# Patient Record
Sex: MALE | Race: WHITE | NOT HISPANIC OR LATINO | Employment: OTHER
[De-identification: names, ages, dates, MRNs, and addresses within clinical notes are randomized per-mention and may not be internally consistent; named-entity substitution may affect disease eponyms.]

---

## 2017-01-17 ENCOUNTER — HOSPITAL (OUTPATIENT)
Dept: OTHER | Age: 67
End: 2017-01-17
Attending: HOSPITALIST

## 2017-01-17 ENCOUNTER — CHARTING TRANS (OUTPATIENT)
Dept: OTHER | Age: 67
End: 2017-01-17

## 2017-01-17 LAB
ANALYZER ANC (IANC): NORMAL
ANION GAP SERPL CALC-SCNC: 12 MMOL/L (ref 10–20)
APTT PPP: 29 SECONDS (ref 22–30)
APTT PPP: 57 SECONDS (ref 22–30)
APTT PPP: ABNORMAL S
APTT PPP: NORMAL S
BASOPHILS # BLD: 0 THOUSAND/MCL (ref 0–0.3)
BASOPHILS NFR BLD: 0 %
BNP SERPL-MCNC: 198 PG/ML
BUN SERPL-MCNC: 17 MG/DL (ref 10–20)
BUN/CREAT SERPL: 20 (ref 7–25)
CALCIUM SERPL-MCNC: 8.9 MG/DL (ref 8.4–10.2)
CHLORIDE: 102 MMOL/L (ref 98–107)
CO2 SERPL-SCNC: 28 MMOL/L (ref 21–32)
CREAT SERPL-MCNC: 0.84 MG/DL (ref 0.67–1.17)
D DIMER PPP FEU-MCNC: 1.27 MG/L FEU
DIFFERENTIAL METHOD BLD: NORMAL
EOSINOPHIL # BLD: 0.4 THOUSAND/MCL (ref 0.1–0.5)
EOSINOPHIL NFR BLD: 4 %
ERYTHROCYTE [DISTWIDTH] IN BLOOD: 14 % (ref 11–15)
GLUCOSE SERPL-MCNC: 151 MG/DL (ref 65–99)
HEMATOCRIT: 48.3 % (ref 39–51)
HGB BLD-MCNC: 16.9 GM/DL (ref 13–17)
LYMPHOCYTES # BLD: 1.1 THOUSAND/MCL (ref 1–4)
LYMPHOCYTES NFR BLD: 12 %
MAGNESIUM SERPL-MCNC: 2.2 MG/DL (ref 1.7–2.4)
MCH RBC QN AUTO: 30.3 PG (ref 26–34)
MCHC RBC AUTO-ENTMCNC: 35 GM/DL (ref 32–36.5)
MCV RBC AUTO: 86.7 FL (ref 78–100)
MONOCYTES # BLD: 0.8 THOUSAND/MCL (ref 0.3–0.9)
MONOCYTES NFR BLD: 8 %
NEUTROPHILS # BLD: 7.2 THOUSAND/MCL (ref 1.8–7.7)
NEUTROPHILS NFR BLD: 76 %
NEUTS SEG NFR BLD: NORMAL %
PERCENT NRBC: NORMAL
PLATELET # BLD: 208 THOUSAND/MCL (ref 140–450)
POTASSIUM SERPL-SCNC: 4.1 MMOL/L (ref 3.4–5.1)
RBC # BLD: 5.57 MILLION/MCL (ref 4.5–5.9)
SODIUM SERPL-SCNC: 138 MMOL/L (ref 135–145)
TROPONIN I SERPL HS-MCNC: <0.02 NG/ML
WBC # BLD: 9.5 THOUSAND/MCL (ref 4.2–11)

## 2017-01-18 ENCOUNTER — CHARTING TRANS (OUTPATIENT)
Dept: OTHER | Age: 67
End: 2017-01-18

## 2017-01-18 LAB
ANALYZER ANC (IANC): ABNORMAL
APTT PPP: 38 SECONDS (ref 22–30)
APTT PPP: 63 SECONDS (ref 22–30)
APTT PPP: ABNORMAL S
APTT PPP: ABNORMAL S
BASOPHILS # BLD: 0.1 THOUSAND/MCL (ref 0–0.3)
BASOPHILS NFR BLD: 1 %
DIFFERENTIAL METHOD BLD: ABNORMAL
EOSINOPHIL # BLD: 0.6 THOUSAND/MCL (ref 0.1–0.5)
EOSINOPHIL NFR BLD: 8 %
ERYTHROCYTE [DISTWIDTH] IN BLOOD: 14.2 % (ref 11–15)
HEMATOCRIT: 42.5 % (ref 39–51)
HGB BLD-MCNC: 14.5 GM/DL (ref 13–17)
LYMPHOCYTES # BLD: 1.6 THOUSAND/MCL (ref 1–4)
LYMPHOCYTES NFR BLD: 24 %
MCH RBC QN AUTO: 30 PG (ref 26–34)
MCHC RBC AUTO-ENTMCNC: 34.1 GM/DL (ref 32–36.5)
MCV RBC AUTO: 87.8 FL (ref 78–100)
MONOCYTES # BLD: 0.6 THOUSAND/MCL (ref 0.3–0.9)
MONOCYTES NFR BLD: 9 %
NEUTROPHILS # BLD: 4 THOUSAND/MCL (ref 1.8–7.7)
NEUTROPHILS NFR BLD: 58 %
NEUTS SEG NFR BLD: ABNORMAL %
PERCENT NRBC: ABNORMAL
PLATELET # BLD: 178 THOUSAND/MCL (ref 140–450)
RBC # BLD: 4.84 MILLION/MCL (ref 4.5–5.9)
WBC # BLD: 6.9 THOUSAND/MCL (ref 4.2–11)

## 2017-01-19 ENCOUNTER — DIAGNOSTIC TRANS (OUTPATIENT)
Dept: OTHER | Age: 67
End: 2017-01-19

## 2017-01-19 ENCOUNTER — CHARTING TRANS (OUTPATIENT)
Dept: OTHER | Age: 67
End: 2017-01-19

## 2017-02-13 PROBLEM — I48.4 ATYPICAL ATRIAL FLUTTER (HCC): Status: ACTIVE | Noted: 2017-02-13

## 2017-04-21 PROBLEM — M48.061 LUMBAR STENOSIS: Status: ACTIVE | Noted: 2017-04-21

## 2017-05-17 PROBLEM — I25.810 CORONARY ARTERY DISEASE INVOLVING CORONARY BYPASS GRAFT OF NATIVE HEART WITHOUT ANGINA PECTORIS: Status: ACTIVE | Noted: 2017-05-17

## 2017-11-06 PROCEDURE — 82607 VITAMIN B-12: CPT | Performed by: INTERNAL MEDICINE

## 2017-11-06 PROCEDURE — 82746 ASSAY OF FOLIC ACID SERUM: CPT | Performed by: INTERNAL MEDICINE

## 2017-11-15 PROCEDURE — 86618 LYME DISEASE ANTIBODY: CPT | Performed by: OTHER

## 2017-11-15 PROCEDURE — 83921 ORGANIC ACID SINGLE QUANT: CPT | Performed by: OTHER

## 2017-11-15 PROCEDURE — 86334 IMMUNOFIX E-PHORESIS SERUM: CPT | Performed by: OTHER

## 2017-11-15 PROCEDURE — 84165 PROTEIN E-PHORESIS SERUM: CPT | Performed by: OTHER

## 2017-11-15 PROCEDURE — 84425 ASSAY OF VITAMIN B-1: CPT | Performed by: OTHER

## 2017-11-15 PROCEDURE — 83883 ASSAY NEPHELOMETRY NOT SPEC: CPT | Performed by: OTHER

## 2018-06-11 PROCEDURE — 88305 TISSUE EXAM BY PATHOLOGIST: CPT | Performed by: INTERNAL MEDICINE

## 2019-01-16 PROCEDURE — 87070 CULTURE OTHR SPECIMN AEROBIC: CPT | Performed by: OTOLARYNGOLOGY

## 2019-01-16 PROCEDURE — 88311 DECALCIFY TISSUE: CPT | Performed by: OTOLARYNGOLOGY

## 2019-01-16 PROCEDURE — 87102 FUNGUS ISOLATION CULTURE: CPT | Performed by: OTOLARYNGOLOGY

## 2019-01-16 PROCEDURE — 87206 SMEAR FLUORESCENT/ACID STAI: CPT | Performed by: OTOLARYNGOLOGY

## 2019-01-16 PROCEDURE — 87205 SMEAR GRAM STAIN: CPT | Performed by: OTOLARYNGOLOGY

## 2019-01-16 PROCEDURE — 87077 CULTURE AEROBIC IDENTIFY: CPT | Performed by: OTOLARYNGOLOGY

## 2019-01-16 PROCEDURE — 88304 TISSUE EXAM BY PATHOLOGIST: CPT | Performed by: OTOLARYNGOLOGY

## 2019-01-16 PROCEDURE — 87075 CULTR BACTERIA EXCEPT BLOOD: CPT | Performed by: OTOLARYNGOLOGY

## 2019-01-16 PROCEDURE — 87076 CULTURE ANAEROBE IDENT EACH: CPT | Performed by: OTOLARYNGOLOGY

## 2019-01-21 ENCOUNTER — HOSPITAL (OUTPATIENT)
Dept: OTHER | Age: 69
End: 2019-01-21

## 2019-01-21 ENCOUNTER — HOSPITAL (OUTPATIENT)
Dept: OTHER | Age: 69
End: 2019-01-21
Attending: HOSPITALIST

## 2019-01-21 LAB
ANALYZER ANC (IANC): ABNORMAL
ANION GAP SERPL CALC-SCNC: 13 MMOL/L (ref 10–20)
APTT PPP: 34 SECONDS (ref 22–32)
APTT PPP: ABNORMAL S
BASOPHILS # BLD: 0.1 THOUSAND/MCL (ref 0–0.3)
BASOPHILS NFR BLD: 1 %
BUN SERPL-MCNC: 21 MG/DL (ref 6–20)
BUN/CREAT SERPL: 25 (ref 7–25)
CALCIUM SERPL-MCNC: 8.5 MG/DL (ref 8.4–10.2)
CHLORIDE: 103 MMOL/L (ref 98–107)
CO2 SERPL-SCNC: 26 MMOL/L (ref 21–32)
CREAT SERPL-MCNC: 0.85 MG/DL (ref 0.67–1.17)
DIFFERENTIAL METHOD BLD: ABNORMAL
EOSINOPHIL # BLD: 0.3 THOUSAND/MCL (ref 0.1–0.5)
EOSINOPHIL NFR BLD: 3 %
ERYTHROCYTE [DISTWIDTH] IN BLOOD: 14.4 % (ref 11–15)
GLUCOSE SERPL-MCNC: 132 MG/DL (ref 65–99)
HEMATOCRIT: 31.3 % (ref 39–51)
HEMATOCRIT: 32.2 % (ref 39–51)
HEMATOCRIT: 37.4 % (ref 39–51)
HGB BLD-MCNC: 10.6 GM/DL (ref 13–17)
HGB BLD-MCNC: 12.2 GM/DL (ref 13–17)
HGB BLD-MCNC: 9.9 GM/DL (ref 13–17)
IMM GRANULOCYTES # BLD AUTO: 0 THOUSAND/MCL (ref 0–0.2)
IMM GRANULOCYTES NFR BLD: 0 %
INR PPP: 1.2
LYMPHOCYTES # BLD: 1.2 THOUSAND/MCL (ref 1–4)
LYMPHOCYTES NFR BLD: 14 %
MCH RBC QN AUTO: 30 PG (ref 26–34)
MCHC RBC AUTO-ENTMCNC: 32.6 GM/DL (ref 32–36.5)
MCV RBC AUTO: 91.9 FL (ref 78–100)
MONOCYTES # BLD: 0.9 THOUSAND/MCL (ref 0.3–0.9)
MONOCYTES NFR BLD: 10 %
NEUTROPHILS # BLD: 5.9 THOUSAND/MCL (ref 1.8–7.7)
NEUTROPHILS NFR BLD: 72 %
NEUTS SEG NFR BLD: ABNORMAL %
NRBC (NRBCRE): 0 /100 WBC
PLATELET # BLD: 171 THOUSAND/MCL (ref 140–450)
POTASSIUM SERPL-SCNC: 4.1 MMOL/L (ref 3.4–5.1)
PROTHROMBIN TIME: 11.9 SECONDS (ref 9.7–11.8)
PROTHROMBIN TIME: ABNORMAL
RBC # BLD: 4.07 MILLION/MCL (ref 4.5–5.9)
SODIUM SERPL-SCNC: 138 MMOL/L (ref 135–145)
WBC # BLD: 8.3 THOUSAND/MCL (ref 4.2–11)

## 2019-01-22 LAB
HEMATOCRIT: 27.7 % (ref 39–51)
HEMATOCRIT: 27.9 % (ref 39–51)
HGB BLD-MCNC: 9 GM/DL (ref 13–17)
HGB BLD-MCNC: 9.1 GM/DL (ref 13–17)

## 2019-03-06 PROCEDURE — 84165 PROTEIN E-PHORESIS SERUM: CPT | Performed by: OTHER

## 2019-03-06 PROCEDURE — 86334 IMMUNOFIX E-PHORESIS SERUM: CPT | Performed by: OTHER

## 2019-03-06 PROCEDURE — 83883 ASSAY NEPHELOMETRY NOT SPEC: CPT | Performed by: OTHER

## 2019-04-16 PROBLEM — I50.33 ACUTE ON CHRONIC HEART FAILURE WITH PRESERVED EJECTION FRACTION (HCC): Status: ACTIVE | Noted: 2019-04-16

## 2019-04-30 PROBLEM — D50.9 IRON DEFICIENCY ANEMIA: Status: ACTIVE | Noted: 2019-04-30

## 2019-05-07 PROBLEM — I50.32 CHRONIC DIASTOLIC HEART FAILURE (HCC): Status: ACTIVE | Noted: 2019-05-07

## 2019-05-17 ENCOUNTER — HOSPITAL ENCOUNTER (OUTPATIENT)
Dept: CARDIOLOGY CLINIC | Facility: HOSPITAL | Age: 69
Discharge: HOME | End: 2019-05-17
Attending: NURSE PRACTITIONER
Payer: MEDICARE

## 2019-05-17 VITALS
OXYGEN SATURATION: 96 % | HEART RATE: 85 BPM | SYSTOLIC BLOOD PRESSURE: 138 MMHG | RESPIRATION RATE: 19 BRPM | DIASTOLIC BLOOD PRESSURE: 76 MMHG

## 2019-05-17 PROCEDURE — A4216 STERILE WATER/SALINE, 10 ML: HCPCS | Performed by: INTERNAL MEDICINE

## 2019-05-17 PROCEDURE — 96375 TX/PRO/DX INJ NEW DRUG ADDON: CPT

## 2019-05-17 PROCEDURE — 96376 TX/PRO/DX INJ SAME DRUG ADON: CPT

## 2019-05-17 PROCEDURE — 83735 ASSAY OF MAGNESIUM: CPT | Performed by: INTERNAL MEDICINE

## 2019-05-17 PROCEDURE — 96365 THER/PROPH/DIAG IV INF INIT: CPT

## 2019-05-17 PROCEDURE — 96366 THER/PROPH/DIAG IV INF ADDON: CPT

## 2019-05-17 PROCEDURE — 36415 COLL VENOUS BLD VENIPUNCTURE: CPT

## 2019-05-17 PROCEDURE — 80048 BASIC METABOLIC PNL TOTAL CA: CPT | Performed by: INTERNAL MEDICINE

## 2019-05-17 PROCEDURE — 99214 OFFICE O/P EST MOD 30 MIN: CPT

## 2019-05-17 RX ORDER — SPIRONOLACTONE 25 MG/1
25 TABLET ORAL DAILY
Status: DISCONTINUED | OUTPATIENT
Start: 2019-05-17 | End: 2019-05-19

## 2019-05-17 RX ORDER — POTASSIUM CHLORIDE 20 MEQ/1
40 TABLET, EXTENDED RELEASE ORAL ONCE
Status: COMPLETED | OUTPATIENT
Start: 2019-05-17 | End: 2019-05-17

## 2019-05-17 RX ORDER — BUMETANIDE 0.25 MG/ML
2 INJECTION, SOLUTION INTRAMUSCULAR; INTRAVENOUS ONCE
Status: COMPLETED | OUTPATIENT
Start: 2019-05-17 | End: 2019-05-17

## 2019-05-17 RX ADMIN — POTASSIUM CHLORIDE 40 MEQ: 20 TABLET, EXTENDED RELEASE ORAL at 12:44:00

## 2019-05-17 RX ADMIN — SPIRONOLACTONE 25 MG: 25 TABLET ORAL at 12:44:00

## 2019-05-17 RX ADMIN — BUMETANIDE 2 MG: 0.25 INJECTION, SOLUTION INTRAMUSCULAR; INTRAVENOUS at 11:00:00

## 2019-05-17 NOTE — PROGRESS NOTES
RN visit for IVP Diuril 250 mg and IVP bumex 2 mg bolus followed by gtt at 2 mg/hr for 6 hrs, 40 meq K and 25 mg Spironolactone and labs per [employer].      Pérez Perez NP   5/17/2019  10:15 AM

## 2019-05-17 NOTE — PROGRESS NOTES
RN visit for IV diuretics. Patient was sent from [facility]  office. Orders were obtained from Danita PERERA for Labs, IV Established L AC, IV Diuretics given per order. 250 mg of IV Diuril and 2 mg IV Bumex, followed by 1.5 mg/hr IV Bumex for 6 hrs.   [facility]

## 2019-05-28 PROBLEM — I42.9 CARDIOMYOPATHY (HCC): Status: ACTIVE | Noted: 2019-05-28

## 2019-05-28 PROBLEM — Z95.820 S/P ANGIOPLASTY WITH STENT: Status: ACTIVE | Noted: 2019-05-28

## 2019-05-28 PROBLEM — I50.40 COMBINED SYSTOLIC AND DIASTOLIC HEART FAILURE (HCC): Status: ACTIVE | Noted: 2019-05-28

## 2019-06-07 ENCOUNTER — APPOINTMENT (OUTPATIENT)
Dept: INTERVENTIONAL RADIOLOGY/VASCULAR | Facility: HOSPITAL | Age: 69
End: 2019-06-07
Attending: INTERNAL MEDICINE
Payer: MEDICARE

## 2019-06-07 ENCOUNTER — HOSPITAL ENCOUNTER (OUTPATIENT)
Dept: INTERVENTIONAL RADIOLOGY/VASCULAR | Facility: HOSPITAL | Age: 69
Discharge: HOME | End: 2019-06-08
Attending: INTERNAL MEDICINE | Admitting: INTERNAL MEDICINE
Payer: MEDICARE

## 2019-06-07 DIAGNOSIS — I48.20 CHRONIC ATRIAL FIBRILLATION (HCC): ICD-10-CM

## 2019-06-07 DIAGNOSIS — I25.10 CAD (CORONARY ARTERY DISEASE): ICD-10-CM

## 2019-06-07 PROCEDURE — 93005 ELECTROCARDIOGRAM TRACING: CPT

## 2019-06-07 PROCEDURE — B2131ZZ FLUOROSCOPY OF MULTIPLE CORONARY ARTERY BYPASS GRAFTS USING LOW OSMOLAR CONTRAST: ICD-10-PCS | Performed by: INTERNAL MEDICINE

## 2019-06-07 PROCEDURE — 82962 GLUCOSE BLOOD TEST: CPT

## 2019-06-07 PROCEDURE — 99153 MOD SED SAME PHYS/QHP EA: CPT

## 2019-06-07 PROCEDURE — 93461 R&L HRT ART/VENTRICLE ANGIO: CPT

## 2019-06-07 PROCEDURE — 99152 MOD SED SAME PHYS/QHP 5/>YRS: CPT

## 2019-06-07 PROCEDURE — B2151ZZ FLUOROSCOPY OF LEFT HEART USING LOW OSMOLAR CONTRAST: ICD-10-PCS | Performed by: INTERNAL MEDICINE

## 2019-06-07 PROCEDURE — B2181ZZ FLUOROSCOPY OF LEFT INTERNAL MAMMARY BYPASS GRAFT USING LOW OSMOLAR CONTRAST: ICD-10-PCS | Performed by: INTERNAL MEDICINE

## 2019-06-07 PROCEDURE — 93010 ELECTROCARDIOGRAM REPORT: CPT | Performed by: INTERNAL MEDICINE

## 2019-06-07 PROCEDURE — 4A023N8 MEASUREMENT OF CARDIAC SAMPLING AND PRESSURE, BILATERAL, PERCUTANEOUS APPROACH: ICD-10-PCS | Performed by: INTERNAL MEDICINE

## 2019-06-07 RX ORDER — MELATONIN
325
Status: DISCONTINUED | OUTPATIENT
Start: 2019-06-08 | End: 2019-06-08

## 2019-06-07 RX ORDER — PANTOPRAZOLE SODIUM 40 MG/1
40 TABLET, DELAYED RELEASE ORAL
Status: DISCONTINUED | OUTPATIENT
Start: 2019-06-08 | End: 2019-06-08

## 2019-06-07 RX ORDER — BUPROPION HYDROCHLORIDE 150 MG/1
150 TABLET ORAL DAILY
Status: DISCONTINUED | OUTPATIENT
Start: 2019-06-08 | End: 2019-06-08

## 2019-06-07 RX ORDER — TRAMADOL HYDROCHLORIDE 50 MG/1
100 TABLET ORAL EVERY 6 HOURS PRN
Status: DISCONTINUED | OUTPATIENT
Start: 2019-06-07 | End: 2019-06-08

## 2019-06-07 RX ORDER — LIDOCAINE HYDROCHLORIDE AND EPINEPHRINE 20; 5 MG/ML; UG/ML
INJECTION, SOLUTION EPIDURAL; INFILTRATION; INTRACAUDAL; PERINEURAL
Status: DISPENSED
Start: 2019-06-07 | End: 2019-06-08

## 2019-06-07 RX ORDER — SODIUM CHLORIDE 9 MG/ML
INJECTION, SOLUTION INTRAVENOUS CONTINUOUS
Status: ACTIVE | OUTPATIENT
Start: 2019-06-07 | End: 2019-06-08

## 2019-06-07 RX ORDER — HYDROCODONE BITARTRATE AND ACETAMINOPHEN 10; 325 MG/1; MG/1
1-2 TABLET ORAL EVERY 6 HOURS PRN
Status: DISCONTINUED | OUTPATIENT
Start: 2019-06-07 | End: 2019-06-08

## 2019-06-07 RX ORDER — ALFUZOSIN HYDROCHLORIDE 10 MG/1
10 TABLET, EXTENDED RELEASE ORAL
Status: DISCONTINUED | OUTPATIENT
Start: 2019-06-08 | End: 2019-06-08

## 2019-06-07 RX ORDER — LIDOCAINE HYDROCHLORIDE 20 MG/ML
INJECTION, SOLUTION EPIDURAL; INFILTRATION; INTRACAUDAL; PERINEURAL
Status: COMPLETED
Start: 2019-06-07 | End: 2019-06-07

## 2019-06-07 RX ORDER — DILTIAZEM HYDROCHLORIDE 60 MG/1
120 TABLET, FILM COATED ORAL EVERY MORNING
Status: DISCONTINUED | OUTPATIENT
Start: 2019-06-08 | End: 2019-06-08

## 2019-06-07 RX ORDER — METOPROLOL SUCCINATE 25 MG/1
25 TABLET, EXTENDED RELEASE ORAL DAILY
Status: DISCONTINUED | OUTPATIENT
Start: 2019-06-08 | End: 2019-06-08

## 2019-06-07 RX ORDER — ATORVASTATIN CALCIUM 40 MG/1
40 TABLET, FILM COATED ORAL DAILY
Status: DISCONTINUED | OUTPATIENT
Start: 2019-06-08 | End: 2019-06-08

## 2019-06-07 RX ORDER — TRAMADOL HYDROCHLORIDE 50 MG/1
50 TABLET ORAL EVERY 6 HOURS PRN
Status: DISCONTINUED | OUTPATIENT
Start: 2019-06-07 | End: 2019-06-08

## 2019-06-07 RX ORDER — CLOPIDOGREL BISULFATE 75 MG/1
TABLET ORAL
Status: COMPLETED
Start: 2019-06-07 | End: 2019-06-07

## 2019-06-07 RX ORDER — MIDAZOLAM HYDROCHLORIDE 1 MG/ML
INJECTION INTRAMUSCULAR; INTRAVENOUS
Status: COMPLETED
Start: 2019-06-07 | End: 2019-06-07

## 2019-06-07 RX ORDER — ACETAMINOPHEN 325 MG/1
650 TABLET ORAL EVERY 4 HOURS PRN
Status: DISCONTINUED | OUTPATIENT
Start: 2019-06-07 | End: 2019-06-08

## 2019-06-07 RX ORDER — SODIUM CHLORIDE 9 MG/ML
INJECTION, SOLUTION INTRAVENOUS CONTINUOUS
Status: DISCONTINUED | OUTPATIENT
Start: 2019-06-07 | End: 2019-06-08

## 2019-06-07 RX ORDER — ALPRAZOLAM 0.5 MG/1
0.5 TABLET ORAL 2 TIMES DAILY PRN
Status: DISCONTINUED | OUTPATIENT
Start: 2019-06-07 | End: 2019-06-08

## 2019-06-07 RX ORDER — CLOPIDOGREL BISULFATE 75 MG/1
75 TABLET ORAL DAILY
Status: DISCONTINUED | OUTPATIENT
Start: 2019-06-08 | End: 2019-06-08

## 2019-06-07 RX ORDER — ASPIRIN 81 MG/1
81 TABLET ORAL DAILY
Status: DISCONTINUED | OUTPATIENT
Start: 2019-06-08 | End: 2019-06-08

## 2019-06-07 RX ORDER — SODIUM CHLORIDE 9 MG/ML
INJECTION, SOLUTION INTRAVENOUS
Status: COMPLETED
Start: 2019-06-07 | End: 2019-06-07

## 2019-06-07 RX ADMIN — SODIUM CHLORIDE: 9 INJECTION, SOLUTION INTRAVENOUS at 08:49:00

## 2019-06-07 RX ADMIN — SODIUM CHLORIDE: 9 INJECTION, SOLUTION INTRAVENOUS at 18:40:00

## 2019-06-07 NOTE — PROCEDURES
Procedure Report    Procedures performed:  1) Coronary angiography   2) PCI of prox-mid 99% LCx stenosis with overlapping stents, 2.5 x 8mm and 2.5 x 32mm with post-dilation to high pressure with no residual stenosis, JAVON 3 flow and no dissection or perfo PCI of 99% prox-mid LCx stenosis with overlapping 2.5 x 8mm and 2.5 x 32mm KACEY, post-dilated to high pressure with no residual stenosis, JAVON 3 flow and no dissection or perforation.     Recommendations:  1) Bedrest x 4 hours  2) ECG on arrival to unit and

## 2019-06-07 NOTE — H&P
[facility] Hospitalist Team  History and Physical  Admit Date:  6/7/19    ASSESSMENT / PLAN:   67 yo male with hx CAD S/P PCI/CABG,DDD, OA, BPH, prostate ca S/P seeds, atypical atrial flutter, anxiety and depression, KANE-not compliant, chronic atrial fib, severe cath      PCP: Russel Pedro MD    History of Present Illness:   67 yo male with hx CAD S/P PCI/CABG,DDD, OA, BPH, prostate ca S/P seeds, atypical atrial flutter, anxiety and depression, chronic atrial fib, severe TR, chronic diastolic and systolic chf EF 3 ANGIOPLASTY (CORONARY)     • BYPASS SURGERY     • CABG  8/2013   • CATARACT      bilateral eyes   • CATH PERCUTANEOUS  TRANSLUMINAL CORONARY ANGIOPLASTY  2001    stent   • COLONOSCOPY     • COLONOSCOPY, POSSIBLE BIOPSY, POSSIBLE POLYPECTOMY 44652 N/A 6/11/ Take 1-2 tablets by mouth every 4 to 6 hours as needed. Disp: 90 tablet Rfl: 0   [DISCONTINUED] Metoprolol Succinate ER 25 MG Oral Tablet 24 Hr Take 0.5 tablets (12.5 mg total) by mouth daily.  Disp: 90 tablet Rfl: 0   bumetanide 2 MG Oral Tab Take 1 tablet Years: 40.00        Pack years: 20        Types: Cigarettes        Quit date: 2013        Years since quittin.1      Smokeless tobacco: Never Used    Alcohol use: Yes      Comment: 1 drink per night       Fam Hx  Family History   Problem Relation acute distress, alert and oriented x3  Neck Supple, no JVD  Pulm: Lungs clear, normal respiratory effort   CV: Heart with regular rate and rhythm   Abd: Abdomen soft, nontender, nondistended   MSK:  Trace edema bilaterally   Skin: no rashes or lesions, wel

## 2019-06-07 NOTE — PROGRESS NOTES
To have stent native circ later today. Pre-treat with plavix 600mg now. Sent script for plavix to [facility] in [place]. Will resume eliquis tomorrow and stop aspirin at that time.

## 2019-06-07 NOTE — INTERVAL H&P NOTE
Pre-op Diagnosis: * No pre-op diagnosis entered *    The above referenced H&P was reviewed by Jonny Rizo MD on 6/7/2019, the patient was examined and no significant changes have occurred in the patient's condition since the H&P was performed.   I dis

## 2019-06-07 NOTE — PROGRESS NOTES
Rochele Bence  [id number]  6/7/2019    Post procedure/ recovery hand-off report given to Pamela Cristobal RN. Patient's vital signs are stable. Procedural access sites are dry and intact with no signs and symptoms of bleeding/ hematoma.  Fluids with pressure

## 2019-06-08 VITALS
DIASTOLIC BLOOD PRESSURE: 83 MMHG | BODY MASS INDEX: 27.98 KG/M2 | HEART RATE: 85 BPM | OXYGEN SATURATION: 95 % | SYSTOLIC BLOOD PRESSURE: 136 MMHG | TEMPERATURE: 98 F | HEIGHT: 71 IN | WEIGHT: 199.88 LBS | RESPIRATION RATE: 20 BRPM

## 2019-06-08 PROCEDURE — 83036 HEMOGLOBIN GLYCOSYLATED A1C: CPT | Performed by: INTERNAL MEDICINE

## 2019-06-08 PROCEDURE — 80048 BASIC METABOLIC PNL TOTAL CA: CPT | Performed by: NURSE PRACTITIONER

## 2019-06-08 PROCEDURE — 93005 ELECTROCARDIOGRAM TRACING: CPT

## 2019-06-08 PROCEDURE — 82962 GLUCOSE BLOOD TEST: CPT

## 2019-06-08 PROCEDURE — 93010 ELECTROCARDIOGRAM REPORT: CPT | Performed by: INTERNAL MEDICINE

## 2019-06-08 PROCEDURE — 85025 COMPLETE CBC W/AUTO DIFF WBC: CPT | Performed by: NURSE PRACTITIONER

## 2019-06-08 RX ORDER — ASPIRIN 81 MG/1
81 TABLET ORAL DAILY
Qty: 30 TABLET | Refills: 0 | Status: SHIPPED | COMMUNITY
Start: 2019-06-08 | End: 2019-07-08 | Stop reason: ALTCHOICE

## 2019-06-08 RX ADMIN — ASPIRIN 81 MG: 81 TABLET ORAL at 09:35:00

## 2019-06-08 RX ADMIN — ATORVASTATIN CALCIUM 40 MG: 40 TABLET, FILM COATED ORAL at 09:35:00

## 2019-06-08 RX ADMIN — DILTIAZEM HYDROCHLORIDE 120 MG: 60 TABLET, FILM COATED ORAL at 09:35:00

## 2019-06-08 RX ADMIN — Medication 50 MG: at 09:35:00

## 2019-06-08 RX ADMIN — ALFUZOSIN HYDROCHLORIDE 10 MG: 10 TABLET, EXTENDED RELEASE ORAL at 09:37:00

## 2019-06-08 RX ADMIN — ALPRAZOLAM 0.5 MG: 0.5 TABLET ORAL at 09:39:00

## 2019-06-08 RX ADMIN — CLOPIDOGREL BISULFATE 75 MG: 75 TABLET ORAL at 09:36:00

## 2019-06-08 RX ADMIN — MELATONIN 325 MG: at 09:37:00

## 2019-06-08 RX ADMIN — METOPROLOL SUCCINATE 25 MG: 25 TABLET, EXTENDED RELEASE ORAL at 09:35:00

## 2019-06-08 RX ADMIN — Medication 1 TABLET: at 09:37:00

## 2019-06-08 RX ADMIN — BUPROPION HYDROCHLORIDE 150 MG: 150 TABLET ORAL at 09:37:00

## 2019-06-08 RX ADMIN — HYDROCODONE BITARTRATE AND ACETAMINOPHEN 1 TABLET: 10; 325 TABLET ORAL at 09:36:00

## 2019-06-08 NOTE — PROGRESS NOTES
Procedure hand off report given to Stuart. Pt's vital signs are stable. Procedural access site is dry and intact with no signs and symptoms of bleeding and hematoma.      6/7 1925  Oozing noted at R groin/procedural site, no swelling noted  Dr. Rigoberto Pena

## 2019-06-08 NOTE — PROGRESS NOTES
[facility] Cardiology  Progress Note    Ar Cagle.  Patient Status:  Outpatient in a Bed    1950 MRN [id number]   Location [facility] 3W/SW Attending Rober Thomas MD   Hosp Day # 0 PCP Errol Cushing, MD     Impression:  1 tab 1 tablet 1 tablet Oral TID   dilTIAZem HCl (CARDIZEM) tab 120 mg 120 mg Oral QAM   Clopidogrel Bisulfate (PLAVIX) tab 75 mg 75 mg Oral Daily   ferrous sulfate EC tab 325 mg 325 mg Oral Daily with breakfast   HYDROcodone-acetaminophen (NORCO)  MG

## 2019-06-08 NOTE — PLAN OF CARE
Vitals stable. Cath site C/D/I. Pulse by doppler. Pt ready to discharge.    Problem: Diabetes/Glucose Control  Goal: Glucose maintained within prescribed range  Description  INTERVENTIONS:  - Monitor Blood Glucose as ordered  - Assess for signs and symptoms Goal  Description  Patient's Short Term Goal: Remain free of cath site complications    Interventions:   - Assess cath insertion site  - Assess pulse  - Assess for pain.    - See additional Care Plan goals for specific interventions   Outcome: Progressing

## 2019-06-08 NOTE — DISCHARGE SUMMARY
General Medicine Discharge Summary     Patient ID:  Ar Cagle.  68year old  2/6/1950    Admit date: 6/7/2019    Discharge date and time: 6/8/19    Attending Physician: Rober Thomas MD     Primary Care Physician: Errol Cushing, MD     Reason fo Anemia  -Per epic hgb 7.4-8.6 since 2/2019  -continue iron     KANE  -not compliant with CPAP     OA/DDD  -hold meloxicam for now  -prn norco    Consults: IP CONSULT TO CARDIAC REHAB  IP CONSULT TO FOOD AND NUTRITION SERVICES    Operative Procedures:      D known as:  SINEMET      TAKE 1 TABLET BY MOUTH THREE TIMES DAILY   Quantity:  270 tablet  Refills:  1     dilTIAZem HCl 120 MG Tabs  Commonly known as:  CARDIZEM      Take 1 tablet (120 mg total) by mouth every morning.    Quantity:  90 tablet  Refills:  3 Follow-up with   PCP  Cardiology       Total Time Coordinating Care: Greater than 30 minutes    Patient had opportunity to ask questions and state understand and agree with therapeutic plan as outlined above.      Thank Sana Keys MD  [facility]

## 2019-06-12 PROBLEM — Z95.820 S/P ANGIOPLASTY WITH STENT: Status: RESOLVED | Noted: 2019-05-28 | Resolved: 2019-06-12

## 2019-06-21 ENCOUNTER — HOSPITAL (OUTPATIENT)
Dept: OTHER | Age: 69
End: 2019-06-21

## 2019-06-21 ENCOUNTER — DIAGNOSTIC TRANS (OUTPATIENT)
Dept: OTHER | Age: 69
End: 2019-06-21

## 2019-06-21 LAB
ALBUMIN SERPL-MCNC: 3.4 G/DL (ref 3.6–5.1)
ALBUMIN/GLOB SERPL: 0.7 {RATIO} (ref 1–2.4)
ALP SERPL-CCNC: 135 UNITS/L (ref 45–117)
ALT SERPL-CCNC: 17 UNITS/L
ANALYZER ANC (IANC): ABNORMAL
ANION GAP SERPL CALC-SCNC: 10 MMOL/L (ref 10–20)
APTT PPP: 31 SEC (ref 22–32)
APTT PPP: NORMAL S
APTT PPP: NORMAL S
AST SERPL-CCNC: 33 UNITS/L
BASOPHILS # BLD: 0.1 K/MCL (ref 0–0.3)
BASOPHILS NFR BLD: 2 %
BILIRUB SERPL-MCNC: 0.8 MG/DL (ref 0.2–1)
BUN SERPL-MCNC: 26 MG/DL (ref 6–20)
BUN/CREAT SERPL: 32 (ref 7–25)
CALCIUM SERPL-MCNC: 8.9 MG/DL (ref 8.4–10.2)
CHLORIDE SERPL-SCNC: 102 MMOL/L (ref 98–107)
CO2 SERPL-SCNC: 26 MMOL/L (ref 21–32)
CREAT SERPL-MCNC: 0.81 MG/DL (ref 0.67–1.17)
DIFFERENTIAL METHOD BLD: ABNORMAL
EOSINOPHIL # BLD: 0.4 K/MCL (ref 0.1–0.5)
EOSINOPHIL NFR BLD: 7 %
ERYTHROCYTE [DISTWIDTH] IN BLOOD: 23.8 % (ref 11–15)
GLOBULIN SER-MCNC: 5.1 G/DL (ref 2–4)
GLUCOSE SERPL-MCNC: 137 MG/DL (ref 65–99)
HCT VFR BLD CALC: 39.8 % (ref 39–51)
HGB BLD-MCNC: 12.7 G/DL (ref 13–17)
IMM GRANULOCYTES # BLD AUTO: 0 K/MCL (ref 0–0.2)
IMM GRANULOCYTES NFR BLD: 0 %
INR PPP: 1.1
INR PPP: NORMAL
LYMPHOCYTES # BLD: 0.9 K/MCL (ref 1–4)
LYMPHOCYTES NFR BLD: 17 %
MAGNESIUM SERPL-MCNC: 2.5 MG/DL (ref 1.7–2.4)
MCH RBC QN AUTO: 27.2 PG (ref 26–34)
MCHC RBC AUTO-ENTMCNC: 31.9 G/DL (ref 32–36.5)
MCV RBC AUTO: 85.2 FL (ref 78–100)
MONOCYTES # BLD: 0.6 K/MCL (ref 0.3–0.9)
MONOCYTES NFR BLD: 11 %
NEUTROPHILS # BLD: 3.5 K/MCL (ref 1.8–7.7)
NEUTROPHILS NFR BLD: 63 %
NEUTS SEG NFR BLD: ABNORMAL %
NRBC (NRBCRE): 0 /100 WBC
NT-PROBNP SERPL-MCNC: 365 PG/ML
PLATELET # BLD: 279 K/MCL (ref 140–450)
POTASSIUM SERPL-SCNC: 4.3 MMOL/L (ref 3.4–5.1)
PROT SERPL-MCNC: 8.5 G/DL (ref 6.4–8.2)
PROTHROMBIN TIME (PRT2): NORMAL
PROTHROMBIN TIME: 11.3 SEC (ref 9.7–11.8)
RBC # BLD: 4.67 MIL/MCL (ref 4.5–5.9)
SODIUM SERPL-SCNC: 134 MMOL/L (ref 135–145)
TROPONIN I SERPL HS-MCNC: <0.02 NG/ML
WBC # BLD: 5.5 K/MCL (ref 4.2–11)

## 2019-06-22 LAB
CREAT SERPL-MCNC: 0.87 MG/DL (ref 0.67–1.17)
MAGNESIUM SERPL-MCNC: 2.8 MG/DL (ref 1.7–2.4)
POTASSIUM SERPL-SCNC: 4.1 MMOL/L (ref 3.4–5.1)

## 2019-07-02 PROBLEM — R04.2 HEMOPTYSIS: Status: ACTIVE | Noted: 2019-07-02

## 2019-07-09 PROBLEM — I50.43 ACUTE ON CHRONIC COMBINED SYSTOLIC AND DIASTOLIC CHF (CONGESTIVE HEART FAILURE) (HCC): Status: ACTIVE | Noted: 2019-04-16

## 2019-08-12 ENCOUNTER — ANESTHESIA EVENT (OUTPATIENT)
Dept: SURGERY | Facility: HOSPITAL | Age: 69
End: 2019-08-12
Payer: MEDICARE

## 2019-08-13 ENCOUNTER — HOSPITAL ENCOUNTER (OUTPATIENT)
Facility: HOSPITAL | Age: 69
Setting detail: HOSPITAL OUTPATIENT SURGERY
Discharge: HOME | End: 2019-08-13
Attending: ORTHOPAEDIC SURGERY | Admitting: ORTHOPAEDIC SURGERY
Payer: MEDICARE

## 2019-08-13 ENCOUNTER — ANESTHESIA (OUTPATIENT)
Dept: SURGERY | Facility: HOSPITAL | Age: 69
End: 2019-08-13
Payer: MEDICARE

## 2019-08-13 ENCOUNTER — APPOINTMENT (OUTPATIENT)
Dept: GENERAL RADIOLOGY | Facility: HOSPITAL | Age: 69
End: 2019-08-13
Attending: ORTHOPAEDIC SURGERY
Payer: MEDICARE

## 2019-08-13 VITALS
HEART RATE: 98 BPM | RESPIRATION RATE: 20 BRPM | BODY MASS INDEX: 27.19 KG/M2 | SYSTOLIC BLOOD PRESSURE: 102 MMHG | DIASTOLIC BLOOD PRESSURE: 53 MMHG | WEIGHT: 194.25 LBS | HEIGHT: 71 IN | OXYGEN SATURATION: 99 % | TEMPERATURE: 98 F

## 2019-08-13 DIAGNOSIS — M19.071 ARTHRITIS OF FOOT, RIGHT: ICD-10-CM

## 2019-08-13 DIAGNOSIS — Z96.9 RETAINED ORTHOPEDIC HARDWARE: ICD-10-CM

## 2019-08-13 LAB — GLUCOSE BLD-MCNC: 138 MG/DL (ref 70–99)

## 2019-08-13 PROCEDURE — 76000 FLUOROSCOPY <1 HR PHYS/QHP: CPT | Performed by: ORTHOPAEDIC SURGERY

## 2019-08-13 PROCEDURE — 0SGP04Z FUSION OF RIGHT TOE PHALANGEAL JOINT WITH INTERNAL FIXATION DEVICE, OPEN APPROACH: ICD-10-PCS | Performed by: ORTHOPAEDIC SURGERY

## 2019-08-13 PROCEDURE — 0QPN04Z REMOVAL OF INTERNAL FIXATION DEVICE FROM RIGHT METATARSAL, OPEN APPROACH: ICD-10-PCS | Performed by: ORTHOPAEDIC SURGERY

## 2019-08-13 PROCEDURE — 82962 GLUCOSE BLOOD TEST: CPT

## 2019-08-13 PROCEDURE — 0SGP0KZ FUSION OF RIGHT TOE PHALANGEAL JOINT WITH NONAUTOLOGOUS TISSUE SUBSTITUTE, OPEN APPROACH: ICD-10-PCS | Performed by: ORTHOPAEDIC SURGERY

## 2019-08-13 DEVICE — GRAFT BN ALLO 3D BN MTRX: Type: IMPLANTABLE DEVICE | Site: FOOT | Status: FUNCTIONAL

## 2019-08-13 RX ORDER — SODIUM CHLORIDE, SODIUM LACTATE, POTASSIUM CHLORIDE, CALCIUM CHLORIDE 600; 310; 30; 20 MG/100ML; MG/100ML; MG/100ML; MG/100ML
INJECTION, SOLUTION INTRAVENOUS CONTINUOUS
Status: DISCONTINUED | OUTPATIENT
Start: 2019-08-13 | End: 2019-08-13

## 2019-08-13 RX ORDER — ACETAMINOPHEN 500 MG
TABLET ORAL
Status: DISCONTINUED
Start: 2019-08-13 | End: 2019-08-13

## 2019-08-13 RX ORDER — CEFAZOLIN SODIUM/WATER 2 G/20 ML
SYRINGE (ML) INTRAVENOUS
Status: DISCONTINUED
Start: 2019-08-13 | End: 2019-08-13

## 2019-08-13 RX ORDER — ACETAMINOPHEN 500 MG
1000 TABLET ORAL ONCE
Status: COMPLETED | OUTPATIENT
Start: 2019-08-13 | End: 2019-08-13

## 2019-08-13 RX ORDER — DEXTROSE MONOHYDRATE 25 G/50ML
50 INJECTION, SOLUTION INTRAVENOUS
Status: DISCONTINUED | OUTPATIENT
Start: 2019-08-13 | End: 2019-08-13

## 2019-08-13 RX ORDER — BUPIVACAINE HYDROCHLORIDE 5 MG/ML
INJECTION, SOLUTION EPIDURAL; INTRACAUDAL AS NEEDED
Status: DISCONTINUED | OUTPATIENT
Start: 2019-08-13 | End: 2019-08-13 | Stop reason: HOSPADM

## 2019-08-13 RX ORDER — MEPERIDINE HYDROCHLORIDE 25 MG/ML
12.5 INJECTION INTRAMUSCULAR; INTRAVENOUS; SUBCUTANEOUS AS NEEDED
Status: DISCONTINUED | OUTPATIENT
Start: 2019-08-13 | End: 2019-08-13

## 2019-08-13 RX ORDER — ONDANSETRON 2 MG/ML
4 INJECTION INTRAMUSCULAR; INTRAVENOUS AS NEEDED
Status: DISCONTINUED | OUTPATIENT
Start: 2019-08-13 | End: 2019-08-13

## 2019-08-13 RX ORDER — CEFAZOLIN SODIUM/WATER 2 G/20 ML
2 SYRINGE (ML) INTRAVENOUS ONCE
Status: DISCONTINUED | OUTPATIENT
Start: 2019-08-13 | End: 2019-08-13 | Stop reason: HOSPADM

## 2019-08-13 RX ORDER — ACETAMINOPHEN 500 MG
1000 TABLET ORAL ONCE AS NEEDED
Status: DISCONTINUED | OUTPATIENT
Start: 2019-08-13 | End: 2019-08-13

## 2019-08-13 RX ORDER — HYDROCODONE BITARTRATE AND ACETAMINOPHEN 5; 325 MG/1; MG/1
2 TABLET ORAL AS NEEDED
Status: COMPLETED | OUTPATIENT
Start: 2019-08-13 | End: 2019-08-13

## 2019-08-13 RX ORDER — NALOXONE HYDROCHLORIDE 0.4 MG/ML
80 INJECTION, SOLUTION INTRAMUSCULAR; INTRAVENOUS; SUBCUTANEOUS AS NEEDED
Status: DISCONTINUED | OUTPATIENT
Start: 2019-08-13 | End: 2019-08-13

## 2019-08-13 RX ORDER — HYDROCODONE BITARTRATE AND ACETAMINOPHEN 5; 325 MG/1; MG/1
1 TABLET ORAL AS NEEDED
Status: COMPLETED | OUTPATIENT
Start: 2019-08-13 | End: 2019-08-13

## 2019-08-13 RX ORDER — HYDROMORPHONE HYDROCHLORIDE 1 MG/ML
0.4 INJECTION, SOLUTION INTRAMUSCULAR; INTRAVENOUS; SUBCUTANEOUS EVERY 5 MIN PRN
Status: DISCONTINUED | OUTPATIENT
Start: 2019-08-13 | End: 2019-08-13

## 2019-08-13 RX ORDER — MIDAZOLAM HYDROCHLORIDE 1 MG/ML
1 INJECTION INTRAMUSCULAR; INTRAVENOUS EVERY 5 MIN PRN
Status: DISCONTINUED | OUTPATIENT
Start: 2019-08-13 | End: 2019-08-13

## 2019-08-13 RX ORDER — DIPHENHYDRAMINE HYDROCHLORIDE 50 MG/ML
12.5 INJECTION INTRAMUSCULAR; INTRAVENOUS AS NEEDED
Status: DISCONTINUED | OUTPATIENT
Start: 2019-08-13 | End: 2019-08-13

## 2019-08-13 NOTE — H&P
69 y/o male with right foot retained hardware and IP joint arthritis  Past Medical History:   Diagnosis Date   • Anxiety    • ATRIAL FIBRILLATION    • Cardiomyopathy ([facility] [address]    • Congestive heart disease ([facility] [address]     last 5/2019   • COPD     no exac in several Clopidogrel Bisulfate 75 MG Oral Tab Take 1 tablet (75 mg total) by mouth daily. Disp: 90 tablet Rfl: 3   bumetanide 2 MG Oral Tab Take 1 tablet (2 mg total) by mouth 2 (two) times daily.  Disp: 180 tablet Rfl: 3   spironolactone 25 MG Oral Tab Take 1 tab Transportation needs:        Medical: Not on file        Non-medical: Not on file    Tobacco Use      Smoking status: Former Smoker        Packs/day: 0.50        Years: 40.00        Pack years: 20        Types: Cigarettes        Quit date: 4/11/2013 oriented x 3. HEENT: Moist mucous membranes. EOM-I. PERRL  Neck: No lymphadenopathy. No JVD. No carotid bruits. Respiratory: Clear to auscultation bilaterally. No wheezes. No rhonchi. Cardiovascular: S1, S2.  Regular rate and rhythm. No murmurs.  Autumn Steinberg allotments and personnel, expected outcome and expected recovery. The patient is in agreement with our plan and all questions were satisfactorily answered.

## 2019-08-13 NOTE — ANESTHESIA PREPROCEDURE EVALUATION
PRE-OP EVALUATION    Patient Name: Holger Quintero.    Pre-op Diagnosis: Arthritis of foot, right [M19.071]  Retained orthopedic hardware [Z96.9]    Procedure(s):  REMOVAL OF HARDWARE RIGHT FOOT, AND FUSION OF INTERPHALANGEAL JOINT RIGHT GREAT TOE    S 180 tablet Rfl: 3   CARBIDOPA-LEVODOPA  MG Oral Tab TAKE 1 TABLET BY MOUTH THREE TIMES DAILY Disp: 270 tablet Rfl: 1   TAMSULOSIN HCL 0.4 MG Oral Cap TAKE 1 CAPSULE BY MOUTH EVERY DAY Disp: 90 capsule Rfl: 3   PANTOPRAZOLE SODIUM 40 MG Oral Tab EC TA Birgit Ramirez MD [facility] MAIN OR   • HEART CORONARY ARTERY BYPASS GRAFT N/A 8/26/2013    Performed by Dick Tompkins MD at [place] [address] Left 3/19/2009    Performed by Dottie Ghosh MD at [address] [place] [address]   • HERNIA SURGERY  2 101 06/12/2019    CO2 26.3 06/12/2019    BUN 22.0 (H) 06/12/2019    CREATSERUM 0.93 06/12/2019     (H) 06/12/2019    CA 9.0 06/12/2019            Airway      Mallampati: II  Mouth opening: >3 FB  TM distance: > 6 cm  Neck ROM: limited Cardiovascular

## 2019-08-13 NOTE — ANESTHESIA POSTPROCEDURE EVALUATION
[facility].  Patient Status:  Hospital Outpatient Surgery   Age/Gender 68year old male MRN [id number]   Location [address] [facility] Attending Cindy Null MD   Hosp Day # 0 PCP John Lam MD       An

## 2019-08-13 NOTE — BRIEF OP NOTE
Pre-Operative Diagnosis: Arthritis of foot, right [M19.071]  Retained orthopedic hardware [Z96.9]     Post-Operative Diagnosis: * same     Procedure Performed:   Procedure(s):  REMOVAL OF HARDWARE RIGHT FOOT, AND FUSION OF INTERPHALANGEAL JOINT RIGHT GREAT

## 2019-08-14 ENCOUNTER — HOSPITAL ENCOUNTER (EMERGENCY)
Facility: HOSPITAL | Age: 69
Discharge: HOME | End: 2019-08-14
Attending: EMERGENCY MEDICINE
Payer: MEDICARE

## 2019-08-14 ENCOUNTER — APPOINTMENT (OUTPATIENT)
Dept: GENERAL RADIOLOGY | Facility: HOSPITAL | Age: 69
End: 2019-08-14
Attending: NURSE PRACTITIONER
Payer: MEDICARE

## 2019-08-14 VITALS
TEMPERATURE: 97 F | HEART RATE: 56 BPM | OXYGEN SATURATION: 98 % | BODY MASS INDEX: 27 KG/M2 | DIASTOLIC BLOOD PRESSURE: 70 MMHG | RESPIRATION RATE: 15 BRPM | WEIGHT: 195 LBS | SYSTOLIC BLOOD PRESSURE: 124 MMHG

## 2019-08-14 DIAGNOSIS — Z48.89 ENCOUNTER FOR POSTOPERATIVE WOUND CHECK: Primary | ICD-10-CM

## 2019-08-14 LAB
BASOPHILS # BLD AUTO: 0.07 X10(3) UL (ref 0–0.2)
BASOPHILS NFR BLD AUTO: 1.1 %
DEPRECATED RDW RBC AUTO: 53 FL (ref 35.1–46.3)
EOSINOPHIL # BLD AUTO: 0.33 X10(3) UL (ref 0–0.7)
EOSINOPHIL NFR BLD AUTO: 5.1 %
ERYTHROCYTE [DISTWIDTH] IN BLOOD BY AUTOMATED COUNT: 16.5 % (ref 11–15)
HCT VFR BLD AUTO: 41.8 % (ref 39–53)
HGB BLD-MCNC: 13.8 G/DL (ref 13–17.5)
IMM GRANULOCYTES # BLD AUTO: 0.01 X10(3) UL (ref 0–1)
IMM GRANULOCYTES NFR BLD: 0.2 %
LYMPHOCYTES # BLD AUTO: 1.05 X10(3) UL (ref 1–4)
LYMPHOCYTES NFR BLD AUTO: 16.3 %
MCH RBC QN AUTO: 29.2 PG (ref 26–34)
MCHC RBC AUTO-ENTMCNC: 33 G/DL (ref 31–37)
MCV RBC AUTO: 88.6 FL (ref 80–100)
MONOCYTES # BLD AUTO: 0.88 X10(3) UL (ref 0.1–1)
MONOCYTES NFR BLD AUTO: 13.7 %
NEUTROPHILS # BLD AUTO: 4.09 X10 (3) UL (ref 1.5–7.7)
NEUTROPHILS # BLD AUTO: 4.09 X10(3) UL (ref 1.5–7.7)
NEUTROPHILS NFR BLD AUTO: 63.6 %
PLATELET # BLD AUTO: 210 10(3)UL (ref 150–450)
RBC # BLD AUTO: 4.72 X10(6)UL (ref 3.8–5.8)
WBC # BLD AUTO: 6.4 X10(3) UL (ref 4–11)

## 2019-08-14 PROCEDURE — 36415 COLL VENOUS BLD VENIPUNCTURE: CPT

## 2019-08-14 PROCEDURE — 73630 X-RAY EXAM OF FOOT: CPT | Performed by: NURSE PRACTITIONER

## 2019-08-14 PROCEDURE — 99284 EMERGENCY DEPT VISIT MOD MDM: CPT

## 2019-08-14 PROCEDURE — 85025 COMPLETE CBC W/AUTO DIFF WBC: CPT | Performed by: NURSE PRACTITIONER

## 2019-08-14 RX ORDER — HYDROCODONE BITARTRATE AND ACETAMINOPHEN 5; 325 MG/1; MG/1
1 TABLET ORAL ONCE
Status: COMPLETED | OUTPATIENT
Start: 2019-08-14 | End: 2019-08-14

## 2019-08-14 NOTE — ED INITIAL ASSESSMENT (HPI)
Surgery on right great toe with Dr. Miley Maldonado yesterday / today is noted to be bleeding through bandages

## 2019-08-14 NOTE — OPERATIVE REPORT
[facility]    PATIENT'S NAME: Susan Galicia   ATTENDING PHYSICIAN: Hi Trevizo M.D. OPERATING PHYSICIAN: Hi Trevizo M.D.    PATIENT ACCOUNT#:   [de-identified]    LOCATION:  [address] [place] [address] 10  MEDICAL RECORD #:   [id number] We now removed a small wedge that was left so that it was essentially just the osteotomy site fusing to the IP joint. We went ahead and placed a K-wire across this for subsequent fixation with a cannulated screw through a 1 cm incision.   Excellent fixatio

## 2019-08-15 NOTE — ED PROVIDER NOTES
I reviewed that chart and discussed the case. I have examined the patient and noted dressing was removed. Good dorsalis pedis pulse. Patient has no active bleeding. Sensation intact light touch.   Patient's hemoglobin came back normal.  X-ray unremarkab

## 2019-08-15 NOTE — ED PROVIDER NOTES
Patient Seen in: [facility] Emergency Department    History   Patient presents with:  Lower Extremity Injury (musculoskeletal)  Bleeding (hematologic)    Stated Complaint: surgery on left foot yesterday, noted to be bleeding through bandages    69-y cancer [facility]) 2010    s/p brachytherapy              Past Surgical History:   Procedure Laterality Date   • ANGIOGRAM  2001   • ANGIOPLASTY (CORONARY)     • BYPASS SURGERY     • CABG  8/2013   • CATARACT      bilateral eyes   • CATH PERCUTANEOUS  Jay Jones TONSILLECTOMY                      Social History    Tobacco Use      Smoking status: Former Smoker        Packs/day: 0.50        Years: 40.00        Pack years: 20        Types: Cigarettes        Quit date: 2013        Years since quittin.3 discharge noted. Sensations intact. Neurological: He is alert and oriented to person, place, and time. Skin: Skin is warm and dry. He is not diaphoretic. Nursing note and vitals reviewed.         ED Course     Labs Reviewed   CBC W/ DIFFERENTIAL - Ab regards patient status here he recommends patient does have close follow-up with Dr. Jackson Womack in 1 to 2 days. Large bulky dressing was reapplied to the surgical wound.     MDM   I have discussed with the patient and/or family the results of test, differential

## 2019-09-20 ENCOUNTER — ANESTHESIA EVENT (OUTPATIENT)
Dept: SURGERY | Facility: HOSPITAL | Age: 69
DRG: 240 | End: 2019-09-20
Payer: MEDICARE

## 2019-09-23 ENCOUNTER — HOSPITAL ENCOUNTER (INPATIENT)
Dept: INTERVENTIONAL RADIOLOGY/VASCULAR | Facility: HOSPITAL | Age: 69
LOS: 5 days | Discharge: HOME | DRG: 240 | End: 2019-09-28
Attending: SURGERY | Admitting: SURGERY
Payer: MEDICARE

## 2019-09-23 DIAGNOSIS — I70.263: ICD-10-CM

## 2019-09-23 DIAGNOSIS — T81.30XA WOUND DEHISCENCE: ICD-10-CM

## 2019-09-23 LAB
ANION GAP SERPL CALC-SCNC: 7 MMOL/L (ref 0–18)
APTT PPP: 175.6 SECONDS (ref 25.4–36.1)
BUN BLD-MCNC: 31 MG/DL (ref 7–18)
BUN/CREAT SERPL: 19.9 (ref 10–20)
CALCIUM BLD-MCNC: 9.3 MG/DL (ref 8.5–10.1)
CHLORIDE SERPL-SCNC: 96 MMOL/L (ref 98–112)
CO2 SERPL-SCNC: 30 MMOL/L (ref 21–32)
CREAT BLD-MCNC: 1.56 MG/DL (ref 0.7–1.3)
DEPRECATED RDW RBC AUTO: 48.7 FL (ref 35.1–46.3)
ERYTHROCYTE [DISTWIDTH] IN BLOOD BY AUTOMATED COUNT: 14.8 % (ref 11–15)
GLUCOSE BLD-MCNC: 120 MG/DL (ref 70–99)
HCT VFR BLD AUTO: 39.5 % (ref 39–53)
HGB BLD-MCNC: 13.3 G/DL (ref 13–17.5)
INR BLD: 1.18 (ref 0.9–1.1)
ISTAT ACTIVATED CLOTTING TIME: 241 SECONDS (ref 74–137)
ISTAT ACTIVATED CLOTTING TIME: 274 SECONDS (ref 74–137)
MCH RBC QN AUTO: 30.2 PG (ref 26–34)
MCHC RBC AUTO-ENTMCNC: 33.7 G/DL (ref 31–37)
MCV RBC AUTO: 89.6 FL (ref 80–100)
OSMOLALITY SERPL CALC.SUM OF ELEC: 284 MOSM/KG (ref 275–295)
PLATELET # BLD AUTO: 249 10(3)UL (ref 150–450)
POTASSIUM SERPL-SCNC: 3.5 MMOL/L (ref 3.5–5.1)
PSA SERPL DL<=0.01 NG/ML-MCNC: 15.6 SECONDS (ref 12.5–14.7)
RBC # BLD AUTO: 4.41 X10(6)UL (ref 3.8–5.8)
SODIUM SERPL-SCNC: 133 MMOL/L (ref 136–145)
WBC # BLD AUTO: 6.5 X10(3) UL (ref 4–11)

## 2019-09-23 PROCEDURE — 85730 THROMBOPLASTIN TIME PARTIAL: CPT | Performed by: SURGERY

## 2019-09-23 PROCEDURE — 85027 COMPLETE CBC AUTOMATED: CPT | Performed by: SURGERY

## 2019-09-23 PROCEDURE — B44FZZ3 ULTRASONOGRAPHY OF RIGHT LOWER EXTREMITY ARTERIES, INTRAVASCULAR: ICD-10-PCS | Performed by: SURGERY

## 2019-09-23 PROCEDURE — 93005 ELECTROCARDIOGRAM TRACING: CPT

## 2019-09-23 PROCEDURE — B41CYZZ FLUOROSCOPY OF PELVIC ARTERIES USING OTHER CONTRAST: ICD-10-PCS | Performed by: SURGERY

## 2019-09-23 PROCEDURE — B41FYZZ FLUOROSCOPY OF RIGHT LOWER EXTREMITY ARTERIES USING OTHER CONTRAST: ICD-10-PCS | Performed by: SURGERY

## 2019-09-23 PROCEDURE — 80048 BASIC METABOLIC PNL TOTAL CA: CPT | Performed by: SURGERY

## 2019-09-23 PROCEDURE — 37252 INTRVASC US NONCORONARY 1ST: CPT

## 2019-09-23 PROCEDURE — 37226 HC TRANSLUM ANGIOPLASTY W STENT FEM POP UNILAT: CPT

## 2019-09-23 PROCEDURE — 99152 MOD SED SAME PHYS/QHP 5/>YRS: CPT

## 2019-09-23 PROCEDURE — 36415 COLL VENOUS BLD VENIPUNCTURE: CPT

## 2019-09-23 PROCEDURE — 99153 MOD SED SAME PHYS/QHP EA: CPT

## 2019-09-23 PROCEDURE — B410YZZ FLUOROSCOPY OF ABDOMINAL AORTA USING OTHER CONTRAST: ICD-10-PCS | Performed by: SURGERY

## 2019-09-23 PROCEDURE — 85610 PROTHROMBIN TIME: CPT | Performed by: SURGERY

## 2019-09-23 PROCEDURE — 85347 COAGULATION TIME ACTIVATED: CPT

## 2019-09-23 PROCEDURE — 76937 US GUIDE VASCULAR ACCESS: CPT

## 2019-09-23 PROCEDURE — 047M34Z DILATION OF RIGHT POPLITEAL ARTERY WITH DRUG-ELUTING INTRALUMINAL DEVICE, PERCUTANEOUS APPROACH: ICD-10-PCS | Performed by: SURGERY

## 2019-09-23 PROCEDURE — 93010 ELECTROCARDIOGRAM REPORT: CPT | Performed by: INTERNAL MEDICINE

## 2019-09-23 RX ORDER — BUMETANIDE 2 MG/1
2 TABLET ORAL 2 TIMES DAILY
Status: DISCONTINUED | OUTPATIENT
Start: 2019-09-23 | End: 2019-09-28

## 2019-09-23 RX ORDER — ATORVASTATIN CALCIUM 40 MG/1
40 TABLET, FILM COATED ORAL DAILY
Status: DISCONTINUED | OUTPATIENT
Start: 2019-09-24 | End: 2019-09-28

## 2019-09-23 RX ORDER — DILTIAZEM HYDROCHLORIDE 60 MG/1
120 TABLET, FILM COATED ORAL DAILY
Status: DISCONTINUED | OUTPATIENT
Start: 2019-09-24 | End: 2019-09-28

## 2019-09-23 RX ORDER — PANTOPRAZOLE SODIUM 40 MG/1
40 TABLET, DELAYED RELEASE ORAL
Status: DISCONTINUED | OUTPATIENT
Start: 2019-09-24 | End: 2019-09-28

## 2019-09-23 RX ORDER — SODIUM CHLORIDE 9 MG/ML
INJECTION, SOLUTION INTRAVENOUS CONTINUOUS
Status: DISCONTINUED | OUTPATIENT
Start: 2019-09-23 | End: 2019-09-25

## 2019-09-23 RX ORDER — CLOPIDOGREL BISULFATE 75 MG/1
TABLET ORAL
Status: COMPLETED
Start: 2019-09-23 | End: 2019-09-23

## 2019-09-23 RX ORDER — DOCUSATE SODIUM 100 MG/1
100 CAPSULE, LIQUID FILLED ORAL 2 TIMES DAILY
Status: DISCONTINUED | OUTPATIENT
Start: 2019-09-23 | End: 2019-09-28

## 2019-09-23 RX ORDER — CLOPIDOGREL BISULFATE 75 MG/1
75 TABLET ORAL DAILY
Status: DISCONTINUED | OUTPATIENT
Start: 2019-09-24 | End: 2019-09-24 | Stop reason: HOSPADM

## 2019-09-23 RX ORDER — BUPROPION HYDROCHLORIDE 150 MG/1
150 TABLET ORAL DAILY
Status: DISCONTINUED | OUTPATIENT
Start: 2019-09-24 | End: 2019-09-28

## 2019-09-23 RX ORDER — METOPROLOL SUCCINATE 50 MG/1
50 TABLET, EXTENDED RELEASE ORAL DAILY
Status: DISCONTINUED | OUTPATIENT
Start: 2019-09-24 | End: 2019-09-28

## 2019-09-23 RX ORDER — SOTALOL HYDROCHLORIDE 120 MG/1
120 TABLET ORAL 2 TIMES DAILY
Status: DISCONTINUED | OUTPATIENT
Start: 2019-09-23 | End: 2019-09-28

## 2019-09-23 RX ORDER — ONDANSETRON 2 MG/ML
4 INJECTION INTRAMUSCULAR; INTRAVENOUS EVERY 6 HOURS PRN
Status: DISCONTINUED | OUTPATIENT
Start: 2019-09-23 | End: 2019-09-28

## 2019-09-23 RX ORDER — CLOPIDOGREL BISULFATE 75 MG/1
75 TABLET ORAL DAILY
Status: DISCONTINUED | OUTPATIENT
Start: 2019-09-24 | End: 2019-09-23

## 2019-09-23 RX ORDER — HEPARIN SODIUM 5000 [USP'U]/ML
INJECTION, SOLUTION INTRAVENOUS; SUBCUTANEOUS
Status: COMPLETED
Start: 2019-09-23 | End: 2019-09-23

## 2019-09-23 RX ORDER — HYDROCODONE BITARTRATE AND ACETAMINOPHEN 10; 325 MG/1; MG/1
1-2 TABLET ORAL EVERY 4 HOURS PRN
Status: DISCONTINUED | OUTPATIENT
Start: 2019-09-23 | End: 2019-09-28

## 2019-09-23 RX ORDER — MIDAZOLAM HYDROCHLORIDE 1 MG/ML
INJECTION INTRAMUSCULAR; INTRAVENOUS
Status: COMPLETED
Start: 2019-09-23 | End: 2019-09-23

## 2019-09-23 RX ORDER — SPIRONOLACTONE 25 MG/1
25 TABLET ORAL 2 TIMES DAILY
Status: DISCONTINUED | OUTPATIENT
Start: 2019-09-23 | End: 2019-09-28

## 2019-09-23 RX ORDER — CLOPIDOGREL BISULFATE 75 MG/1
300 TABLET ORAL ONCE
Status: DISCONTINUED | OUTPATIENT
Start: 2019-09-23 | End: 2019-09-24 | Stop reason: HOSPADM

## 2019-09-23 RX ORDER — ALPRAZOLAM 0.5 MG/1
0.5 TABLET ORAL 2 TIMES DAILY PRN
Status: DISCONTINUED | OUTPATIENT
Start: 2019-09-23 | End: 2019-09-28

## 2019-09-23 RX ORDER — HEPARIN SODIUM 5000 [USP'U]/ML
5000 INJECTION, SOLUTION INTRAVENOUS; SUBCUTANEOUS EVERY 8 HOURS SCHEDULED
Status: DISCONTINUED | OUTPATIENT
Start: 2019-09-24 | End: 2019-09-24

## 2019-09-23 RX ORDER — LIDOCAINE HYDROCHLORIDE 10 MG/ML
INJECTION, SOLUTION EPIDURAL; INFILTRATION; INTRACAUDAL; PERINEURAL
Status: COMPLETED
Start: 2019-09-23 | End: 2019-09-23

## 2019-09-23 RX ORDER — DEXTROSE AND SODIUM CHLORIDE 5; .45 G/100ML; G/100ML
INJECTION, SOLUTION INTRAVENOUS CONTINUOUS
Status: DISCONTINUED | OUTPATIENT
Start: 2019-09-23 | End: 2019-09-25

## 2019-09-23 RX ORDER — MECLIZINE HYDROCHLORIDE 25 MG/1
25 TABLET ORAL 3 TIMES DAILY PRN
Status: DISCONTINUED | OUTPATIENT
Start: 2019-09-23 | End: 2019-09-28

## 2019-09-23 RX ORDER — OXYCODONE HCL 20 MG/1
20 TABLET, FILM COATED, EXTENDED RELEASE ORAL EVERY 12 HOURS
Status: DISCONTINUED | OUTPATIENT
Start: 2019-09-23 | End: 2019-09-28

## 2019-09-23 RX ADMIN — OXYCODONE HCL 20 MG: 20 TABLET, FILM COATED, EXTENDED RELEASE ORAL at 20:34:00

## 2019-09-23 RX ADMIN — DOCUSATE SODIUM 100 MG: 100 CAPSULE, LIQUID FILLED ORAL at 22:48:00

## 2019-09-23 RX ADMIN — SPIRONOLACTONE 25 MG: 25 TABLET ORAL at 22:48:00

## 2019-09-23 RX ADMIN — BUMETANIDE 2 MG: 2 TABLET ORAL at 22:48:00

## 2019-09-23 RX ADMIN — SODIUM CHLORIDE: 9 INJECTION, SOLUTION INTRAVENOUS at 10:15:00

## 2019-09-23 RX ADMIN — Medication 1 TABLET: at 22:48:00

## 2019-09-23 RX ADMIN — DEXTROSE AND SODIUM CHLORIDE: 5; .45 INJECTION, SOLUTION INTRAVENOUS at 20:35:00

## 2019-09-23 NOTE — BRIEF OP NOTE
Vascular Surgery Brief Op Note  Patients Name:    Ezra Núñez.     Operating Physician: Maryuri Ca MD  CSN:    [id number]                                                           Location:  OR  MRN:     [id number]

## 2019-09-24 ENCOUNTER — ANESTHESIA (OUTPATIENT)
Dept: SURGERY | Facility: HOSPITAL | Age: 69
DRG: 240 | End: 2019-09-24
Payer: MEDICARE

## 2019-09-24 LAB
ANION GAP SERPL CALC-SCNC: 6 MMOL/L (ref 0–18)
ATRIAL RATE: 56 BPM
BUN BLD-MCNC: 22 MG/DL (ref 7–18)
BUN/CREAT SERPL: 22.9 (ref 10–20)
CALCIUM BLD-MCNC: 8.7 MG/DL (ref 8.5–10.1)
CHLORIDE SERPL-SCNC: 98 MMOL/L (ref 98–112)
CO2 SERPL-SCNC: 31 MMOL/L (ref 21–32)
CREAT BLD-MCNC: 0.96 MG/DL (ref 0.7–1.3)
DEPRECATED RDW RBC AUTO: 48.8 FL (ref 35.1–46.3)
ERYTHROCYTE [DISTWIDTH] IN BLOOD BY AUTOMATED COUNT: 14.9 % (ref 11–15)
GLUCOSE BLD-MCNC: 121 MG/DL (ref 70–99)
GLUCOSE BLD-MCNC: 140 MG/DL (ref 70–99)
HCT VFR BLD AUTO: 37.6 % (ref 39–53)
HGB BLD-MCNC: 12.6 G/DL (ref 13–17.5)
MCH RBC QN AUTO: 29.9 PG (ref 26–34)
MCHC RBC AUTO-ENTMCNC: 33.5 G/DL (ref 31–37)
MCV RBC AUTO: 89.3 FL (ref 80–100)
OSMOLALITY SERPL CALC.SUM OF ELEC: 286 MOSM/KG (ref 275–295)
P AXIS: 55 DEGREES
P-R INTERVAL: 238 MS
PLATELET # BLD AUTO: 241 10(3)UL (ref 150–450)
POTASSIUM SERPL-SCNC: 3.2 MMOL/L (ref 3.5–5.1)
Q-T INTERVAL: 498 MS
QRS DURATION: 96 MS
QTC CALCULATION (BEZET): 480 MS
R AXIS: 62 DEGREES
RBC # BLD AUTO: 4.21 X10(6)UL (ref 3.8–5.8)
SODIUM SERPL-SCNC: 135 MMOL/L (ref 136–145)
T AXIS: 28 DEGREES
VENTRICULAR RATE: 56 BPM
WBC # BLD AUTO: 6.8 X10(3) UL (ref 4–11)

## 2019-09-24 PROCEDURE — 80048 BASIC METABOLIC PNL TOTAL CA: CPT | Performed by: SURGERY

## 2019-09-24 PROCEDURE — 88305 TISSUE EXAM BY PATHOLOGIST: CPT | Performed by: ORTHOPAEDIC SURGERY

## 2019-09-24 PROCEDURE — 85027 COMPLETE CBC AUTOMATED: CPT | Performed by: SURGERY

## 2019-09-24 PROCEDURE — 88311 DECALCIFY TISSUE: CPT | Performed by: ORTHOPAEDIC SURGERY

## 2019-09-24 PROCEDURE — 0Y6M0Z9 DETACHMENT AT RIGHT FOOT, PARTIAL 1ST RAY, OPEN APPROACH: ICD-10-PCS | Performed by: ORTHOPAEDIC SURGERY

## 2019-09-24 PROCEDURE — 82962 GLUCOSE BLOOD TEST: CPT

## 2019-09-24 PROCEDURE — 88312 SPECIAL STAINS GROUP 1: CPT | Performed by: ORTHOPAEDIC SURGERY

## 2019-09-24 RX ORDER — MIDAZOLAM HYDROCHLORIDE 1 MG/ML
INJECTION INTRAMUSCULAR; INTRAVENOUS
Status: COMPLETED
Start: 2019-09-24 | End: 2019-09-24

## 2019-09-24 RX ORDER — NALOXONE HYDROCHLORIDE 0.4 MG/ML
80 INJECTION, SOLUTION INTRAMUSCULAR; INTRAVENOUS; SUBCUTANEOUS AS NEEDED
Status: DISCONTINUED | OUTPATIENT
Start: 2019-09-24 | End: 2019-09-24 | Stop reason: HOSPADM

## 2019-09-24 RX ORDER — HYDROMORPHONE HYDROCHLORIDE 1 MG/ML
2 INJECTION, SOLUTION INTRAMUSCULAR; INTRAVENOUS; SUBCUTANEOUS EVERY 2 HOUR PRN
Status: DISCONTINUED | OUTPATIENT
Start: 2019-09-24 | End: 2019-09-28

## 2019-09-24 RX ORDER — DEXTROSE MONOHYDRATE 25 G/50ML
50 INJECTION, SOLUTION INTRAVENOUS
Status: DISCONTINUED | OUTPATIENT
Start: 2019-09-24 | End: 2019-09-24 | Stop reason: HOSPADM

## 2019-09-24 RX ORDER — CLOPIDOGREL BISULFATE 75 MG/1
75 TABLET ORAL DAILY
Status: DISCONTINUED | OUTPATIENT
Start: 2019-09-25 | End: 2019-09-28

## 2019-09-24 RX ORDER — HYDROMORPHONE HYDROCHLORIDE 1 MG/ML
INJECTION, SOLUTION INTRAMUSCULAR; INTRAVENOUS; SUBCUTANEOUS
Status: COMPLETED
Start: 2019-09-24 | End: 2019-09-24

## 2019-09-24 RX ORDER — GABAPENTIN 300 MG/1
300 CAPSULE ORAL 3 TIMES DAILY
Status: DISCONTINUED | OUTPATIENT
Start: 2019-09-24 | End: 2019-09-28

## 2019-09-24 RX ORDER — ZOLPIDEM TARTRATE 5 MG/1
5 TABLET ORAL NIGHTLY PRN
Status: DISCONTINUED | OUTPATIENT
Start: 2019-09-24 | End: 2019-09-28

## 2019-09-24 RX ORDER — DOCUSATE SODIUM 100 MG/1
100 CAPSULE, LIQUID FILLED ORAL 2 TIMES DAILY
Status: DISCONTINUED | OUTPATIENT
Start: 2019-09-24 | End: 2019-09-24

## 2019-09-24 RX ORDER — POTASSIUM CHLORIDE 20 MEQ/1
40 TABLET, EXTENDED RELEASE ORAL EVERY 4 HOURS
Status: COMPLETED | OUTPATIENT
Start: 2019-09-24 | End: 2019-09-24

## 2019-09-24 RX ORDER — SODIUM CHLORIDE, SODIUM LACTATE, POTASSIUM CHLORIDE, CALCIUM CHLORIDE 600; 310; 30; 20 MG/100ML; MG/100ML; MG/100ML; MG/100ML
INJECTION, SOLUTION INTRAVENOUS CONTINUOUS
Status: DISCONTINUED | OUTPATIENT
Start: 2019-09-24 | End: 2019-09-24 | Stop reason: HOSPADM

## 2019-09-24 RX ORDER — MIDAZOLAM HYDROCHLORIDE 1 MG/ML
1 INJECTION INTRAMUSCULAR; INTRAVENOUS EVERY 5 MIN PRN
Status: DISCONTINUED | OUTPATIENT
Start: 2019-09-24 | End: 2019-09-24 | Stop reason: HOSPADM

## 2019-09-24 RX ORDER — HYDROMORPHONE HYDROCHLORIDE 1 MG/ML
0.4 INJECTION, SOLUTION INTRAMUSCULAR; INTRAVENOUS; SUBCUTANEOUS EVERY 5 MIN PRN
Status: DISCONTINUED | OUTPATIENT
Start: 2019-09-24 | End: 2019-09-24 | Stop reason: HOSPADM

## 2019-09-24 RX ORDER — POLYETHYLENE GLYCOL 3350 17 G/17G
17 POWDER, FOR SOLUTION ORAL DAILY
Status: DISCONTINUED | OUTPATIENT
Start: 2019-09-24 | End: 2019-09-28

## 2019-09-24 RX ORDER — HYDROMORPHONE HYDROCHLORIDE 1 MG/ML
0.5 INJECTION, SOLUTION INTRAMUSCULAR; INTRAVENOUS; SUBCUTANEOUS EVERY 2 HOUR PRN
Status: DISCONTINUED | OUTPATIENT
Start: 2019-09-24 | End: 2019-09-28

## 2019-09-24 RX ORDER — HYDROMORPHONE HYDROCHLORIDE 1 MG/ML
1 INJECTION, SOLUTION INTRAMUSCULAR; INTRAVENOUS; SUBCUTANEOUS EVERY 2 HOUR PRN
Status: DISCONTINUED | OUTPATIENT
Start: 2019-09-24 | End: 2019-09-28

## 2019-09-24 RX ADMIN — CLOPIDOGREL BISULFATE 75 MG: 75 TABLET ORAL at 08:53:00

## 2019-09-24 RX ADMIN — SODIUM CHLORIDE, SODIUM LACTATE, POTASSIUM CHLORIDE, CALCIUM CHLORIDE: 600; 310; 30; 20 INJECTION, SOLUTION INTRAVENOUS at 15:15:00

## 2019-09-24 RX ADMIN — DOCUSATE SODIUM 100 MG: 100 CAPSULE, LIQUID FILLED ORAL at 08:51:00

## 2019-09-24 RX ADMIN — OXYCODONE HCL 20 MG: 20 TABLET, FILM COATED, EXTENDED RELEASE ORAL at 17:08:00

## 2019-09-24 RX ADMIN — MIDAZOLAM HYDROCHLORIDE 1 MG: 1 INJECTION INTRAMUSCULAR; INTRAVENOUS at 12:12:00

## 2019-09-24 RX ADMIN — DILTIAZEM HYDROCHLORIDE 120 MG: 60 TABLET, FILM COATED ORAL at 08:50:00

## 2019-09-24 RX ADMIN — HYDROMORPHONE HYDROCHLORIDE 1 MG: 1 INJECTION, SOLUTION INTRAMUSCULAR; INTRAVENOUS; SUBCUTANEOUS at 20:16:00

## 2019-09-24 RX ADMIN — Medication 1 TABLET: at 15:12:00

## 2019-09-24 RX ADMIN — SODIUM CHLORIDE: 9 INJECTION, SOLUTION INTRAVENOUS at 11:50:00

## 2019-09-24 RX ADMIN — HYDROCODONE BITARTRATE AND ACETAMINOPHEN 2 TABLET: 10; 325 TABLET ORAL at 13:48:00

## 2019-09-24 RX ADMIN — SOTALOL HYDROCHLORIDE 120 MG: 120 TABLET ORAL at 08:51:00

## 2019-09-24 RX ADMIN — Medication 1 TABLET: at 20:15:00

## 2019-09-24 RX ADMIN — HYDROMORPHONE HYDROCHLORIDE 0.4 MG: 1 INJECTION, SOLUTION INTRAMUSCULAR; INTRAVENOUS; SUBCUTANEOUS at 12:11:00

## 2019-09-24 RX ADMIN — GABAPENTIN 300 MG: 300 CAPSULE ORAL at 20:15:00

## 2019-09-24 RX ADMIN — HYDROMORPHONE HYDROCHLORIDE 1 MG: 1 INJECTION, SOLUTION INTRAMUSCULAR; INTRAVENOUS; SUBCUTANEOUS at 23:14:00

## 2019-09-24 RX ADMIN — POTASSIUM CHLORIDE 40 MEQ: 20 TABLET, EXTENDED RELEASE ORAL at 23:11:00

## 2019-09-24 RX ADMIN — HYDROMORPHONE HYDROCHLORIDE 1 MG: 1 INJECTION, SOLUTION INTRAMUSCULAR; INTRAVENOUS; SUBCUTANEOUS at 16:03:00

## 2019-09-24 RX ADMIN — HYDROMORPHONE HYDROCHLORIDE 1 MG: 1 INJECTION, SOLUTION INTRAMUSCULAR; INTRAVENOUS; SUBCUTANEOUS at 18:13:00

## 2019-09-24 RX ADMIN — HYDROMORPHONE HYDROCHLORIDE 0.5 MG: 1 INJECTION, SOLUTION INTRAMUSCULAR; INTRAVENOUS; SUBCUTANEOUS at 12:24:00

## 2019-09-24 RX ADMIN — POTASSIUM CHLORIDE 40 MEQ: 20 TABLET, EXTENDED RELEASE ORAL at 18:14:00

## 2019-09-24 RX ADMIN — SPIRONOLACTONE 25 MG: 25 TABLET ORAL at 08:52:00

## 2019-09-24 RX ADMIN — HYDROMORPHONE HYDROCHLORIDE 0.4 MG: 1 INJECTION, SOLUTION INTRAMUSCULAR; INTRAVENOUS; SUBCUTANEOUS at 11:44:00

## 2019-09-24 RX ADMIN — DOCUSATE SODIUM 100 MG: 100 CAPSULE, LIQUID FILLED ORAL at 20:15:00

## 2019-09-24 RX ADMIN — HYDROMORPHONE HYDROCHLORIDE 0.4 MG: 1 INJECTION, SOLUTION INTRAMUSCULAR; INTRAVENOUS; SUBCUTANEOUS at 12:02:00

## 2019-09-24 RX ADMIN — Medication 1 TABLET: at 08:52:00

## 2019-09-24 RX ADMIN — SPIRONOLACTONE 25 MG: 25 TABLET ORAL at 20:15:00

## 2019-09-24 RX ADMIN — SOTALOL HYDROCHLORIDE 120 MG: 120 TABLET ORAL at 20:15:00

## 2019-09-24 RX ADMIN — HYDROMORPHONE HYDROCHLORIDE 0.4 MG: 1 INJECTION, SOLUTION INTRAMUSCULAR; INTRAVENOUS; SUBCUTANEOUS at 11:49:00

## 2019-09-24 RX ADMIN — BUMETANIDE 2 MG: 2 TABLET ORAL at 09:03:00

## 2019-09-24 RX ADMIN — HYDROMORPHONE HYDROCHLORIDE 0.4 MG: 1 INJECTION, SOLUTION INTRAMUSCULAR; INTRAVENOUS; SUBCUTANEOUS at 12:21:00

## 2019-09-24 RX ADMIN — Medication 50 MG: at 08:51:00

## 2019-09-24 RX ADMIN — HYDROCODONE BITARTRATE AND ACETAMINOPHEN 2 TABLET: 10; 325 TABLET ORAL at 18:13:00

## 2019-09-24 RX ADMIN — HYDROMORPHONE HYDROCHLORIDE 0.5 MG: 1 INJECTION, SOLUTION INTRAMUSCULAR; INTRAVENOUS; SUBCUTANEOUS at 12:32:00

## 2019-09-24 RX ADMIN — METOPROLOL SUCCINATE 50 MG: 50 TABLET, EXTENDED RELEASE ORAL at 08:52:00

## 2019-09-24 RX ADMIN — BUMETANIDE 2 MG: 2 TABLET ORAL at 20:15:00

## 2019-09-24 RX ADMIN — BUPROPION HYDROCHLORIDE 150 MG: 150 TABLET ORAL at 09:03:00

## 2019-09-24 RX ADMIN — POTASSIUM CHLORIDE 40 MEQ: 20 TABLET, EXTENDED RELEASE ORAL at 15:12:00

## 2019-09-24 RX ADMIN — GABAPENTIN 300 MG: 300 CAPSULE ORAL at 17:08:00

## 2019-09-24 RX ADMIN — ATORVASTATIN CALCIUM 40 MG: 40 TABLET, FILM COATED ORAL at 08:51:00

## 2019-09-24 RX ADMIN — PANTOPRAZOLE SODIUM 40 MG: 40 TABLET, DELAYED RELEASE ORAL at 05:34:00

## 2019-09-24 RX ADMIN — OXYCODONE HCL 20 MG: 20 TABLET, FILM COATED, EXTENDED RELEASE ORAL at 05:33:00

## 2019-09-24 RX ADMIN — HYDROCODONE BITARTRATE AND ACETAMINOPHEN 1 TABLET: 10; 325 TABLET ORAL at 09:02:00

## 2019-09-24 RX ADMIN — POLYETHYLENE GLYCOL 3350 17 G: 17 POWDER, FOR SOLUTION ORAL at 17:08:00

## 2019-09-24 NOTE — ANESTHESIA PREPROCEDURE EVALUATION
PRE-OP EVALUATION    Patient Name: Franko Kyle.    Pre-op Diagnosis: Wound dehiscence [T81.30XA]    Procedure(s):  RIGHT GREAT TOE AMPUTATION    Surgeon(s) and Role:     Venkata Selby MD - Primary    Pre-op vitals reviewed.   Temp: 98.6 °F (37 mg 20 mg Oral 2 times per day   Pantoprazole Sodium (PROTONIX) EC tab 40 mg 40 mg Oral QAM AC   apixaban (ELIQUIS) tab 5 mg 5 mg Oral BID   carbidopa-levodopa (SINEMET)  MG per tab 1 tablet 1 tablet Oral TID   0.9% NaCl infusion  Intravenous Continuo MG Oral Tab Take 1 tablet (0.5 mg total) by mouth 2 (two) times daily as needed for Sleep.  (Patient taking differently: Take 0.5 mg by mouth 2 (two) times daily as needed.  ) Disp: 60 tablet Rfl: 1   HYDROcodone-acetaminophen  MG Oral Tab Take 1-2 ta CHF                Endo/Other      (+) diabetes                            Pulmonary        (+) COPD            (+) sleep apnea       Neuro/Psych  Comment: Parkinson's disease    (+) depression                              Past Surgical History:   Procedur Dora Gee MD at [facility] MAIN OR   • TONSILLECTOMY       Social History    Tobacco Use      Smoking status: Former Smoker        Packs/day: 0.50        Years: 40.00        Pack years: 20        Types: Cigarettes        Quit date: 4/11/2013        Years since [facility]

## 2019-09-24 NOTE — PLAN OF CARE
Assumed care @ 1900. Patient alert oriented x4. S/P angio  Kept on bedrest per protocol  Incision on left groin dry and intact, no bleeding no hematoma. Incision on poster tibial dry and intact no bleeding no hematoma.   Pedal pulses doppler  On telemetr Monitor arterial and/or venous puncture sites for bleeding and/or hematoma  - Assess quality of pulses, skin color and temperature  - Assess for signs of decreased coronary artery perfusion - ex.  Angina  - Evaluate fluid balance, assess for edema, trend we

## 2019-09-24 NOTE — H&P
67 y/o male with right toe gangrene and PVD, s/p vascular procedure, here for [facility] and partial distal great toe amputation  Past Medical History:   Diagnosis Date   • Anxiety    • Arrhythmia     atrial fibrillation   • ATRIAL FIBRILLATION    • Back problem DAILY ) Disp: 30 tablet Rfl: 0   oxyCODONE HCl ER (OXYCONTIN) 20 MG Oral Tablet Extended Release 12 hour Abuse-Deterrent Take 1 tablet (20 mg total) by mouth Q12H.  Disp: 69 tablet Rfl: 0   DILTIAZEM  MG Oral Tab TAKE 1 TABLET(120 MG) BY MOUTH EVERY No results found for: ALLALTERNATA, ALLAFUMIGAT, ALLBOXELDER, ALLBERMGRASS, ALLCATDANDER, ALLCEDAR, ALLCLADOHERB, ALLROACH, ALLCOTTWOOD, ALLDPTERONY No results found for: ALLDFARINAE, ALLDOGDANDER, ALLELMTREE, ALLHICKORY, ALLMARSHTK, ALLMOUSEEPI, ALLM Not on file        Physically abused: Not on file        Forced sexual activity: Not on file    Other Topics      Concerns:         Service: Not Asked        Blood Transfusions: Not Asked        Caffeine Concern: Not Asked        Occupational Expos

## 2019-09-24 NOTE — PROGRESS NOTES
Vascular Surgery Progress note. Patient seen and examined this am. No overnight events. The patient states his foot feels warm and well perfused without any numbness, tingling, weakness or severe pain.  On physical examination his puncture site in his gr

## 2019-09-24 NOTE — ANESTHESIA POSTPROCEDURE EVALUATION
[facility].  Patient Status:  Inpatient   Age/Gender Lidia Jacobs [age]year old male MRN [id number]   Location [address] Attending Sarah Fulton MD   [address] Day # 1 PCP Nancy Erickson MD       Anesthesia Post-op Note    Procedure(s):

## 2019-09-24 NOTE — CONSULTS
.  Reason for consult: periop mgmt    Consulted by: Dr. Varsha Campbell    PCP: Tammi Rausch MD      History of Present Illness: Patient is a 68year old male with h/o CAD s/p stent 6/19, afib on eliquis, DM, PVD who presented for angioplasty and stent of LE last [place]   • EXTREMITY LOWER HARDWARE REMOVAL Right 8/13/2019    Performed by Jailyn Hill MD at [address] [facility] OR   • [place] Right 11/15/2016    Performed by Jailyn Hill MD at [address] [facility] [address] N/A 8/26/20 mg by mouth daily. TAKE 1 TABLET(150 MG) BY MOUTH EVERY MORNING ) Disp: 30 tablet Rfl: 0   SERTRALINE HCL 50 MG Oral Tab TAKE 1 TABLET(50 MG) BY MOUTH DAILY (Patient taking differently: Take 50 mg by mouth daily.  TAKE 1 TABLET(50 MG) BY MOUTH DAILY ) Disp: Disp: 270 tablet Rfl: 1   TAMSULOSIN HCL 0.4 MG Oral Cap TAKE 1 CAPSULE BY MOUTH EVERY DAY (Patient taking differently: Take 0.4 mg by mouth daily.  ) Disp: 90 capsule Rfl: 3   Fluocinonide 0.05 % External Solution Apply to aa BID PRN Disp: 60 mL Rfl: PRN or more times a week      Drinks per session: 1 or 2      Binge frequency: Never      Comment: 1 drink per night       Fam Hx  Family History   Problem Relation Age of Onset   • High Blood Pressure Father    • High Cholesterol Father    • Heart Disorder Fa * 135*   K 3.5 3.2*   CL 96* 98   CO2 30.0 31.0   BUN 31* 22*   CREATSERUM 1.56* 0.96   * 140*   CA 9.3 8.7       No results for input(s): ALT, AST, ALB, AMYLASE, LIPASE, LDH in the last 168 hours.     Invalid input(s): ALPHOS, TBIL, DBIL, TP !3.70mm!----------------------------! +----+---------------------+------+------+----------------------------+ ! GSV ! Right distal thigh   ! No    !4.80mm!----------------------------! +----+---------------------+------+------+----------------------------+ ! G +----+---------------------+------+------+----------------------------+ ! GSV ! Left distal calf     ! No    !2.10mm! sclerotic.                  ! +----+---------------------+------+------+----------------------------+ ! GSV ! Left ankle           ! No    !2.00m ASSESSMENT / PLAN:    # PVD s/p angioplasty/stent  · plavix    # toe gangrene s/p amputation  · Discussed pain control at length with acute pain service  · Currently pain seems manageable with norco, gabapentin, prn iv dilaudid  · Lidocaine infusion

## 2019-09-24 NOTE — PROGRESS NOTES
Pain Service  POD#0 s/p right great toe amputation - consulted for possible IV Lidocaine - will not give Lidocaine IV now with sinus cindy HR 50 - hx afib - controlled.   Norco, IV Dilaudid and Gabapentin for pain - patient reports pain better 7/10 after me

## 2019-09-24 NOTE — PACU NOTE
Dr. Kristine Cueva discussed continued pain control options for long term management based on the patient's extensive neuropathy issues.  Patient opted to not attempt a peripheral nerve block at this time in PACU and is wiling to work with Pain Service to possibly ad

## 2019-09-24 NOTE — BRIEF OP NOTE
Pre-Operative Diagnosis: *right great toe gangrene     Post-Operative Diagnosis: * same     Procedure Performed:   Great toe amputation    Surgeon: Dr Jerod Pelayo    Assistant(s):   none     Surgical Findings: see op note     Specimen: great toe     Estimated Bl

## 2019-09-25 LAB — POTASSIUM SERPL-SCNC: 4.4 MMOL/L (ref 3.5–5.1)

## 2019-09-25 PROCEDURE — 84132 ASSAY OF SERUM POTASSIUM: CPT | Performed by: HOSPITALIST

## 2019-09-25 RX ORDER — BISACODYL 10 MG
10 SUPPOSITORY, RECTAL RECTAL
Status: DISCONTINUED | OUTPATIENT
Start: 2019-09-25 | End: 2019-09-28

## 2019-09-25 RX ORDER — SODIUM PHOSPHATE,MONO-DIBASIC 19G-7G/118
1 ENEMA (ML) RECTAL ONCE AS NEEDED
Status: ACTIVE | OUTPATIENT
Start: 2019-09-25 | End: 2019-09-25

## 2019-09-25 RX ADMIN — DILTIAZEM HYDROCHLORIDE 120 MG: 60 TABLET, FILM COATED ORAL at 08:57:00

## 2019-09-25 RX ADMIN — DOCUSATE SODIUM 100 MG: 100 CAPSULE, LIQUID FILLED ORAL at 21:40:00

## 2019-09-25 RX ADMIN — Medication 1 TABLET: at 08:57:00

## 2019-09-25 RX ADMIN — SPIRONOLACTONE 25 MG: 25 TABLET ORAL at 21:40:00

## 2019-09-25 RX ADMIN — SOTALOL HYDROCHLORIDE 120 MG: 120 TABLET ORAL at 21:40:00

## 2019-09-25 RX ADMIN — BISACODYL 10 MG: 10 MG SUPPOSITORY, RECTAL RECTAL at 22:49:00

## 2019-09-25 RX ADMIN — OXYCODONE HCL 20 MG: 20 TABLET, FILM COATED, EXTENDED RELEASE ORAL at 18:39:00

## 2019-09-25 RX ADMIN — CLOPIDOGREL BISULFATE 75 MG: 75 TABLET ORAL at 08:56:00

## 2019-09-25 RX ADMIN — BUPROPION HYDROCHLORIDE 150 MG: 150 TABLET ORAL at 09:01:00

## 2019-09-25 RX ADMIN — DOCUSATE SODIUM 100 MG: 100 CAPSULE, LIQUID FILLED ORAL at 08:56:00

## 2019-09-25 RX ADMIN — POLYETHYLENE GLYCOL 3350 17 G: 17 POWDER, FOR SOLUTION ORAL at 08:58:00

## 2019-09-25 RX ADMIN — HYDROMORPHONE HYDROCHLORIDE 1 MG: 1 INJECTION, SOLUTION INTRAMUSCULAR; INTRAVENOUS; SUBCUTANEOUS at 03:33:00

## 2019-09-25 RX ADMIN — BUMETANIDE 2 MG: 2 TABLET ORAL at 09:01:00

## 2019-09-25 RX ADMIN — GABAPENTIN 300 MG: 300 CAPSULE ORAL at 08:57:00

## 2019-09-25 RX ADMIN — GABAPENTIN 300 MG: 300 CAPSULE ORAL at 15:12:00

## 2019-09-25 RX ADMIN — ATORVASTATIN CALCIUM 40 MG: 40 TABLET, FILM COATED ORAL at 08:56:00

## 2019-09-25 RX ADMIN — Medication 1 TABLET: at 21:40:00

## 2019-09-25 RX ADMIN — OXYCODONE HCL 20 MG: 20 TABLET, FILM COATED, EXTENDED RELEASE ORAL at 05:32:00

## 2019-09-25 RX ADMIN — HYDROMORPHONE HYDROCHLORIDE 0.5 MG: 1 INJECTION, SOLUTION INTRAMUSCULAR; INTRAVENOUS; SUBCUTANEOUS at 19:36:00

## 2019-09-25 RX ADMIN — BUMETANIDE 2 MG: 2 TABLET ORAL at 21:40:00

## 2019-09-25 RX ADMIN — Medication 1 TABLET: at 15:12:00

## 2019-09-25 RX ADMIN — HYDROCODONE BITARTRATE AND ACETAMINOPHEN 2 TABLET: 10; 325 TABLET ORAL at 21:40:00

## 2019-09-25 RX ADMIN — SPIRONOLACTONE 25 MG: 25 TABLET ORAL at 08:57:00

## 2019-09-25 RX ADMIN — HYDROCODONE BITARTRATE AND ACETAMINOPHEN 2 TABLET: 10; 325 TABLET ORAL at 15:12:00

## 2019-09-25 RX ADMIN — PANTOPRAZOLE SODIUM 40 MG: 40 TABLET, DELAYED RELEASE ORAL at 05:33:00

## 2019-09-25 RX ADMIN — SOTALOL HYDROCHLORIDE 120 MG: 120 TABLET ORAL at 08:56:00

## 2019-09-25 RX ADMIN — Medication 50 MG: at 08:58:00

## 2019-09-25 RX ADMIN — HYDROCODONE BITARTRATE AND ACETAMINOPHEN 2 TABLET: 10; 325 TABLET ORAL at 08:58:00

## 2019-09-25 RX ADMIN — METOPROLOL SUCCINATE 50 MG: 50 TABLET, EXTENDED RELEASE ORAL at 08:56:00

## 2019-09-25 RX ADMIN — GABAPENTIN 300 MG: 300 CAPSULE ORAL at 21:40:00

## 2019-09-25 RX ADMIN — HYDROMORPHONE HYDROCHLORIDE 1 MG: 1 INJECTION, SOLUTION INTRAMUSCULAR; INTRAVENOUS; SUBCUTANEOUS at 11:38:00

## 2019-09-25 NOTE — OPERATIVE REPORT
[facility]    PATIENT'S NAME: Kirsty Senior   ATTENDING PHYSICIAN: Vanessa Ardon M.D. OPERATING PHYSICIAN: Lilly Kwan M.D.    PATIENT ACCOUNT#:   [de-identified]    LOCATION:  [address] [place] [address]  MEDICAL RECORD #:   [id number]       DATE OF AZAEL table without complications, placed into a postop shoe. Patient tolerated the procedure well.       Dictated By Checo Carcamo M.D.  d: 09/24/2019 11:40:38  t: 09/24/2019 11:58:48  Job [id number]  WN/

## 2019-09-25 NOTE — PLAN OF CARE
S/p toe amputation  Able to bear weight on heel as needed  Post op shoe  Can be dc'd per ortho  F/u melba in 1 week   Leave dressing intact

## 2019-09-25 NOTE — PLAN OF CARE
Assumed care @ 1900. Patient alert oriented x4. On telemetry monitoring. Denies SOB,  On dilaudid IV for pain. Dressing dry and intact on the right foot. Surgical incision TA  Updated patient with plan of care, verbalizes understanding.   Ensures ranjeet temperature  - Assess for signs of decreased coronary artery perfusion - ex.  Angina  - Evaluate fluid balance, assess for edema, trend weights  Outcome: Progressing  Goal: Absence of cardiac arrhythmias or at baseline  Description  INTERVENTIONS:  - Contin

## 2019-09-25 NOTE — PROGRESS NOTES
Vascular surgery progress note    Patient seen and examined this a.m. He underwent toe amputation yesterday. He states his pain is controlled with oxycodone and dilaudid. He is tolerating PO and voiding without issues .  His vitals and labs are stable and w

## 2019-09-25 NOTE — PLAN OF CARE
Assumed care at 0700. AxOx4, VSS  On 2L NC post-op  LE pulses present per doppler  Had right great toe amputation this afternoon. Uncontrolled pain post-op. Pain service consulted and Dr. Cora Soriano informed.  Orders for dilaudid PRN and gabapentin along with p CARDIOVASCULAR - ADULT  Goal: Maintains optimal cardiac output and hemodynamic stability  Description  INTERVENTIONS:  - Monitor vital signs, rhythm, and trends  - Monitor for bleeding, hypotension and signs of decreased cardiac output  - Evaluate effectiv mobility/gait  Description  Interventions:  - Assess patient's functional ability and stability  - Promote increasing activity/tolerance for mobility and gait  - Educate and engage patient/family in tolerated activity level and precautions  Outcome: Lex

## 2019-09-25 NOTE — PLAN OF CARE
Assumed care at 0700. No acute issues. Prn norco, dilaudid for pain as well as scheduled oxycodone. Drowsy at times but a/o x 4.  RA day, 1-2L at [facility]. NSR on tele. Right foot dressing intact. NWB.   Ok to dc per vascular and ortho, needs PT eval as p

## 2019-09-26 LAB
GLUCOSE BLD-MCNC: 129 MG/DL (ref 70–99)
GLUCOSE BLD-MCNC: 195 MG/DL (ref 70–99)

## 2019-09-26 PROCEDURE — 82962 GLUCOSE BLOOD TEST: CPT

## 2019-09-26 PROCEDURE — 97162 PT EVAL MOD COMPLEX 30 MIN: CPT

## 2019-09-26 PROCEDURE — 97535 SELF CARE MNGMENT TRAINING: CPT

## 2019-09-26 PROCEDURE — 97116 GAIT TRAINING THERAPY: CPT

## 2019-09-26 PROCEDURE — 97165 OT EVAL LOW COMPLEX 30 MIN: CPT

## 2019-09-26 RX ORDER — GABAPENTIN 300 MG/1
300 CAPSULE ORAL 3 TIMES DAILY
Qty: 60 CAPSULE | Refills: 0 | Status: SHIPPED | OUTPATIENT
Start: 2019-09-26 | End: 2020-03-04

## 2019-09-26 RX ORDER — HYDROCODONE BITARTRATE AND ACETAMINOPHEN 10; 325 MG/1; MG/1
1-2 TABLET ORAL EVERY 4 HOURS PRN
Qty: 30 TABLET | Refills: 0 | Status: SHIPPED | OUTPATIENT
Start: 2019-09-26 | End: 2019-09-27

## 2019-09-26 RX ADMIN — SOTALOL HYDROCHLORIDE 120 MG: 120 TABLET ORAL at 08:28:00

## 2019-09-26 RX ADMIN — BUPROPION HYDROCHLORIDE 150 MG: 150 TABLET ORAL at 08:28:00

## 2019-09-26 RX ADMIN — CLOPIDOGREL BISULFATE 75 MG: 75 TABLET ORAL at 08:28:00

## 2019-09-26 RX ADMIN — HYDROCODONE BITARTRATE AND ACETAMINOPHEN 2 TABLET: 10; 325 TABLET ORAL at 02:12:00

## 2019-09-26 RX ADMIN — SPIRONOLACTONE 25 MG: 25 TABLET ORAL at 08:27:00

## 2019-09-26 RX ADMIN — Medication 1 TABLET: at 08:27:00

## 2019-09-26 RX ADMIN — GABAPENTIN 300 MG: 300 CAPSULE ORAL at 16:29:00

## 2019-09-26 RX ADMIN — PANTOPRAZOLE SODIUM 40 MG: 40 TABLET, DELAYED RELEASE ORAL at 06:19:00

## 2019-09-26 RX ADMIN — OXYCODONE HCL 20 MG: 20 TABLET, FILM COATED, EXTENDED RELEASE ORAL at 06:19:00

## 2019-09-26 RX ADMIN — BUMETANIDE 2 MG: 2 TABLET ORAL at 20:59:00

## 2019-09-26 RX ADMIN — HYDROCODONE BITARTRATE AND ACETAMINOPHEN 2 TABLET: 10; 325 TABLET ORAL at 06:19:00

## 2019-09-26 RX ADMIN — DOCUSATE SODIUM 100 MG: 100 CAPSULE, LIQUID FILLED ORAL at 20:59:00

## 2019-09-26 RX ADMIN — GABAPENTIN 300 MG: 300 CAPSULE ORAL at 08:28:00

## 2019-09-26 RX ADMIN — GABAPENTIN 300 MG: 300 CAPSULE ORAL at 20:59:00

## 2019-09-26 RX ADMIN — HYDROCODONE BITARTRATE AND ACETAMINOPHEN 2 TABLET: 10; 325 TABLET ORAL at 20:59:00

## 2019-09-26 RX ADMIN — ATORVASTATIN CALCIUM 40 MG: 40 TABLET, FILM COATED ORAL at 08:27:00

## 2019-09-26 RX ADMIN — HYDROCODONE BITARTRATE AND ACETAMINOPHEN 2 TABLET: 10; 325 TABLET ORAL at 15:09:00

## 2019-09-26 RX ADMIN — Medication 50 MG: at 08:28:00

## 2019-09-26 RX ADMIN — POLYETHYLENE GLYCOL 3350 17 G: 17 POWDER, FOR SOLUTION ORAL at 08:28:00

## 2019-09-26 RX ADMIN — METOPROLOL SUCCINATE 50 MG: 50 TABLET, EXTENDED RELEASE ORAL at 08:28:00

## 2019-09-26 RX ADMIN — SPIRONOLACTONE 25 MG: 25 TABLET ORAL at 20:59:00

## 2019-09-26 RX ADMIN — BUMETANIDE 2 MG: 2 TABLET ORAL at 08:28:00

## 2019-09-26 RX ADMIN — Medication 1 TABLET: at 20:59:00

## 2019-09-26 RX ADMIN — OXYCODONE HCL 20 MG: 20 TABLET, FILM COATED, EXTENDED RELEASE ORAL at 18:09:00

## 2019-09-26 RX ADMIN — Medication 1 TABLET: at 16:29:00

## 2019-09-26 RX ADMIN — DILTIAZEM HYDROCHLORIDE 120 MG: 60 TABLET, FILM COATED ORAL at 08:28:00

## 2019-09-26 RX ADMIN — DOCUSATE SODIUM 100 MG: 100 CAPSULE, LIQUID FILLED ORAL at 08:27:00

## 2019-09-26 RX ADMIN — SOTALOL HYDROCHLORIDE 120 MG: 120 TABLET ORAL at 20:59:00

## 2019-09-26 NOTE — PROGRESS NOTES
[facility] Hospitalist note    PCP: Greta Khalil MD    Chief Complaint:  F/u toe amputation    SUBJECTIVE:  Pt reports episodic severe pain but in between it is tolerable  No sob, eating ok  Does not feel like he can go home because of the pain although he has Oral BID   • bumetanide  2 mg Oral BID   • Metoprolol Succinate ER  50 mg Oral Daily   • atorvastatin  40 mg Oral Daily   • Sotalol HCl  120 mg Oral BID   • dilTIAZem HCl  120 mg Oral Daily   • buPROPion HCl ER (XL)  150 mg Oral Daily   • Sertraline HCl  5

## 2019-09-26 NOTE — HOME CARE LIAISON
TNL REC'D VERBAL ORDER TO MEET WITH PTNT TO OFFER HH ON DC. TNL MET WITH PTNT AT BEDSIDE TO DISCUSS HH ON DC.  PTNT DECLINED STATING HE WILL NOT NEED ANY WOUND CARE UNTIL AFTER HIS FOLLOW UP WITH MD. PTNT NOT SURE OF HIS NEEDS UNTIL HE FOLLOW UP WITH ADRYAN. TN

## 2019-09-26 NOTE — PLAN OF CARE
Assumed care at [address] [place] [address]. Pt A&O X 4. Right foot dressing CDI. BLE elevated on pillows. PRN Norco and scheduled Oxycontin effective for pain control. Voiced c/o constipation; obtained order for suppository and enema. Suppository effective. NSR on tele. arrhythmias or at baseline  Description  INTERVENTIONS:  - Continuous cardiac monitoring, monitor vital signs, obtain 12 lead EKG if indicated  - Evaluate effectiveness of antiarrhythmic and heart rate control medications as ordered  - Initiate emergency m

## 2019-09-26 NOTE — PLAN OF CARE
Assumed care at 0700, no acute issues  A&O x 4, NSR on tele  PRN Norco & Scheduled Oxycontin for pain control.   Average pain score was 3-4/10  1-2L NC when sleeping/napping  Weight bearing on R heel only with postop shoe  OT/PT recommends Hh, pt will consi

## 2019-09-26 NOTE — OCCUPATIONAL THERAPY NOTE
OCCUPATIONAL THERAPY EVALUATION - INPATIENT     Room Number: 5398/6703-I  Evaluation Date: 9/26/2019  Type of Evaluation: Initial  Presenting Problem: partial 1st ray resection R foot on 9/24    Physician Order: IP Consult to Occupational Therapy  Reason f ANGIOPLASTY  2001    stent   • COLONOSCOPY     • COLONOSCOPY, POSSIBLE BIOPSY, POSSIBLE POLYPECTOMY 00671 N/A 6/11/2018    Performed by Sylvia Barrera MD at [facility]   • ENDOSCOPY,  SINUS SEPTOPLASTY,ANTROSTOMIES Bilateral 1/16/2019 Uses: Glasses    Prior Level of Function: independent with ADL and IADL. Pt enjoys spending time with his 2 cats. Neighbors and friends assist the pt as needed with household tasks. Pt bought many pre-made meals before this admission.       OBJECTIVE  Pr Contact guard assist    Skilled Therapy Provided: Educated the pt about R heel weight bear precaution (using post-op shoe). Verbalized understanding. Supervision supine to sit. Pt was able to cinthia post-op shoe on his R foot independently while seated.  CG Overall Complexity LOW     OT Discharge Recommendations: Home with home health PT/OT       PLAN  OT Treatment Plan: Balance activities; Energy conservation/work simplification techniques;ADL training;Functional transfer training;UE strengthening/ROM; Patie

## 2019-09-26 NOTE — PROGRESS NOTES
[facility] Hospitalist note    PCP: Kaitlin Doe MD    Chief Complaint:  F/u toe amputation    SUBJECTIVE:  Pt reports significant pain in toe today  No sob, eating ok    OBJECTIVE:  Temp:  [98.5 °F (36.9 °C)-99.2 °F (37.3 °C)] 99.1 °F (37.3 °C)  Pulse:  [62-7 120 mg Oral BID   • dilTIAZem HCl  120 mg Oral Daily   • buPROPion HCl ER (XL)  150 mg Oral Daily   • Sertraline HCl  50 mg Oral Daily   • oxyCODONE HCl ER  20 mg Oral 2 times per day   • Pantoprazole Sodium  40 mg Oral QAM AC   • apixaban  5 mg Oral BID

## 2019-09-26 NOTE — PHYSICAL THERAPY NOTE
PHYSICAL THERAPY EVALUATION - INPATIENT     Room Number: 6891/4592-Z  Evaluation Date: 9/26/2019  Type of Evaluation: Initial  Physician Order: PT Eval and Treat    Presenting Problem: s/p angio. stent LLE, toe amputation  Reason for Therapy: Mobility Performed by Agus Bullard MD at [facility]   • ENDOSCOPY,  SINUS SEPTOPLASTY,ANTROSTOMIES Bilateral 1/16/2019    Performed by Fernando Mendez MD at [facility] Right 8/13/2019    Perf level.    SUBJECTIVE  Pt motivated, eager to participate    Patient self-stated goal is go home    OBJECTIVE  Precautions: Bed/chair alarm  Fall Risk: High fall risk    WEIGHT BEARING RESTRICTION  Weight Bearing Restriction: R lower extremity        R Lesley Rojas Provided: Pt performed supine to sit with supervision. Pt donned post-op shoe. Pt performed sit to stand with CGA, cues for hand placement. Pt ambulated within hallway with RW, CGA. Pt requiring reinforcement for sequencing.  Pt returned to bedside chair wi 2      CURRENT GOALS    Goal #1 Patient is able to demonstrate supine - sit EOB @ level: modified independent     Goal #2 Patient is able to demonstrate transfers Sit to/from Stand at assistance level: modified independent     Goal #3 Patient is able to am

## 2019-09-26 NOTE — CM/SW NOTE
Pt is a 69 yo male admitted for gangrene of both lower extremities. Pt is  and has no children. Pt lives alone but says he is good friends with all his neighbors who look out for him. Pt's HCPOA is his brother Alcira Pedroza who lives in [place].   Pt also

## 2019-09-27 RX ORDER — HYDROCODONE BITARTRATE AND ACETAMINOPHEN 10; 325 MG/1; MG/1
1-2 TABLET ORAL EVERY 6 HOURS PRN
Qty: 45 TABLET | Refills: 0 | Status: ON HOLD | OUTPATIENT
Start: 2019-09-27 | End: 2019-10-18

## 2019-09-27 RX ADMIN — GABAPENTIN 300 MG: 300 CAPSULE ORAL at 16:01:00

## 2019-09-27 RX ADMIN — Medication 50 MG: at 08:50:00

## 2019-09-27 RX ADMIN — BUMETANIDE 2 MG: 2 TABLET ORAL at 21:39:00

## 2019-09-27 RX ADMIN — BUPROPION HYDROCHLORIDE 150 MG: 150 TABLET ORAL at 08:49:00

## 2019-09-27 RX ADMIN — ATORVASTATIN CALCIUM 40 MG: 40 TABLET, FILM COATED ORAL at 08:49:00

## 2019-09-27 RX ADMIN — METOPROLOL SUCCINATE 50 MG: 50 TABLET, EXTENDED RELEASE ORAL at 08:48:00

## 2019-09-27 RX ADMIN — HYDROCODONE BITARTRATE AND ACETAMINOPHEN 2 TABLET: 10; 325 TABLET ORAL at 04:56:00

## 2019-09-27 RX ADMIN — OXYCODONE HCL 20 MG: 20 TABLET, FILM COATED, EXTENDED RELEASE ORAL at 18:09:00

## 2019-09-27 RX ADMIN — DILTIAZEM HYDROCHLORIDE 120 MG: 60 TABLET, FILM COATED ORAL at 08:47:00

## 2019-09-27 RX ADMIN — HYDROCODONE BITARTRATE AND ACETAMINOPHEN 2 TABLET: 10; 325 TABLET ORAL at 13:07:00

## 2019-09-27 RX ADMIN — Medication 1 TABLET: at 08:50:00

## 2019-09-27 RX ADMIN — PANTOPRAZOLE SODIUM 40 MG: 40 TABLET, DELAYED RELEASE ORAL at 06:20:00

## 2019-09-27 RX ADMIN — DOCUSATE SODIUM 100 MG: 100 CAPSULE, LIQUID FILLED ORAL at 21:24:00

## 2019-09-27 RX ADMIN — SPIRONOLACTONE 25 MG: 25 TABLET ORAL at 08:50:00

## 2019-09-27 RX ADMIN — OXYCODONE HCL 20 MG: 20 TABLET, FILM COATED, EXTENDED RELEASE ORAL at 06:20:00

## 2019-09-27 RX ADMIN — CLOPIDOGREL BISULFATE 75 MG: 75 TABLET ORAL at 08:50:00

## 2019-09-27 RX ADMIN — BUMETANIDE 2 MG: 2 TABLET ORAL at 08:48:00

## 2019-09-27 RX ADMIN — Medication 1 TABLET: at 16:00:00

## 2019-09-27 RX ADMIN — HYDROCODONE BITARTRATE AND ACETAMINOPHEN 1 TABLET: 10; 325 TABLET ORAL at 21:39:00

## 2019-09-27 RX ADMIN — GABAPENTIN 300 MG: 300 CAPSULE ORAL at 21:25:00

## 2019-09-27 RX ADMIN — DOCUSATE SODIUM 100 MG: 100 CAPSULE, LIQUID FILLED ORAL at 08:49:00

## 2019-09-27 RX ADMIN — SOTALOL HYDROCHLORIDE 120 MG: 120 TABLET ORAL at 21:00:00

## 2019-09-27 RX ADMIN — SOTALOL HYDROCHLORIDE 120 MG: 120 TABLET ORAL at 09:00:00

## 2019-09-27 RX ADMIN — SPIRONOLACTONE 25 MG: 25 TABLET ORAL at 21:24:00

## 2019-09-27 RX ADMIN — Medication 1 TABLET: at 21:24:00

## 2019-09-27 RX ADMIN — GABAPENTIN 300 MG: 300 CAPSULE ORAL at 08:49:00

## 2019-09-27 NOTE — PLAN OF CARE
Assumed care at [address] [facility]. Pt A&O X 4. Right foot dressing CDI. BLE elevated. PRN Norco effective for pain control. Left groin site ROMERO, area soft/nontender. No hematoma. Appears to have slept most of the night. NSR with 1 degree avb on tele.    No signifi Absence of cardiac arrhythmias or at baseline  Description  INTERVENTIONS:  - Continuous cardiac monitoring, monitor vital signs, obtain 12 lead EKG if indicated  - Evaluate effectiveness of antiarrhythmic and heart rate control medications as ordered  - I bathing  - Educate and encourage patient/family in tolerated functional activity level and precautions during self-care     Outcome: Progressing

## 2019-09-27 NOTE — PROGRESS NOTES
S/p right first toe amputation  Doing well  Pain better controlled with gabapentin added  dsg c/d/i  Patient WBAT  Moves toes flex/ext/ehl  Foot warm and well perfused  Can be dc'd per ortho and f/u melba next Thursday in [place]

## 2019-09-27 NOTE — PROGRESS NOTES
Vascular Surgery Progress note. Patient seen and examined this am. He is POD 4 s/p RLE angio with angioplasty and stenting of the popliteal artery. No overnight events.  The patient states his foot feels warm and well perfused without any numbness, tingl

## 2019-09-27 NOTE — PROCEDURES
[facility]    PATIENT'S NAME: Pepitoaleena Cabralesiglesia   ATTENDING PHYSICIAN: Iva Welsh M.D. OPERATING PHYSICIAN: Elder Deal MD   PATIENT ACCOUNT#:   [id number]    LOCATION:  [facility]  MEDICAL RECORD #:   [id number]       DATE OF B chronic nonhealing wound of his right first toe.   After completion of the procedure, he was noted to have palpable pulses in the right lower extremity dramatically improved from preoperative baseline and stable Doppler signal in the left lower extremity as extremity arteriogram was performed that revealed a patent common femoral SFA and profunda. The popliteal artery abruptly occluded to a flush  occlusion with prominent geniculate collaterals.   The concern is with the large prominent geniculate collaterals was then post dilated with a 5 mm balloon. Then, a repeat right lower extremity arteriogram revealed rapid washout of contrast with no further residual stenosis and preservation of all collaterals.   The popliteal was widely patent through the AT, PT, and

## 2019-09-27 NOTE — PLAN OF CARE
Received patient A/Ox4, RA, NSR on tele at 0700  Patient is post op day 3 for a right great toe amputation   Posterior tibial pulses with doppler, left pedal with doppler, right pedal TA  Patient rates pain as a 3-4 out of 10, has oxycodone 20mg q12 hours anxiety  - Utilize distraction and/or relaxation techniques  - Monitor for opioid side effects  - Notify MD/LIP if interventions unsuccessful or patient reports new pain  - Anticipate increased pain with activity and pre-medicate as appropriate  9/27/2019 Progressing     Problem: SKIN/TISSUE INTEGRITY - ADULT  Goal: Skin integrity remains intact  Description  INTERVENTIONS  - Assess and document risk factors for pressure ulcer development  - Assess and document skin integrity  - Monitor for areas of redness during self-care     9/27/2019 1501 by Wendy Uribe RN  Outcome: Progressing  9/27/2019 1458 by Wendy Uribe RN  Outcome: Progressing

## 2019-09-27 NOTE — PROGRESS NOTES
[facility] Hospitalist Progress Note     [facility]      SUBJECTIVE:  No CP, SOB, or N/V.    OBJECTIVE:  Temp:  [98 °F (36.7 °C)-99.2 °F (37.3 °C)] 98 °F (36.7 °C)  Pulse:  [54-70] 57  Resp:  [15-24] 24  BP: (104-148)/(54-69) 148/69    Intake/Output:     In of both lower extremities due to atherosclerosis ([facility] [D19.169 (ICD-10-CM)]). ---------------------------------------------------------------------------- History:   Risk factors:  Hypertension.   Hyperlipidemia. ------------------------------------------- ! GSV !Right ankle          ! No    !3.80mm! sclerotic.                  ! +----+---------------------+------+------+----------------------------+ ! GSV ! Left s/f junction    ! No    !5.20mm!----------------------------! +----+---------------------+------+----- +----+---------------------+------+------+----------------------------+ ! SSV ! Left popliteal fossa ! No    !2.30mm! sclerotic.                  ! +----+---------------------+------+------+----------------------------+ ! SSV ! Proximal left calf   ! No    !1.90m (COLACE) cap 100 mg 100 mg Oral BID   spironolactone (ALDACTONE) tab 25 mg 25 mg Oral BID   bumetanide (BUMEX) tab 2 mg 2 mg Oral BID   Metoprolol Succinate ER (Toprol XL) 24 hr tab 50 mg 50 mg Oral Daily   Meclizine HCl (ANTIVERT) tab 25 mg 25 mg Oral TID bumex  - Beta blocker     # Afib  - on Eliquis  - Continue beta blocker, diltiazem, sotalol     # CKD  - Cr stable  - Renally dose medications     # Parkison's   - Continue Sinemet    Prophy:  DVT: SCDs  Deconditioning prevention: PT/OT    Dispo: admitted

## 2019-09-28 VITALS
SYSTOLIC BLOOD PRESSURE: 124 MMHG | HEIGHT: 71 IN | WEIGHT: 205 LBS | TEMPERATURE: 98 F | HEART RATE: 55 BPM | BODY MASS INDEX: 28.7 KG/M2 | RESPIRATION RATE: 20 BRPM | OXYGEN SATURATION: 98 % | DIASTOLIC BLOOD PRESSURE: 71 MMHG

## 2019-09-28 RX ADMIN — BUMETANIDE 2 MG: 2 TABLET ORAL at 08:49:00

## 2019-09-28 RX ADMIN — PANTOPRAZOLE SODIUM 40 MG: 40 TABLET, DELAYED RELEASE ORAL at 06:24:00

## 2019-09-28 RX ADMIN — Medication 1 TABLET: at 08:46:00

## 2019-09-28 RX ADMIN — OXYCODONE HCL 20 MG: 20 TABLET, FILM COATED, EXTENDED RELEASE ORAL at 06:24:00

## 2019-09-28 RX ADMIN — METOPROLOL SUCCINATE 50 MG: 50 TABLET, EXTENDED RELEASE ORAL at 08:46:00

## 2019-09-28 RX ADMIN — CLOPIDOGREL BISULFATE 75 MG: 75 TABLET ORAL at 08:46:00

## 2019-09-28 RX ADMIN — ATORVASTATIN CALCIUM 40 MG: 40 TABLET, FILM COATED ORAL at 08:46:00

## 2019-09-28 RX ADMIN — GABAPENTIN 300 MG: 300 CAPSULE ORAL at 08:45:00

## 2019-09-28 RX ADMIN — DOCUSATE SODIUM 100 MG: 100 CAPSULE, LIQUID FILLED ORAL at 08:46:00

## 2019-09-28 RX ADMIN — BUPROPION HYDROCHLORIDE 150 MG: 150 TABLET ORAL at 08:48:00

## 2019-09-28 RX ADMIN — SOTALOL HYDROCHLORIDE 120 MG: 120 TABLET ORAL at 08:46:00

## 2019-09-28 RX ADMIN — POLYETHYLENE GLYCOL 3350 17 G: 17 POWDER, FOR SOLUTION ORAL at 08:45:00

## 2019-09-28 RX ADMIN — DILTIAZEM HYDROCHLORIDE 120 MG: 60 TABLET, FILM COATED ORAL at 08:45:00

## 2019-09-28 RX ADMIN — Medication 50 MG: at 08:46:00

## 2019-09-28 RX ADMIN — SPIRONOLACTONE 25 MG: 25 TABLET ORAL at 08:45:00

## 2019-09-28 RX ADMIN — HYDROCODONE BITARTRATE AND ACETAMINOPHEN 2 TABLET: 10; 325 TABLET ORAL at 02:57:00

## 2019-09-28 NOTE — PLAN OF CARE
Patient resting in bed. Oxy given as scheduled and Norco used for breakthrough pain. No events through night. Anticipate d/c home today.

## 2019-09-28 NOTE — PLAN OF CARE
Discharge instructions, prescriptions, medications & follow-up appointments reviewed with pt. Verbalizes understanding. IV ELOISE PAZ'd. Rt foot remains in original dressings, with surgical shoe on. Brother to drive pt home.   To car via JUSTO Andersen accompanied by constance

## 2019-09-30 NOTE — CM/SW NOTE
09/30/19 0900   Discharge disposition   Expected discharge disposition Home or Self   Name of [address] [place] [address] services after discharge Patient refused services   Discharge transportati

## 2019-10-10 ENCOUNTER — APPOINTMENT (OUTPATIENT)
Dept: ULTRASOUND IMAGING | Facility: HOSPITAL | Age: 69
DRG: 463 | End: 2019-10-10
Attending: EMERGENCY MEDICINE
Payer: MEDICARE

## 2019-10-10 ENCOUNTER — APPOINTMENT (OUTPATIENT)
Dept: GENERAL RADIOLOGY | Facility: HOSPITAL | Age: 69
DRG: 463 | End: 2019-10-10
Attending: EMERGENCY MEDICINE
Payer: MEDICARE

## 2019-10-10 ENCOUNTER — HOSPITAL ENCOUNTER (INPATIENT)
Facility: HOSPITAL | Age: 69
LOS: 8 days | Discharge: NURSING, REHAB OR LONG-TERM CARE | DRG: 463 | End: 2019-10-19
Attending: EMERGENCY MEDICINE | Admitting: HOSPITALIST
Payer: MEDICARE

## 2019-10-10 DIAGNOSIS — L03.119 CELLULITIS OF FOOT: ICD-10-CM

## 2019-10-10 DIAGNOSIS — L03.119 CELLULITIS OF LOWER EXTREMITY, UNSPECIFIED LATERALITY: Primary | ICD-10-CM

## 2019-10-10 DIAGNOSIS — L03.115 CELLULITIS OF RIGHT LOWER EXTREMITY: ICD-10-CM

## 2019-10-10 PROCEDURE — 96365 THER/PROPH/DIAG IV INF INIT: CPT

## 2019-10-10 PROCEDURE — 73630 X-RAY EXAM OF FOOT: CPT | Performed by: EMERGENCY MEDICINE

## 2019-10-10 PROCEDURE — 80053 COMPREHEN METABOLIC PANEL: CPT | Performed by: EMERGENCY MEDICINE

## 2019-10-10 PROCEDURE — 99285 EMERGENCY DEPT VISIT HI MDM: CPT

## 2019-10-10 PROCEDURE — 36415 COLL VENOUS BLD VENIPUNCTURE: CPT

## 2019-10-10 PROCEDURE — 85025 COMPLETE CBC W/AUTO DIFF WBC: CPT | Performed by: EMERGENCY MEDICINE

## 2019-10-10 PROCEDURE — 83605 ASSAY OF LACTIC ACID: CPT | Performed by: EMERGENCY MEDICINE

## 2019-10-10 PROCEDURE — 87040 BLOOD CULTURE FOR BACTERIA: CPT | Performed by: EMERGENCY MEDICINE

## 2019-10-10 PROCEDURE — 93971 EXTREMITY STUDY: CPT | Performed by: EMERGENCY MEDICINE

## 2019-10-11 ENCOUNTER — APPOINTMENT (OUTPATIENT)
Dept: GENERAL RADIOLOGY | Facility: HOSPITAL | Age: 69
DRG: 463 | End: 2019-10-11
Attending: INTERNAL MEDICINE
Payer: MEDICARE

## 2019-10-11 ENCOUNTER — APPOINTMENT (OUTPATIENT)
Dept: GENERAL RADIOLOGY | Facility: HOSPITAL | Age: 69
DRG: 463 | End: 2019-10-11
Attending: ORTHOPAEDIC SURGERY
Payer: MEDICARE

## 2019-10-11 PROBLEM — L03.119 CELLULITIS OF LOWER EXTREMITY, UNSPECIFIED LATERALITY: Status: ACTIVE | Noted: 2019-10-11

## 2019-10-11 PROCEDURE — 73620 X-RAY EXAM OF FOOT: CPT | Performed by: ORTHOPAEDIC SURGERY

## 2019-10-11 PROCEDURE — 87077 CULTURE AEROBIC IDENTIFY: CPT | Performed by: INTERNAL MEDICINE

## 2019-10-11 PROCEDURE — 87205 SMEAR GRAM STAIN: CPT | Performed by: INTERNAL MEDICINE

## 2019-10-11 PROCEDURE — 87070 CULTURE OTHR SPECIMN AEROBIC: CPT | Performed by: INTERNAL MEDICINE

## 2019-10-11 PROCEDURE — 87186 SC STD MICRODIL/AGAR DIL: CPT | Performed by: INTERNAL MEDICINE

## 2019-10-11 PROCEDURE — 83735 ASSAY OF MAGNESIUM: CPT | Performed by: INTERNAL MEDICINE

## 2019-10-11 PROCEDURE — 85025 COMPLETE CBC W/AUTO DIFF WBC: CPT | Performed by: INTERNAL MEDICINE

## 2019-10-11 PROCEDURE — 84132 ASSAY OF SERUM POTASSIUM: CPT | Performed by: INTERNAL MEDICINE

## 2019-10-11 PROCEDURE — 80048 BASIC METABOLIC PNL TOTAL CA: CPT | Performed by: INTERNAL MEDICINE

## 2019-10-11 PROCEDURE — 73610 X-RAY EXAM OF ANKLE: CPT | Performed by: INTERNAL MEDICINE

## 2019-10-11 RX ORDER — GABAPENTIN 300 MG/1
300 CAPSULE ORAL 3 TIMES DAILY
Status: DISCONTINUED | OUTPATIENT
Start: 2019-10-11 | End: 2019-10-19

## 2019-10-11 RX ORDER — PANTOPRAZOLE SODIUM 40 MG/1
40 TABLET, DELAYED RELEASE ORAL
Status: DISCONTINUED | OUTPATIENT
Start: 2019-10-11 | End: 2019-10-19

## 2019-10-11 RX ORDER — HYDROCODONE BITARTRATE AND ACETAMINOPHEN 10; 325 MG/1; MG/1
1 TABLET ORAL EVERY 4 HOURS PRN
Status: DISCONTINUED | OUTPATIENT
Start: 2019-10-11 | End: 2019-10-19

## 2019-10-11 RX ORDER — SOTALOL HYDROCHLORIDE 120 MG/1
120 TABLET ORAL
Status: DISCONTINUED | OUTPATIENT
Start: 2019-10-11 | End: 2019-10-19

## 2019-10-11 RX ORDER — HYDROCODONE BITARTRATE AND ACETAMINOPHEN 10; 325 MG/1; MG/1
1-2 TABLET ORAL EVERY 6 HOURS PRN
Status: DISCONTINUED | OUTPATIENT
Start: 2019-10-11 | End: 2019-10-11

## 2019-10-11 RX ORDER — METOPROLOL SUCCINATE 25 MG/1
25 TABLET, EXTENDED RELEASE ORAL
Status: DISCONTINUED | OUTPATIENT
Start: 2019-10-12 | End: 2019-10-14

## 2019-10-11 RX ORDER — ALFUZOSIN HYDROCHLORIDE 10 MG/1
10 TABLET, EXTENDED RELEASE ORAL
Status: DISCONTINUED | OUTPATIENT
Start: 2019-10-11 | End: 2019-10-19

## 2019-10-11 RX ORDER — HYDROCODONE BITARTRATE AND ACETAMINOPHEN 10; 325 MG/1; MG/1
2 TABLET ORAL EVERY 4 HOURS PRN
Status: DISCONTINUED | OUTPATIENT
Start: 2019-10-11 | End: 2019-10-19

## 2019-10-11 RX ORDER — BUPROPION HYDROCHLORIDE 150 MG/1
150 TABLET ORAL DAILY
Status: DISCONTINUED | OUTPATIENT
Start: 2019-10-11 | End: 2019-10-19

## 2019-10-11 RX ORDER — ALPRAZOLAM 0.5 MG/1
0.5 TABLET ORAL 2 TIMES DAILY PRN
Status: DISCONTINUED | OUTPATIENT
Start: 2019-10-11 | End: 2019-10-19

## 2019-10-11 RX ORDER — ATORVASTATIN CALCIUM 40 MG/1
40 TABLET, FILM COATED ORAL DAILY
Status: DISCONTINUED | OUTPATIENT
Start: 2019-10-11 | End: 2019-10-19

## 2019-10-11 RX ORDER — SPIRONOLACTONE 25 MG/1
25 TABLET ORAL 2 TIMES DAILY
Status: DISCONTINUED | OUTPATIENT
Start: 2019-10-11 | End: 2019-10-19

## 2019-10-11 RX ORDER — DILTIAZEM HYDROCHLORIDE 60 MG/1
120 TABLET, FILM COATED ORAL DAILY
Status: DISCONTINUED | OUTPATIENT
Start: 2019-10-11 | End: 2019-10-11

## 2019-10-11 RX ORDER — SODIUM CHLORIDE 9 MG/ML
INJECTION, SOLUTION INTRAVENOUS CONTINUOUS
Status: ACTIVE | OUTPATIENT
Start: 2019-10-11 | End: 2019-10-11

## 2019-10-11 RX ORDER — BUMETANIDE 2 MG/1
2 TABLET ORAL
Status: DISCONTINUED | OUTPATIENT
Start: 2019-10-11 | End: 2019-10-19

## 2019-10-11 RX ORDER — HYDROCODONE BITARTRATE AND ACETAMINOPHEN 10; 325 MG/1; MG/1
1-2 TABLET ORAL EVERY 4 HOURS PRN
Status: DISCONTINUED | OUTPATIENT
Start: 2019-10-11 | End: 2019-10-11 | Stop reason: SDUPTHER

## 2019-10-11 RX ORDER — METOPROLOL SUCCINATE 50 MG/1
50 TABLET, EXTENDED RELEASE ORAL
Status: DISCONTINUED | OUTPATIENT
Start: 2019-10-11 | End: 2019-10-11 | Stop reason: DRUGHIGH

## 2019-10-11 RX ORDER — POTASSIUM CHLORIDE 20 MEQ/1
40 TABLET, EXTENDED RELEASE ORAL EVERY 4 HOURS
Status: COMPLETED | OUTPATIENT
Start: 2019-10-11 | End: 2019-10-11

## 2019-10-11 NOTE — ED PROVIDER NOTES
Patient Seen in: [facility] Emergency Department      History   Patient presents with:  Deep Vein Thrombosis (cardiovascular)    Stated Complaint: R/O DVT    HPI    49-year-old male comes to the hospital complaint having difficulty with pain by the brachytherapy   • Sleep apnea     no cpap   • Visual impairment     glasses              Past Surgical History:   Procedure Laterality Date   • ANGIOGRAM  2001   • ANGIOPLASTY (CORONARY)     • CABG  8/2013   • CATARACT      bilateral eyes   • CATH PERCUTAN Social History    Tobacco Use      Smoking status: Former Smoker        Packs/day: 0.50        Years: 40.00        Pack years: 20        Types: Cigarettes        Quit date: 2013        Years since quittin.5      Smokeless tobacc WBC 15.2 (*)     HGB 12.2 (*)     HCT 35.7 (*)     RDW-SD 49.1 (*)     Neutrophil Absolute Prelim 12.90 (*)     Neutrophil Absolute 12.90 (*)     Lymphocyte Absolute 0.76 (*)     Monocyte Absolute 1.36 (*)     All other components within normal limits by: Sara Brewer MD            Us Venous Doppler Leg Left - Diag Img (cpt=93971)    Result Date: 10/10/2019  PROCEDURE:  US VENOUS DOPPLER LEG LEFT - DIAG IMG (CPT=93971)  COMPARISON:  None.   INDICATIONS:  eval for DVT  TECHNIQUE:  Real time, grey scale, an lower extremities due to atherosclerosis (HCC) [Z50.012 (ICD-10-CM)]). ---------------------------------------------------------------------------- History:   Risk factors:  Hypertension.   Hyperlipidemia. --------------------------------------------------- ankle          ! No    !3.80mm! sclerotic.                  ! +----+---------------------+------+------+----------------------------+ ! GSV ! Left s/f junction    ! No    !5.20mm!----------------------------! +----+---------------------+------+------+---------- +----+---------------------+------+------+----------------------------+ ! SSV ! Left popliteal fossa ! No    !2.30mm! sclerotic.                  ! +----+---------------------+------+------+----------------------------+ ! SSV ! Proximal left calf   ! No    !1.90m provider specified.       Medications Prescribed:  Current Discharge Medication List                   Present on Admission  Date Reviewed: 10/10/2019          ICD-10-CM Noted POA    Cellulitis of lower extremity L03.119 10/10/2019 Unknown

## 2019-10-11 NOTE — CONSULTS
INFECTIOUS DISEASE CONSULT NOTE    Leia Bautista.  Patient Status:  Inpatient    1950 MRN [id number]   [facility] 3SW-A Attending Nikole, [facility] [address] Day # 0 PCP • CATH PERCUTANEOUS  TRANSLUMINAL CORONARY ANGIOPLASTY  2001    stent   • COLONOSCOPY     • COLONOSCOPY, POSSIBLE BIOPSY, POSSIBLE POLYPECTOMY 25335 N/A 6/11/2018    Performed by Lindsay Cantrell MD at [address] [place] [address] • Diabetes Paternal Grandmother    • High Blood Pressure Sister    • High Blood Pressure Brother       reports that he quit smoking about 6 years ago. His smoking use included cigarettes. He has a 20.00 pack-year smoking history.  He has never used smokeles Completed. See pertinent positives and negatives in the the HPI. Constitutional: Negative for anorexia, malaise, chills, fevers. Eyes: Negative for eye drainage and redness.   Ears, nose, mouth, throat: Negative for nasal congestion, sore throat, oral u 1955 10/11/19  0456   * 127*   BUN 34* 28*   CREATSERUM 1.88* 1.20   GFRAA 41* 70   GFRNAA 35* 60   CA 9.0 9.0   ALB 2.7*  --    * 133*   K 4.3 3.5   CL 91* 97*   CO2 26.0 30.0   ALKPHO 100  --    AST 19  --    ALT 9*  --    BILT 0.8  --    TP CONCLUSION:  1. Severe hallux valgus and arthritis at the 1st MTP joint. 2. Additional deformities of the 2nd through 4th toes. Dislocation at the 2nd MTP joint. 3. Details as above. Continued clinical correlation recommended.     Dictated by: Heather Dover, - minimal erythema, does not seem to have cellulitis. Severe pain when moving foot not c/w cellulitis either. 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  Ortho also suspected flare of charcot arthropathy as a possibility  - would consider empiric treatment for crystal a

## 2019-10-11 NOTE — ED INITIAL ASSESSMENT (HPI)
Pt arrives with left foot and leg pain, pt states he cannot put any weight on his foot without pain, when touching patient foot pt expresses pain. Able to feel pulse to foot and foot is warm.

## 2019-10-11 NOTE — PLAN OF CARE
Page to Dr. Jacobo Russell re cardiac medications and vital signs at approx 0800. Awaiting response. PT ordered prior from [facility] order set- writer discussed with PT Adriana, patient is currently awaiting ortho consult. PT consult on temporary hold.      0830-retu infrom of Dr. Denice Mullen concern of site. Writer also informed Dr. Ghada Jensen in page that cardiology concerned over the potential for gout to LLE ankle and xray was ordered. Awaiting response. Pictures of RLE surgical site taken and placed on chart.      Return

## 2019-10-11 NOTE — CONSULTS
[facility] Cardiology Consultation Nilay Pacheco MD    The patient was interviewed, examined, the chart was reviewed and the consult was dictated. This is a 68year old male with a chief complaint of bradycardia. Impression:  1.   Atrial fibrillation with sl

## 2019-10-11 NOTE — H&P
[facility] Hospitalist Team  History and Physical       Assessment/Plan:     #Left foot pain  -seen by ortho, suspect charcot foot flare - currently NWB  -ID on consult, iv abx for possible cellulitis  -check ankle xr  -pain control prn   -PT OT when able to bear • High cholesterol    • Mixed hyperlipidemia 10/3/2016   • Osteoarthritis    • Parkinson's disease [facility])    • Peripheral vascular disease ([facility]    • Prostate cancer ([facility] 2010    s/p brachytherapy   • Sleep apnea     no cpap   • Visual impairment     gla Left 12/1/2015    Performed by Mitzi Monzon MD at [facility] MAIN OR   • TOE AMPUTATION Right 9/24/2019    Performed by Anahy Almazan MD at [facility] MAIN OR   • TONSILLECTOMY          ALL:    Pollen                  OTHER (SEE COMMENTS)    Comment:Nasal congest obvious abnormality, atraumatic. Eyes:  Sclera anicteric, No conjunctival pallor, EOMs intact.     Nose: Nares normal,  Mucosa normal    Throat: Lips, mucosa, and tongue normal   Neck: Supple, symmetrical, trachea midline   Lungs:   Clear to auscultation 4th digits. No acute fracture demonstrated. There is a small corticated plantar heel spur. CONCLUSION:  1. Severe hallux valgus and arthritis at the 1st MTP joint. 2. Additional deformities of the 2nd through 4th toes.   Dislocation at the 2nd MTP soraya 2019  ---------------------------------------------------------------------------- Vein Mapping Patient: Lavell Mack Date: Sep 20 2019 11:22AM :     1950 (69yrs)         Accession#: [id number] MRN:     [id number] Gender: +----+---------------------+------+------+----------------------------+ ! GSV ! Right proximal calf  ! No    !3.20mm! sclerotic.                  ! +----+---------------------+------+------+----------------------------+ ! GSV ! Right mid calf       ! No    !3.10m !Right popliteal fossa! No    !4.30mm!----------------------------! +----+---------------------+------+------+----------------------------+ ! SSV ! Proximal right calf  ! No    !3.40mm!----------------------------! +----+---------------------+------+------+--- inpatient services that will reasonably be expected to span two midnight's based on the clinical documentation in H+P. Based on patients current state of illness, I anticipate that, after discharge, patient will require TBD.

## 2019-10-11 NOTE — CONSULTS
ORTHOPAEDIC SURGERY CONSULT NOTE    Attending Physician: Marcos Melendez Physician: Valentino Shark, MD    Patient Name: Leia Bautista.   Age:  68year old  Sex: male  MRN: [id number]  : 1950  Date of Admission: 10/10/2019    REASON FOR CONSULT Performed by Louis Babb MD at [address] Bilateral 1/16/2019    Performed by Miryam Daniels MD at [address] Right 8/13/2019    Perform Pressure Sister    • High Blood Pressure Brother        Review of Systems:  Cardiac: Negative for chest pain  Respiratory: Negative for shortness of breath  Musculoskeletal: left foot wound, right foot swelling  Neurological: neg  Skin: neg  14 point revie

## 2019-10-11 NOTE — PHYSICAL THERAPY NOTE
PT order received via admission protocol, chart reviewed. In AM, d/w RN, requesting to hold off on PT until clarification of WB with ortho. In PM, d/w RN.   Clarified no PT at this time as pt is to be NWB, bedrest.  Please re-consult when pt is appropriat

## 2019-10-12 ENCOUNTER — ANESTHESIA EVENT (OUTPATIENT)
Dept: SURGERY | Facility: HOSPITAL | Age: 69
End: 2019-10-12

## 2019-10-12 ENCOUNTER — ANESTHESIA (OUTPATIENT)
Dept: SURGERY | Facility: HOSPITAL | Age: 69
End: 2019-10-12

## 2019-10-12 PROCEDURE — 87186 SC STD MICRODIL/AGAR DIL: CPT | Performed by: PODIATRIST

## 2019-10-12 PROCEDURE — 0QBN0ZZ EXCISION OF RIGHT METATARSAL, OPEN APPROACH: ICD-10-PCS | Performed by: PODIATRIST

## 2019-10-12 PROCEDURE — 87070 CULTURE OTHR SPECIMN AEROBIC: CPT | Performed by: PODIATRIST

## 2019-10-12 PROCEDURE — 87147 CULTURE TYPE IMMUNOLOGIC: CPT | Performed by: PODIATRIST

## 2019-10-12 PROCEDURE — 85025 COMPLETE CBC W/AUTO DIFF WBC: CPT | Performed by: INTERNAL MEDICINE

## 2019-10-12 PROCEDURE — 87075 CULTR BACTERIA EXCEPT BLOOD: CPT | Performed by: PODIATRIST

## 2019-10-12 PROCEDURE — 87176 TISSUE HOMOGENIZATION CULTR: CPT | Performed by: PODIATRIST

## 2019-10-12 PROCEDURE — 88304 TISSUE EXAM BY PATHOLOGIST: CPT | Performed by: PODIATRIST

## 2019-10-12 PROCEDURE — 0KBV0ZZ EXCISION OF RIGHT FOOT MUSCLE, OPEN APPROACH: ICD-10-PCS | Performed by: PODIATRIST

## 2019-10-12 PROCEDURE — 87077 CULTURE AEROBIC IDENTIFY: CPT | Performed by: PODIATRIST

## 2019-10-12 PROCEDURE — 87205 SMEAR GRAM STAIN: CPT | Performed by: PODIATRIST

## 2019-10-12 PROCEDURE — 80048 BASIC METABOLIC PNL TOTAL CA: CPT | Performed by: INTERNAL MEDICINE

## 2019-10-12 PROCEDURE — 82962 GLUCOSE BLOOD TEST: CPT

## 2019-10-12 PROCEDURE — 87076 CULTURE ANAEROBE IDENT EACH: CPT | Performed by: PODIATRIST

## 2019-10-12 RX ORDER — ACETAMINOPHEN 500 MG
1000 TABLET ORAL ONCE AS NEEDED
Status: DISCONTINUED | OUTPATIENT
Start: 2019-10-12 | End: 2019-10-12 | Stop reason: HOSPADM

## 2019-10-12 RX ORDER — BUPIVACAINE HYDROCHLORIDE 5 MG/ML
INJECTION, SOLUTION EPIDURAL; INTRACAUDAL AS NEEDED
Status: DISCONTINUED | OUTPATIENT
Start: 2019-10-12 | End: 2019-10-12 | Stop reason: HOSPADM

## 2019-10-12 RX ORDER — HYDROMORPHONE HYDROCHLORIDE 1 MG/ML
0.4 INJECTION, SOLUTION INTRAMUSCULAR; INTRAVENOUS; SUBCUTANEOUS EVERY 5 MIN PRN
Status: DISCONTINUED | OUTPATIENT
Start: 2019-10-12 | End: 2019-10-12 | Stop reason: HOSPADM

## 2019-10-12 RX ORDER — DOCUSATE SODIUM 100 MG/1
100 CAPSULE, LIQUID FILLED ORAL 2 TIMES DAILY
Status: DISCONTINUED | OUTPATIENT
Start: 2019-10-12 | End: 2019-10-19

## 2019-10-12 RX ORDER — POLYETHYLENE GLYCOL 3350 17 G/17G
17 POWDER, FOR SOLUTION ORAL DAILY PRN
Status: DISCONTINUED | OUTPATIENT
Start: 2019-10-12 | End: 2019-10-19

## 2019-10-12 RX ORDER — SENNOSIDES 8.6 MG
8.6 TABLET ORAL 2 TIMES DAILY
Status: DISCONTINUED | OUTPATIENT
Start: 2019-10-12 | End: 2019-10-19

## 2019-10-12 RX ORDER — NALOXONE HYDROCHLORIDE 0.4 MG/ML
80 INJECTION, SOLUTION INTRAMUSCULAR; INTRAVENOUS; SUBCUTANEOUS AS NEEDED
Status: DISCONTINUED | OUTPATIENT
Start: 2019-10-12 | End: 2019-10-12 | Stop reason: HOSPADM

## 2019-10-12 RX ORDER — BACITRACIN 50000 [USP'U]/1
INJECTION, POWDER, LYOPHILIZED, FOR SOLUTION INTRAMUSCULAR AS NEEDED
Status: DISCONTINUED | OUTPATIENT
Start: 2019-10-12 | End: 2019-10-12 | Stop reason: HOSPADM

## 2019-10-12 RX ORDER — DEXTROSE MONOHYDRATE 25 G/50ML
50 INJECTION, SOLUTION INTRAVENOUS
Status: DISCONTINUED | OUTPATIENT
Start: 2019-10-12 | End: 2019-10-12 | Stop reason: HOSPADM

## 2019-10-12 RX ORDER — BISACODYL 10 MG
10 SUPPOSITORY, RECTAL RECTAL
Status: DISCONTINUED | OUTPATIENT
Start: 2019-10-12 | End: 2019-10-19

## 2019-10-12 RX ORDER — SODIUM CHLORIDE 9 MG/ML
INJECTION, SOLUTION INTRAVENOUS CONTINUOUS
Status: DISCONTINUED | OUTPATIENT
Start: 2019-10-12 | End: 2019-10-12 | Stop reason: HOSPADM

## 2019-10-12 NOTE — PLAN OF CARE
No change to strike through drainage to dressing, BLE elevated, remains NWB to BLE. Voids and tolerates diet. Updated progressing and in agreement with POC. Refer to earlier note.

## 2019-10-12 NOTE — CONSULTS
Consulting physician: Valentino Shark, MD  Reason for consult: Right foot infection    Leia Bautista. 68year old male who is a patient of Dr. Michelle Funez admitted for increased left foot and ankle pain, redness and swelling as well as worsening of right fo at [facility]   • ENDOSCOPY,  SINUS SEPTOPLASTY,ANTROSTOMIES Bilateral 1/16/2019    Performed by Brianna Esparza MD at [address] Right 8/13/2019    Performed by Em Angel MD at [facility] SALIMA Not on file      Years of education: Not on file      Highest education level: Not on file    Occupational History      Not on file    Social Needs      Financial resource strain: Not on file      Food insecurity:        Worry: Not on file        Inability Self-Exams: Not Asked    Social History Narrative      Not on file      Pollen                  OTHER (SEE COMMENTS)    Comment:Nasal congestion    ROS  Constitutional: negative for - fever, chills, weight change  Integument: Right 1st ray amputation site ALT 9*  --   --   --    BILT 0.8  --   --   --    TP 8.1  --   --   --      Recent Labs   Lab 10/10/19  1955 10/11/19  0456 10/12/19  0455   RBC 3.99 3.51* 3.38*   HGB 12.2* 10.6* 10.3*   HCT 35.7* 32.3* 31.3*   MCV 89.5 92.0 92.6   MCH 30.6 30.2 30.5

## 2019-10-12 NOTE — PLAN OF CARE
Call to OR for estimated time of surgery today. Spoke with Lilliana Sun, estimated time for OR is 7033, writer informed OR that patient may require medical and/or cardiac clearance prior to operation. Writer will inform OR once clearance obtained.    Page sent to

## 2019-10-12 NOTE — ANESTHESIA PREPROCEDURE EVALUATION
PRE-OP EVALUATION    Patient Name: John Crawford.    Pre-op Diagnosis: Cellulitis of foot [J49.797]    Procedure(s):  Incision and Debridement and Revision Amputation - Right Foot    Surgeon(s) and Role:     * Anastasia Cox, ALICIA - Primary    Pre-op v ADD-vantage, 3.375 g, Intravenous, Q8H  [COMPLETED] CefTRIAXone Sodium (ROCEPHIN) 1 g in sodium chloride 0.9% 100 mL MBP/ADD-vantage, 1 g, Intravenous, Once        Outpatient Medications:  levofloxacin (LEVAQUIN) 750 MG Oral Tab, Take 1 tablet (750 mg tota taking differently: Take 0.5 mg by mouth 2 (two) times daily as needed.  ), Disp: 60 tablet, Rfl: 1  atorvastatin 40 MG Oral Tab, Take 1 tablet (40 mg total) by mouth daily. , Disp: 90 tablet, Rfl: 1  Meclizine HCl 25 MG Oral Tab, Take 1 tablet (25 mg total range.  6. Right atrium: The atrium is mildly dilated. 7. Tricuspid valve: There is severe regurgitation. 8. Pericardium, extracardiac:  There is no significant pericardial effusion.     Impressions:  No previous study was available for comparison      Ex Performed by Justice Rubinstein, MD at [place] [address] ([facility]) Bilateral     shoulder surgery   • OTHER Right 2013    femoral endarterectomy   • OTHER SURGICAL HISTORY      left knee ligament repair   • OTHER SURGICAL HISTORY      le breath sounds   Other findings            ASA: 4   Plan: general  NPO status verified and patient meets guidelines. Patient has taken beta blockers in last 24 hours.         Plan/risks discussed with: patient              Discussed general anesthesia with

## 2019-10-12 NOTE — PROGRESS NOTES
[facility] Hospitalist Team  Progress Note      Greg Salazar.  2/6/1950    Assessment/Plan:   #Left foot pain  -seen by ortho and podiatry, NWB for now  -ID on consult, iv abx for possible cellulitis  -check ankle xr -no acute findings  -pain control prn 125*       Recent Labs   Lab 10/10/19  1955   ALT 9*   AST 19   ALB 2.7*       Recent Labs   Lab 10/12/19  1055   PGLU 163*       Meds:   Scheduled:   • docusate sodium  100 mg Oral BID   • Senna  8.6 mg Oral BID   • atorvastatin  40 mg Oral Daily   • bume

## 2019-10-12 NOTE — ANESTHESIA POSTPROCEDURE EVALUATION
[facility].  Patient Status:  Inpatient   Age/Gender 68year old male MRN [id number]   [facility] SURGERY Attending Kimberli Agustin [place] Day # 1 PCP Christiano Huang MD       Anesthesia Post-op Note    Proced

## 2019-10-12 NOTE — PLAN OF CARE
Pt is AAOX4 on room air, OSR ,TELE, cultures taken from Rt great toes area. Rt foot remains wrapped and elevated on pillows. Ortho called about a Podiatry consult in the morning. Podiatry phoned about taking Pt to OR today.  Turned over this info to the day

## 2019-10-12 NOTE — BRIEF OP NOTE
Pre-Operative Diagnosis: gas gangrene right foot     Post-Operative Diagnosis: gas gangrene right foot     Procedure Performed:   Procedure(s):  Right Foot Incision and Debridement, Revision of 1st ray  Amputation site    Surgeon(s) and Role:     * Kenny

## 2019-10-12 NOTE — PROGRESS NOTES
Assessment and Plan:     Impression:  1. Atrial fibrillation with slow ventricular response, converted to sinus bradycardia  2. Short, nonsustained ventricular tachycardia  3.   Ischemic dilated cardiomyopathy   - EF 35% prior to revascularization in 6/ 10/12/19  0455   RBC 3.38*   HGB 10.3*   HCT 31.3*   MCV 92.6   MCH 30.5   MCHC 32.9   RDW 14.9   NEPRELIM 7.35   WBC 9.7   .0     No results for input(s): INR in the last 168 hours.     Xr Ankle (min 3 Views), Left (cpt=73610)    Result Date: 10/11/

## 2019-10-12 NOTE — PROGRESS NOTES
Update on plan  Discussed patient condition with Dr. Jenise Beaver who is out of town and he agrees that the right foot may need debridement/revision amputation this weekend    Discussed case with Dr. Dwight Rojas who is the podiatrist on call, will evaluate patient

## 2019-10-12 NOTE — OPERATIVE REPORT
Date of surgery: 10/12/2019     Preoperative Diagnosis:   1. Gas gangrene right foot  2. Status post partial first ray amputation, right foot    Postoperative Diagnosis:   1. Gas gangrene right foot  2.   Status post partial first ray amputation, right f specimens were sent for culture and pathology. Next, the surgical site was pulse lavaged with 3 L of saline and bacitracin. A second look was done and no further purulence was noted. The wound margins had healthy bleeding.   Surgical site was then dresse

## 2019-10-13 PROCEDURE — 85025 COMPLETE CBC W/AUTO DIFF WBC: CPT | Performed by: PODIATRIST

## 2019-10-13 PROCEDURE — 86140 C-REACTIVE PROTEIN: CPT | Performed by: PODIATRIST

## 2019-10-13 PROCEDURE — 85652 RBC SED RATE AUTOMATED: CPT | Performed by: PODIATRIST

## 2019-10-13 RX ORDER — CETIRIZINE HYDROCHLORIDE 10 MG/1
5 TABLET ORAL DAILY
Status: DISCONTINUED | OUTPATIENT
Start: 2019-10-13 | End: 2019-10-19

## 2019-10-13 NOTE — PROGRESS NOTES
Patient seen at bedside doing well. No acute events overnight. States that his pain has decreased especially on the left foot and ankle.     ROS  Constitutional: negative for - fever, chills, weight change  Integument: Right 1st ray amputation site   Resp ALT 9*  --   --   --    BILT 0.8  --   --   --    TP 8.1  --   --   --      Recent Labs   Lab 10/11/19  0456 10/12/19  0455 10/13/19  0535   RBC 3.51* 3.38* 3.36*   HGB 10.6* 10.3* 10.1*   HCT 32.3* 31.3* 31.2*   MCV 92.0 92.6 92.9   MCH 30.2 30.5 30.1 dressings applied  Continue nonweightbearing  OR cultures as above  Continue IV antibiotics  Patient will need long-term IV antibiotics  We will plan for further debridement and wound VAC in the next few days    decreased pain to the left foot and ankle.

## 2019-10-13 NOTE — PROGRESS NOTES
[facility] Hospitalist Team  Progress Note      Ar Cagle.  2/6/1950    Assessment/Plan:   #Left foot/ankle pain  -seen by ortho and podiatry, NWB for now  -ID on consult, iv abx for possible cellulitis  -check ankle xr -no acute findings  -pain control 2.4  --   --    * 127*  --  125*       Recent Labs   Lab 10/10/19  1955   ALT 9*   AST 19   ALB 2.7*       Recent Labs   Lab 10/12/19  1055   PGLU 163*       Meds:   Scheduled:   • docusate sodium  100 mg Oral BID   • Senna  8.6 mg Oral BID   • ator

## 2019-10-13 NOTE — PLAN OF CARE
Dsg changed to Rt foot per Dr. Hansa King , wound care consult ordered in am, wet to dry dressing changed -ordered bid, Norco given q 4 hrs prn/pain,IV abx con't , updated on plan of care.

## 2019-10-13 NOTE — PROGRESS NOTES
Assessment and Plan:     Impression:  1. Atrial fibrillation with slow ventricular response, converted to sinus bradycardia  2. Short, nonsustained ventricular tachycardia  3.   Ischemic dilated cardiomyopathy   - EF 35% prior to revascularization in 6/ 10/13/19  0535   RBC 3.36*   HGB 10.1*   HCT 31.2*   MCV 92.9   MCH 30.1   MCHC 32.4   RDW 14.8   NEPRELIM 6.47   WBC 8.7   .0     No results for input(s): INR in the last 168 hours.     Xr Ankle (min 3 Views), Left (cpt=73610)    Result Date: 10/11/

## 2019-10-13 NOTE — PLAN OF CARE
Pt. Admitted for L foot pain and inability to ambulate on 10-11-19. RIGHT foot I&D and revision amputation with Dr. Tone Grande on 10-12-19, POD 1. Pain level is well controlled. Bedrest for now, NWB to bilat lower extremities, elevated on pillows.   Incision o

## 2019-10-14 PROCEDURE — 99214 OFFICE O/P EST MOD 30 MIN: CPT

## 2019-10-14 RX ORDER — MORPHINE SULFATE 4 MG/ML
2 INJECTION, SOLUTION INTRAMUSCULAR; INTRAVENOUS EVERY 2 HOUR PRN
Status: DISCONTINUED | OUTPATIENT
Start: 2019-10-14 | End: 2019-10-19

## 2019-10-14 RX ORDER — MORPHINE SULFATE 4 MG/ML
1 INJECTION, SOLUTION INTRAMUSCULAR; INTRAVENOUS EVERY 2 HOUR PRN
Status: DISCONTINUED | OUTPATIENT
Start: 2019-10-14 | End: 2019-10-19

## 2019-10-14 RX ORDER — CLOPIDOGREL BISULFATE 75 MG/1
75 TABLET ORAL DAILY
Status: DISCONTINUED | OUTPATIENT
Start: 2019-10-14 | End: 2019-10-19

## 2019-10-14 RX ORDER — MORPHINE SULFATE 4 MG/ML
0.5 INJECTION, SOLUTION INTRAMUSCULAR; INTRAVENOUS EVERY 2 HOUR PRN
Status: DISCONTINUED | OUTPATIENT
Start: 2019-10-14 | End: 2019-10-19

## 2019-10-14 NOTE — PROGRESS NOTES
[facility] Cardiology Progress Note - [address] [facility].  Patient Status:  Inpatient    1950 MRN [id number]   [facility] 3SW-A Attending Kim George MD   [facility] Day # 3 PCP Nicole Guerrero MD     Subjective: Weights  10/10/19 1947 : 205 lb (93 kg)  09/20/19 1724 : 205 lb (93 kg)  08/14/19 1518 : 195 lb (88.5 kg)      Tele: NSR    Physical Exam:    General: Alert and oriented x 3. No apparent distress. No respiratory or constitutional distress.   HEENT: Normocep (SENOKOT) tab 8.6 mg, 8.6 mg, Oral, BID  ALPRAZolam (XANAX) tab 0.5 mg, 0.5 mg, Oral, BID PRN  atorvastatin (LIPITOR) tab 40 mg, 40 mg, Oral, Daily  bumetanide (BUMEX) tab 2 mg, 2 mg, Oral, BID (Diuretic)  buPROPion HCl ER (XL) (WELLBUTRIN XL) 150 MG 24 hr allergies    Anand Torrez MD  10/14/2019  10:37 AM

## 2019-10-14 NOTE — PLAN OF CARE
Pt pain managed with [place]. VSS. NWB to BLE. BLE elevated on pillows. Wet to dry dressing changed to R foot last night per order, tolerated well. Voiding without difficulty. IV Zosyn infusing per order. Plan: TBD. Will monitor.

## 2019-10-14 NOTE — PROGRESS NOTES
[facility] Hospitalist Team  Progress Note      Rocky Chavez.  2/6/1950    Assessment/Plan:   #Left foot/ankle pain  -seen by ortho and podiatry, NWB for now  -ID on consult, iv abx for possible cellulitis  -check ankle xr -no acute findings  -pain control --  8.9   MG  --  2.4  --   --    * 127*  --  125*       Recent Labs   Lab 10/10/19  1955   ALT 9*   AST 19   ALB 2.7*       Recent Labs   Lab 10/12/19  1055   PGLU 163*       Meds:   Scheduled:   • Clopidogrel Bisulfate  75 mg Oral Daily   • Metopr

## 2019-10-14 NOTE — PLAN OF CARE
HR 57-59. Toprol and Sotalol due. Discussed with Dr. Dima Robertson. Give sotalol now and hold Toprol. Discuss with cardiology when they round today. Will monitor.

## 2019-10-14 NOTE — PROGRESS NOTES
INFECTIOUS DISEASE PROGRESS NOTE    Azael Li.  Patient Status:  Inpatient    1950 MRN [id number]   [facility] 3SW-A Attending David Rodriguez,*   Hosp Day # 3 PCP HYDROcodone-acetaminophen (NORCO)  MG per tab 1 tablet, 1 tablet, Oral, Q4H PRN **OR** HYDROcodone-acetaminophen (NORCO)  MG per tab 2 tablet, 2 tablet, Oral, Q4H PRN  •  Piperacillin Sod-Tazobactam So (ZOSYN) 3.375 g in dextrose 5 % 100 mL ADD result)    Collection Time: 10/12/19 10:41 AM   Result Value Ref Range    Tissue Culture Result 2+ growth Enterococcus faecalis (A) N/A    Tissue Smear 3+ WBCs seen (A) N/A    Tissue Smear 3+ Gram positive cocci in pairs and clusters (A) N/A       Suscepti

## 2019-10-14 NOTE — PROGRESS NOTES
Patient seen at bedside doing well. No acute events overnight.      ROS  Constitutional: negative for - fever, chills, weight change  Integument: Right 1st ray amputation site   Respiratory: negative for - wheezing, SOB, chest congestion, cough  Cardiovasc 10/11/19  0456 10/12/19  0455 10/13/19  0535   RBC 3.51* 3.38* 3.36*   HGB 10.6* 10.3* 10.1*   HCT 32.3* 31.3* 31.2*   MCV 92.0 92.6 92.9   MCH 30.2 30.5 30.1   MCHC 32.8 32.9 32.4   RDW 15.2* 14.9 14.8   NEPRELIM 9.67* 7.35 6.47   WBC 12.6* 9.7 8.7   PLT 4.  BLOOD CULTURE     Status: None (Preliminary result)    Collection Time: 10/10/19  8:46 PM   Result Value Ref Range    Blood Culture Result No Growth 3 Days N/A     A/P: 68year old male with Gas Gangrene of right foot and Left foot and ankle celluliti

## 2019-10-14 NOTE — CM/SW NOTE
10/14/19 1500   CM/SW Referral Data   Referral Source Social Work (self-referral)   Reason for Referral Discharge planning;Psychoscial assessment   Informant Patient   Patient Info   Patient's Mental Status Alert;Oriented   Patient's [address]

## 2019-10-14 NOTE — CONSULTS
[facility]  Report of Inpatient Wound Care Consultation    Cornell Amezcua.  Patient Status:  Inpatient    1950 MRN [id number]   [facility] 3SW-A Attending Shayne Rockwell MD   [facility] Day # 3 PCP Brenda Kohler MD     Reason f 1/16/2019    Performed by Erika Magana MD at [address] [place] [address] Right 8/13/2019    Performed by Michelle Murphy MD at [facility] MAIN OR   • [facility] Right 11/15/2016    Performed by Michelle Murphy MD at  bone on the midportion of the wound bed. Well defined edges. Moderate amount of serosanguinous drainage. No odor. Nicolette wound  is slightly pinkish in color, warm to touch and edema noted. Audible DP and PT via handheld doppler.   Less than 3 capillary ref

## 2019-10-15 ENCOUNTER — ANESTHESIA EVENT (OUTPATIENT)
Dept: SURGERY | Facility: HOSPITAL | Age: 69
End: 2019-10-15

## 2019-10-15 PROCEDURE — 97161 PT EVAL LOW COMPLEX 20 MIN: CPT

## 2019-10-15 PROCEDURE — 97116 GAIT TRAINING THERAPY: CPT

## 2019-10-15 NOTE — PROGRESS NOTES
INFECTIOUS DISEASE PROGRESS NOTE    Maggi Negrete.  Patient Status:  Inpatient    1950 MRN [id number]   [facility] 3SW-A Attending Lucila Green,*   Hosp Day # 4 PCP (BETAPACE) tab 120 mg, 120 mg, Oral, 2x Daily(Beta Blocker)  •  spironolactone (ALDACTONE) tab 25 mg, 25 mg, Oral, BID  •  Alfuzosin HCl ER (UROXATRAL) 24 hr tab 10 mg, 10 mg, Oral, Daily with breakfast  •  influenza vaccine (PF)(FLUZONE HD) high dose for --    ALT 9*  --   --   --    BILT 0.8  --   --   --    TP 8.1  --   --   --        Microbiology    Reviewed in EMR,  Hospital Encounter on 10/10/19   1.  TISSUE AEROBIC CULTURE     Status: Abnormal (Preliminary result)    Collection Time: 10/12/19 10:41 weeks of IV abx as well as more surgery  - cx noted, only enterococcus and S aureus so far  - monitor site, follow cx and adjust abx  - may need more surgery as per Dr Jacky Lino     2. L foot pain  - much better, seems almost resolved.  assume crystal arthropat

## 2019-10-15 NOTE — PLAN OF CARE
Patient seen by wound care, dressing changed. NWB TO BLE in place. Elevation to BLE in place. Pain controlled, patient reports improved pain and mobility to LLE/foot and improved pain to RLE. No cardiac events noted.  Discussed beta blockers with Dr. Kim Duong

## 2019-10-15 NOTE — CM/SW NOTE
SW follow up. Met with pt to discuss ANGELINA recommendations. Pt is in agreement and wants to review the list. DON screen requested, SW to follow.

## 2019-10-15 NOTE — PROGRESS NOTES
Saw and discussed case with patient and examined foot  Patient pain much decreased in right foot and left ankle  Left ankle good ROM, no erythema, able to bear weight on it today wound clean and dry, minimal erythema  Bone visible, wound open  Will need re

## 2019-10-15 NOTE — PLAN OF CARE
Recommend ANGELINA upon D/C ELOS 7-10 days    Problem: Impaired Functional Mobility  Goal: Achieve highest/safest level of mobility/gait  Description  Interventions:  - Assess patient's functional ability and stability  - Promote increasing activity/tolerance f

## 2019-10-15 NOTE — ANESTHESIA PREPROCEDURE EVALUATION
PRE-OP EVALUATION    Patient Name: Mike Temple.    Pre-op Diagnosis: INPT    Procedure(s):  RIGHT FOOT DEBRIDEMENT AND APPLICATION OF INTEGRA GRAFT AND WOUND VAC    Surgeon(s) and Role:     * Elena Cox DPM - Primary    Pre-op vitals reviewed. tablet, 1 tablet, Oral, TID  [MAR Hold] gabapentin (NEURONTIN) cap 300 mg, 300 mg, Oral, TID  [MAR Hold] Pantoprazole Sodium (PROTONIX) EC tab 40 mg, 40 mg, Oral, QAM AC  [MAR Hold] Sertraline HCl (ZOLOFT) tab 50 mg, 50 mg, Oral, Daily  [MAR Hold] Sotalol (two) times daily. , Disp: 180 tablet, Rfl: 3  [DISCONTINUED] BUPROPION HCL ER, XL, 150 MG Oral Tablet 24 Hr, TAKE 1 TABLET(150 MG) BY MOUTH EVERY MORNING (Patient taking differently: Take 150 mg by mouth daily.  TAKE 1 TABLET(150 MG) BY MOUTH EVERY MORNING 0        Allergies: Pollen      Anesthesia Evaluation    Patient summary reviewed.     Anesthetic Complications  (-) history of anesthetic complications         GI/Hepatic/Renal                                 Cardiovascular                  (+) hypertensio Right 8/13/2019    Performed by Willow Caruso MD at [facility] Right 10/12/2019    Performed by Ebenezer Levin DPM at [facility] MAIN OR   • [facility] Right 11/15/2016    Performed by Willow Caruso MD at (L) 10/13/2019    HGB 10.1 (L) 10/13/2019    HCT 31.2 (L) 10/13/2019    MCV 92.9 10/13/2019    MCH 30.1 10/13/2019    MCHC 32.4 10/13/2019    RDW 14.8 10/13/2019    .0 10/13/2019     Lab Results   Component Value Date     (L) 10/12/2019    K 4

## 2019-10-15 NOTE — PHYSICAL THERAPY NOTE
PHYSICAL THERAPY EVALUATION - INPATIENT     Room Number: 370/370-A  Evaluation Date: 10/15/2019  Type of Evaluation: Initial  Physician Order: PT Eval and Treat    Presenting Problem: s/p I & D and revision of amputation R foot  Reason for Therapy: M [address] [address]   • ENDOSCOPY,  SINUS SEPTOPLASTY,ANTROSTOMIES Bilateral 1/16/2019    Performed by Gisele Hopkins MD at [address] Right 8/13/2019    Performed by Ting Potter MD at [address] [facility] PTA.    SUBJECTIVE  \"I want to be able to do for myself again. \"    Patient self-stated goal is to get better.     OBJECTIVE     Fall Risk: High fall risk    WEIGHT BEARING RESTRICTION  Weight Bearing Restriction: R lower extremity        R Lower Extremity min A;scores based on FIM rating scale)  Distance (ft): 20  Assistive Device: Rolling walker  Pattern: R Decreased stance time  Stoop/Curb Assistance: Not tested       Skilled Therapy Provided: evaluation; pt instructed in the role of PT, POC and D/C plans DISCHARGE RECOMMENDATIONS  PT Discharge Recommendations: Sub-acute rehabilitation(ELOS 7-10 days)    PLAN  PT Treatment Plan: Bed mobility; Endurance; Patient education;Gait training;Strengthening;Stoop training;Stair training;Transfer training;Balance tra

## 2019-10-15 NOTE — PLAN OF CARE
Pt having more pain last night. Pain managed with Norco and IV morphine. VSS. NWB to BLE. BLE elevated. Voiding per urinal without difficulty. Dressing changed this shift. Plan: ANGELINA when ready. Will monitor.

## 2019-10-15 NOTE — PLAN OF CARE
Pt A&O. On room air. Tele and  monitoring maintained per KANE protocol. Encouraged use of incentive spirometer and ankle pump exercises every hour while awake. Tolerating cardiac diet with good appetite. Will be NPO after 1000 tomorrow morning.  Last BM 1

## 2019-10-15 NOTE — PROGRESS NOTES
[facility] Cardiology Progress Note - [address] [facility].  Patient Status:  Inpatient    1950 MRN [id number]   [facility] 3SW-A Attending Lorraine Mercedes MD   [facility] Day # 4 PCP Tammi Rausch MD     Subjective: distress. No respiratory or constitutional distress. HEENT: Normocephalic, anicteric sclera, neck supple, no thyromegaly or adenopathy. Neck: No JVD, carotids 2+, no bruits. Cardiac: Regular rate and rhythm.  S1, S2 normal. No murmur, pericardial rub, S3 5 mg, 5 mg, Oral, Daily  PEG 3350 (MIRALAX) powder packet 17 g, 17 g, Oral, Daily PRN  docusate sodium (COLACE) cap 100 mg, 100 mg, Oral, BID  magnesium hydroxide (MILK OF MAGNESIA) 400 MG/5ML suspension 30 mL, 30 mL, Oral, Daily PRN  bisacodyl (DULCOLAX) anxiety  Hematology: denies bruising or excessive bleeding  Endocrine: no history of diabetes  Allergy: see above drug allergies    Columba Kohler MD  10/15/2019  10:17 AM

## 2019-10-15 NOTE — PROGRESS NOTES
[facility] Hospitalist Team  Progress Note      Jigar Donald.  2/6/1950    Assessment/Plan:   #Left foot/ankle pain  -seen by ortho and podiatry, NWB for now  -ID on consult, iv abx for possible cellulitis  -check ankle xr -no acute findings  -pain control --  28.0   BUN 34* 28*  --  19*   CREATSERUM 1.88* 1.20  --  1.00   CA 9.0 9.0  --  8.9   MG  --  2.4  --   --    * 127*  --  125*       Recent Labs   Lab 10/10/19  1955   ALT 9*   AST 19   ALB 2.7*       Recent Labs   Lab 10/12/19  1055   PGLU 163*

## 2019-10-16 ENCOUNTER — ANESTHESIA (OUTPATIENT)
Dept: SURGERY | Facility: HOSPITAL | Age: 69
End: 2019-10-16

## 2019-10-16 PROCEDURE — 0QBN0ZZ EXCISION OF RIGHT METATARSAL, OPEN APPROACH: ICD-10-PCS | Performed by: PODIATRIST

## 2019-10-16 PROCEDURE — 97530 THERAPEUTIC ACTIVITIES: CPT

## 2019-10-16 PROCEDURE — 97116 GAIT TRAINING THERAPY: CPT

## 2019-10-16 PROCEDURE — 97110 THERAPEUTIC EXERCISES: CPT

## 2019-10-16 PROCEDURE — 82962 GLUCOSE BLOOD TEST: CPT

## 2019-10-16 PROCEDURE — 0HRMXK3 REPLACEMENT OF RIGHT FOOT SKIN WITH NONAUTOLOGOUS TISSUE SUBSTITUTE, FULL THICKNESS, EXTERNAL APPROACH: ICD-10-PCS | Performed by: PODIATRIST

## 2019-10-16 PROCEDURE — 97535 SELF CARE MNGMENT TRAINING: CPT

## 2019-10-16 PROCEDURE — 97165 OT EVAL LOW COMPLEX 30 MIN: CPT

## 2019-10-16 RX ORDER — DEXTROSE MONOHYDRATE 25 G/50ML
50 INJECTION, SOLUTION INTRAVENOUS
Status: DISCONTINUED | OUTPATIENT
Start: 2019-10-16 | End: 2019-10-16 | Stop reason: HOSPADM

## 2019-10-16 RX ORDER — MAGNESIUM HYDROXIDE 1200 MG/15ML
LIQUID ORAL CONTINUOUS PRN
Status: COMPLETED | OUTPATIENT
Start: 2019-10-16 | End: 2019-10-16

## 2019-10-16 RX ORDER — INSULIN ASPART 100 [IU]/ML
INJECTION, SOLUTION INTRAVENOUS; SUBCUTANEOUS ONCE
Status: DISCONTINUED | OUTPATIENT
Start: 2019-10-16 | End: 2019-10-16 | Stop reason: HOSPADM

## 2019-10-16 RX ORDER — BUPIVACAINE HYDROCHLORIDE 5 MG/ML
INJECTION, SOLUTION EPIDURAL; INTRACAUDAL AS NEEDED
Status: DISCONTINUED | OUTPATIENT
Start: 2019-10-16 | End: 2019-10-16 | Stop reason: HOSPADM

## 2019-10-16 RX ORDER — ONDANSETRON 2 MG/ML
4 INJECTION INTRAMUSCULAR; INTRAVENOUS AS NEEDED
Status: DISCONTINUED | OUTPATIENT
Start: 2019-10-16 | End: 2019-10-16 | Stop reason: HOSPADM

## 2019-10-16 RX ORDER — HYDROMORPHONE HYDROCHLORIDE 1 MG/ML
0.4 INJECTION, SOLUTION INTRAMUSCULAR; INTRAVENOUS; SUBCUTANEOUS EVERY 5 MIN PRN
Status: DISCONTINUED | OUTPATIENT
Start: 2019-10-16 | End: 2019-10-16 | Stop reason: HOSPADM

## 2019-10-16 RX ORDER — SODIUM CHLORIDE 9 MG/ML
INJECTION, SOLUTION INTRAVENOUS CONTINUOUS
Status: DISCONTINUED | OUTPATIENT
Start: 2019-10-16 | End: 2019-10-16 | Stop reason: HOSPADM

## 2019-10-16 RX ORDER — NALOXONE HYDROCHLORIDE 0.4 MG/ML
80 INJECTION, SOLUTION INTRAMUSCULAR; INTRAVENOUS; SUBCUTANEOUS AS NEEDED
Status: DISCONTINUED | OUTPATIENT
Start: 2019-10-16 | End: 2019-10-16 | Stop reason: HOSPADM

## 2019-10-16 NOTE — PROGRESS NOTES
INFECTIOUS DISEASE PROGRESS NOTE    Mike Temple.  Patient Status:  Inpatient    1950 MRN [id number]   [facility] 3SW-A Attending Roxann Coats,*   Hosp Day # 5 PCP 120 mg, Oral, 2x Daily(Beta Blocker)  •  spironolactone (ALDACTONE) tab 25 mg, 25 mg, Oral, BID  •  Alfuzosin HCl ER (UROXATRAL) 24 hr tab 10 mg, 10 mg, Oral, Daily with breakfast  •  influenza vaccine (PF)(FLUZONE HD) high dose for 65 yrs & older inj 0.5m 19  --   --   --    ALT 9*  --   --   --    BILT 0.8  --   --   --    TP 8.1  --   --   --        Microbiology    Reviewed in EMR,  Hospital Encounter on 10/10/19   1.  TISSUE AEROBIC CULTURE     Status: Abnormal    Collection Time: 10/12/19 10:41 AM   Resu Collection Time: 10/10/19  8:46 PM   Result Value Ref Range    Blood Culture Result No Growth 5 Days N/A         Radiology: reviewed      ASSESSMENT/ PLAN:    1.R foot infection, at site of recent amputation  - s/p debridement, now clean as per podiatry bu

## 2019-10-16 NOTE — PHYSICAL THERAPY NOTE
PHYSICAL THERAPY TREATMENT NOTE - INPATIENT    Room Number: 370/370-A     Session: 1   Number of Visits to Meet Established Goals: 5    Presenting Problem: s/p I & D and revision of amputation R foot    Problem List  Principal Problem:    Cellulitis of lo DEBRIDEMENT Right 10/12/2019    Performed by Hetal Echols DPM at [facility] MAIN OR   • FOOT JOINT FUSION Right 11/15/2016    Performed by Em Angel MD at [facility] MAIN OR   • [address] N/A 8/26/2013    Performed by Carroll Rodriguez MD at [facility]                    AM-PAC '6-Clicks' INPATIENT SHORT FORM - BASIC MOBILITY  How much difficulty does the patient currently have. ..  -   Turning over in bed (including adjusting bedclothes, sheets and blankets)?: None   -   Sitting down on an cues and assist to ambulate with walker or knee walker. Pt may benefit from knee walker/ scooter use to maintain PWB on RLE and pt has history of shoulder surgeries.  At this time, pt remains below baseline for mobility due to pain, dec balance in standing,

## 2019-10-16 NOTE — OCCUPATIONAL THERAPY NOTE
OCCUPATIONAL THERAPY EVALUATION - INPATIENT     Room Number: 370/370-A  Evaluation Date: 10/16/2019  Type of Evaluation: Initial  Presenting Problem: (R foot I&D, revision of toe amputation site)    Physician Order: IP Consult to Occupational Therapy  Reas COLONOSCOPY, POSSIBLE BIOPSY, POSSIBLE POLYPECTOMY [address] N/A 6/11/2018    Performed by Adarsh Durant MD at [address] Bilateral 1/16/2019    Performed by Krystian Hartman MD at [address] [facility] Walk-in shower; Tub-shower combo(walk in 1st flr under construction; tub/shw 2nd flr)       Occupation/Status: (retired industrial  )     Drives: Yes  Patient Regularly Uses: Glasses    Prior Level of Function:     SUBJECTIVE   \"Dr Potter al ADDITIONAL TESTS                                    NEUROLOGICAL FINDINGS                   ACTIVITY TOLERANCE                         O2 SATURATIONS                ACTIVITIES OF DAILY LIVING ASSESSMENT  AM-PAC ‘6-Clicks’ Inpatient Daily Activity Short place. Patient donned underwear in sitting with min A without removing shoes prior, he was educated to remove shoes for increased ease/energy conservation but he proceeded anyway. Patient was educated on recommendation for sub acute rehab.  He was left wi including review of medical or therapy records    Specific performance deficits impacting engagement in ADL/IADL MODERATE  3 - 5 performance deficits   Client Assessment/Performance Deficits MODERATE - Comorbidities and min to mod modifications of tasks

## 2019-10-16 NOTE — PLAN OF CARE
A&Ox4. Pain controlled with [place]. Dressing to R foot changed, per order. Site is fragile. Feet elevated on pillow. VSS. Tele monitoring, per KANE protocol. IV abx infusing as ordered. Updated on POC. Will continue to monitor.

## 2019-10-16 NOTE — PROGRESS NOTES
[facility] Hospitalist Team  Progress Note      Anna Marie Roberson.  2/6/1950    Assessment/Plan:   #Left foot/ankle pain  -seen by ortho and podiatry, NWB for now  -ID on consult, iv abx for possible cellulitis  -check ankle xr -no acute findings  -pain control 34* 28*  --  19*   CREATSERUM 1.88* 1.20  --  1.00   CA 9.0 9.0  --  8.9   MG  --  2.4  --   --    * 127*  --  125*       Recent Labs   Lab 10/10/19  1955   ALT 9*   AST 19   ALB 2.7*       Recent Labs   Lab 10/12/19  1055   PGLU 163*       Meds:

## 2019-10-16 NOTE — PROGRESS NOTES
[facility] Cardiology Progress Note - [address] [facility]  Patient Status:  Inpatient    1950 MRN [id number]   [facility] 3SW-A Attending Zakiya Simms MD   [facility] Day # 5 PCP Ashleigh Bolton MD     Subjective: neck supple, no thyromegaly or adenopathy. Neck: No JVD, carotids 2+, no bruits. Cardiac: Regular rate and rhythm. S1, S2 normal. No murmur, pericardial rub, S3, thrill, heave or extra cardiac sounds.   Lungs: Clear without wheezes, rales, rhonchi or dull PRN  docusate sodium (COLACE) cap 100 mg, 100 mg, Oral, BID  magnesium hydroxide (MILK OF MAGNESIA) 400 MG/5ML suspension 30 mL, 30 mL, Oral, Daily PRN  bisacodyl (DULCOLAX) rectal suppository 10 mg, 10 mg, Rectal, Daily PRN  Senna (SENOKOT) tab 8.6 mg, 8. allergies    Jaxson Neff MD  10/16/2019  3:56 PM

## 2019-10-16 NOTE — PLAN OF CARE
Pt A&O. On room air. Tele and  monitoring maintained per KANE protocol. Encouraged use of incentive spirometer and ankle pump exercises every hour while awake. Tolerating cardiac diet with good appetite. Has been NPO since 1000 this morning.  Last BM 10/1

## 2019-10-17 PROCEDURE — 85025 COMPLETE CBC W/AUTO DIFF WBC: CPT | Performed by: PODIATRIST

## 2019-10-17 PROCEDURE — 36573 INSJ PICC RS&I 5 YR+: CPT

## 2019-10-17 PROCEDURE — 02HV33Z INSERTION OF INFUSION DEVICE INTO SUPERIOR VENA CAVA, PERCUTANEOUS APPROACH: ICD-10-PCS | Performed by: HOSPITALIST

## 2019-10-17 PROCEDURE — 80048 BASIC METABOLIC PNL TOTAL CA: CPT | Performed by: PODIATRIST

## 2019-10-17 RX ORDER — SODIUM CHLORIDE 0.9 % (FLUSH) 0.9 %
10 SYRINGE (ML) INJECTION AS NEEDED
Status: DISCONTINUED | OUTPATIENT
Start: 2019-10-17 | End: 2019-10-19

## 2019-10-17 NOTE — PROGRESS NOTES
[facility] Cardiology Progress Note - [address] [facility].  Patient Status:  Inpatient    1950 MRN [id number]   [facility] 3SW-A Attending Jigar Nuñez MD   [facility] Day # 6 PCP Kaitlin Doe MD     Subjective: sclera, neck supple, no thyromegaly or adenopathy. Neck: No JVD, carotids 2+, no bruits. Cardiac: Regular rate and rhythm. S1, S2 normal. No murmur, pericardial rub, S3, thrill, heave or extra cardiac sounds.   Lungs: Clear without wheezes, rales, rhonchi (ZYRTEC) tab 5 mg, 5 mg, Oral, Daily  PEG 3350 (MIRALAX) powder packet 17 g, 17 g, Oral, Daily PRN  docusate sodium (COLACE) cap 100 mg, 100 mg, Oral, BID  magnesium hydroxide (MILK OF MAGNESIA) 400 MG/5ML suspension 30 mL, 30 mL, Oral, Daily PRN  bisacody bruising or excessive bleeding  Endocrine: no history of diabetes  Allergy: see above drug allergies    Giovana Epperson MD  10/17/2019  8:08 AM

## 2019-10-17 NOTE — PLAN OF CARE
A&Ox4. Continues to be drowsy. Wakes up easily when being spoken to. Tolerated well drinking fluids and taking PO meds. VSS. HR in the high 50s when asleep. Tele monitoring, per KANE protocol. Kurlex roll on right foot remains clean and intact.  Foot elevate

## 2019-10-17 NOTE — ANESTHESIA POSTPROCEDURE EVALUATION
[facility].  Patient Status:  Inpatient   Age/Gender 68year old male MRN [id number]   Location [address] [facility] Attending Jazmyne Frances, [address] [facility] Day # 5 PCP Juan Miguel Ulloa MD       Anesthesia Post-op Note    Procedu

## 2019-10-17 NOTE — PHYSICAL THERAPY NOTE
Pt Re eval orders recd, discrepancy with wt bearing left LE in Epic. Anton Cespedes, paged Dr Rosa Maria Jolley to clarify.

## 2019-10-17 NOTE — OCCUPATIONAL THERAPY NOTE
Attempted to see patient this pm, however patient having PICC line placement. Will re-attempt as schedule allows.

## 2019-10-17 NOTE — PROGRESS NOTES
INFECTIOUS DISEASE PROGRESS NOTE    S[facility]ie [facility].  Patient Status:  Inpatient    1950 MRN [id number]   [facility] 3SW-A Attending Gilford Guy,*   Hosp Day # 6 PCP 50 mg, Oral, Daily  •  Sotalol HCl (BETAPACE) tab 120 mg, 120 mg, Oral, 2x Daily(Beta Blocker)  •  spironolactone (ALDACTONE) tab 25 mg, 25 mg, Oral, BID  •  Alfuzosin HCl ER (UROXATRAL) 24 hr tab 10 mg, 10 mg, Oral, Daily with breakfast  •  influenza vacc 140   K 4.3 3.5 4.3 4.3 3.8   CL 91* 97*  --  103 102   CO2 26.0 30.0  --  28.0 29.0   ALKPHO 100  --   --   --   --    AST 19  --   --   --   --    ALT 9*  --   --   --   --    BILT 0.8  --   --   --   --    TP 8.1  --   --   --   --        Microbiology Oxacillin 0.5 Sensitive      Trimethoprim/Sulfa <=10 Sensitive      Vancomycin <=0.5 Sensitive    4.  BLOOD CULTURE     Status: None    Collection Time: 10/10/19  8:46 PM   Result Value Ref Range    Blood Culture Result No Growth 5 Days N/A       Radiology:

## 2019-10-17 NOTE — PROGRESS NOTES
Received from PACU at 2035. Brother and nephew at bedside. Pt is drowsy, and easy to wake. Denies any pain. VSS. Wound vac in place with scant drainage in tubing. Will continue to monitor.

## 2019-10-17 NOTE — DIETARY NOTE
[address]. Admitting diagnosis:  Cellulitis of lower extremity, unspecified laterality [V91.860]    Ht: 180.3 cm (5' 11\")  Wt: 93 kg (205 lb). This is 119% of IBW  Body mass index is 28.59 kg/m².   IBW:

## 2019-10-17 NOTE — BRIEF OP NOTE
Pre-Operative Diagnosis: complicated surgical wound, right foot     Post-Operative Diagnosis: same     Procedure Performed:   Procedure(s):  RIGHT FOOT DEBRIDEMENT AND APPLICATION OF INTEGRA GRAFT AND WOUND VAC    Surgeon(s) and Role:     * Tino Cox,

## 2019-10-17 NOTE — OPERATIVE REPORT
Date of surgery: 10/16/2019     Preoperative Diagnosis:   1. Complicated surgical wound, right foot  2. Osteomyelitis, right foot  3. Gas gangrene, right foot    Postoperative Diagnosis: Same     Procedure:   1.   Excisional wound debridement to level of

## 2019-10-17 NOTE — CONSULTS
[facility]  Inpatient Wound Care Contact Note    Miranda Jolly.  Patient Status:  Inpatient    1950 MRN [id number]   [facility] 3SW-A Attending Conchetta Mountain, *   Hosp Day # 6 PCP Andrea Sanchez MD     Consult received,

## 2019-10-17 NOTE — CM/SW NOTE
Received order that pt will require wound vac at DC. Met with pt to discuss DC planning. Pt's friends present with pt's consent. Pt had not yet reviewed [facility] list previously given.   Pt's friends reviewed list with pt and pt requested referral to [facility]

## 2019-10-17 NOTE — PROGRESS NOTES
10/16 S/p wound debridement, Integra and wound VAC    PT/OT  OOB    Ok to discharge  PICC line, IV abx per ID  Wound VAC, wound RB    D/w RN and ID  F/u in [facility] wound clinic next in 2 weeks

## 2019-10-17 NOTE — PROGRESS NOTES
[facility] Hospitalist Team  Progress Note      Ramo Soto.  2/6/1950    Assessment/Plan:       #RLE s/p amputation and now wound infection  -s/p I&D, plan debridement 10/16 per podiatry with placement of wound vac  -iv abx   -IV morphine prn, norco pr vidal    CN grossly intact      Data:       Labs:   Recent Labs   Lab 10/10/19  1955 10/11/19  0456 10/12/19  0455 10/13/19  0535 10/17/19  0447   WBC 15.2* 12.6* 9.7 8.7 7.2   HGB 12.2* 10.6* 10.3* 10.1* 12.4*   MCV 89.5 92.0 92.6 92.9 90.5   .0 2

## 2019-10-18 PROCEDURE — 85025 COMPLETE CBC W/AUTO DIFF WBC: CPT | Performed by: HOSPITALIST

## 2019-10-18 PROCEDURE — 99211 OFF/OP EST MAY X REQ PHY/QHP: CPT

## 2019-10-18 PROCEDURE — 97164 PT RE-EVAL EST PLAN CARE: CPT

## 2019-10-18 PROCEDURE — 97530 THERAPEUTIC ACTIVITIES: CPT

## 2019-10-18 PROCEDURE — 90471 IMMUNIZATION ADMIN: CPT

## 2019-10-18 PROCEDURE — 97168 OT RE-EVAL EST PLAN CARE: CPT

## 2019-10-18 PROCEDURE — 97116 GAIT TRAINING THERAPY: CPT

## 2019-10-18 RX ORDER — HYDROCODONE BITARTRATE AND ACETAMINOPHEN 10; 325 MG/1; MG/1
1-2 TABLET ORAL EVERY 6 HOURS PRN
Qty: 30 TABLET | Refills: 0 | Status: SHIPPED | OUTPATIENT
Start: 2019-10-18 | End: 2019-11-12 | Stop reason: ALTCHOICE

## 2019-10-18 RX ORDER — ALPRAZOLAM 0.5 MG/1
0.5 TABLET ORAL 2 TIMES DAILY PRN
Qty: 15 TABLET | Refills: 0 | Status: SHIPPED | OUTPATIENT
Start: 2019-10-18 | End: 2019-11-14

## 2019-10-18 RX ORDER — METOPROLOL SUCCINATE 25 MG/1
12.5 TABLET, EXTENDED RELEASE ORAL
Qty: 15 TABLET | Refills: 0 | Status: SHIPPED | OUTPATIENT
Start: 2019-10-19 | End: 2019-12-11

## 2019-10-18 NOTE — PROGRESS NOTES
[facility] Hospitalist Team  Progress Note      Lilian Alvarez.  2/6/1950    Assessment/Plan:       #RLE s/p amputation and now wound infection--> acute osteomyelitis  -s/p I&D, s/p debridement 10/16 per podiatry with placement of wound vac  -iv abx per ID sounds present  MSK: No edema, clubbing or cyanosis, R foot wrapped in ACE- c/d/i.  LLE wnl  Skin: no visible rashes           Data:       Labs:   Recent Labs   Lab 10/12/19  0455 10/13/19  0535 10/17/19  0447 10/18/19  0618   WBC 9.7 8.7 7.2 8.0   HGB 10.3

## 2019-10-18 NOTE — OCCUPATIONAL THERAPY NOTE
OCCUPATIONAL THERAPY EVALUATION - INPATIENT     Room Number: 370/370-A  Evaluation Date: 10/18/2019  Type of Evaluation: Re-evaluation  Presenting Problem: (R foot gangrene, s/p I&D x2 and wound vac placement)    Physician Order: IP Consult to Occupational bilateral eyes   • CATH PERCUTANEOUS  TRANSLUMINAL CORONARY ANGIOPLASTY  2001    stent   • COLONOSCOPY     • COLONOSCOPY, POSSIBLE BIOPSY, POSSIBLE POLYPECTOMY 75855 N/A 6/11/2018    Performed by Adela Briseno MD at [address] [place] [address] OCCUPATIONAL PROFILE    HOME SITUATION  Type of Home: House  Home Layout: Two level  Lives With: Alone    Toilet and Equipment: Standard height toilet  Shower/Tub and Equipment: Walk-in shower; Tub-shower combo(walk in 1st flr, tub/shw 2nd flr) attention    RANGE OF MOTION AND STRENGTH ASSESSMENT    Upper extremity ROM is within functional limits     Upper extremity strength is within functional limits    COORDINATION  Gross Motor    WFL    Fine Motor    WFL      ADDITIONAL TESTS of session/findings; All patient questions and concerns addressed; Other (Comment)(BLEs elevated, pillow under RLE)    ASSESSMENT     Patient is a 68year old male admitted on 10/10/2019 for foot pain, gangrene.  Complete medical history and occupational prof rehabilitation(elos 7-10 days)  OT Device Recommendations: TBD    PLAN  OT Treatment Plan: Energy conservation/work simplification techniques;ADL training;Functional transfer training;UE strengthening/ROM; Endurance training;Patient/Family education;Patient

## 2019-10-18 NOTE — CM/SW NOTE
MSW left a message for Jordan Vasquez at [address] to inform her of pt's dc today at [address] [facility] requested for a 7pm . PCS form placed on chart.     [address] Res[address]  H:[phone]  [address] [facility]:  [id number]

## 2019-10-18 NOTE — PROGRESS NOTES
INFECTIOUS DISEASE PROGRESS NOTE    Gilda Mcgregor.  Patient Status:  Inpatient    1950 MRN [id number]   [facility] 3SW-A Attending Kendra Gibbs,*   Hosp Day # 7 PCP Sertraline HCl (ZOLOFT) tab 50 mg, 50 mg, Oral, Daily  •  Sotalol HCl (BETAPACE) tab 120 mg, 120 mg, Oral, 2x Daily(Beta Blocker)  •  spironolactone (ALDACTONE) tab 25 mg, 25 mg, Oral, BID  •  Alfuzosin HCl ER (UROXATRAL) 24 hr tab 10 mg, 10 mg, Oral, Sera in EMR,  Hospital Encounter on 10/10/19   1.  TISSUE AEROBIC CULTURE     Status: Abnormal    Collection Time: 10/12/19 10:41 AM   Result Value Ref Range    Tissue Culture Result 2+ growth Enterococcus faecalis (A) N/A    Tissue Culture Result 2+ growth Stap ASSESSMENT/ PLAN:    1.R foot infection, at site of recent amputation  - s/p debridement x 2 , with exposed bone.  Path c/w acute OM  - cx noted, only enterococcus, MSSA and anaerobes  - plan is for 4-6 weeks of IV abx on d/c (could stop at 4 weeks if a

## 2019-10-18 NOTE — PROGRESS NOTES
ADDENDUM:  The patient was personally seen and examined by me. I agree with the note written below with the following comments:    S: no acute events overnight. denies cp/palpitations/sob.     O: /89 (BP Location: Right arm)   Pulse 60   Temp 98 °F ( at 10/18/2019 1344  Gross per 24 hour   Intake 1240 ml   Output 2251 ml   Net -1011 ml     Wt Readings from Last 3 Encounters:  10/10/19 : 205 lb (93 kg)  09/20/19 : 205 lb (93 kg)  08/14/19 : 195 lb (88.5 kg)      General: Awake and alert; in no acute dis AC  Sertraline HCl (ZOLOFT) tab 50 mg, 50 mg, Oral, Daily  Sotalol HCl (BETAPACE) tab 120 mg, 120 mg, Oral, 2x Daily(Beta Blocker)  spironolactone (ALDACTONE) tab 25 mg, 25 mg, Oral, BID  Alfuzosin HCl ER (UROXATRAL) 24 hr tab 10 mg, 10 mg, Oral, Daily wit

## 2019-10-18 NOTE — CONSULTS
[facility]  Report of Inpatient Wound Care Consultation    Nataly Frausto.  Patient Status:  Inpatient    1950 MRN [id number]   [facility] 3SW-A Attending Ro Jain, *   Hosp Day # 7 PCP Greta Khalil MD     Reason Stanley   • EXTREMITY LOWER HARDWARE REMOVAL Right 8/13/2019    Performed by René Clark MD at [address] [place] [address]   • [place] Right 10/12/2019    Performed by Laura Whitman DPM at [facility] MAIN OR   • FOOT JOINT FUSION Right 1 weeks per . Thank you for allowing me to participate in the care of your patient. Time Spent 5 minutes, Thank you.     Pal Burrows RN, BSN [address] [place] [address],[facility] Floor   Wound Care pager [phone]  10/18/2019  10:14 AM

## 2019-10-18 NOTE — PHYSICAL THERAPY NOTE
PHYSICAL THERAPY EVALUATION - INPATIENT     Room Number: 370/370-A  Evaluation Date: 10/18/2019  Type of Evaluation: Re-evaluation  Physician Order: PT Eval and Treat    Presenting Problem: s/p I & D and revision amputation 1st ray right foot 10/12/1 s/p brachytherapy   • Sleep apnea     no cpap   • Visual impairment     glasses       Past Surgical History  Past Surgical History:   Procedure Laterality Date   • ANGIOGRAM  2001   • ANGIOPLASTY (CORONARY)     • CABG  8/2013   • CATARACT      bilateral Aram Alexander MD at [address] [facility] [address] Left 12/1/2015    Performed by Rocael Sarabia MD at [address]   • TOE AMPUTATION Right 9/24/2019    Performed by Jacquie Santiago MD at [address]   • [place] '6-Clicks' INPATIENT SHORT FORM - BASIC MOBILITY  How much difficulty does the patient currently have. ..  -   Turning over in bed (including adjusting bedclothes, sheets and blankets)?: None   -   Sitting down on and standing up from a chair with arms (e.g foot 1/16/19 . Pertinent comorbidities and personal factors impacting therapy include anxiety, a fib, PVD, Parkinsons, OA. HTN, HD, B TSA.  .  In this PT evaluation, the patient presents with the following impairments dec dynamic/static standing balance, d Goal #6    Goal Comments: Goals established on 10/18/2019

## 2019-10-18 NOTE — PLAN OF CARE
Pain controlled with prn Cathaleen Buys dressing on right foot is clean and intact. Wound vac has scant output. Foot elevated on pillow. VSS. IV abx infusing as ordered. Tele monitoring. VSS. On RA. Plan is d/c to [facility] when ready.

## 2019-10-18 NOTE — PLAN OF CARE
REPORT GIVEN TO KALEIGH AT [facility]. SCRIPT FOR DAVID Moore SENT W/ PT. PICC LINE PATENDE DRG INTACT. WILL DISCONNECT WOUND VAC AS SOON AMB ARRIVE.

## 2019-10-18 NOTE — PLAN OF CARE
RECD EARLY AM ALERT  ,AWAKE, ORIENTED. DR ANDERSEN HERE AND SAW PT AND AWARE OF PT CONCERNED RE: LT FOOT. CMS GOOD PEDAL PULSES PALPABLE. TOES WARM,RT FOOT WITH DRG AND WOUND VAC IN PLACE. WOUND RN HERE AND SPOKE PODIATRIST NO NEED TO CHANGE DRG. D/C PLAN TODA

## 2019-10-18 NOTE — DISCHARGE SUMMARY
Pt was not discharged 10/18/19 due to issues with ambulance transport.   Pt will be discharging today 10/19/19  General Medicine Discharge Summary     Patient ID:  Audrey Orr.  68year old  [id number]  2/6/1950    Admit date: 10/10/2019    Dischar consult, cont low dose BB  -cont sotolol  -eliquis given 10/14, to resume anticoagulation today if ok with podiatry  - hold ccb     #CAD s/p cabg and PCI, NSVT  -cards following  - no recurrence of NSVT on BB     #parkinsons-no acute issue  -cont sinemet (min 3 Views), Left (cpt=73630)    Result Date: 10/10/2019  PROCEDURE:  XR FOOT, COMPLETE (MIN 3 VIEWS), LEFT (CPT=73630)  TECHNIQUE:  AP, oblique, and lateral views were obtained. COMPARISON:  None.   INDICATIONS:  R/O DVT  PATIENT STATED HISTORY: (As tra thrombus. There are areas of wall thickening and intraluminal septations, some of which are calcified, in  the mid superficial femoral vein and popliteal vein consistent with chronic sequela of prior DVT or inflammation.   Flow is demonstrated in the poste Kelsie Dust, RVT ---------------------------------------------------------------------------- Indications:   (Gangrene of both lower extremities due to atherosclerosis ([facility] [M09.212 (ICD-10-CM)]). ------------------------------------------------------ ! No    !2.80mm!----------------------------! +----+---------------------+------+------+----------------------------+ ! GSV ! Right ankle          ! No    !3.80mm! sclerotic.                  ! +----+---------------------+------+------+----------------------- +----+---------------------+------+------+----------------------------+ ! SSV ! Distal right calf    ! No    !3.60mm!----------------------------! +----+---------------------+------+------+----------------------------+ ! SSV ! Left popliteal fossa ! No    !2.30m total) by mouth 2 (two) times daily as needed. CONTINUE these medications which have NOT CHANGED    bumetanide 2 MG Oral Tab  Take 1 tablet (2 mg total) by mouth 2 (two) times daily.     gabapentin 300 MG Oral Cap  Take 1 capsule (300 mg total) by mout [phone]           Call Ade Malagon MD   Cardiology  [address] [facility] [address]   [facility] [address] [phone]           Schedule an appointment with Juan Miguel Ulloa MD as soon as possible for a visit in 1 week(s)   PCP, fo

## 2019-10-19 VITALS
HEART RATE: 70 BPM | HEIGHT: 71 IN | TEMPERATURE: 98 F | WEIGHT: 205 LBS | DIASTOLIC BLOOD PRESSURE: 62 MMHG | RESPIRATION RATE: 18 BRPM | SYSTOLIC BLOOD PRESSURE: 115 MMHG | OXYGEN SATURATION: 95 % | BODY MASS INDEX: 28.7 KG/M2

## 2019-10-19 NOTE — PLAN OF CARE
Assumed care of patient around 0730, alert and oriented X4, no c/o CP/SOB, SpO2 98 % on RA    Plan for Tono Falling today at noon. Report called to [facility].         Problem: PAIN - ADULT  Goal: Verbalizes/displays adequate comfort level or patient's stated pa LE, WBAT on L LE     Outcome: Progressing

## 2019-10-19 NOTE — PLAN OF CARE
POD 7 Rt foot I&D, Pt was to be D/C'd [facility]. Issues involving [facility] ambulance service delayed D/C. [facility] ambulance dispatched phoned at approx 2100 hrs stating that Pt transport was attempting to be handed off to [facility].  No call back at 3250

## 2019-10-19 NOTE — PLAN OF CARE
EXPECTING TO BE  AT 1900 . CALL [facility] AND SPOKE TO HOPE . PER HOPE WILL BE DELAY 2 -3 HRS [facility] AMB ADVICE CALL IF DELAY LIKE THIS TO INFORM PT . WILL CALL [facility]; DELAY TRANSPORT. SPOKE TO [facility] AND LET THEM  KNOW DELAY TRANSFER

## 2019-10-19 NOTE — PROGRESS NOTES
RN called to [facility] this am. Spoke with Puma Granados. Agreeable to accept patient around noon today. Called to [facility] ambulance to arrange transport at 1200 .

## 2019-10-21 NOTE — CM/SW NOTE
10/21/19 0800   Discharge disposition   Expected discharge disposition Skilled Nurs   Name of Clementine Aleena Restrepo'   Discharge transportation [address] [facility] 10/18/19

## 2019-10-22 ENCOUNTER — INITIAL APN SNF VISIT (OUTPATIENT)
Dept: INTERNAL MEDICINE CLINIC | Age: 69
End: 2019-10-22

## 2019-10-22 VITALS
OXYGEN SATURATION: 99 % | SYSTOLIC BLOOD PRESSURE: 108 MMHG | RESPIRATION RATE: 18 BRPM | HEART RATE: 68 BPM | TEMPERATURE: 98 F | DIASTOLIC BLOOD PRESSURE: 71 MMHG

## 2019-10-22 DIAGNOSIS — I48.20 CHRONIC ATRIAL FIBRILLATION (HCC): ICD-10-CM

## 2019-10-22 DIAGNOSIS — Z79.899 MEDICATION MANAGEMENT: ICD-10-CM

## 2019-10-22 DIAGNOSIS — Z89.411 HISTORY OF COMPLETE RAY AMPUTATION OF FIRST TOE OF RIGHT FOOT (HCC): ICD-10-CM

## 2019-10-22 DIAGNOSIS — D50.9 IRON DEFICIENCY ANEMIA, UNSPECIFIED IRON DEFICIENCY ANEMIA TYPE: ICD-10-CM

## 2019-10-22 DIAGNOSIS — I73.9 PAD (PERIPHERAL ARTERY DISEASE) (HCC): ICD-10-CM

## 2019-10-22 DIAGNOSIS — Z74.09 IMPAIRED MOBILITY AND ADLS: ICD-10-CM

## 2019-10-22 DIAGNOSIS — I50.32 CHRONIC DIASTOLIC HEART FAILURE (HCC): ICD-10-CM

## 2019-10-22 DIAGNOSIS — F51.02 ADJUSTMENT INSOMNIA: ICD-10-CM

## 2019-10-22 DIAGNOSIS — I73.9 PVD (PERIPHERAL VASCULAR DISEASE) (HCC): ICD-10-CM

## 2019-10-22 DIAGNOSIS — L03.115 CELLULITIS OF RIGHT LOWER EXTREMITY: Primary | ICD-10-CM

## 2019-10-22 DIAGNOSIS — M48.061 SPINAL STENOSIS OF LUMBAR REGION, UNSPECIFIED WHETHER NEUROGENIC CLAUDICATION PRESENT: ICD-10-CM

## 2019-10-22 DIAGNOSIS — K59.01 SLOW TRANSIT CONSTIPATION: ICD-10-CM

## 2019-10-22 DIAGNOSIS — J44.9 CHRONIC OBSTRUCTIVE PULMONARY DISEASE, UNSPECIFIED COPD TYPE (HCC): ICD-10-CM

## 2019-10-22 DIAGNOSIS — I25.5 ISCHEMIC CARDIOMYOPATHY: ICD-10-CM

## 2019-10-22 DIAGNOSIS — I10 BENIGN ESSENTIAL HTN: ICD-10-CM

## 2019-10-22 DIAGNOSIS — Z78.9 IMPAIRED MOBILITY AND ADLS: ICD-10-CM

## 2019-10-22 DIAGNOSIS — I25.810 CORONARY ARTERY DISEASE INVOLVING CORONARY BYPASS GRAFT OF NATIVE HEART WITHOUT ANGINA PECTORIS: ICD-10-CM

## 2019-10-22 DIAGNOSIS — R26.81 GAIT INSTABILITY: ICD-10-CM

## 2019-10-22 PROCEDURE — 99310 SBSQ NF CARE HIGH MDM 45: CPT | Performed by: NURSE PRACTITIONER

## 2019-10-22 RX ORDER — ZOLPIDEM TARTRATE 10 MG/1
10 TABLET ORAL NIGHTLY
COMMUNITY
End: 2019-10-31 | Stop reason: ALTCHOICE

## 2019-10-22 RX ORDER — OMEPRAZOLE 20 MG/1
20 CAPSULE, DELAYED RELEASE ORAL EVERY MORNING
COMMUNITY
End: 2020-10-13

## 2019-10-22 RX ORDER — BISACODYL 10 MG
10 SUPPOSITORY, RECTAL RECTAL DAILY PRN
COMMUNITY
End: 2020-12-01 | Stop reason: CLARIF

## 2019-10-22 RX ORDER — POLYETHYLENE GLYCOL 3350 17 G/17G
17 POWDER, FOR SOLUTION ORAL DAILY
COMMUNITY

## 2019-10-22 NOTE — CONSULTS
[facility]    PATIENT'S NAME: Catalina Black   ATTENDING PHYSICIAN: Thu Martinez. Rosa Maria Jolley MD   CONSULTING PHYSICIAN: Raf Bhakta M.D.    PATIENT ACCOUNT#:   [de-identified]    LOCATION:  3SW-A 370 A ED  MEDICAL RECORD #:   [id number]       DATE OF BI bypass surgery, post coronary stenting, history of atrial fibrillation, on anticoagulation. MEDICATIONS:  At home, sotalol, Cardizem, metoprolol, and anticoagulation with factor X inhibitor. ALLERGIES:  None known.     FAMILY HISTORY:  Noncontributory

## 2019-10-22 NOTE — PROGRESS NOTES
Azael Li. : 1950  Age 68year old  male patient is admitted to Facility: [address] Res[address] for [facility] after revision of right 1st ray amputation and OM.     [address] [place] [address] Drive date:    10.10.19  Discharge date to [facility]:    10.19.19 take both sotalol and metoprolol per cardiology.     Past Medical History:   Diagnosis Date   • Anxiety    • Arrhythmia     atrial fibrillation   • ATRIAL FIBRILLATION    • Back problem    • Cardiomyopathy [facility])    • Congestive heart disease ([facility]     last by Manas Pedraza MD at [place] [address] Left 3/19/2009    Performed by Justina Hernandez MD at [address] [place] [address]   • HIP REPLACEMENT SURGERY  3/2015    right hip replacement    • HIP TOTAL REPLACEMENT Right 1/7/2015    Performed Code    ADVANCED CARE PLANNING TEAM: None      CURRENT MEDICATIONS   Zolpidem Tartrate 10 MG Oral Tab, Take 10 mg by mouth nightly., Disp: , Rfl:   PEG 3350 Oral Powd Pack, Take 17 g by mouth daily. , Disp: , Rfl:   bisacodyl (DULCOLAX) 10 MG Rectal Suppos, 1  Meclizine HCl 25 MG Oral Tab, Take 1 tablet (25 mg total) by mouth 3 (three) times daily as needed for Dizziness. , Disp: 60 tablet, Rfl: 3  Clopidogrel Bisulfate 75 MG Oral Tab, Take 1 tablet (75 mg total) by mouth daily. , Disp: 90 tablet, Rfl: 3  vasiliy apparent distress  LINES, TUBES, DRAINS:  PIC line - location LUE  SKIN: no rashes, no suspicious lesions, pale, warm, dry  WOUND:   Right foot surgical wound:  Wound vac in place and functional  EYES: PERRLA, EOMI, sclera anicteric, conjunctiva normal; th 1-2 tab q 6 hrs prn  8. Add Vitamin C 500 mg BID x 2 wks  9. Add Zinc 220 mg daily x 2 wks  10. Weekly CBC, CMP, ESR, and CRP  11. Consult Dietician to recommend protein supplement  12. Consult local wound RN to manage wound vac  13.  F/U w/ [address] [facility] wound cl

## 2019-10-28 ENCOUNTER — APPOINTMENT (OUTPATIENT)
Dept: WOUND CARE | Facility: HOSPITAL | Age: 69
End: 2019-10-28
Attending: PODIATRIST
Payer: MEDICARE

## 2019-10-31 ENCOUNTER — SNF VISIT (OUTPATIENT)
Dept: INTERNAL MEDICINE CLINIC | Age: 69
End: 2019-10-31

## 2019-10-31 VITALS
OXYGEN SATURATION: 95 % | BODY MASS INDEX: 26 KG/M2 | SYSTOLIC BLOOD PRESSURE: 110 MMHG | HEART RATE: 65 BPM | DIASTOLIC BLOOD PRESSURE: 65 MMHG | RESPIRATION RATE: 20 BRPM | TEMPERATURE: 98 F | WEIGHT: 187.38 LBS

## 2019-10-31 DIAGNOSIS — L03.115 CELLULITIS OF RIGHT LOWER EXTREMITY: Primary | ICD-10-CM

## 2019-10-31 DIAGNOSIS — R09.82 POST-NASAL DRIP: ICD-10-CM

## 2019-10-31 DIAGNOSIS — Z89.411 HISTORY OF COMPLETE RAY AMPUTATION OF FIRST TOE OF RIGHT FOOT (HCC): ICD-10-CM

## 2019-10-31 DIAGNOSIS — Z79.899 MEDICATION MANAGEMENT: ICD-10-CM

## 2019-10-31 DIAGNOSIS — F51.02 ADJUSTMENT INSOMNIA: ICD-10-CM

## 2019-10-31 PROCEDURE — 99309 SBSQ NF CARE MODERATE MDM 30: CPT | Performed by: NURSE PRACTITIONER

## 2019-10-31 RX ORDER — FLUTICASONE PROPIONATE 50 MCG
1 SPRAY, SUSPENSION (ML) NASAL 2 TIMES DAILY
COMMUNITY
End: 2021-03-16

## 2019-10-31 NOTE — PROGRESS NOTES
Alejandrina Matthews., 36/1950, 68year old, male    Chief Complaint:  Patient presents with:   Follow - Up: S/P revision of right 1st ray amputation and OM  Post Nasal Drip  Insomnia       Subjective:    PMH significant for Parkinson's dz, COPD, KANE (not u murmur  ABDOMEN:  normal active BS+, soft, nondistended; no organomegaly, no masses; no bruits; nontender, no guarding, no rebound tenderness. :Deferred  LYMPHATIC:no lymphedema  MUSCULOSKELETAL: no acute synovitis upper or lower extremity.   Weakness R/ 1 spray each nare BID    COPD  1. O2 sat q shift  2. Not on any tx     KANE   1. Does not use CPAP  2. Check O2 sat q shift     CAD/HTN/HL/A Fib w/ slow ventricular response/Ischemic CM/ PVD/PAD/NSVT  1. VS q 4 hrs x 48 hrs then q shift  2. Daily wt  3.  Car

## 2019-11-04 ENCOUNTER — OFFICE VISIT (OUTPATIENT)
Dept: WOUND CARE | Facility: HOSPITAL | Age: 69
End: 2019-11-04
Attending: PODIATRIST
Payer: MEDICARE

## 2019-11-04 DIAGNOSIS — M86.171 ACUTE OSTEOMYELITIS OF RIGHT ANKLE OR FOOT (HCC): Primary | ICD-10-CM

## 2019-11-04 PROCEDURE — 99214 OFFICE O/P EST MOD 30 MIN: CPT

## 2019-11-04 NOTE — PROGRESS NOTES
Progress Note Details  Patient Name: Oscar Rey  Patient Number: [id number]  Patient YOB: 1950  Date: 11/4/2019  Physician / Basilia Elizalde: Anyi Garcia 19: Emmanuel 44    Chief Complaint  This information was obtained from the pa Yes  History of chemotherapy?: No  History of radiation?: Yes  Date and Type[de-identified] Rachael radiation therapy for prostate cancer    Medications  alprazolam - oral 0.5 mg tablet  gabapentin - oral 300 mg capsule  meclizine - oral 25 mg tablet  carbidopa-levodopa - cm  Vascular Assessment:  Left Extremity Pulses:  Dorsalis Pedis: Palpable  Right Extremity Pulses:  Dorsalis Pedis: Palpable  Left Extremity colors, hair growth, and conditions:  Extremity Color: WNL  Hair Growth on Extremity: Yes  Temperature of Extremit

## 2019-11-06 ENCOUNTER — SNF VISIT (OUTPATIENT)
Dept: INTERNAL MEDICINE CLINIC | Age: 69
End: 2019-11-06

## 2019-11-06 VITALS
TEMPERATURE: 98 F | RESPIRATION RATE: 18 BRPM | OXYGEN SATURATION: 97 % | HEART RATE: 64 BPM | SYSTOLIC BLOOD PRESSURE: 112 MMHG | BODY MASS INDEX: 27 KG/M2 | DIASTOLIC BLOOD PRESSURE: 69 MMHG | WEIGHT: 192.38 LBS

## 2019-11-06 DIAGNOSIS — L03.115 CELLULITIS OF RIGHT LOWER EXTREMITY: Primary | ICD-10-CM

## 2019-11-06 DIAGNOSIS — R52 PAIN: ICD-10-CM

## 2019-11-06 DIAGNOSIS — Z78.9 IMPAIRED MOBILITY AND ADLS: ICD-10-CM

## 2019-11-06 DIAGNOSIS — Z89.411 HISTORY OF COMPLETE RAY AMPUTATION OF FIRST TOE OF RIGHT FOOT (HCC): ICD-10-CM

## 2019-11-06 DIAGNOSIS — R26.81 GAIT INSTABILITY: ICD-10-CM

## 2019-11-06 DIAGNOSIS — Z79.899 MEDICATION MANAGEMENT: ICD-10-CM

## 2019-11-06 DIAGNOSIS — Z74.09 IMPAIRED MOBILITY AND ADLS: ICD-10-CM

## 2019-11-06 PROCEDURE — 99308 SBSQ NF CARE LOW MDM 20: CPT | Performed by: NURSE PRACTITIONER

## 2019-11-07 NOTE — PROGRESS NOTES
Cornell Amezcua., 36/1950, 68year old, male    Chief Complaint:  Patient presents with:   Follow - Up: s/p osteomyelitis of right foot       Subjective:    PMH significant for Parkinson's dz, COPD, KANE (not using CPAP), CAD s/p MI, PCI and CABG, HTN, bruits  BREAST: ---deferred  RESPIRATORY:clear to auscultation anteriorly and posteriorlly; no wheezing; on room air  CARDIOVASCULAR: S1, S2 normal, RRR; no S3, no S4; , no click, no murmur  ABDOMEN:  normal active BS+, soft, nondistended; no organomegaly, prn     Constipation  1. Colace 100 mg BID  2. Miralax 17 gm daily   3. Dulcolax 10 mg supp daily prn     Parkinson's dz  1. Sinemet 25/100 mg TID    Post nasal drip  1. Flonase nasal 50 mcg/act; 1 spray each nare BID    COPD  1. O2 sat q shift  2.  Not on

## 2019-11-12 ENCOUNTER — SNF VISIT (OUTPATIENT)
Dept: INTERNAL MEDICINE CLINIC | Age: 69
End: 2019-11-12

## 2019-11-12 VITALS
TEMPERATURE: 99 F | SYSTOLIC BLOOD PRESSURE: 109 MMHG | OXYGEN SATURATION: 96 % | DIASTOLIC BLOOD PRESSURE: 62 MMHG | RESPIRATION RATE: 20 BRPM | HEART RATE: 70 BPM | WEIGHT: 193.31 LBS | BODY MASS INDEX: 27 KG/M2

## 2019-11-12 DIAGNOSIS — Z89.411 HISTORY OF COMPLETE RAY AMPUTATION OF FIRST TOE OF RIGHT FOOT (HCC): ICD-10-CM

## 2019-11-12 DIAGNOSIS — L03.115 CELLULITIS OF RIGHT LOWER EXTREMITY: Primary | ICD-10-CM

## 2019-11-12 PROCEDURE — 99308 SBSQ NF CARE LOW MDM 20: CPT | Performed by: NURSE PRACTITIONER

## 2019-11-12 RX ORDER — OXYCODONE AND ACETAMINOPHEN 7.5; 325 MG/1; MG/1
1-2 TABLET ORAL EVERY 4 HOURS PRN
COMMUNITY
End: 2020-03-04

## 2019-11-13 NOTE — PROGRESS NOTES
Holger Meeker., 36/1950, 68year old, male    Chief Complaint:  Patient presents with:   Follow - Up: S/P revision of right 1st ray amputation and OM       Subjective:    PMH significant for Parkinson's dz, COPD, KANE (not using CPAP), CAD s/p MI, PCI nontender, no guarding, no rebound tenderness. :Deferred  LYMPHATIC:no lymphedema  MUSCULOSKELETAL: no acute synovitis upper or lower extremity.   Weakness R/T recent hospitalization/diagnoses/sequelae; will undergo therapies to rehab and improve strengt spray each nare BID    COPD  1. O2 sat q shift  2. Not on any tx     KANE   1. Does not use CPAP  2. Check O2 sat q shift     CAD/HTN/HL/A Fib w/ slow ventricular response/Ischemic CM/ PVD/PAD/NSVT  1. VS q shift  2.  Daily wt, notify MD/NP for >2lb overnigh no insomnia

## 2019-11-18 ENCOUNTER — APPOINTMENT (OUTPATIENT)
Dept: WOUND CARE | Facility: HOSPITAL | Age: 69
End: 2019-11-18
Attending: PODIATRIST
Payer: MEDICARE

## 2019-11-20 ENCOUNTER — SNF DISCHARGE (OUTPATIENT)
Dept: INTERNAL MEDICINE CLINIC | Age: 69
End: 2019-11-20

## 2019-11-20 DIAGNOSIS — Z78.9 IMPAIRED MOBILITY AND ADLS: ICD-10-CM

## 2019-11-20 DIAGNOSIS — F51.02 ADJUSTMENT INSOMNIA: ICD-10-CM

## 2019-11-20 DIAGNOSIS — R52 PAIN: ICD-10-CM

## 2019-11-20 DIAGNOSIS — K59.01 SLOW TRANSIT CONSTIPATION: ICD-10-CM

## 2019-11-20 DIAGNOSIS — I48.20 CHRONIC ATRIAL FIBRILLATION (HCC): ICD-10-CM

## 2019-11-20 DIAGNOSIS — R26.81 GAIT INSTABILITY: ICD-10-CM

## 2019-11-20 DIAGNOSIS — J44.9 CHRONIC OBSTRUCTIVE PULMONARY DISEASE, UNSPECIFIED COPD TYPE (HCC): ICD-10-CM

## 2019-11-20 DIAGNOSIS — L03.115 CELLULITIS OF RIGHT LOWER EXTREMITY: Primary | ICD-10-CM

## 2019-11-20 DIAGNOSIS — Z74.09 IMPAIRED MOBILITY AND ADLS: ICD-10-CM

## 2019-11-20 DIAGNOSIS — I10 BENIGN ESSENTIAL HTN: ICD-10-CM

## 2019-11-20 PROCEDURE — 99316 NF DSCHRG MGMT 30 MIN+: CPT | Performed by: NURSE PRACTITIONER

## 2019-11-21 VITALS
DIASTOLIC BLOOD PRESSURE: 62 MMHG | OXYGEN SATURATION: 97 % | SYSTOLIC BLOOD PRESSURE: 122 MMHG | RESPIRATION RATE: 18 BRPM | TEMPERATURE: 98 F | WEIGHT: 197 LBS | HEART RATE: 65 BPM | BODY MASS INDEX: 27 KG/M2

## 2019-11-22 NOTE — PROGRESS NOTES
Belia Francis., 36/1950, 68year old, male is being discharged from Facility: [facility]    Date of Admission:10/19/19    Date of Anticipated Discharge:11/23/19                            Admitting Diagnoses:Rig drainage from eyes  HENT: normocephalic; normal nose, no nasal drainage, mucous membranes pink, moist, pharynx no exudate, no visible cerumen.   NECK: supple; FROM; no JVD, no TMG, no carotid bruits  BREAST: ---deferred  RESPIRATORY:clear to auscultation an Justo/ID okay to change to Zosyn  6. PT/OT eval and tx  7. Plan DC on or before 11.23.19; SW to assist w/ DC  8. Percocet 7.5/325 mg; 1-2 tab q 4 hrs prn  9. Vitamin C 500 mg BID x 2 wks  10. Zinc 220 mg daily x 2 wks  11.  Weekly CBC, CMP, ESR, and CRP, re PM

## 2019-11-25 ENCOUNTER — OFFICE VISIT (OUTPATIENT)
Dept: WOUND CARE | Facility: HOSPITAL | Age: 69
End: 2019-11-25
Attending: PODIATRIST
Payer: MEDICARE

## 2019-11-25 DIAGNOSIS — M86.171 ACUTE OSTEOMYELITIS OF RIGHT ANKLE OR FOOT (HCC): Primary | ICD-10-CM

## 2019-11-25 PROCEDURE — 11042 DBRDMT SUBQ TIS 1ST 20SQCM/<: CPT

## 2019-11-25 PROCEDURE — 97605 NEG PRS WND THER DME<=50SQCM: CPT

## 2019-11-26 NOTE — PROGRESS NOTES
Progress Note Details  Patient Name: Alberto Fajardo  Patient Number: [id number]  Patient YOB: 1950  Date: 11/25/2019  Physician / Ramsey Camp: Olesya Hassan: Pao Serrano    Chief Complaint  This information was obtained from the p from heel  Right Extremity: Edema is present  Compression Device In Use: No  Calf Measurement 37 cm from heel  Ankle Measurement 11 cm from heel  Vascular Assessment:  Left Extremity colors, hair growth, and conditions:  Extremity Color: WNL  Hair Growth o continuous. RN to change dressing three times weekly, unless noted below. - endoform to wound bad then foam    Follow-Up Appointments  Return Appointment in 2 weeks. Care summary  Reviewed and evaluated labs.   Discussed the Plan of Care at bedside with

## 2019-12-09 ENCOUNTER — OFFICE VISIT (OUTPATIENT)
Dept: WOUND CARE | Facility: HOSPITAL | Age: 69
End: 2019-12-09
Attending: PODIATRIST
Payer: MEDICARE

## 2019-12-09 DIAGNOSIS — L97.512 RIGHT FOOT ULCER, WITH FAT LAYER EXPOSED (HCC): Primary | ICD-10-CM

## 2019-12-09 PROCEDURE — 11042 DBRDMT SUBQ TIS 1ST 20SQCM/<: CPT

## 2019-12-09 NOTE — PROGRESS NOTES
Progress Note Details  Patient Name: Woodroe Dubin  Patient Number: [id number]  Patient YOB: 1950  Date: 12/9/2019  Physician / Tiesha Porras: Anyi Bhat 19: Emmanuel 42    Chief Complaint  This information was obtained from the pa Assessment:  Left Extremity: Edema is not present  Compression Device In Use: No  Calf Measurement 37 cm from heel  Ankle Measurement 11 cm from heel  Right Extremity: Edema is present  Compression Device In Use: No  Calf Measurement 37 cm from heel  Ankle Dressings  May shower with protection. Cleanse with saline or wound cleanser  Collagen - endoform  Hydrofera ready  ABD pad  Kerlix  Change dressing 3x/week -  to change    NPWT Orders  Discontinue NPWT.     Follow-Up Appointments  Return Appointment in

## 2019-12-12 PROBLEM — I70.0 ABDOMINAL AORTIC ATHEROSCLEROSIS (HCC): Status: ACTIVE | Noted: 2019-12-12

## 2019-12-23 ENCOUNTER — OFFICE VISIT (OUTPATIENT)
Dept: WOUND CARE | Facility: HOSPITAL | Age: 69
End: 2019-12-23
Attending: PODIATRIST
Payer: MEDICARE

## 2019-12-23 DIAGNOSIS — L97.512 RIGHT FOOT ULCER, WITH FAT LAYER EXPOSED (HCC): Primary | ICD-10-CM

## 2019-12-23 PROCEDURE — 11042 DBRDMT SUBQ TIS 1ST 20SQCM/<: CPT

## 2019-12-24 NOTE — PROGRESS NOTES
Progress Note Details  Patient Name: Curtis Andersen  Patient Number: [id number]  Patient YOB: 1950  Date: 12/23/2019  Physician / Jaya Pagey: Anyi Dhaliwal 19: Emmanuel 42    Chief Complaint  This information was obtained from the p Extremity: Edema is not present  Compression Device In Use: No  Calf Measurement 37 cm from heel  Ankle Measurement 11 cm from heel  Right Extremity: Edema is present  Compression Device In Use: No  Calf Measurement 37 cm from heel  Ankle Measurement 11 cm Insensate. Wound Cleansing & Dressings  May shower with protection. Cleanse with saline or wound cleanser  Collagen - brian  Hydrofera ready  ABD pad  Kerlix  Change dressing 3x/week -  to change    NPWT Orders  Discontinue NPWT.     Follow-Up Appoin

## 2019-12-30 ENCOUNTER — OFFICE VISIT (OUTPATIENT)
Dept: WOUND CARE | Facility: HOSPITAL | Age: 69
End: 2019-12-30
Attending: PODIATRIST
Payer: MEDICARE

## 2019-12-30 DIAGNOSIS — M86.171 ACUTE OSTEOMYELITIS OF RIGHT ANKLE OR FOOT (HCC): ICD-10-CM

## 2019-12-30 DIAGNOSIS — L97.512 RIGHT FOOT ULCER, WITH FAT LAYER EXPOSED (HCC): Primary | ICD-10-CM

## 2019-12-30 PROCEDURE — 99213 OFFICE O/P EST LOW 20 MIN: CPT

## 2020-01-06 ENCOUNTER — OFFICE VISIT (OUTPATIENT)
Dept: WOUND CARE | Facility: HOSPITAL | Age: 70
End: 2020-01-06
Attending: PODIATRIST
Payer: MEDICARE

## 2020-01-06 DIAGNOSIS — M86.171 ACUTE OSTEOMYELITIS OF RIGHT ANKLE OR FOOT (HCC): ICD-10-CM

## 2020-01-06 DIAGNOSIS — L97.512 RIGHT FOOT ULCER, WITH FAT LAYER EXPOSED (HCC): Primary | ICD-10-CM

## 2020-01-06 PROCEDURE — 99214 OFFICE O/P EST MOD 30 MIN: CPT

## 2020-01-06 NOTE — PROGRESS NOTES
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 Insensate. Wound Cleansing & Dressings  May shower with protection.   Antibiotic Ointment/Cream

## 2020-01-13 ENCOUNTER — OFFICE VISIT (OUTPATIENT)
Dept: WOUND CARE | Facility: HOSPITAL | Age: 70
End: 2020-01-13
Attending: PODIATRIST
Payer: MEDICARE

## 2020-01-13 DIAGNOSIS — L97.512 RIGHT FOOT ULCER, WITH FAT LAYER EXPOSED (HCC): Primary | ICD-10-CM

## 2020-01-13 DIAGNOSIS — M86.171 ACUTE OSTEOMYELITIS OF RIGHT ANKLE OR FOOT (HCC): ICD-10-CM

## 2020-01-13 PROCEDURE — 99214 OFFICE O/P EST MOD 30 MIN: CPT

## 2020-01-20 ENCOUNTER — OFFICE VISIT (OUTPATIENT)
Dept: WOUND CARE | Facility: HOSPITAL | Age: 70
End: 2020-01-20
Attending: PODIATRIST
Payer: MEDICARE

## 2020-01-20 DIAGNOSIS — L97.512 RIGHT FOOT ULCER, WITH FAT LAYER EXPOSED (HCC): Primary | ICD-10-CM

## 2020-01-20 DIAGNOSIS — M86.171 ACUTE OSTEOMYELITIS OF RIGHT ANKLE OR FOOT (HCC): ICD-10-CM

## 2020-01-20 PROCEDURE — 99213 OFFICE O/P EST LOW 20 MIN: CPT

## 2020-01-21 NOTE — PROGRESS NOTES
Progress Note Details  Patient Name: Gatito Silveira  Patient Number: [id number]  Patient YOB: 1950  Date: 1/20/2020  Physician / [facility]: Anyi Winkler 19: Geoff Lopez    Chief Complaint  This information was obtained from the pa BMI: 25.1, Temperature: 97.8 Â°F (36.56 Â°C), Pulse: 75 bpm, Respiratory Rate: 16 breaths/min, Blood Pressure: 125/75 mmHg.     Lower Extremity Assessment  Edema Assessment:  Left Extremity: Edema is not present  Compression Device In Use: No  Calf Measurem

## 2020-02-03 ENCOUNTER — OFFICE VISIT (OUTPATIENT)
Dept: WOUND CARE | Facility: HOSPITAL | Age: 70
End: 2020-02-03
Attending: PODIATRIST
Payer: MEDICARE

## 2020-02-03 DIAGNOSIS — L97.512 RIGHT FOOT ULCER, WITH FAT LAYER EXPOSED (HCC): Primary | ICD-10-CM

## 2020-02-03 DIAGNOSIS — M86.171 ACUTE OSTEOMYELITIS OF RIGHT ANKLE OR FOOT (HCC): ICD-10-CM

## 2020-02-03 PROCEDURE — 97605 NEG PRS WND THER DME<=50SQCM: CPT

## 2020-02-10 ENCOUNTER — OFFICE VISIT (OUTPATIENT)
Dept: WOUND CARE | Facility: HOSPITAL | Age: 70
End: 2020-02-10
Attending: PODIATRIST
Payer: MEDICARE

## 2020-02-10 DIAGNOSIS — L97.512 RIGHT FOOT ULCER, WITH FAT LAYER EXPOSED (HCC): Primary | ICD-10-CM

## 2020-02-10 DIAGNOSIS — M86.171 ACUTE OSTEOMYELITIS OF RIGHT ANKLE OR FOOT (HCC): ICD-10-CM

## 2020-02-10 PROCEDURE — 97605 NEG PRS WND THER DME<=50SQCM: CPT

## 2020-02-10 NOTE — PROGRESS NOTES
Progress Note Details  Patient Name: Curtis Andersen  Patient Number: [id number]  Patient YOB: 1950  Date: 2/10/2020  Physician / Jaya Pagey: Anyi Dhaliwal 19: Dayan Dela Cruz    Chief Complaint  This information was obtained from the pa Notes:  integrating skin graft noted in wound bed    Vitals  Height/Length: 71 in (180.34 cm), Weight: 180 lbs (81.82 kgs), BMI: 25.1, Temperature: 96.2 °F (35.67 °C), Pulse: 60 bpm, Respiratory Rate: 16 breaths/min, Blood Pressure: 139/82 mmHg.     Lower E weekly, unless noted below. Follow-Up Appointments  Return Appointment in 2 weeks. Misc/Additional Orders  Home health care nurse for wound care. Care summary  Reviewed and evaluated labs. Discussed the Plan of Care at bedside with patient.  The tomasa

## 2020-02-17 PROBLEM — L03.119 CELLULITIS OF LOWER EXTREMITY: Status: RESOLVED | Noted: 2019-10-10 | Resolved: 2020-02-17

## 2020-02-17 PROBLEM — R04.2 HEMOPTYSIS: Status: RESOLVED | Noted: 2019-07-02 | Resolved: 2020-02-17

## 2020-02-17 PROBLEM — L03.119 CELLULITIS OF LOWER EXTREMITY, UNSPECIFIED LATERALITY: Status: RESOLVED | Noted: 2019-10-11 | Resolved: 2020-02-17

## 2020-02-24 ENCOUNTER — OFFICE VISIT (OUTPATIENT)
Dept: WOUND CARE | Facility: HOSPITAL | Age: 70
End: 2020-02-24
Attending: PODIATRIST
Payer: MEDICARE

## 2020-02-24 DIAGNOSIS — L97.512 RIGHT FOOT ULCER, WITH FAT LAYER EXPOSED (HCC): Primary | ICD-10-CM

## 2020-02-24 DIAGNOSIS — M86.171 ACUTE OSTEOMYELITIS OF RIGHT ANKLE OR FOOT (HCC): ICD-10-CM

## 2020-02-24 PROCEDURE — 97605 NEG PRS WND THER DME<=50SQCM: CPT

## 2020-02-24 NOTE — PROGRESS NOTES
Progress Note Details  Patient Name: Maco Will  Patient Number: [id number]  Patient YOB: 1950  Date: 2/24/2020  Physician / Annia Villegas: Anyi Canales 19: Emmanuel 42    Chief Complaint  This information was obtained from the pa does not exhibit signs or symptoms of infection. Local Pulse is Weak.   General Notes:  integrating skin graft noted in wound bed    Vitals  Height/Length: 71 in (180.34 cm), Weight: 180 lbs (81.82 kgs), BMI: 25.1, Temperature: 98.6 °F (37 °C), Pulse: 65 bp Orders  NPWT, black foam to at 125 mmHG continuous. RN to change dressing three times weekly, unless noted below. Follow-Up Appointments  Return Appointment in 2 weeks. Misc/Additional Orders  Home health care nurse for wound care.     Care summary  R

## 2020-03-09 ENCOUNTER — OFFICE VISIT (OUTPATIENT)
Dept: WOUND CARE | Facility: HOSPITAL | Age: 70
End: 2020-03-09
Attending: PODIATRIST
Payer: MEDICARE

## 2020-03-09 DIAGNOSIS — L97.512 RIGHT FOOT ULCER, WITH FAT LAYER EXPOSED (HCC): Primary | ICD-10-CM

## 2020-03-09 DIAGNOSIS — M86.171 ACUTE OSTEOMYELITIS OF RIGHT ANKLE OR FOOT (HCC): ICD-10-CM

## 2020-03-09 PROCEDURE — 99213 OFFICE O/P EST LOW 20 MIN: CPT

## 2020-03-09 RX ORDER — DOXYCYCLINE HYCLATE 100 MG/1
100 CAPSULE ORAL 2 TIMES DAILY
Qty: 28 CAPSULE | Refills: 0 | Status: SHIPPED | OUTPATIENT
Start: 2020-03-09 | End: 2020-03-23

## 2020-03-09 NOTE — PROGRESS NOTES
Progress Note Details  Patient Name: Alberto Fajardo  Patient Number: [id number]  Patient YOB: 1950  Date: 3/9/2020  Physician / Ramsey Gone: Anyi Murillo 19: Emmanuel 42    Chief Complaint  This information was obtained from the pat Warm. Periwound skin does not exhibit signs or symptoms of infection. Local Pulse is Weak.     Vitals  Height/Length: 71 in (180.34 cm), Weight: 180 lbs (81.82 kgs), BMI: 25.1, Temperature: 98.3 °F (36.83 °C), Pulse: 67 bpm, Respiratory Rate: 16 breaths/min

## 2020-03-16 ENCOUNTER — OFFICE VISIT (OUTPATIENT)
Dept: WOUND CARE | Facility: HOSPITAL | Age: 70
End: 2020-03-16
Attending: PODIATRIST
Payer: MEDICARE

## 2020-03-16 DIAGNOSIS — M86.171 ACUTE OSTEOMYELITIS OF RIGHT ANKLE OR FOOT (HCC): ICD-10-CM

## 2020-03-16 DIAGNOSIS — L97.512 RIGHT FOOT ULCER, WITH FAT LAYER EXPOSED (HCC): Primary | ICD-10-CM

## 2020-03-16 PROCEDURE — 11042 DBRDMT SUBQ TIS 1ST 20SQCM/<: CPT

## 2020-03-23 ENCOUNTER — OFFICE VISIT (OUTPATIENT)
Dept: WOUND CARE | Facility: HOSPITAL | Age: 70
End: 2020-03-23
Attending: PODIATRIST
Payer: MEDICARE

## 2020-03-23 DIAGNOSIS — M86.171 ACUTE OSTEOMYELITIS OF RIGHT ANKLE OR FOOT (HCC): ICD-10-CM

## 2020-03-23 DIAGNOSIS — L97.512 RIGHT FOOT ULCER, WITH FAT LAYER EXPOSED (HCC): Primary | ICD-10-CM

## 2020-03-23 PROCEDURE — 11042 DBRDMT SUBQ TIS 1ST 20SQCM/<: CPT

## 2020-03-23 NOTE — PROGRESS NOTES
Chief Complaint  This information was obtained from the patient  Patient is here for a wound care follow up. He states that his pain has improved.     Allergies  pollen    HPI  This information was obtained from the patient    3/23/20: Wound much improved Maceration, Erythema. The temperature of the periwound skin is Warm. Periwound skin does not exhibit signs or symptoms of infection. Local Pulse is Weak.     Vitals  Height/Length: 71 in (180.34 cm), Weight: 180 lbs (81.82 kgs), BMI: 25.1, Temperature: 98.3 Orders  Home health care nurse for wound care. Care summary  Reviewed and evaluated labs. Discussed the Plan of Care at bedside with patient. The patient verbally acknowledges understanding of all instructions and all questions were answered.   Wound im

## 2020-03-30 ENCOUNTER — OFFICE VISIT (OUTPATIENT)
Dept: WOUND CARE | Facility: HOSPITAL | Age: 70
End: 2020-03-30
Attending: PODIATRIST
Payer: MEDICARE

## 2020-03-30 DIAGNOSIS — M86.171 ACUTE OSTEOMYELITIS OF RIGHT ANKLE OR FOOT (HCC): ICD-10-CM

## 2020-03-30 DIAGNOSIS — L97.512 RIGHT FOOT ULCER, WITH FAT LAYER EXPOSED (HCC): Primary | ICD-10-CM

## 2020-03-30 PROCEDURE — 11042 DBRDMT SUBQ TIS 1ST 20SQCM/<: CPT

## 2020-03-30 NOTE — PROGRESS NOTES
Progress Note Details  Patient Name: Barron Alonzo  Patient Number: [id number]  Patient YOB: 1950  Date: 3/30/2020  Physician / Brenton Sloan: Anyi Zavala 19: Emmanuel 42    Chief Complaint  This information was obtained from the pa odor. The patient reports a wound pain of level 2/10. The wound margin is rolled. Wound bed has 26-50% slough, 26-50% pink, firm granulation. The periwound skin exhibited: Edema, Moist, Maceration, Erythema. The periwound skin did not exhibit: Dry/Scaly. pad  Other: - Kendall  Change Dressing Daily and PRN    Compression Therapy:  Avoid prolonged standing in one place. Elevate leg(s)as much as possible. Off-Loading  Partial weight bearing    Misc/Additional Orders  Home health care nurse for wound care.

## 2020-04-06 ENCOUNTER — APPOINTMENT (OUTPATIENT)
Dept: WOUND CARE | Facility: HOSPITAL | Age: 70
End: 2020-04-06
Attending: PODIATRIST
Payer: MEDICARE

## 2020-05-18 PROBLEM — Z89.439: Status: ACTIVE | Noted: 2020-05-18

## 2020-05-18 PROBLEM — G20.A1 PARKINSON'S DISEASE: Status: ACTIVE | Noted: 2020-05-18

## 2020-05-18 PROBLEM — G20 PARKINSON'S DISEASE (HCC): Status: ACTIVE | Noted: 2020-05-18

## 2020-06-01 ENCOUNTER — OFFICE VISIT (OUTPATIENT)
Dept: WOUND CARE | Facility: HOSPITAL | Age: 70
End: 2020-06-01
Attending: PODIATRIST
Payer: MEDICARE

## 2020-06-01 DIAGNOSIS — G60.3 IDIOPATHIC PROGRESSIVE POLYNEUROPATHY: ICD-10-CM

## 2020-06-01 DIAGNOSIS — L97.512 RIGHT FOOT ULCER, WITH FAT LAYER EXPOSED (HCC): Primary | ICD-10-CM

## 2020-06-01 PROCEDURE — 99213 OFFICE O/P EST LOW 20 MIN: CPT

## 2020-06-01 NOTE — PROGRESS NOTES
Progress Note Details  Patient Name:  Gabriel García  Patient Number:  [id number]  Patient YOB: 1950  Date:  6/1/2020  Physician / Ariella :  Anyi Vazquez 19:  Cain Hurley    Chief Complaint  This information was obtained from drainage noted which has no odor. The patient reports a wound pain of level 2/10. The wound margin is rolled. Wound bed has 1-25% slough, 51-75% pink, firm granulation.    The periwound skin exhibited: Edema, Moist. The periwound skin did not exhibit: Dry/S

## 2020-06-15 ENCOUNTER — OFFICE VISIT (OUTPATIENT)
Dept: WOUND CARE | Facility: HOSPITAL | Age: 70
End: 2020-06-15
Attending: PODIATRIST
Payer: MEDICARE

## 2020-06-15 DIAGNOSIS — L97.512 RIGHT FOOT ULCER, WITH FAT LAYER EXPOSED (HCC): Primary | ICD-10-CM

## 2020-06-15 DIAGNOSIS — G60.3 IDIOPATHIC PROGRESSIVE POLYNEUROPATHY: ICD-10-CM

## 2020-06-15 PROCEDURE — 99213 OFFICE O/P EST LOW 20 MIN: CPT

## 2020-06-16 NOTE — PROGRESS NOTES
Progress Note Details  Patient Name: Yohana Welch  Patient Number: [id number]  Patient YOB: 1950  Date: 6/15/2020  Physician / Joanna Course: Anyi Baron 19: Emmanuel 42    Chief Complaint  This information was obtained from the pa been noted. No sinus tract has been noted. No undermining has been noted. There is a small amount of sero-sanguineous drainage noted which has no odor. The patient reports a wound pain of level 2/10. The wound margin is rolled.  Wound bed has % pink, patient verbally acknowledges understanding of all instructions and all questions were answered. Reviewed and evaluated labs. Wound improving. No s/s of infection.           Electronic Signature(s)  Signed By: Bina Gold 06/15/2020 13:47:24

## 2020-06-20 PROBLEM — G20 PARKINSON'S DISEASE (HCC): Status: RESOLVED | Noted: 2020-05-18 | Resolved: 2020-06-20

## 2020-06-20 PROBLEM — G20.A1 PARKINSON'S DISEASE: Status: RESOLVED | Noted: 2020-05-18 | Resolved: 2020-06-20

## 2020-06-29 ENCOUNTER — APPOINTMENT (OUTPATIENT)
Dept: WOUND CARE | Facility: HOSPITAL | Age: 70
End: 2020-06-29
Attending: PODIATRIST
Payer: MEDICARE

## 2020-07-13 ENCOUNTER — OFFICE VISIT (OUTPATIENT)
Dept: WOUND CARE | Facility: HOSPITAL | Age: 70
End: 2020-07-13
Attending: PODIATRIST
Payer: MEDICARE

## 2020-07-13 DIAGNOSIS — G60.3 IDIOPATHIC PROGRESSIVE POLYNEUROPATHY: ICD-10-CM

## 2020-07-13 DIAGNOSIS — L97.512 RIGHT FOOT ULCER, WITH FAT LAYER EXPOSED (HCC): Primary | ICD-10-CM

## 2020-07-13 PROCEDURE — 99213 OFFICE O/P EST LOW 20 MIN: CPT

## 2020-07-13 NOTE — PROGRESS NOTES
Progress Note Details  Patient Name: Ar Britt  Patient Number: [id number]  Patient YOB: 1950  Date: 7/13/2020  Physician / Lelia Grad: Anyi Muller 19: Cricket July    Chief Complaint  This information was obtained from the pa undermining has been noted. There is a moderate amount of serous drainage noted which has no odor. The patient reports a wound pain of level 2/10. The wound margin is well defined. Wound bed has % pink, firm granulation.   The periwound skin exhibited

## 2020-08-10 ENCOUNTER — OFFICE VISIT (OUTPATIENT)
Dept: WOUND CARE | Facility: HOSPITAL | Age: 70
End: 2020-08-10
Attending: PODIATRIST
Payer: MEDICARE

## 2020-08-10 DIAGNOSIS — G60.3 IDIOPATHIC PROGRESSIVE POLYNEUROPATHY: ICD-10-CM

## 2020-08-10 DIAGNOSIS — L97.512 RIGHT FOOT ULCER, WITH FAT LAYER EXPOSED (HCC): Primary | ICD-10-CM

## 2020-08-10 PROCEDURE — 99214 OFFICE O/P EST MOD 30 MIN: CPT

## 2020-08-10 NOTE — PROGRESS NOTES
Progress Note Details  Patient Name: Velvet Rizo  Patient Number: [id number]  Patient YOB: 1950  Date: 8/10/2020  Physician / Olympia Conquest: Anyi Reed 19: Emmanuel 42    Chief Complaint  This information was obtained from the pa width x 0.2cm depth, with an area of 0.18 sq cm and a volume of 0.036 cubic cm. No tunneling has been noted. No sinus tract has been noted. No undermining has been noted. There is a small amount of serous drainage noted which has no odor.  The patient repor Fredy 08/10/2020 14:32:40

## 2020-08-17 PROBLEM — M17.11 PRIMARY OSTEOARTHRITIS OF RIGHT KNEE: Status: ACTIVE | Noted: 2020-08-17

## 2020-08-17 PROBLEM — M54.12 RADICULITIS OF LEFT CERVICAL REGION: Status: ACTIVE | Noted: 2020-08-17

## 2020-09-04 PROBLEM — I48.0 PAROXYSMAL ATRIAL FIBRILLATION (HCC): Status: ACTIVE | Noted: 2020-09-04

## 2020-12-01 ENCOUNTER — APPOINTMENT (OUTPATIENT)
Dept: GENERAL RADIOLOGY | Facility: HOSPITAL | Age: 70
DRG: 629 | End: 2020-12-01
Attending: EMERGENCY MEDICINE
Payer: MEDICARE

## 2020-12-01 ENCOUNTER — HOSPITAL ENCOUNTER (INPATIENT)
Facility: HOSPITAL | Age: 70
LOS: 6 days | Discharge: NURSING, REHAB OR LONG-TERM CARE | DRG: 629 | End: 2020-12-09
Attending: EMERGENCY MEDICINE | Admitting: INTERNAL MEDICINE
Payer: MEDICARE

## 2020-12-01 DIAGNOSIS — L03.115 CELLULITIS OF RIGHT FOOT: Primary | ICD-10-CM

## 2020-12-01 PROBLEM — R73.9 HYPERGLYCEMIA: Status: ACTIVE | Noted: 2020-12-01

## 2020-12-01 PROCEDURE — 73630 X-RAY EXAM OF FOOT: CPT | Performed by: EMERGENCY MEDICINE

## 2020-12-01 PROCEDURE — 36415 COLL VENOUS BLD VENIPUNCTURE: CPT

## 2020-12-01 PROCEDURE — 99285 EMERGENCY DEPT VISIT HI MDM: CPT

## 2020-12-01 PROCEDURE — 86140 C-REACTIVE PROTEIN: CPT | Performed by: EMERGENCY MEDICINE

## 2020-12-01 PROCEDURE — 82962 GLUCOSE BLOOD TEST: CPT

## 2020-12-01 PROCEDURE — 83036 HEMOGLOBIN GLYCOSYLATED A1C: CPT | Performed by: INTERNAL MEDICINE

## 2020-12-01 PROCEDURE — 93010 ELECTROCARDIOGRAM REPORT: CPT

## 2020-12-01 PROCEDURE — 93005 ELECTROCARDIOGRAM TRACING: CPT

## 2020-12-01 PROCEDURE — 80053 COMPREHEN METABOLIC PANEL: CPT | Performed by: EMERGENCY MEDICINE

## 2020-12-01 PROCEDURE — 96365 THER/PROPH/DIAG IV INF INIT: CPT

## 2020-12-01 PROCEDURE — 85025 COMPLETE CBC W/AUTO DIFF WBC: CPT | Performed by: EMERGENCY MEDICINE

## 2020-12-01 PROCEDURE — 87040 BLOOD CULTURE FOR BACTERIA: CPT | Performed by: EMERGENCY MEDICINE

## 2020-12-01 RX ORDER — ENOXAPARIN SODIUM 100 MG/ML
40 INJECTION SUBCUTANEOUS DAILY
Status: DISCONTINUED | OUTPATIENT
Start: 2020-12-01 | End: 2020-12-02

## 2020-12-01 RX ORDER — CLOPIDOGREL BISULFATE 75 MG/1
75 TABLET ORAL DAILY
Status: DISCONTINUED | OUTPATIENT
Start: 2020-12-02 | End: 2020-12-09

## 2020-12-01 RX ORDER — TAMSULOSIN HYDROCHLORIDE 0.4 MG/1
0.4 CAPSULE ORAL DAILY
Status: DISCONTINUED | OUTPATIENT
Start: 2020-12-02 | End: 2020-12-09

## 2020-12-01 RX ORDER — TAMSULOSIN HYDROCHLORIDE 0.4 MG/1
0.4 CAPSULE ORAL DAILY
COMMUNITY
End: 2021-05-10

## 2020-12-01 RX ORDER — GABAPENTIN 400 MG/1
800 CAPSULE ORAL 4 TIMES DAILY
Status: DISCONTINUED | OUTPATIENT
Start: 2020-12-01 | End: 2020-12-09

## 2020-12-01 RX ORDER — ACETAMINOPHEN 325 MG/1
650 TABLET ORAL EVERY 6 HOURS PRN
Status: DISCONTINUED | OUTPATIENT
Start: 2020-12-01 | End: 2020-12-09

## 2020-12-01 RX ORDER — PANTOPRAZOLE SODIUM 40 MG/1
40 TABLET, DELAYED RELEASE ORAL DAILY
Status: DISCONTINUED | OUTPATIENT
Start: 2020-12-02 | End: 2020-12-09

## 2020-12-01 RX ORDER — BUPROPION HYDROCHLORIDE 150 MG/1
150 TABLET ORAL EVERY MORNING
COMMUNITY
End: 2021-01-29

## 2020-12-01 RX ORDER — BUMETANIDE 2 MG/1
2 TABLET ORAL
Status: DISCONTINUED | OUTPATIENT
Start: 2020-12-02 | End: 2020-12-09

## 2020-12-01 RX ORDER — MELOXICAM 15 MG/1
15 TABLET ORAL DAILY PRN
Status: ON HOLD | COMMUNITY
End: 2020-12-06 | Stop reason: CLARIF

## 2020-12-01 RX ORDER — MECLIZINE HYDROCHLORIDE 25 MG/1
25 TABLET ORAL 3 TIMES DAILY PRN
COMMUNITY
End: 2021-01-28

## 2020-12-01 RX ORDER — ZOLPIDEM TARTRATE 5 MG/1
5 TABLET ORAL NIGHTLY
Status: DISCONTINUED | OUTPATIENT
Start: 2020-12-01 | End: 2020-12-09

## 2020-12-01 RX ORDER — HYDROCODONE BITARTRATE AND ACETAMINOPHEN 10; 325 MG/1; MG/1
1 TABLET ORAL EVERY 6 HOURS PRN
Status: DISCONTINUED | OUTPATIENT
Start: 2020-12-01 | End: 2020-12-02

## 2020-12-01 RX ORDER — SOTALOL HYDROCHLORIDE 120 MG/1
120 TABLET ORAL EVERY 12 HOURS
Status: ON HOLD | COMMUNITY
End: 2020-12-01 | Stop reason: CLARIF

## 2020-12-01 RX ORDER — DILTIAZEM HYDROCHLORIDE 60 MG/1
120 TABLET, FILM COATED ORAL DAILY
Status: DISCONTINUED | OUTPATIENT
Start: 2020-12-02 | End: 2020-12-09

## 2020-12-01 RX ORDER — ATORVASTATIN CALCIUM 40 MG/1
40 TABLET, FILM COATED ORAL DAILY
Status: DISCONTINUED | OUTPATIENT
Start: 2020-12-02 | End: 2020-12-09

## 2020-12-01 RX ORDER — BUPROPION HYDROCHLORIDE 150 MG/1
150 TABLET ORAL EVERY MORNING
Status: DISCONTINUED | OUTPATIENT
Start: 2020-12-02 | End: 2020-12-09

## 2020-12-01 RX ORDER — SPIRONOLACTONE 25 MG/1
25 TABLET ORAL DAILY
Status: DISCONTINUED | OUTPATIENT
Start: 2020-12-02 | End: 2020-12-09

## 2020-12-01 RX ORDER — GABAPENTIN 400 MG/1
800 CAPSULE ORAL 4 TIMES DAILY
COMMUNITY
End: 2021-10-11

## 2020-12-01 RX ORDER — ONDANSETRON 2 MG/ML
4 INJECTION INTRAMUSCULAR; INTRAVENOUS EVERY 6 HOURS PRN
Status: DISCONTINUED | OUTPATIENT
Start: 2020-12-01 | End: 2020-12-09

## 2020-12-01 NOTE — H&P
[facility] Hospitalist History and Physical      Patient presents with:  Cellulitis  Arrythmia/Palpitations  Pain       PCP: Christiano Huang MD      History of Present Illness: Patient is a 65year old male with PMH sig for a-fib, chronic diastolic HF, HTN, HLD, CAD CORONARY ANGIOPLASTY  2001    stent   • COLONOSCOPY     • COLONOSCOPY, POSSIBLE BIOPSY, POSSIBLE POLYPECTOMY 33071 N/A 6/11/2018    Performed by Randell Fitzgerald MD at [address] [place] [address]   • ENDOSCOPY,  SINUS SEPTOPLASTY,ANTROSTOMIES Bilateral 1/1 COMMENTS)    Comment:Nasal congestion       •  MELOXICAM 15 MG Oral Tab, TAKE 1 TABLET BY MOUTH DAILY AS NEEDED    •  HYDROcodone-acetaminophen  MG Oral Tab, Take 1 tablet by mouth every 6 (six) hours as needed for Pain.     •  [facility] EVERY DAY    •  Fluticasone Propionate 50 MCG/ACT Nasal Suspension, 1 spray by Each Nare route 2 (two) times daily. •  PEG 3350 Oral Powd Pack, Take 17 g by mouth daily.     •  bisacodyl (DULCOLAX) 10 MG Rectal Suppos, Place 10 mg rectally daily as neede Component Value Date    WBC 8.5 12/01/2020    HGB 15.0 12/01/2020    HCT 45.0 12/01/2020    .0 12/01/2020    CREATSERUM 1.24 12/01/2020    BUN 16 12/01/2020     12/01/2020    K 3.9 12/01/2020     12/01/2020    CO2 28.0 12/01/2020    GL involving his R foot.        # R foot cellulitis   # R foot ulcers, suspect infected   - this is concerning for developing OM  - cont empiric meropenem   - f/u cultures   - podiatry and ID c/s  - wound care RN c/s    # Chronic systolic HF  - compensated cur

## 2020-12-01 NOTE — ED PROVIDER NOTES
Patient Seen in: [facility] Emergency Department      History   Patient presents with:  Cellulitis  Arrythmia/Palpitations  Pain    Stated Complaint: Cellulitis; tachycardia; right foot pain w/ previous amputations to same foot     HPI    70-year-ol • COLONOSCOPY, POSSIBLE BIOPSY, POSSIBLE POLYPECTOMY 36851 N/A 6/11/2018    Performed by Adarsh Durant MD at [address] Bilateral 1/16/2019    Performed by Krystian Hartman MD at [place] [address] Years: 40.00        Pack years: 20        Types: Cigarettes        Quit date: 2013        Years since quittin.6      Smokeless tobacco: Never Used    Alcohol use: Yes      Frequency: 4 or more times a week      Drinks per session: 1 or 2 CBC W/ DIFFERENTIAL - Abnormal; Notable for the following components:    RDW-SD 46.5 (*)     Lymphocyte Absolute 0.77 (*)     All other components within normal limits   RAPID SARS-COV-2 BY PCR - Normal   CBC WITH DIFFERENTIAL WITH PLATELET    Narrative: CONCLUSION:  Postsurgical changes are noted. No new cortical erosion is seen. No subcutaneous emphysema is seen. If there is persistent clinical concern then consider MRI.    Dictated by (CST): Nathan Ritchie MD on 12/01/2020 at 3:53 PM     Finalized by (CST

## 2020-12-02 ENCOUNTER — APPOINTMENT (OUTPATIENT)
Dept: MRI IMAGING | Facility: HOSPITAL | Age: 70
DRG: 629 | End: 2020-12-02
Attending: PODIATRIST
Payer: MEDICARE

## 2020-12-02 ENCOUNTER — APPOINTMENT (OUTPATIENT)
Dept: ULTRASOUND IMAGING | Facility: HOSPITAL | Age: 70
DRG: 629 | End: 2020-12-02
Attending: PODIATRIST
Payer: MEDICARE

## 2020-12-02 PROCEDURE — 87075 CULTR BACTERIA EXCEPT BLOOD: CPT | Performed by: INTERNAL MEDICINE

## 2020-12-02 PROCEDURE — 87070 CULTURE OTHR SPECIMN AEROBIC: CPT | Performed by: INTERNAL MEDICINE

## 2020-12-02 PROCEDURE — 73718 MRI LOWER EXTREMITY W/O DYE: CPT | Performed by: PODIATRIST

## 2020-12-02 PROCEDURE — 87186 SC STD MICRODIL/AGAR DIL: CPT | Performed by: INTERNAL MEDICINE

## 2020-12-02 PROCEDURE — 87077 CULTURE AEROBIC IDENTIFY: CPT | Performed by: INTERNAL MEDICINE

## 2020-12-02 PROCEDURE — 93925 LOWER EXTREMITY STUDY: CPT | Performed by: PODIATRIST

## 2020-12-02 PROCEDURE — 80048 BASIC METABOLIC PNL TOTAL CA: CPT | Performed by: INTERNAL MEDICINE

## 2020-12-02 PROCEDURE — 99214 OFFICE O/P EST MOD 30 MIN: CPT

## 2020-12-02 PROCEDURE — 87205 SMEAR GRAM STAIN: CPT | Performed by: INTERNAL MEDICINE

## 2020-12-02 PROCEDURE — 85025 COMPLETE CBC W/AUTO DIFF WBC: CPT | Performed by: INTERNAL MEDICINE

## 2020-12-02 PROCEDURE — 82962 GLUCOSE BLOOD TEST: CPT

## 2020-12-02 RX ORDER — HYDROCODONE BITARTRATE AND ACETAMINOPHEN 10; 325 MG/1; MG/1
1-2 TABLET ORAL EVERY 6 HOURS PRN
Status: DISCONTINUED | OUTPATIENT
Start: 2020-12-02 | End: 2020-12-09

## 2020-12-02 RX ORDER — KETOROLAC TROMETHAMINE 30 MG/ML
15 INJECTION, SOLUTION INTRAMUSCULAR; INTRAVENOUS EVERY 6 HOURS PRN
Status: DISPENSED | OUTPATIENT
Start: 2020-12-02 | End: 2020-12-04

## 2020-12-02 NOTE — CONSULTS
[facility]  Report of Inpatient Wound Care Consultation     Rocky Chavez.  Patient Status:  Observation    1950 MRN [id number]   [facility] 4NW-A Attending Rosemarie Aguilar,    Hosp Day # 0 PCP MD STEPHON Mendieta [facility]   • EXTREMITY LOWER HARDWARE REMOVAL Right 8/13/2019    Performed by Em Angel MD at [facility] MAIN OR   • Alyssabury Right 10/16/2019    Performed by Hetal Echols DPM at [facility] MAIN OR   • EXTREMITY LO frequency: Never      Comment: 1 drink per night    Drug use: No      Allergies:  @ALLERGY    Laboratory Data:  Recent Labs   Lab 12/01/20  1450 12/01/20  1450 12/01/20  2113 12/02/20  0719 12/02/20  0755 12/02/20  1237   WBC 8.5  --   --   --  6.3  -- participate in the care of your patient. Please call me at [phone] or page me at [phone] if you have any questions about this consultation and plan of care. If unable to reach me at these, please call the Inpatient Wound Care pager at #[phone].     Time Spent 45

## 2020-12-02 NOTE — PLAN OF CARE
Pt received alert and oriented x4 on room air. On tele and pulse ox. Scheduled for MRI per podiatry. Frequent complaints of shooting intermittent pain from right foot. Norco, Toradol, and Tylenol given. Eliquis and Cardizem held.  VSS, pt with history of AF

## 2020-12-02 NOTE — CONSULTS
INFECTIOUS DISEASE CONSULT NOTE    Leia Bautista.  Patient Status:  Observation    1950 MRN [id number]   [facility] 4NW-A Attending Precnitza Bolanos,    Hosp Day # 0 PCP K • ANGIOGRAM  2001   • ANGIOPLASTY (CORONARY)     • CABG  8/2013   • CATARACT      bilateral eyes   • CATH PERCUTANEOUS  TRANSLUMINAL CORONARY ANGIOPLASTY  2001    stent   • COLONOSCOPY     • COLONOSCOPY, POSSIBLE BIOPSY, POSSIBLE POLYPECTOMY 75564 N/A 6/11 • TOE AMPUTATION Right 9/24/2019    Performed by Ana Urbano MD at [facility] MAIN OR   • TONSILLECTOMY       Family History   Problem Relation Age of Onset   • High Blood Pressure Father    • High Cholesterol Father    • Heart Disorder Father    • High Bloo •  spironolactone (ALDACTONE) tab 25 mg, 25 mg, Oral, Daily  •  tamsulosin HCl (FLOMAX) cap 0.4 mg, 0.4 mg, Oral, Daily    Review of Systems:    Completed. See pertinent positives and negatives in the the HPI.     Constitutional: Negative for chills, fevers HCT 45.0 38.8*   MCV 95.1 95.3   MCH 31.7 31.7   MCHC 33.3 33.2   RDW 13.3 13.5   NEPRELIM 6.56 4.14   WBC 8.5 6.3   .0 238.0     Recent Labs   Lab 12/01/20  1450 12/02/20  0755   * 86   BUN 16 15   CREATSERUM 1.24 1.05   GFRAA 67 82   GFRNAA CONCLUSION:  Postsurgical changes are noted. No new cortical erosion is seen. No subcutaneous emphysema is seen. If there is persistent clinical concern then consider MRI.    Dictated by (CST): Catherine Cranker, MD on 12/01/2020 at 3:53 PM     Finalized by (CST

## 2020-12-02 NOTE — PROGRESS NOTES
NURSING ADMISSION NOTE      Patient admitted via Cart  Oriented to room. Safety precautions initiated. Bed in low position. Call light in reach. Pt AOx4. VSS. Admission navigator completed. Med rec completed by pharmacy staff.  ID and podiatry con

## 2020-12-02 NOTE — CONSULTS
Ana M Jeronimo  2/6/1950  [id number]      PODIATRY PROGRESS NOTE    HPI: Ana M Jeronimo is a 65year old admitted through the ER yesterday after being sent over from immediate care due to complaints of right foot wound with swelling and redness. Psoriasis     Benign essential HTN     Mixed hyperlipidemia     Atypical atrial flutter (HCC)     Lumbar stenosis     Coronary artery disease involving coronary bypass graft of native heart without angina pectoris     Acute on chronic combined systolic and REPLACEMENT SURGERY  3/2015    right hip replacement    • HIP TOTAL REPLACEMENT Right 1/7/2015    Performed by Emily Tomlinson MD at [address] [place] [address]   • LEFT PHACOEMULSIFICATION OF CATARACT WITH INTRAOCULAR LENS IMPLANT 91866 Left 8/13/2014    Performed by Jefry Ashley, Not on file    Social Needs      Financial resource strain: Not on file      Food insecurity        Worry: Not on file        Inability: Not on file      Transportation needs        Medical: Not on file        Non-medical: Not on file    Tobacco Use completed. Pertinent positives and negatives noted in the the HPI. O :    12/02/20  0611   BP: 92/58   Pulse: 76   Resp: 18   Temp: 97.8 °F (36.6 °C)           General :  AA&O x3, NAD. VSS, Afebrile. Dressings are clean, dry, and intact to right foot. degrees    T Axis -15 degrees   RAINBOW DRAW BLUE    Collection Time: 12/01/20  2:50 PM   Result Value Ref Range    Hold Blue Auto Resulted    RAINBOW DRAW LAVENDER    Collection Time: 12/01/20  2:50 PM   Result Value Ref Range    Hold Lavender Auto Result Basophil Absolute 0.06 0.00 - 0.20 x10(3) uL    Immature Granulocyte Absolute 0.03 0.00 - 1.00 x10(3) uL    Neutrophil % 77.6 %    Lymphocyte % 9.1 %    Monocyte % 11.6 %    Eosinophil % 0.6 %    Basophil % 0.7 %    Immature Granulocyte % 0.4 %   C-REACTIV 35.1 - 46.3 fL    Neutrophil Absolute Prelim 4.14 1.50 - 7.70 x10 (3) uL    Neutrophil Absolute 4.14 1.50 - 7.70 x10(3) uL    Lymphocyte Absolute 0.95 (L) 1.00 - 4.00 x10(3) uL    Monocyte Absolute 0.85 0.10 - 1.00 x10(3) uL    Eosinophil Absolute 0.19 0.0

## 2020-12-02 NOTE — CM/SW NOTE
Care Progression Note:  Active Acute Medical Issue:   Cellulitis of right foot     Other Contributing Medical Factors/Dx. : Afib, chronic diastolic HF, HTN, HLD, CAD PVD, Parkinson's, CODP.     Length of stay: 0     Avoidable Delays:      Discharge Barriers:

## 2020-12-02 NOTE — PROGRESS NOTES
[facility] Hospitalist Progress Note                                                                   [facility].  2/6/1950    SUBJECTIVE:  Pt seen and examined.   He c/o intermittent shoo Sertraline HCl  50 mg Oral Daily   • spironolactone  25 mg Oral Daily   • tamsulosin HCl  0.4 mg Oral Daily     Continuous Infusions:   PRN: ketorolac (TORADOL) injection, HYDROcodone-acetaminophen, acetaminophen, ondansetron HCl    Assessment/Plan:  Princ

## 2020-12-03 ENCOUNTER — APPOINTMENT (OUTPATIENT)
Dept: INTERVENTIONAL RADIOLOGY/VASCULAR | Facility: HOSPITAL | Age: 70
DRG: 629 | End: 2020-12-03
Attending: SURGERY
Payer: MEDICARE

## 2020-12-03 PROCEDURE — B41FYZZ FLUOROSCOPY OF RIGHT LOWER EXTREMITY ARTERIES USING OTHER CONTRAST: ICD-10-PCS | Performed by: SURGERY

## 2020-12-03 PROCEDURE — 82962 GLUCOSE BLOOD TEST: CPT

## 2020-12-03 PROCEDURE — 99152 MOD SED SAME PHYS/QHP 5/>YRS: CPT

## 2020-12-03 PROCEDURE — 047K3D1 DILATION OF RIGHT FEMORAL ARTERY WITH INTRALUMINAL DEVICE, USING DRUG-COATED BALLOON, PERCUTANEOUS APPROACH: ICD-10-PCS | Performed by: SURGERY

## 2020-12-03 PROCEDURE — 99153 MOD SED SAME PHYS/QHP EA: CPT

## 2020-12-03 PROCEDURE — 80048 BASIC METABOLIC PNL TOTAL CA: CPT | Performed by: SURGERY

## 2020-12-03 PROCEDURE — 85025 COMPLETE CBC W/AUTO DIFF WBC: CPT | Performed by: SURGERY

## 2020-12-03 PROCEDURE — 76937 US GUIDE VASCULAR ACCESS: CPT

## 2020-12-03 PROCEDURE — 047M3Z1 DILATION OF RIGHT POPLITEAL ARTERY USING DRUG-COATED BALLOON, PERCUTANEOUS APPROACH: ICD-10-PCS | Performed by: SURGERY

## 2020-12-03 PROCEDURE — 04FK3ZZ FRAGMENTATION OF RIGHT FEMORAL ARTERY, PERCUTANEOUS APPROACH: ICD-10-PCS | Performed by: SURGERY

## 2020-12-03 PROCEDURE — 75710 ARTERY X-RAYS ARM/LEG: CPT

## 2020-12-03 RX ORDER — CLOPIDOGREL BISULFATE 75 MG/1
TABLET ORAL
Status: COMPLETED
Start: 2020-12-03 | End: 2020-12-03

## 2020-12-03 RX ORDER — MIDAZOLAM HYDROCHLORIDE 1 MG/ML
INJECTION INTRAMUSCULAR; INTRAVENOUS
Status: COMPLETED
Start: 2020-12-03 | End: 2020-12-03

## 2020-12-03 RX ORDER — MORPHINE SULFATE 2 MG/ML
2 INJECTION, SOLUTION INTRAMUSCULAR; INTRAVENOUS EVERY 4 HOURS PRN
Status: DISCONTINUED | OUTPATIENT
Start: 2020-12-03 | End: 2020-12-09

## 2020-12-03 RX ORDER — LIDOCAINE HYDROCHLORIDE 10 MG/ML
INJECTION, SOLUTION EPIDURAL; INFILTRATION; INTRACAUDAL; PERINEURAL
Status: COMPLETED
Start: 2020-12-03 | End: 2020-12-03

## 2020-12-03 RX ORDER — HEPARIN SODIUM 5000 [USP'U]/ML
INJECTION, SOLUTION INTRAVENOUS; SUBCUTANEOUS
Status: COMPLETED
Start: 2020-12-03 | End: 2020-12-03

## 2020-12-03 RX ORDER — MORPHINE SULFATE 2 MG/ML
1 INJECTION, SOLUTION INTRAMUSCULAR; INTRAVENOUS EVERY 4 HOURS PRN
Status: DISCONTINUED | OUTPATIENT
Start: 2020-12-03 | End: 2020-12-09

## 2020-12-03 NOTE — PLAN OF CARE
Pt received back after US. Pt A&Ox4. O2 sats above 90% on RA. Pt c/o right foot pain that is \" sharp and shooting\". Medication given, see MAR. Pt taken down to MRI. Restless leg hindered imaging RN went to MRI to give Medication, See MAR. IV antibiotics o

## 2020-12-03 NOTE — WOUND PROGRESS NOTE
[facility]  Inpatient Wound Care Contact Note    Sg Santiago.  Patient Status:  Observation    1950 MRN [id number]   [facility] 4NW-A Attending Peyman Bae,    Hosp Day # 0 PCP Addison Pepper MD     Re-consulted for N

## 2020-12-03 NOTE — PROGRESS NOTES
INFECTIOUS DISEASE PROGRESS NOTE    Marissa Berry.  Patient Status:  Observation    1950 MRN [id number]   [facility] 4NW-A Attending Pedro Fung,    Hosp Day # 0 PCP •  spironolactone (ALDACTONE) tab 25 mg, 25 mg, Oral, Daily  •  tamsulosin HCl (FLOMAX) cap 0.4 mg, 0.4 mg, Oral, Daily    Review of Systems:  Completed. See pertinent positives and negatives in the the HPI. Physical Exam:    General: No acute distress. 12/01/2020 10.10 (H)      No results found for: VANCT  No results for input(s): Aamir Barraza, SPECGRAVITY, [address], [address], [address], [address] [facility], [address] [facility], [address], [address], [address], [place], [address], WBCUR, RBCUR, [place] in the last 168 hours.      Microbiology Impression: CONCLUSION: Motion degraded exam. Examination had to be terminated prematurely. Postsurgical changes of amputation of the 1st and 2nd toes at the level of the mid metatarsal a great toe and MTP joint the 2nd toe.      There is increased britney

## 2020-12-03 NOTE — CONSULTS
[facility]  Vascular Surgery Consultation    [address] [facility]  Patient Status:  Observation    1950 MRN [id number]   [facility] 4NW-A Attending Wade Bal DO   Hosp Day # 0 PCP Dash Garg MD     Reason for Consultatio [facility] MAIN OR   • EXTREMITY LOWER IRRIGATION & DEBRIDEMENT Right 10/16/2019    Performed by Tong Flower DPM at [facility] MAIN OR   • Noemi Right 10/12/2019    Performed by Tong Flower DPM at [facility] MAIN OR   • Savanah KEMP smokeless tobacco. He reports current alcohol use. He reports that he does not use drugs.     Allergies:    Pollen                  OTHER (SEE COMMENTS)    Comment:Nasal congestion    Medications:    Current Facility-Administered Medications:   •  ketorolac leg swelling  NEURO/EYES: denies headaches, passing out, motor dysfunction, difficulty walking, difficulty with speech, temporary blindness, double vision, confusion    Physical Exam:  /77 (BP Location: Left arm)   Pulse 68   Temp 97.8 °F (36.6 °C) (

## 2020-12-03 NOTE — PROGRESS NOTES
[facility] Hospitalist Progress Note                                                                   [facility].  2/6/1950    SUBJECTIVE:  Pt seen and examined.   Understands MRI result a Oral QID   • Pantoprazole Sodium  40 mg Oral Daily   • Sertraline HCl  50 mg Oral Daily   • spironolactone  25 mg Oral Daily   • tamsulosin HCl  0.4 mg Oral Daily     Continuous Infusions:   PRN: ketorolac (TORADOL) injection, HYDROcodone-acetaminophen, ac

## 2020-12-03 NOTE — PLAN OF CARE
Problem: SKIN/TISSUE INTEGRITY - ADULT  Goal: Incision(s), wounds(s) or drain site(s) healing without S/S of infection  Description: INTERVENTIONS:  - Assess and document risk factors for pressure ulcer development  - Assess and document skin integrity

## 2020-12-03 NOTE — BRIEF OP NOTE
Pre-Operative Diagnosis: Atherosclerosis with ulcer right foot     Post-Operative Diagnosis: Same     Procedure Performed:   1. Ultrasound-guided percutaneous access left common femoral artery  2.   Selection of right common femoral artery and angiogram ri

## 2020-12-03 NOTE — PLAN OF CARE
Alert and oriented, afebrile, Dr Mary Grace Buckley at the bedside, taking cultures of the right foot ulcer, specimen sent for cultures. Right foot dressing was changed, wet to dry with NS and wrap with kerlix, on IV Merrem.  Right foot is red and swollen, very sensiti

## 2020-12-03 NOTE — PROGRESS NOTES
[address] [facility].  2/6/1950  [id number]      PODIATRY PROGRESS NOTE    Pt seen at bedside in f/u. No new complaints. Pt relates he is frustrated because he was doing very well for a few months.   His wound had healed and he was walking quite a bit and Chloride 105 98 - 112 mmol/L    CO2 28.0 21.0 - 32.0 mmol/L    Anion Gap 4 0 - 18 mmol/L    BUN 15 7 - 18 mg/dL    Creatinine 1.05 0.70 - 1.30 mg/dL    BUN/CREA Ratio 14.3 10.0 - 20.0    Calcium, Total 8.2 (L) 8.5 - 10.1 mg/dL    Calculated Osmolality 2 XRAYS :=====  CONCLUSION:  Postsurgical changes are noted. No new cortical erosion is seen. No subcutaneous emphysema is seen. If there is persistent clinical concern then consider MRI.       MRI :   =====  CONCLUSION:  Motion degraded exam.  E infection and at risk for TMA. Pt relates understanding. Rec vascular eval.  Last seen by Dr Elizabeth Meier 8/2020. Cultures pending. Continue IV antibiotics per infectious disease. Pt to f/u in wound clinic upon discharge.    Please reconsult podiatry if co

## 2020-12-03 NOTE — CM/SW NOTE
Possible need for a wound vac/IV ABX.  SW to remain available and follow up if any other needs arise

## 2020-12-04 PROCEDURE — B548ZZA ULTRASONOGRAPHY OF SUPERIOR VENA CAVA, GUIDANCE: ICD-10-PCS | Performed by: INTERNAL MEDICINE

## 2020-12-04 PROCEDURE — 02HV33Z INSERTION OF INFUSION DEVICE INTO SUPERIOR VENA CAVA, PERCUTANEOUS APPROACH: ICD-10-PCS | Performed by: INTERNAL MEDICINE

## 2020-12-04 PROCEDURE — 36573 INSJ PICC RS&I 5 YR+: CPT

## 2020-12-04 PROCEDURE — 2W1SX6Z COMPRESSION OF RIGHT FOOT USING PRESSURE DRESSING: ICD-10-PCS | Performed by: INTERNAL MEDICINE

## 2020-12-04 PROCEDURE — 82962 GLUCOSE BLOOD TEST: CPT

## 2020-12-04 RX ORDER — METRONIDAZOLE 500 MG/1
500 TABLET ORAL 3 TIMES DAILY
Qty: 42 TABLET | Refills: 0 | Status: SHIPPED | OUTPATIENT
Start: 2020-12-04 | End: 2020-12-08

## 2020-12-04 RX ORDER — CEFAZOLIN SODIUM/WATER 2 G/20 ML
2 SYRINGE (ML) INTRAVENOUS EVERY 8 HOURS
Qty: 2520 ML | Refills: 0 | Status: SHIPPED | OUTPATIENT
Start: 2020-12-04 | End: 2021-01-15

## 2020-12-04 NOTE — PROGRESS NOTES
INFECTIOUS DISEASE PROGRESS NOTE    Holger Quintero.  Patient Status:  Observation    1950 MRN [id number]   [facility] 4NW-A Attending Ventura Thorpe DO   Hosp Day # 1 PCP Systems:  Completed. See pertinent positives and negatives in the the HPI. Physical Exam:    General: No acute distress. Alert and oriented x 3. Vital signs: Blood pressure 112/71, pulse 88, temperature 98.1 °F (36.7 °C), temperature source Oral, resp. Microbiology    Reviewed in EMR,    Radiology: Reviewed.     PROCEDURE: MRI FOOT, RIGHT (CPT=73718)     COMPARISON: [facility] , XR, XR FOOT, COMPLETE (MIN 3 VIEWS), RIGHT (CPT=73630), 12/01/2020, 3:22 PM.     INDICATIONS: Chronic wound to right distal foot metatarsal head with adjacent soft tissue edema and signal abnormality concerning for osteomyelitis.      There is some increased signal involving the proximal phalanx of the 3rd toe which may represent marrow edema although developing osteomyelitis cannot Impression/ Recs as noted above. D/w staff and with pt  Will follow up with me 2 weeks after dc.      Tameka Watts MD

## 2020-12-04 NOTE — PLAN OF CARE
Assumed pt care at 1330, Pt is alert x 4, on Ra, NSR on the monitor. PT denies any cardiovascular symptoms at this time Left groin is soft, no hematoma, right and left pedal pulses are doppler, right foot has dressing in place.  Pt is up with SBA,pt does no

## 2020-12-04 NOTE — WOUND PROGRESS NOTE
[facility]  Report of Inpatient Wound Care Progress Note    Hafsa Paniagua.  Patient Status:  Inpatient    1950 MRN [id number]   [facility] 2NE-A Attending Didi Adler, DO   Hosp Day # 1 PCP Fabiano Grijalva MD       SUBJECT DEBRIDEMENT Right 10/16/2019    Performed by Hetal Echols DPM at [facility] Right 10/12/2019    Performed by Hetal Echols DPM at [facility] MAIN OR   • Savanah Right 11/15/2016    Performed by Angelito Doran 12/02/20  0755 12/02/20  0755 12/03/20  1405 12/03/20  1416 12/03/20  1416 12/03/20 2051 12/04/20  0534 12/04/20  1258   WBC 8.5  --  6.3  --  5.8  --   --   --   --   --    HGB 15.0  --  12.9*  --  12.0*  --   --   --   --   --    HCT 45.0  --  38.8*  -- Spent 45 Minutes. Thank you,    Adrien Waddell.  Gurvinder Alcazar, PT, MPT  [address]  [address] [place] [address]  [place], [address] [place] [address] Rd  [phone]  Spectralink: [phone]  Pager: [phone]

## 2020-12-04 NOTE — CM/SW NOTE
Per hospitalist note, pt potentially open to [facility] for discharge. Attempted to speak with pt over his room phone; he requested I call him back. [facility] referrals sent out.  Made sure to include that wound care, wound vac, and IV abx could be available to the p

## 2020-12-04 NOTE — PLAN OF CARE
Received pt from previous shift- pt alert x 3- vital signs stable- tele NSR  Lung sound diminished- sat room air 95%.  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

## 2020-12-04 NOTE — CM/SW NOTE
Acknowledged order for wound vac at discharge. During discharge rounds, [employer] wound vac was delivered to the pt's room. Spoke to Farhana at [facility], pt is going to require IV abx upon discharge. Spoke to Zahida, she is going to place a PT/OT consult.  Pt may be

## 2020-12-04 NOTE — PROGRESS NOTES
[facility] Hospitalist Progress Note                                                                   [facility] [place].  2/6/1950    SUBJECTIVE:  Pt seen and examined.   RLE angiogram with subto BID   • atorvastatin  40 mg Oral Daily   • bumetanide  2 mg Oral BID (Diuretic)   • buPROPion HCl ER (XL)  150 mg Oral QAM   • Clopidogrel Bisulfate  75 mg Oral Daily   • dilTIAZem HCl  120 mg Oral Daily   • zolpidem  5 mg Oral Nightly   • gabapentin  800

## 2020-12-04 NOTE — PROGRESS NOTES
No complaints  Groin without hematoma  Excellent signal in anterior tibial artery with dressing on the right foot  Continue wound care  Patient can be discharged from vascular surgery standpoint with his current anticoagulation regimen and follow-up in 2 w

## 2020-12-04 NOTE — CM/SW NOTE
Spoke to pt over the phone to follow up on discharge plans. Pt agreeable to completing his IV abx and wound care/wound vac need in a SNF. Explained that pt is accepted at [facility], as pt lives close to there.  Pt agreeable to reviewing the Aidin list

## 2020-12-04 NOTE — PLAN OF CARE
Aox4, room air, no complaints of chest pain or SOB, VSS, NSR on tele, wound vac placed on right foot, dressing orders per wound care, safety precautions in place. Physical therapy to see. Vascular access consulted. Will continue to monitor the pt.      Prob physical limitations  - Instruct pt to call for assistance with activity based on assessment  - Modify environment to reduce risk of injury  - Provide assistive devices as appropriate  - Consider OT/PT consult to assist with strengthening/mobility  - Encou

## 2020-12-04 NOTE — HOME CARE LIAISON
Received referral from [facility]. Met with patient at the bedside and provided choice. Patient is agreeable to [facility]. Residential brochure provided with contact information. All questions addressed and answered.      Per Mayda Taylor

## 2020-12-05 PROCEDURE — 82962 GLUCOSE BLOOD TEST: CPT

## 2020-12-05 PROCEDURE — 97161 PT EVAL LOW COMPLEX 20 MIN: CPT

## 2020-12-05 PROCEDURE — 97530 THERAPEUTIC ACTIVITIES: CPT

## 2020-12-05 NOTE — PROGRESS NOTES
[facility]  hospitalist daily note    Patient was seen/examined on 12/5/20    S; no chest pain, no SOB, no abd pain, no nausea/emesis  + passing gas and urinating  Denies bleeding    Medications in Epic    PE    12/05/20  0955   BP: 125/73   Pulse: 92   Resp: 20

## 2020-12-05 NOTE — PLAN OF CARE
Received patient at 0730. Alert and Oriented x4. Tele Rhythm NSR with 1st AVB. O2 saturation 96% On room air. Breath sounds clear. Bed is locked and in low position. Call light and personal items within reach. No C/O chest pain or SOB.  Pt voiding with no i dressing/incision, wound bed, drain sites and surrounding tissue  - Implement wound care per orders  - Initiate isolation precautions as appropriate  - Initiate Pressure Ulcer prevention bundle as indicated  Outcome: Progressing     Problem: SAFETY ADULT - use RW for transfers and amb, NWB R LE  Outcome: Progressing

## 2020-12-05 NOTE — CM/SW NOTE
Spoke with RN who said pt is still looking over the [facility] list.  PT/OT still need to see pt and write note for ANGELINA. RN aware.

## 2020-12-05 NOTE — PHYSICAL THERAPY NOTE
PHYSICAL THERAPY EVALUATION - INPATIENT     Room Number: 8650/8641-V  Evaluation Date: 12/5/2020  Type of Evaluation: Initial  Physician Order: PT Eval and Treat    Presenting Problem: Cellulitis R foot  Reason for Therapy: Mobility Dysfunction and D ATRIAL FIBRILLATION    • Cardiomyopathy [facility])    • Congestive heart disease ([facility] [address]     last 5/2019   • COPD     no exac in several years per pt as of 10/18/16, no O2   • Coronary artery disease involving coronary bypass graft of native heart without angina PHACOEMULSIFICATION OF CATARACT WITH INTRAOCULAR LENS IMPLANT 77882 Left 8/13/2014    Performed by Tuan Mixon MD at [place] [address] ([facility]) Bilateral     shoulder surgery   • OTHER Right 2013    femoral endarterectomy   • OT within functional limits    RANGE OF MOTION AND STRENGTH ASSESSMENT  Upper extremity ROM are within functional limits, B UE strength grossly WFL except  L shoulder grossly 4/5, R shoulder strength grossly 4-/5.     Lower extremity ROM is within functional l on goals and course of PT. Pt educated on NWB status of R LE to protect integrity of R foot wound/wound vac. Pt supine to sit to EOB with supervision. Pt denies dizziness upon sitting and demonstrates good sitting balance.   Pt sit to stand to RW with mi from skilled inpatient PT to address the above deficits to assist patient in returning to prior to level of function. DISCHARGE RECOMMENDATIONS  PT Discharge Recommendations: Sub-acute rehabilitation    PLAN  PT Treatment Plan: Patient education; Family ed

## 2020-12-05 NOTE — PLAN OF CARE
Assumed care of pt who is alert x 4. Right foot with wound vac in place. Pt looking at list for ANGEL[facility]. No complaint of pain.  Up to bathroom

## 2020-12-05 NOTE — PLAN OF CARE
Assumed care of pt at 1930 a/o x4 in no apparent distress. Monitor shows sinus rhythm with first degree AVB with stable BP and afebrile. He is on RA without SOB. Wound vac noted to R foot and he is constantly moving both feet while in bed.   He took pain injury  - Provide assistive devices as appropriate  - Consider OT/PT consult to assist with strengthening/mobility  - Encourage toileting schedule  Outcome: Progressing     Problem: PAIN - ADULT  Goal: Verbalizes/displays adequate comfort level or patient'

## 2020-12-06 PROCEDURE — 82962 GLUCOSE BLOOD TEST: CPT

## 2020-12-06 NOTE — PLAN OF CARE
Assumed care of pt at 1930 a/o x4 up in the chair in no apparent distress. He is in sinus on tele with stable BP and on RA with sats upper 90s. He is taking norco for pain. Around 2315 the wound vac machine started alarming blockage.   Clamps all open, m INTERVENTIONS:  - Assess pt frequently for physical needs  - Identify cognitive and physical deficits and behaviors that affect risk of falls.   - [place] fall precautions as indicated by assessment.  - Educate pt/family on patient safety including physic

## 2020-12-06 NOTE — CERTIFICATION
**Certification    PHYSICIAN Certification of Need for Inpatient Hospitalization    Based on the his current state of illness, Abbi Lemus requires inpatient hospitalization for his osteomyelitis

## 2020-12-06 NOTE — PROGRESS NOTES
[facility] hospitalist daily note     Patient was seen/examined on 12/6/20     S; no chest pain, no SOB, no abd pain, no nausea/emesis  + passing gas and urinating  Denies bleeding  Seen by PT and ANGELINA advised.  Per pt he is possibly interested in 2 places     Medi

## 2020-12-06 NOTE — PLAN OF CARE
Denies c/o malaise or cardiac symptoms. Remains in NSR, NL S1, S2, RRR. 1st degree heart block. Lungs with wheezing noted posteriorly. Asked if he felt the need to cough to which he replied no. Abdomen soft and non tender to touch.   Difficult to appre comfort level or patient's stated pain goal  Description: INTERVENTIONS:  - Encourage pt to monitor pain and request assistance  - Assess pain using appropriate pain scale  - Administer analgesics based on type and severity of pain and evaluate response  - Assess and document dressing/incision, wound bed, drain sites and surrounding tissue  - Implement wound care per orders  - Initiate isolation precautions as appropriate  - Initiate Pressure Ulcer prevention bundle as indicated  Outcome: Not Progressing

## 2020-12-07 PROCEDURE — 82962 GLUCOSE BLOOD TEST: CPT

## 2020-12-07 RX ORDER — HYDROCODONE BITARTRATE AND ACETAMINOPHEN 10; 325 MG/1; MG/1
1 TABLET ORAL EVERY 6 HOURS PRN
Qty: 20 TABLET | Refills: 0 | Status: SHIPPED | OUTPATIENT
Start: 2020-12-07 | End: 2021-01-28

## 2020-12-07 RX ORDER — CEFAZOLIN SODIUM/WATER 2 G/20 ML
2 SYRINGE (ML) INTRAVENOUS EVERY 8 HOURS
Status: DISCONTINUED | OUTPATIENT
Start: 2020-12-07 | End: 2020-12-09

## 2020-12-07 RX ORDER — ALPRAZOLAM 0.5 MG/1
0.5 TABLET ORAL DAILY PRN
Qty: 3 TABLET | Refills: 0 | Status: SHIPPED | OUTPATIENT
Start: 2020-12-07 | End: 2021-01-29

## 2020-12-07 NOTE — CM/SW NOTE
Alisha met with pt- he states his first [facility] choice is [address] of [address]. 2nd choice would be [facility] in [facility] [address] in [facility]. Alisha discussed pt with RN.   Wound vac issue so wound care saw pt this am. They want podiatry to s

## 2020-12-07 NOTE — WOUND PROGRESS NOTE
[facility]  Inpatient Wound Care Contact Note    Alejandrina Matthews.  Patient Status:  Inpatient    1950 MRN [id number]   [facility] 2NE-A Attending Tristian Bravo MD   [address] Day # 4 PCP Jessica Lennon MD     Talked to RN, Negative Pr

## 2020-12-07 NOTE — PLAN OF CARE
S/p angio RLE with angioplasty and stent placement 12/3  A&O X4. VSS. NSR on cardiac monitor. Adequate saturation on RA. Lungs clear. Denied SOB. Denies chest discomfort. RLE dressing in place. Per wound care RN, wound vac off for now and cont.  We self management of diabetes  12/7/2020 1539 by Curtis Lynch RN  Outcome: Progressing  12/7/2020 1524 by Curtis Lynch RN  Outcome: Progressing     Problem: SKIN/TISSUE INTEGRITY - ADULT  Goal: Incision(s), wounds(s) or drain site(s) healing without management  - Manage/alleviate anxiety  - Utilize distraction and/or relaxation techniques  - Monitor for opioid side effects  - Notify MD/LIP if interventions unsuccessful or patient reports new pain  - Anticipate increased pain with activity and pre-medi

## 2020-12-07 NOTE — PROGRESS NOTES
INFECTIOUS DISEASE PROGRESS NOTE    Leia Bautista.  Patient Status:  Observation    1950 MRN [id number]   [facility] 4NW-A Attending Precilla um, DO   Hosp Day # 4 PCP Completed. See pertinent positives and negatives in the the HPI. Physical Exam:    General: No acute distress. Alert and oriented x 3. Vital signs: Blood pressure 128/70, pulse 87, temperature 98.1 °F (36.7 °C), temperature source Oral, resp.  rate 18, No results for input(s): Nelsy Benoit, [address], [address], [address], [address], [place], [place], [facility] [address], [address], NITRITE, LEUUR, WBCUR, RBCUR, BACUR, EPIUR in the last 168 hours. Microbiology    Reviewed in EMR,    Radiology: Reviewed.     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      There is increased marrow signal with loss of normal cortical signal involving the 2nd metatarsal head with adjacent so

## 2020-12-07 NOTE — PROGRESS NOTES
[facility] hospitalist daily note     Patient was seen/examined on 12/7/20     S; no chest pain, no SOB, no abd pain, no nausea/emesis  + passing gas and urinating  Denies bleeding  Wound vac stopped working       Medications in Epic     PE    12/07/20  1214   BP

## 2020-12-07 NOTE — PLAN OF CARE
Received patient on bed, alert and oriented. Denied chest pain, denied SOB. Wound vac to right foot hasn't been working. Dressing removed and wet to dry dressing applied. Wound had a brownish discharge with foul odor.  Patient tolerated whole dressing barajas injury  Description: INTERVENTIONS:  - Assess pt frequently for physical needs  - Identify cognitive and physical deficits and behaviors that affect risk of falls.   - [place] fall precautions as indicated by assessment.  - Educate pt/family on patient sa

## 2020-12-08 PROCEDURE — 82962 GLUCOSE BLOOD TEST: CPT

## 2020-12-08 PROCEDURE — 97530 THERAPEUTIC ACTIVITIES: CPT

## 2020-12-08 PROCEDURE — 97165 OT EVAL LOW COMPLEX 30 MIN: CPT

## 2020-12-08 PROCEDURE — 97116 GAIT TRAINING THERAPY: CPT

## 2020-12-08 PROCEDURE — 97110 THERAPEUTIC EXERCISES: CPT

## 2020-12-08 PROCEDURE — 99214 OFFICE O/P EST MOD 30 MIN: CPT

## 2020-12-08 PROCEDURE — 80048 BASIC METABOLIC PNL TOTAL CA: CPT | Performed by: HOSPITALIST

## 2020-12-08 PROCEDURE — 85027 COMPLETE CBC AUTOMATED: CPT | Performed by: HOSPITALIST

## 2020-12-08 RX ORDER — POTASSIUM CHLORIDE 20 MEQ/1
40 TABLET, EXTENDED RELEASE ORAL EVERY 4 HOURS
Status: COMPLETED | OUTPATIENT
Start: 2020-12-08 | End: 2020-12-08

## 2020-12-08 NOTE — PLAN OF CARE
Patient alert and oriented x4, VS stable, RA, , controlled Afib on tele  No complaint of pain or discomfort  Awaiting wound care to see patient and apply wound vac to R foot  PT/OT follows  NWB, patient is able to stand up and pivot using walker, cane a

## 2020-12-08 NOTE — PLAN OF CARE
Problem: Impaired Activities of Daily Living  Goal: Achieve highest/safest level of independence in self care  Description: Interventions:  - Assess ability and encourage patient to participate in ADLs to maximize function  - Promote sitting position [facility]

## 2020-12-08 NOTE — WOUND PROGRESS NOTE
[facility]  Report of Inpatient Wound Care Consultation    Leia Bautista.  Patient Status:  Inpatient    1950 MRN [id number]   [facility] 2NE-A Attending Jayson Stapleton MD   [facility] Day # 5 PCP Bjorn Rinaldi MD     Reason for Cons 10/16/2019    Performed by Jonathan Mg DPM at [facility] 10/12/2019    Performed by Jonathan Mg DPM at [facility] MAIN OR   • [facility] 11/15/2016    Performed by Cindy Null MD at [facility] SALIMA 12/08/2020     12/08/2020    CA 9.0 12/08/2020    PGLU 109 12/08/2020         Impression: 1. Right dorsal foot: hx of DM and PVD    Measures 1 cm x 0.5 cm x 2 cm. Sinus tract at 6 o' clock with a maximum distance of   3.3 cm  Wound bed is spongy pink

## 2020-12-08 NOTE — PHYSICAL THERAPY NOTE
PHYSICAL THERAPY TREATMENT NOTE - INPATIENT    Room Number: 3519/5372-Z     Session: 1   Number of Visits to Meet Established Goals: 4    Presenting Problem: Cellulitis R foot    Problem List  Principal Problem:    Cellulitis of right foot  Active Problem CORONARY ARTERY BYPASS GRAFT N/A 8/26/2013    Performed by Mandie Hartmann MD at [place] [address] Left 3/19/2009    Performed by Rosita Staples MD at [address] [place] [address]   • HIP REPLACEMENT SURGERY  3/2015    right hip replacemen '6-Clicks' INPATIENT SHORT FORM - BASIC MOBILITY  How much difficulty does the patient currently have. ..  -   Turning over in bed (including adjusting bedclothes, sheets and blankets)?: None   -   Sitting down on and standing up from a chair with arms (e.g PLAN  PT Treatment Plan: Patient education; Family education;Gait training;Stoop training;Stair training;Transfer training;Balance training  Rehab Potential : Good  Frequency (Obs): 3-5x/week    CURRENT GOALS   Goal #1 Patient is able to demonstrate sup

## 2020-12-08 NOTE — PROGRESS NOTES
[facility] hospitalist daily note     Patient was seen/examined on 12/8/20     S; no chest pain, no SOB, no abd pain, no nausea/emesis  + passing gas and urinating  Denies bleeding          Medications in Epic     PE    12/08/20  1211   BP: 131/74   Pulse: 78   R

## 2020-12-08 NOTE — DIETARY NOTE
[address] [facility]. Admitting diagnosis:  Cellulitis of right foot [V85.912]    Ht: 180.3 cm (5' 10.98\")  Wt: 90 kg (198 lb 6.6 oz). This is 120% of IBW  Body mass index is 27.67 kg/m².   IBW: 75.5 kg    Wt Read
No

## 2020-12-08 NOTE — PROGRESS NOTES
Podiatry note    Patient being followed for a wound on his right foot. He had a wound VAC. They were concerned that there might be some purulence in the drainage and was switched to saline wet-to-dry dressing. Currently on antibiotics.   Saline wet-to-

## 2020-12-08 NOTE — PLAN OF CARE
Received patient,alert and oriented. Denied chest pain, denied SOB. Flipped to Afib controlled after midnight. Discussed POC. Due meds given. Safety measures reinforced, call light within reach. Needs attended to. Will continue to monitor.     Problem: [place] appropriate  - Initiate Pressure Ulcer prevention bundle as indicated  12/8/2020 0312 by Jack Mcpherson, RN  Outcome: Progressing  12/8/2020 0234 by Jack Mcpherson, RN  Outcome: Progressing     Problem: SAFETY ADULT - FALL  Goal: Free from fall injury  D stability  - Promote increasing activity/tolerance for mobility and gait  - Educate and engage patient/family in tolerated activity level and precautions  - Recommend pt use RW for transfers and amb, NWB R LE  12/8/2020 0312 by Tanna Garcia RN  Outcome

## 2020-12-08 NOTE — OCCUPATIONAL THERAPY NOTE
OCCUPATIONAL THERAPY EVALUATION - INPATIENT     Room Number: 1615/3064-V  Evaluation Date: 12/8/2020  Type of Evaluation: Initial  Presenting Problem: R LE cellulitis, 12/3 s/p R LE stenting,angiogram, see op note    Physician Order: IP Consult to [facility] pt as of 10/18/16, no O2   • Coronary artery disease involving coronary bypass graft of native heart without angina pectoris 5/17/2017   • Depression    • DM2 (diabetes mellitus, type 2) (HCC)     elevated glucose due to steroid use for lung infection   • [facility]   • ORTHOPEDIC SURG (PBP) Bilateral     shoulder surgery   • OTHER Right 2013    femoral endarterectomy   • OTHER SURGICAL HISTORY      left knee ligament repair   • OTHER SURGICAL HISTORY      left hand laceration repair 4th, 5th fingers   • OTHER GELY ADDITIONAL TESTS                                    NEUROLOGICAL FINDINGS                   ACTIVITY TOLERANCE                         O2 SATURATIONS                ACTIVITIES OF DAILY LIVING ASSESSMENT  AM-PAC ‘6-Clicks’ Inpatient Daily Activity Short performance deficits: R LE NWB precaution, decreased R LE ROM, and decreased standing balance.  These deficits impact the patient’s ability to participate in ADL, instrumental activities of daily living, rest and sleep, work, leisure and social participatio mask and gloves. Patient wore surgical mask.

## 2020-12-09 ENCOUNTER — MOBILE ENCOUNTER (OUTPATIENT)
Dept: FAMILY MEDICINE CLINIC | Facility: CLINIC | Age: 70
End: 2020-12-09

## 2020-12-09 ENCOUNTER — EXTERNAL FACILITY (OUTPATIENT)
Dept: FAMILY MEDICINE CLINIC | Facility: CLINIC | Age: 70
End: 2020-12-09

## 2020-12-09 VITALS
DIASTOLIC BLOOD PRESSURE: 76 MMHG | HEART RATE: 100 BPM | TEMPERATURE: 98 F | SYSTOLIC BLOOD PRESSURE: 104 MMHG | RESPIRATION RATE: 18 BRPM | WEIGHT: 200.63 LBS | OXYGEN SATURATION: 99 % | BODY MASS INDEX: 28.09 KG/M2 | HEIGHT: 70.98 IN

## 2020-12-09 DIAGNOSIS — I73.9 PERIPHERAL VASCULAR DISEASE (HCC): ICD-10-CM

## 2020-12-09 DIAGNOSIS — M48.061 SPINAL STENOSIS OF LUMBAR REGION, UNSPECIFIED WHETHER NEUROGENIC CLAUDICATION PRESENT: ICD-10-CM

## 2020-12-09 DIAGNOSIS — I50.32 CHRONIC DIASTOLIC HEART FAILURE (HCC): ICD-10-CM

## 2020-12-09 DIAGNOSIS — Z98.62 STATUS POST PERIPHERAL ARTERY ANGIOPLASTY: ICD-10-CM

## 2020-12-09 DIAGNOSIS — L03.115 CELLULITIS OF RIGHT FOOT: Primary | ICD-10-CM

## 2020-12-09 DIAGNOSIS — I48.0 PAROXYSMAL ATRIAL FIBRILLATION (HCC): ICD-10-CM

## 2020-12-09 DIAGNOSIS — M86.9 OSTEOMYELITIS OF RIGHT FOOT, UNSPECIFIED TYPE (HCC): ICD-10-CM

## 2020-12-09 DIAGNOSIS — I25.5 ISCHEMIC CARDIOMYOPATHY: ICD-10-CM

## 2020-12-09 PROCEDURE — 82962 GLUCOSE BLOOD TEST: CPT

## 2020-12-09 PROCEDURE — 84132 ASSAY OF SERUM POTASSIUM: CPT | Performed by: HOSPITALIST

## 2020-12-09 PROCEDURE — 97116 GAIT TRAINING THERAPY: CPT

## 2020-12-09 PROCEDURE — 99306 1ST NF CARE HIGH MDM 50: CPT | Performed by: FAMILY MEDICINE

## 2020-12-09 PROCEDURE — 1111F DSCHRG MED/CURRENT MED MERGE: CPT | Performed by: FAMILY MEDICINE

## 2020-12-09 PROCEDURE — 97110 THERAPEUTIC EXERCISES: CPT

## 2020-12-09 RX ORDER — POTASSIUM CHLORIDE 20 MEQ/1
40 TABLET, EXTENDED RELEASE ORAL ONCE
Status: COMPLETED | OUTPATIENT
Start: 2020-12-09 | End: 2020-12-09

## 2020-12-09 NOTE — PLAN OF CARE
Rec'd pt sleeping, no apprrent distress or discomfort assessed. Telemetry showing SR with !st degree block,  Pt has episodes of Afib intermittantly. (L) arm precautions noted for PICC line for L/T aabx.   (R) foot with wound vac to 125mm/hg suction, dressi

## 2020-12-09 NOTE — CM/SW NOTE
12/09/20 1039   Discharge disposition   Expected discharge disposition Skilled Nurs   Name of Jass P[address]rez Dr at [place]   Discharge [facility]     Pt discussed with RN in rounds this am.  Pt ok to transfer to [place].

## 2020-12-09 NOTE — PLAN OF CARE
Pt rec'd sleeping, no appparent distress or discomfort assessed. Lungs clear on room air. Telemetry shwoing SR wiht 1st degree heart block, in and odut of Afib. Wound vac to (R) foot at 125 mm/hg suction.   Bilateral pedal pulses palpable via doppler onl Problem: Impaired Functional Mobility  Goal: Achieve highest/safest level of mobility/gait  Description: Interventions:  - Assess patient's functional ability and stability  - Promote increasing activity/tolerance for mobility and gait  - Educate and eng Manage/alleviate anxiety  - Utilize distraction and/or relaxation techniques  - Monitor for opioid side effects  - Notify MD/LIP if interventions unsuccessful or patient reports new pain  - Anticipate increased pain with activity and pre-medicate as approp

## 2020-12-09 NOTE — PROGRESS NOTES
INFECTIOUS DISEASE PROGRESS NOTE    Ramo Soto.  Patient Status:  Observation    1950 MRN [id number]   [facility] 4NW-A Attending Joseph Paige, DO   Hosp Day # 6 PCP the the HPI. Physical Exam:    General: No acute distress. Alert and oriented x 3. Vital signs: Blood pressure 150/74, pulse 103, temperature 97.7 °F (36.5 °C), temperature source Oral, resp.  rate 16, height 5' 10.98\" (1.803 m), weight 200 lb 9.9 oz ( swelling. History of prior amputation. TECHNIQUE: Multiplanar imaging of the foot is acquired including axial, sagittal and coronal imaging. Proton density, T2 weighted and fat suppression sequences are included.  Exam was performed without intravenous Dictated by (CST): Ne Sanchez MD on 12/02/2020 at 10:53 PM   Finalized by (CST): Ne Sanchez MD on 12/02/2020 at 11:02 PM       ASSESSMENT:  1. R foot infection with relapsing OM. Cx MSSA.   2. OM of the R foot (2nd metatarsal per imaging)- based

## 2020-12-09 NOTE — PROGRESS NOTES
Assumed pt care at 170, pt alert and oriented x4, on RA, NSR on the monitor, all need met will continue to monitor.

## 2020-12-09 NOTE — PROGRESS NOTES
INFECTIOUS DISEASE PROGRESS NOTE    Alejandrina Matthews.  Patient Status:  Observation    1950 MRN [id number]   [facility] 4NW-A Attending Lilliana Garber,    Hosp Day # 5 PCP Daily    Review of Systems:  Completed. See pertinent positives and negatives in the the HPI. Physical Exam:    General: No acute distress. Alert and oriented x 3.   Vital signs: Blood pressure 122/79, pulse 73, temperature 98.3 °F (36.8 °C), temperature COMPLETE (MIN 3 VIEWS), RIGHT (CPT=73630), 12/01/2020, 3:22 PM.     INDICATIONS: Chronic wound to right distal foot with redness and swelling. History of prior amputation.      TECHNIQUE: Multiplanar imaging of the foot is acquired including axial, sagittal the proximal phalanx of the 3rd toe which may represent marrow edema although developing osteomyelitis cannot entirely be excluded.      Dictated by (CST): Pao Clement MD on 12/02/2020 at 10:53 PM   Finalized by (CST): Pao Clement MD on 12/02/2020 at

## 2020-12-09 NOTE — PLAN OF CARE
Assumed care of Pt. At 1. Pt. Is Axox4 and on room air with an O2 sat of 96%. Pt. Has clear bilateral breath sounds. Pt. Denies any shortness of breath. Pt. Has NSR & a 1st degree AVB w/ a HR of 94. Pt. Zuleyma Kem in and out of afib. Pt.  Denies any chest pain S/S of infection  Description: INTERVENTIONS:  - Assess and document risk factors for pressure ulcer development  - Assess and document skin integrity  - Assess and document dressing/incision, wound bed, drain sites and surrounding tissue  - Implement woun comfort level or patient's stated pain goal  Description: INTERVENTIONS:  - Encourage pt to monitor pain and request assistance  - Assess pain using appropriate pain scale  - Administer analgesics based on type and severity of pain and evaluate response  -

## 2020-12-09 NOTE — DISCHARGE SUMMARY
General Medicine Discharge Summary     Patient ID:  Clifm Glenda.  65year old  2/6/1950    Admit date: 12/1/2020    Discharge date and time: 12/9/2020    Attending Physician: Shannon Cary, VASCULAR SURGERY  CONSULT TO WOUND OSTOMY  IP CONSULT TO SOCIAL WORK  IP CONSULT TO OCCUPATIONAL THERAPY  IP CONSULT TO VASCULAR ACCESS TEAM  IP CONSULT TO SOCIAL WORK  IP CONSULT TO PHYSICAL THERAPY  IP CONSULT TO OCCUPATIONAL THERAPY    Operative Procedu daily.    apixaban (ELIQUIS) 5 MG Oral Tab  Take 1 tablet (5 mg total) by mouth 2 (two) times daily. atorvastatin 40 MG Oral Tab  Take 1 tablet (40 mg total) by mouth daily.     bumetanide 2 MG Oral Tab  Take 1 tablet (2 mg total) by mouth 2 (two) times

## 2020-12-09 NOTE — PHYSICAL THERAPY NOTE
PHYSICAL THERAPY TREATMENT NOTE - INPATIENT    Room Number: 8554/4344-I     Session: 2   Number of Visits to Meet Established Goals: 4    Presenting Problem: Cellulitis R foot    Problem List  Principal Problem:    Cellulitis of right foot  Active Problem CORONARY ARTERY BYPASS GRAFT N/A 8/26/2013    Performed by Stella Ashby MD at / [place] [address] Left 3/19/2009    Performed by La Noriega MD at [address] [place] [address]   • HIP REPLACEMENT SURGERY  3/2015    right hip replacemen INPATIENT SHORT FORM - BASIC MOBILITY  How much difficulty does the patient currently have. ..  -   Turning over in bed (including adjusting bedclothes, sheets and blankets)?: None   -   Sitting down on and standing up from a chair with arms (e.g., wheelcha training;Transfer training;Balance training  Rehab Potential : Good  Frequency (Obs): 3-5x/week    CURRENT GOALS   Goal #1 Patient is able to demonstrate supine - sit EOB @ level: modified independent      Goal #2 Patient is able to demonstrate transfers S

## 2020-12-09 NOTE — PLAN OF CARE
Received bedside report on this Pt., at 1115. Pt., awake, A&Ox4, calm and cooperative. Pt., was in SR on Tele monitor, sats greater than 92% on RA. Pt., received PRN Norco this morning for pain, effective, denied pain this afternoon.  Pt., sat up in chair,

## 2020-12-10 ENCOUNTER — NURSE ONLY (OUTPATIENT)
Dept: LAB | Age: 70
End: 2020-12-10
Attending: FAMILY MEDICINE
Payer: MEDICARE

## 2020-12-10 ENCOUNTER — INITIAL APN SNF VISIT (OUTPATIENT)
Dept: INTERNAL MEDICINE CLINIC | Age: 70
End: 2020-12-10

## 2020-12-10 ENCOUNTER — MOBILE ENCOUNTER (OUTPATIENT)
Dept: FAMILY MEDICINE CLINIC | Facility: CLINIC | Age: 70
End: 2020-12-10

## 2020-12-10 DIAGNOSIS — L40.3 SAPHO SYNDROME (HCC): Primary | ICD-10-CM

## 2020-12-10 DIAGNOSIS — G20 PARKINSON'S DISEASE (HCC): ICD-10-CM

## 2020-12-10 DIAGNOSIS — L70.9 SAPHO SYNDROME (HCC): Primary | ICD-10-CM

## 2020-12-10 DIAGNOSIS — M86.171 OTHER ACUTE OSTEOMYELITIS OF RIGHT FOOT (HCC): Primary | ICD-10-CM

## 2020-12-10 DIAGNOSIS — M85.80 SAPHO SYNDROME (HCC): Primary | ICD-10-CM

## 2020-12-10 DIAGNOSIS — Z78.9 IMPAIRED MOBILITY AND ADLS: ICD-10-CM

## 2020-12-10 DIAGNOSIS — I48.0 PAROXYSMAL ATRIAL FIBRILLATION (HCC): ICD-10-CM

## 2020-12-10 DIAGNOSIS — D50.9 IRON DEFICIENCY ANEMIA, UNSPECIFIED IRON DEFICIENCY ANEMIA TYPE: ICD-10-CM

## 2020-12-10 DIAGNOSIS — M48.061 SPINAL STENOSIS OF LUMBAR REGION, UNSPECIFIED WHETHER NEUROGENIC CLAUDICATION PRESENT: ICD-10-CM

## 2020-12-10 DIAGNOSIS — I50.22 CHRONIC SYSTOLIC HEART FAILURE (HCC): ICD-10-CM

## 2020-12-10 DIAGNOSIS — C61 PROSTATE CA (HCC): ICD-10-CM

## 2020-12-10 DIAGNOSIS — Z89.439 HISTORY OF AMPUTATION OF FOOT THROUGH METATARSAL BONE (HCC): ICD-10-CM

## 2020-12-10 DIAGNOSIS — E56.9 VITAMIN DISEASE: ICD-10-CM

## 2020-12-10 DIAGNOSIS — I25.5 ISCHEMIC CARDIOMYOPATHY: ICD-10-CM

## 2020-12-10 DIAGNOSIS — I48.20 CHRONIC ATRIAL FIBRILLATION (HCC): ICD-10-CM

## 2020-12-10 DIAGNOSIS — I48.91 ATRIAL FIBRILLATION (HCC): ICD-10-CM

## 2020-12-10 DIAGNOSIS — I73.9 PERIPHERAL VASCULAR DISEASE (HCC): ICD-10-CM

## 2020-12-10 DIAGNOSIS — R26.81 GAIT INSTABILITY: ICD-10-CM

## 2020-12-10 DIAGNOSIS — I10 BENIGN ESSENTIAL HTN: ICD-10-CM

## 2020-12-10 DIAGNOSIS — I50.32 CHRONIC DIASTOLIC HEART FAILURE (HCC): ICD-10-CM

## 2020-12-10 DIAGNOSIS — L03.115 CELLULITIS OF RIGHT FOOT: Primary | ICD-10-CM

## 2020-12-10 DIAGNOSIS — M86.9 OSTEOMYELITIS OF RIGHT FOOT, UNSPECIFIED TYPE (HCC): ICD-10-CM

## 2020-12-10 DIAGNOSIS — F32.4 MAJOR DEPRESSIVE DISORDER IN PARTIAL REMISSION, UNSPECIFIED WHETHER RECURRENT (HCC): ICD-10-CM

## 2020-12-10 DIAGNOSIS — R52 PAIN: ICD-10-CM

## 2020-12-10 DIAGNOSIS — G47.00 INSOMNIA, UNSPECIFIED TYPE: ICD-10-CM

## 2020-12-10 DIAGNOSIS — M65.9 SAPHO SYNDROME (HCC): Primary | ICD-10-CM

## 2020-12-10 DIAGNOSIS — R42 DIZZINESS: ICD-10-CM

## 2020-12-10 DIAGNOSIS — Z98.62 STATUS POST PERIPHERAL ARTERY ANGIOPLASTY: ICD-10-CM

## 2020-12-10 DIAGNOSIS — M86.9 SAPHO SYNDROME (HCC): Primary | ICD-10-CM

## 2020-12-10 DIAGNOSIS — Z74.09 IMPAIRED MOBILITY AND ADLS: ICD-10-CM

## 2020-12-10 DIAGNOSIS — M17.11 PRIMARY OSTEOARTHRITIS OF RIGHT KNEE: ICD-10-CM

## 2020-12-10 DIAGNOSIS — L03.115 CELLULITIS OF RIGHT FOOT: ICD-10-CM

## 2020-12-10 DIAGNOSIS — J44.9 CHRONIC OBSTRUCTIVE PULMONARY DISEASE, UNSPECIFIED COPD TYPE (HCC): ICD-10-CM

## 2020-12-10 DIAGNOSIS — E78.2 MIXED HYPERLIPIDEMIA: ICD-10-CM

## 2020-12-10 PROCEDURE — 82306 VITAMIN D 25 HYDROXY: CPT

## 2020-12-10 PROCEDURE — 86140 C-REACTIVE PROTEIN: CPT

## 2020-12-10 PROCEDURE — 85652 RBC SED RATE AUTOMATED: CPT

## 2020-12-10 PROCEDURE — 99358 PROLONG SERVICE W/O CONTACT: CPT | Performed by: FAMILY MEDICINE

## 2020-12-10 PROCEDURE — 83735 ASSAY OF MAGNESIUM: CPT

## 2020-12-10 PROCEDURE — 99310 SBSQ NF CARE HIGH MDM 45: CPT | Performed by: NURSE PRACTITIONER

## 2020-12-10 PROCEDURE — 85025 COMPLETE CBC W/AUTO DIFF WBC: CPT

## 2020-12-10 PROCEDURE — 80053 COMPREHEN METABOLIC PANEL: CPT

## 2020-12-10 RX ORDER — ESZOPICLONE 1 MG/1
1 TABLET, FILM COATED ORAL NIGHTLY
Qty: 30 TABLET | Refills: 0 | Status: SHIPPED | OUTPATIENT
Start: 2020-12-10 | End: 2020-12-29

## 2020-12-10 NOTE — PROGRESS NOTES
Maggi Negrete.  Author: Bree Mosley MD     1950 MRN [id number]   [facility]  Admission 20      Last Hospital Discharge 20 PCP Kaitlin Doe MD   Hospital of Discharge  [facility]        CC -admitted to [place] at [address] [place] [address] right lower extremity, he has another concern about his chronic back pain  He states that the back pain is very severe and he takes 2 tablets of Norco 10/325 every 6 hours for several years  Patient was very upset that only 1 tablet every 6 hours was presc Performed by Walter Hartman MD at [place] [address] Left 3/19/2009    Performed by Autumn Teran MD at [address] [place] [address]   • HIP REPLACEMENT SURGERY  3/2015    right hip replacement    • HIP TOTAL REPLACEMENT Right 1/7/2015 Code    CURRENT MEDICATIONS   Current Outpatient Medications   Medication Sig Dispense Refill   • Eszopiclone (LUNESTA) 1 MG Oral Tab Take 1 tablet (1 mg total) by mouth nightly.  30 tablet 0   • ALPRAZolam 0.5 MG Oral Tab Take 1 tablet (0.5 mg total) by mo times daily. 180 tablet 1   • Fluticasone Propionate 50 MCG/ACT Nasal Suspension 1 spray by Each Nare route 2 (two) times daily. • PEG 3350 Oral Powd Pack Take 17 g by mouth daily.          REVIEW OF SYSTEMS:  Review of Systems:   Constitutional: No fev 137 138 134*   K 3.5 3.7 3.5    103 100   CO2 28.0 31.0 30.0      ASSESSMENT AND PLAN:  Diagnoses and all orders for this visit:    Cellulitis of right foot    Osteomyelitis of right foot, unspecified type ([facility]    Peripheral vascular disease ([facility]

## 2020-12-11 VITALS
HEART RATE: 83 BPM | BODY MASS INDEX: 28 KG/M2 | SYSTOLIC BLOOD PRESSURE: 134 MMHG | OXYGEN SATURATION: 97 % | RESPIRATION RATE: 20 BRPM | TEMPERATURE: 97 F | DIASTOLIC BLOOD PRESSURE: 83 MMHG | WEIGHT: 200 LBS

## 2020-12-11 RX ORDER — ACETAMINOPHEN 325 MG/1
650 TABLET ORAL EVERY 6 HOURS PRN
COMMUNITY
End: 2021-01-28

## 2020-12-11 RX ORDER — BISACODYL 10 MG
10 SUPPOSITORY, RECTAL RECTAL DAILY PRN
COMMUNITY

## 2020-12-11 NOTE — PROGRESS NOTES
Belia Francis. : 1950  Age 65year old  male patient is admitted to Facility: The [address] [place] [address] for subacute rehab.     [address] [place] [address] Drive date:   2020  Discharge date to [facility]:  2020  ELOS:  TBD pending IV abx plan exac in several years per pt as of 10/18/16, no O2   • Coronary artery disease involving coronary bypass graft of native heart without angina pectoris 5/17/2017   • Depression    • DM2 (diabetes mellitus, type 2) (HCC)     elevated glucose due to steroid [facility]   • ORTHOPEDIC SURG (PBP) Bilateral     shoulder surgery   • OTHER Right 2013    femoral endarterectomy   • OTHER SURGICAL HISTORY      left knee ligament repair   • OTHER SURGICAL HISTORY      left hand laceration repair 4th, 5th fingers   • OTHER GELY Take 1 tablet (0.5 mg total) by mouth daily as needed for Anxiety. Hold for sedation. Do not combine with pain medication 3 tablet 0   • HYDROcodone-acetaminophen  MG Oral Tab Take 1 tablet by mouth every 6 (six) hours as needed for Pain.  Hold for se lb (90.7 kg)   SpO2 97%   BMI 27.89 kg/m²      REVIEW OF SYSTEMS:  GENERAL HEALTH:feels well otherwise  SKIN: denies any unusual skin lesions or rashes  WOUNDS: Right foot through and through  EYES:no visual complaints or deficits  HENT: denies nasal conge appropriate to situation    Therapy Update pending evaluations  Ambulation   ADLs/Transfers   DC planning     Post hospital discharge readmission risk assessment tool:   Pertinent medical history:   [place] all that apply with \"x\"  [ X ]  Heart failure/pulm risk/         22.8%    DIAGNOSTICS REVIEWED AT THIS VISIT:    Lab Results   Component Value Date    WBC 7.3 12/10/2020    RBC 4.42 12/10/2020    HGB 13.9 12/10/2020    HCT 41.9 12/10/2020    MCV 94.8 12/10/2020    MCH 31.4 12/10/2020    MCHC 33.2 12/10/202 18    Chronic systolic heart failure, hypertension, hyperlipidemia, CAD status post CABG, A.  Fib  Appears euvolemic  Bumex 2 mg p.o. twice daily  Atorvastatin 40 mg daily  Plavix 75 mg daily  Diltiazem 120 mg daily  Apixaban 5 mg p.o. twice daily  Spironol establish plan of care in [place].     Med Nguyen, APRN  12/10/20   3:27 PM

## 2020-12-11 NOTE — PROGRESS NOTES
Mike Temple.  Author: Norah Velazco MD     1950 MRN [id number]   [facility]  Admission 20      Last Hospital Discharge 20 PCP Gillian Sparks MD   Hospital of Discharge  [facility]       Patient admitted to [facility] at [facility]

## 2020-12-15 ENCOUNTER — SNF VISIT (OUTPATIENT)
Dept: INTERNAL MEDICINE CLINIC | Age: 70
End: 2020-12-15

## 2020-12-15 VITALS
HEART RATE: 85 BPM | RESPIRATION RATE: 18 BRPM | SYSTOLIC BLOOD PRESSURE: 138 MMHG | OXYGEN SATURATION: 92 % | DIASTOLIC BLOOD PRESSURE: 81 MMHG | TEMPERATURE: 97 F

## 2020-12-15 DIAGNOSIS — L40.9 PSORIASIS: ICD-10-CM

## 2020-12-15 DIAGNOSIS — M86.171 OTHER ACUTE OSTEOMYELITIS OF RIGHT FOOT (HCC): Primary | ICD-10-CM

## 2020-12-15 DIAGNOSIS — R06.2 WHEEZE: ICD-10-CM

## 2020-12-15 PROCEDURE — 99309 SBSQ NF CARE MODERATE MDM 30: CPT | Performed by: NURSE PRACTITIONER

## 2020-12-15 NOTE — PROGRESS NOTES
John Crawford., 36/1950, 65year old, male    Chief Complaint:  Patient presents with:   Follow - Up: right foot wound, infection w osteomyelitis, on IV antibiotics  Derm Problem       Subjective:   PMH significant for Parkinson's dz, COPD, KANE (not non tender, FROM  BREAST: deferred  RESPIRATORY:clear to auscultation anteriorly and posteriorly, +wheezing to posterior left lung fields, no dyspnea, no cough, room air  CARDIOVASCULAR: RRR, S1 and S2, no murmurs, no edema  ABDOMEN:  normal active BS+, so controlled, pulse controlled  Continue to monitor     Psoriasis  Fluocinonide 0.05% solution daily prn to affected areas  Fluocinonide shampoo daily prn    Parkinson's disease  Follow-up with Dr. Froilan Bar on DC from rehab  No Rx currently     Secondary periph

## 2020-12-16 ENCOUNTER — PATIENT OUTREACH (OUTPATIENT)
Dept: CASE MANAGEMENT | Age: 70
End: 2020-12-16

## 2020-12-16 NOTE — PROGRESS NOTES
Recived a call from Boy  From the [facility] requesting assistance with scheduling follow up appointments       DR. Jessica Schulz MD  Infectious Disease  [address] [facility] [address]  [place]  [phone]  DR. Sepulveda office will call back

## 2020-12-17 ENCOUNTER — NURSE ONLY (OUTPATIENT)
Dept: LAB | Age: 70
End: 2020-12-17
Attending: FAMILY MEDICINE
Payer: MEDICARE

## 2020-12-17 DIAGNOSIS — M85.80 SAPHO SYNDROME (HCC): Primary | ICD-10-CM

## 2020-12-17 DIAGNOSIS — M65.9 SAPHO SYNDROME (HCC): Primary | ICD-10-CM

## 2020-12-17 DIAGNOSIS — M86.9 SAPHO SYNDROME (HCC): Primary | ICD-10-CM

## 2020-12-17 DIAGNOSIS — E56.9 VITAMIN DISEASE: ICD-10-CM

## 2020-12-17 DIAGNOSIS — L70.9 SAPHO SYNDROME (HCC): Primary | ICD-10-CM

## 2020-12-17 DIAGNOSIS — I50.22 CHRONIC SYSTOLIC HEART FAILURE (HCC): ICD-10-CM

## 2020-12-17 DIAGNOSIS — I48.91 ATRIAL FIBRILLATION (HCC): ICD-10-CM

## 2020-12-17 DIAGNOSIS — L40.3 SAPHO SYNDROME (HCC): Primary | ICD-10-CM

## 2020-12-17 PROCEDURE — 86140 C-REACTIVE PROTEIN: CPT

## 2020-12-17 PROCEDURE — 80053 COMPREHEN METABOLIC PANEL: CPT

## 2020-12-17 PROCEDURE — 85652 RBC SED RATE AUTOMATED: CPT

## 2020-12-17 PROCEDURE — 85025 COMPLETE CBC W/AUTO DIFF WBC: CPT

## 2020-12-17 NOTE — PROGRESS NOTES
Recived a call from Sandy Fisher  From the [facility] requesting assistance with scheduling follow up appointments       DR. Montse Pulido MD  Infectious Disease  [address] [facility] [address]  [phone]  Appt Made for  Tue Jan 5th @11:4

## 2020-12-22 ENCOUNTER — SNF VISIT (OUTPATIENT)
Dept: INTERNAL MEDICINE CLINIC | Age: 70
End: 2020-12-22

## 2020-12-22 VITALS
DIASTOLIC BLOOD PRESSURE: 78 MMHG | SYSTOLIC BLOOD PRESSURE: 142 MMHG | OXYGEN SATURATION: 94 % | WEIGHT: 207.31 LBS | BODY MASS INDEX: 29 KG/M2 | TEMPERATURE: 97 F | RESPIRATION RATE: 18 BRPM | HEART RATE: 85 BPM

## 2020-12-22 DIAGNOSIS — E78.2 MIXED HYPERLIPIDEMIA: ICD-10-CM

## 2020-12-22 DIAGNOSIS — I50.32 CHRONIC DIASTOLIC HEART FAILURE (HCC): ICD-10-CM

## 2020-12-22 DIAGNOSIS — R52 PAIN: ICD-10-CM

## 2020-12-22 DIAGNOSIS — L40.9 PSORIASIS: ICD-10-CM

## 2020-12-22 DIAGNOSIS — M86.171 OTHER ACUTE OSTEOMYELITIS OF RIGHT FOOT (HCC): Primary | ICD-10-CM

## 2020-12-22 DIAGNOSIS — I10 BENIGN ESSENTIAL HTN: ICD-10-CM

## 2020-12-22 DIAGNOSIS — Z74.09 IMPAIRED MOBILITY AND ADLS: ICD-10-CM

## 2020-12-22 DIAGNOSIS — Z78.9 IMPAIRED MOBILITY AND ADLS: ICD-10-CM

## 2020-12-22 PROCEDURE — 99308 SBSQ NF CARE LOW MDM 20: CPT | Performed by: NURSE PRACTITIONER

## 2020-12-22 NOTE — PROGRESS NOTES
Hafsa Paniagua., 36/1950, 65year old, male    Chief Complaint:  Patient presents with:   Follow - Up: right foot wound, infection w osteomyelitis, on IV antibiotics       Subjective:   PMH significant for Parkinson's dz, COPD, KANE (not using CPAP), C wheezing no dyspnea, no cough, room air  CARDIOVASCULAR: RRR, S1 and S2, no murmurs, no edema  ABDOMEN:  normal active BS+, soft, nondistended; no organomegaly, no masses; nontender, no guarding, no rebound tenderness.   :no suprapubic distension  LYMPHAT management Norco as needed  IV PICC line left upper extremity monitoring maintenance per staff  Nonweightbearing on right lower extremity  PT/OT eval and treat  ELOS 6 weeks  INTERMEDIATE risk for readmission  DC second week in January with rehab and SW ev to Dr. Rangel Sotelo  Additional labs as needed     Follow Ups:  PCP within 7 days of DC  Follow-up with Dr. Rangel Sotelo in 2 weeks ID  Follow-up with Dr. Ramos [place] vascular surgery 1/8 at 4 pm  Follow-up with neurology Dr. Ursula Orozco on DC from rehab  Follow-up with Dr. Ronni Rutledge

## 2020-12-24 ENCOUNTER — NURSE ONLY (OUTPATIENT)
Dept: LAB | Age: 70
End: 2020-12-24
Attending: FAMILY MEDICINE
Payer: MEDICARE

## 2020-12-24 DIAGNOSIS — E56.9 VITAMIN DISEASE: ICD-10-CM

## 2020-12-24 DIAGNOSIS — L70.9 SAPHO SYNDROME (HCC): Primary | ICD-10-CM

## 2020-12-24 DIAGNOSIS — M65.9 SAPHO SYNDROME (HCC): Primary | ICD-10-CM

## 2020-12-24 DIAGNOSIS — I50.22 CHRONIC SYSTOLIC HEART FAILURE (HCC): ICD-10-CM

## 2020-12-24 DIAGNOSIS — M85.80 SAPHO SYNDROME (HCC): Primary | ICD-10-CM

## 2020-12-24 DIAGNOSIS — I48.91 ATRIAL FIBRILLATION (HCC): ICD-10-CM

## 2020-12-24 DIAGNOSIS — M86.9 SAPHO SYNDROME (HCC): Primary | ICD-10-CM

## 2020-12-24 DIAGNOSIS — L40.3 SAPHO SYNDROME (HCC): Primary | ICD-10-CM

## 2020-12-24 PROCEDURE — 85652 RBC SED RATE AUTOMATED: CPT

## 2020-12-24 PROCEDURE — 86140 C-REACTIVE PROTEIN: CPT

## 2020-12-24 PROCEDURE — 84154 ASSAY OF PSA FREE: CPT

## 2020-12-24 PROCEDURE — 80053 COMPREHEN METABOLIC PANEL: CPT

## 2020-12-24 PROCEDURE — 85025 COMPLETE CBC W/AUTO DIFF WBC: CPT

## 2020-12-24 PROCEDURE — 84153 ASSAY OF PSA TOTAL: CPT

## 2020-12-29 ENCOUNTER — SNF VISIT (OUTPATIENT)
Dept: INTERNAL MEDICINE CLINIC | Age: 70
End: 2020-12-29

## 2020-12-29 VITALS
BODY MASS INDEX: 29 KG/M2 | WEIGHT: 206 LBS | TEMPERATURE: 97 F | HEART RATE: 93 BPM | SYSTOLIC BLOOD PRESSURE: 129 MMHG | OXYGEN SATURATION: 96 % | RESPIRATION RATE: 18 BRPM | DIASTOLIC BLOOD PRESSURE: 79 MMHG

## 2020-12-29 DIAGNOSIS — I48.0 PAROXYSMAL ATRIAL FIBRILLATION (HCC): ICD-10-CM

## 2020-12-29 DIAGNOSIS — R06.2 WHEEZE: ICD-10-CM

## 2020-12-29 DIAGNOSIS — Z78.9 IMPAIRED MOBILITY AND ADLS: ICD-10-CM

## 2020-12-29 DIAGNOSIS — Z74.09 IMPAIRED MOBILITY AND ADLS: ICD-10-CM

## 2020-12-29 DIAGNOSIS — I25.5 ISCHEMIC CARDIOMYOPATHY: ICD-10-CM

## 2020-12-29 DIAGNOSIS — M86.171 OTHER ACUTE OSTEOMYELITIS OF RIGHT FOOT (HCC): Primary | ICD-10-CM

## 2020-12-29 DIAGNOSIS — G47.00 INSOMNIA, UNSPECIFIED TYPE: ICD-10-CM

## 2020-12-29 DIAGNOSIS — I10 BENIGN ESSENTIAL HTN: ICD-10-CM

## 2020-12-29 DIAGNOSIS — I50.42 CHRONIC COMBINED SYSTOLIC AND DIASTOLIC HEART FAILURE (HCC): ICD-10-CM

## 2020-12-29 PROCEDURE — 99309 SBSQ NF CARE MODERATE MDM 30: CPT | Performed by: NURSE PRACTITIONER

## 2020-12-29 RX ORDER — ESZOPICLONE 1 MG/1
2 TABLET, FILM COATED ORAL NIGHTLY
Qty: 30 TABLET | Refills: 0 | Status: SHIPPED | OUTPATIENT
Start: 2020-12-29 | End: 2021-01-29

## 2020-12-29 NOTE — PROCEDURES
[facility]    PATIENT'S NAME[place] [facility] [place]IAN: Aguila Campbell DO   OPERATING PHYSICIAN: Coni Alvarez M.D.    PATIENT ACCOUNT#:   [de-identified]    LOCATION:  [address] [place] [address]  MEDICAL RECORD #:   [id number]       DATE OF initiated by a qualified nurse under my supervision. Using ultrasound, I percutaneously accessed the left common femoral artery with a micropuncture kit. I then advanced a J-wire into the aorta.   I then exchanged the micropuncture sheath for a 5-Bolivian s I balloon angioplastied this popliteal artery for in-stent restenoses. Repeat angiogram showed an excellent technical result with patent SFA and popliteal artery and patent posterior tibial artery and anterior tibial artery in the foot.   All devices were

## 2020-12-29 NOTE — PROGRESS NOTES
Maggi Negrete., 36/1950, 65year old, male    Chief Complaint:  Patient presents with:   Follow - Up: right foot wound, infection w osteomyelitis, on IV antibiotics  Cough  Insomnia       Subjective:   PMH significant for Parkinson's dz, COPD, KANE (n TUBES, DRAINS:  PIC line - location Left upper extremity  SKIN: pink, warm, dry  WOUND: Right foot through and through wound dressed, improving in size and depth  EYES: sclera anicteric, conjunctiva normal; there is no nystagmus, no drainage from eyes  HEN 12/24/2020    K 3.4 (L) 12/24/2020     12/24/2020    CO2 30.0 12/24/2020     Component      Latest Ref Rng & Units 12/24/2020 12/17/2020 12/10/2020   PSA      <=4.00 ng/mL <0.01     PSA, Free      ng/mL <0.015     PSA, % Free            SED RATE      Major depression in partial remission  No suicidal or homicidal ideation  Admits to missing his spouse whom he lost  Bupropion HCl 150 mg daily  Sertraline 50 mg daily  Lunesta 1 mg p.o. nightly increase to 2mg QHS  Psychiatric eval as needed  A

## 2020-12-31 ENCOUNTER — NURSE ONLY (OUTPATIENT)
Dept: LAB | Age: 70
End: 2020-12-31
Attending: FAMILY MEDICINE
Payer: MEDICARE

## 2020-12-31 ENCOUNTER — SNF VISIT (OUTPATIENT)
Dept: INTERNAL MEDICINE CLINIC | Age: 70
End: 2020-12-31

## 2020-12-31 VITALS
SYSTOLIC BLOOD PRESSURE: 140 MMHG | WEIGHT: 207 LBS | DIASTOLIC BLOOD PRESSURE: 77 MMHG | BODY MASS INDEX: 29 KG/M2 | OXYGEN SATURATION: 95 % | HEART RATE: 75 BPM | TEMPERATURE: 98 F | RESPIRATION RATE: 18 BRPM

## 2020-12-31 DIAGNOSIS — J44.9 CHRONIC OBSTRUCTIVE PULMONARY DISEASE, UNSPECIFIED COPD TYPE (HCC): ICD-10-CM

## 2020-12-31 DIAGNOSIS — I50.42 CHRONIC COMBINED SYSTOLIC AND DIASTOLIC HEART FAILURE (HCC): ICD-10-CM

## 2020-12-31 DIAGNOSIS — L40.3 SAPHO SYNDROME (HCC): ICD-10-CM

## 2020-12-31 DIAGNOSIS — E78.2 MIXED HYPERLIPIDEMIA: ICD-10-CM

## 2020-12-31 DIAGNOSIS — M65.9 SAPHO SYNDROME (HCC): ICD-10-CM

## 2020-12-31 DIAGNOSIS — Z74.09 IMPAIRED MOBILITY AND ADLS: ICD-10-CM

## 2020-12-31 DIAGNOSIS — Z78.9 IMPAIRED MOBILITY AND ADLS: ICD-10-CM

## 2020-12-31 DIAGNOSIS — I48.91 ATRIAL FIBRILLATION (HCC): ICD-10-CM

## 2020-12-31 DIAGNOSIS — I48.0 PAROXYSMAL ATRIAL FIBRILLATION (HCC): ICD-10-CM

## 2020-12-31 DIAGNOSIS — M85.80 SAPHO SYNDROME (HCC): ICD-10-CM

## 2020-12-31 DIAGNOSIS — M86.171 OTHER ACUTE OSTEOMYELITIS OF RIGHT FOOT (HCC): Primary | ICD-10-CM

## 2020-12-31 DIAGNOSIS — L70.9 SAPHO SYNDROME (HCC): ICD-10-CM

## 2020-12-31 DIAGNOSIS — M86.9 SAPHO SYNDROME (HCC): ICD-10-CM

## 2020-12-31 DIAGNOSIS — I50.22 CHRONIC SYSTOLIC HEART FAILURE (HCC): ICD-10-CM

## 2020-12-31 DIAGNOSIS — R26.81 GAIT INSTABILITY: ICD-10-CM

## 2020-12-31 DIAGNOSIS — I10 BENIGN ESSENTIAL HTN: ICD-10-CM

## 2020-12-31 DIAGNOSIS — E56.9 VITAMIN DISEASE: ICD-10-CM

## 2020-12-31 LAB
ALBUMIN SERPL-MCNC: 3 G/DL (ref 3.4–5)
ALBUMIN/GLOB SERPL: 0.7 {RATIO} (ref 1–2)
ALP LIVER SERPL-CCNC: 99 U/L
ALT SERPL-CCNC: 13 U/L
ANION GAP SERPL CALC-SCNC: 3 MMOL/L (ref 0–18)
AST SERPL-CCNC: 34 U/L (ref 15–37)
BASOPHILS # BLD AUTO: 0.07 X10(3) UL (ref 0–0.2)
BASOPHILS NFR BLD AUTO: 1.3 %
BILIRUB SERPL-MCNC: 0.3 MG/DL (ref 0.1–2)
BUN BLD-MCNC: 20 MG/DL (ref 7–18)
BUN/CREAT SERPL: 18.3 (ref 10–20)
CALCIUM BLD-MCNC: 8.9 MG/DL (ref 8.5–10.1)
CHLORIDE SERPL-SCNC: 102 MMOL/L (ref 98–112)
CO2 SERPL-SCNC: 31 MMOL/L (ref 21–32)
CREAT BLD-MCNC: 1.09 MG/DL
CRP SERPL-MCNC: 0.56 MG/DL (ref ?–0.3)
DEPRECATED RDW RBC AUTO: 47.6 FL (ref 35.1–46.3)
EOSINOPHIL # BLD AUTO: 0.7 X10(3) UL (ref 0–0.7)
EOSINOPHIL NFR BLD AUTO: 13 %
ERYTHROCYTE [DISTWIDTH] IN BLOOD BY AUTOMATED COUNT: 13.3 % (ref 11–15)
GLOBULIN PLAS-MCNC: 4.5 G/DL (ref 2.8–4.4)
GLUCOSE BLD-MCNC: 151 MG/DL (ref 70–99)
HCT VFR BLD AUTO: 39.7 %
HGB BLD-MCNC: 12.7 G/DL
IMM GRANULOCYTES # BLD AUTO: 0.01 X10(3) UL (ref 0–1)
IMM GRANULOCYTES NFR BLD: 0.2 %
LYMPHOCYTES # BLD AUTO: 1.1 X10(3) UL (ref 1–4)
LYMPHOCYTES NFR BLD AUTO: 20.4 %
M PROTEIN MFR SERPL ELPH: 7.5 G/DL (ref 6.4–8.2)
MCH RBC QN AUTO: 31 PG (ref 26–34)
MCHC RBC AUTO-ENTMCNC: 32 G/DL (ref 31–37)
MCV RBC AUTO: 96.8 FL
MONOCYTES # BLD AUTO: 0.73 X10(3) UL (ref 0.1–1)
MONOCYTES NFR BLD AUTO: 13.5 %
NEUTROPHILS # BLD AUTO: 2.78 X10 (3) UL (ref 1.5–7.7)
NEUTROPHILS # BLD AUTO: 2.78 X10(3) UL (ref 1.5–7.7)
NEUTROPHILS NFR BLD AUTO: 51.6 %
OSMOLALITY SERPL CALC.SUM OF ELEC: 288 MOSM/KG (ref 275–295)
PATIENT FASTING Y/N/NP: YES
PLATELET # BLD AUTO: 182 10(3)UL (ref 150–450)
POTASSIUM SERPL-SCNC: 4.5 MMOL/L (ref 3.5–5.1)
RBC # BLD AUTO: 4.1 X10(6)UL
SED RATE-ML: 61 MM/HR
SODIUM SERPL-SCNC: 136 MMOL/L (ref 136–145)
WBC # BLD AUTO: 5.4 X10(3) UL (ref 4–11)

## 2020-12-31 PROCEDURE — 85652 RBC SED RATE AUTOMATED: CPT

## 2020-12-31 PROCEDURE — 99309 SBSQ NF CARE MODERATE MDM 30: CPT | Performed by: NURSE PRACTITIONER

## 2020-12-31 PROCEDURE — 85025 COMPLETE CBC W/AUTO DIFF WBC: CPT

## 2020-12-31 PROCEDURE — 86140 C-REACTIVE PROTEIN: CPT

## 2020-12-31 PROCEDURE — 80053 COMPREHEN METABOLIC PANEL: CPT

## 2020-12-31 NOTE — PROGRESS NOTES
Ezra Núñez., 36/1950, 65year old, male    Chief Complaint:  Patient presents with:   Follow - Up: right foot wound, infection w osteomyelitis, on IV antibiotics  Lab Results  Cough       Subjective:   PMH significant for Parkinson's dz, COPD, KANE improving in size and depth  EYES: sclera anicteric, conjunctiva normal; there is no nystagmus, no drainage from eyes  HENT: normocephalic; normal nose, no nasal drainage, mucous membranes pink, moist.  NECK: supple, non tender, FROM  BREAST: deferred  RES 12 mm/Hr 61 (H) 58 (H) 60 (H) 73 (H)   C-REACTIVE PROTEIN      <0.30 mg/dL 0.56 (H) 0.62 (H) 0.51 (H) 1.07 (H)       Assessment and plan:    Acute osteomyelitis of the second MTP, cellulitis and ulcers, severe PAD status post angioplasty and stent of right QHS  Psychiatric eval as needed  Alprazolam 0.5 mg p.o. daily as needed  Monitoring and emotional support offered     Hyponatremia, mild  Sodium 134-136 now  Continue to monitor     Allergic rhinitis  Flonase 1 spray each nostril 2 times daily  mucinex D B

## 2021-01-05 ENCOUNTER — SNF VISIT (OUTPATIENT)
Dept: INTERNAL MEDICINE CLINIC | Age: 71
End: 2021-01-05

## 2021-01-05 VITALS
RESPIRATION RATE: 18 BRPM | BODY MASS INDEX: 30 KG/M2 | OXYGEN SATURATION: 95 % | DIASTOLIC BLOOD PRESSURE: 79 MMHG | WEIGHT: 213 LBS | SYSTOLIC BLOOD PRESSURE: 123 MMHG | HEART RATE: 91 BPM | TEMPERATURE: 97 F

## 2021-01-05 DIAGNOSIS — I50.42 CHRONIC COMBINED SYSTOLIC AND DIASTOLIC HEART FAILURE (HCC): ICD-10-CM

## 2021-01-05 DIAGNOSIS — I10 BENIGN ESSENTIAL HTN: ICD-10-CM

## 2021-01-05 DIAGNOSIS — Z74.09 IMPAIRED MOBILITY AND ADLS: ICD-10-CM

## 2021-01-05 DIAGNOSIS — Z78.9 IMPAIRED MOBILITY AND ADLS: ICD-10-CM

## 2021-01-05 DIAGNOSIS — I48.0 PAROXYSMAL ATRIAL FIBRILLATION (HCC): ICD-10-CM

## 2021-01-05 DIAGNOSIS — E78.2 MIXED HYPERLIPIDEMIA: ICD-10-CM

## 2021-01-05 DIAGNOSIS — M86.171 OTHER ACUTE OSTEOMYELITIS OF RIGHT FOOT (HCC): Primary | ICD-10-CM

## 2021-01-05 DIAGNOSIS — R26.81 GAIT INSTABILITY: ICD-10-CM

## 2021-01-05 PROCEDURE — 99309 SBSQ NF CARE MODERATE MDM 30: CPT | Performed by: NURSE PRACTITIONER

## 2021-01-05 NOTE — PROGRESS NOTES
Belia Francis., 36/1950, 65year old, male    Chief Complaint:  Patient presents with:   Follow - Up: right foot wound, IV antibiotics, osteomyelitis, ADL dysfunction  Lab Results  Weakness  Pain       Subjective:   PMH significant for Parkinson's dz deferred  RESPIRATORY: clear, no crackles, no dyspnea, no cough, room air  CARDIOVASCULAR: RRR, S1 and S2, no murmurs, no edema  ABDOMEN:  normal active BS+, soft, nondistended; no organomegaly, no masses; nontender, no guarding, no rebound tenderness.    post angioplasty and stent of right superficial femoral artery  On IV Ancef for 6 weeks per ID Dr. Clemens Shone  Weekly CBC, CMP, ESR, CRP and fax to Dr. Castillo  care wound MD and nurse following  Pain management Norco as needed  IV PICC line left mild  Sodium 134-136 now  Continue to monitor     Allergic rhinitis  Flonase 1 spray each nostril 2 times daily  mucinex D BID     Dizziness  Meclizine 25 mg 3 times daily as needed     BPH  Tamsulosin 0.4 mg daily     Bowel management  MiraLAX 17 g daily

## 2021-01-07 ENCOUNTER — NURSE ONLY (OUTPATIENT)
Dept: LAB | Age: 71
End: 2021-01-07
Attending: FAMILY MEDICINE
Payer: MEDICARE

## 2021-01-07 DIAGNOSIS — I48.91 ATRIAL FIBRILLATION (HCC): ICD-10-CM

## 2021-01-07 DIAGNOSIS — L70.9 SAPHO SYNDROME (HCC): Primary | ICD-10-CM

## 2021-01-07 DIAGNOSIS — M65.9 SAPHO SYNDROME (HCC): Primary | ICD-10-CM

## 2021-01-07 DIAGNOSIS — M86.9 SAPHO SYNDROME (HCC): Primary | ICD-10-CM

## 2021-01-07 DIAGNOSIS — M85.80 SAPHO SYNDROME (HCC): Primary | ICD-10-CM

## 2021-01-07 DIAGNOSIS — E56.9 VITAMIN DISEASE: ICD-10-CM

## 2021-01-07 DIAGNOSIS — I50.22 CHRONIC SYSTOLIC HEART FAILURE (HCC): ICD-10-CM

## 2021-01-07 DIAGNOSIS — L40.3 SAPHO SYNDROME (HCC): Primary | ICD-10-CM

## 2021-01-07 LAB
ALBUMIN SERPL-MCNC: 2.9 G/DL (ref 3.4–5)
ALBUMIN/GLOB SERPL: 0.8 {RATIO} (ref 1–2)
ALP LIVER SERPL-CCNC: 93 U/L
ALT SERPL-CCNC: 14 U/L
ANION GAP SERPL CALC-SCNC: 4 MMOL/L (ref 0–18)
AST SERPL-CCNC: 31 U/L (ref 15–37)
BASOPHILS # BLD AUTO: 0.07 X10(3) UL (ref 0–0.2)
BASOPHILS NFR BLD AUTO: 1.1 %
BILIRUB SERPL-MCNC: 0.6 MG/DL (ref 0.1–2)
BUN BLD-MCNC: 19 MG/DL (ref 7–18)
BUN/CREAT SERPL: 24.1 (ref 10–20)
CALCIUM BLD-MCNC: 9 MG/DL (ref 8.5–10.1)
CHLORIDE SERPL-SCNC: 102 MMOL/L (ref 98–112)
CO2 SERPL-SCNC: 31 MMOL/L (ref 21–32)
CREAT BLD-MCNC: 0.79 MG/DL
CRP SERPL-MCNC: 0.54 MG/DL (ref ?–0.3)
DEPRECATED RDW RBC AUTO: 45.3 FL (ref 35.1–46.3)
EOSINOPHIL # BLD AUTO: 0.38 X10(3) UL (ref 0–0.7)
EOSINOPHIL NFR BLD AUTO: 5.9 %
ERYTHROCYTE [DISTWIDTH] IN BLOOD BY AUTOMATED COUNT: 13.3 % (ref 11–15)
GLOBULIN PLAS-MCNC: 3.7 G/DL (ref 2.8–4.4)
GLUCOSE BLD-MCNC: 96 MG/DL (ref 70–99)
HCT VFR BLD AUTO: 37.7 %
HGB BLD-MCNC: 12.6 G/DL
IMM GRANULOCYTES # BLD AUTO: 0.02 X10(3) UL (ref 0–1)
IMM GRANULOCYTES NFR BLD: 0.3 %
LYMPHOCYTES # BLD AUTO: 1.22 X10(3) UL (ref 1–4)
LYMPHOCYTES NFR BLD AUTO: 19 %
M PROTEIN MFR SERPL ELPH: 6.6 G/DL (ref 6.4–8.2)
MCH RBC QN AUTO: 30.9 PG (ref 26–34)
MCHC RBC AUTO-ENTMCNC: 33.4 G/DL (ref 31–37)
MCV RBC AUTO: 92.4 FL
MONOCYTES # BLD AUTO: 0.81 X10(3) UL (ref 0.1–1)
MONOCYTES NFR BLD AUTO: 12.6 %
NEUTROPHILS # BLD AUTO: 3.92 X10 (3) UL (ref 1.5–7.7)
NEUTROPHILS # BLD AUTO: 3.92 X10(3) UL (ref 1.5–7.7)
NEUTROPHILS NFR BLD AUTO: 61.1 %
OSMOLALITY SERPL CALC.SUM OF ELEC: 286 MOSM/KG (ref 275–295)
PATIENT FASTING Y/N/NP: YES
PLATELET # BLD AUTO: 193 10(3)UL (ref 150–450)
POTASSIUM SERPL-SCNC: 4.1 MMOL/L (ref 3.5–5.1)
RBC # BLD AUTO: 4.08 X10(6)UL
SED RATE-ML: 45 MM/HR
SODIUM SERPL-SCNC: 137 MMOL/L (ref 136–145)
WBC # BLD AUTO: 6.4 X10(3) UL (ref 4–11)

## 2021-01-07 PROCEDURE — 80053 COMPREHEN METABOLIC PANEL: CPT

## 2021-01-07 PROCEDURE — 86140 C-REACTIVE PROTEIN: CPT

## 2021-01-07 PROCEDURE — 85652 RBC SED RATE AUTOMATED: CPT

## 2021-01-07 PROCEDURE — 85025 COMPLETE CBC W/AUTO DIFF WBC: CPT

## 2021-01-08 ENCOUNTER — SNF VISIT (OUTPATIENT)
Dept: INTERNAL MEDICINE CLINIC | Age: 71
End: 2021-01-08

## 2021-01-08 VITALS
OXYGEN SATURATION: 97 % | SYSTOLIC BLOOD PRESSURE: 107 MMHG | WEIGHT: 211 LBS | RESPIRATION RATE: 18 BRPM | TEMPERATURE: 98 F | HEART RATE: 81 BPM | BODY MASS INDEX: 29 KG/M2 | DIASTOLIC BLOOD PRESSURE: 59 MMHG

## 2021-01-08 DIAGNOSIS — Z78.9 IMPAIRED MOBILITY AND ADLS: ICD-10-CM

## 2021-01-08 DIAGNOSIS — J44.9 CHRONIC OBSTRUCTIVE PULMONARY DISEASE, UNSPECIFIED COPD TYPE (HCC): ICD-10-CM

## 2021-01-08 DIAGNOSIS — I10 BENIGN ESSENTIAL HTN: ICD-10-CM

## 2021-01-08 DIAGNOSIS — M86.171 OTHER ACUTE OSTEOMYELITIS OF RIGHT FOOT (HCC): Primary | ICD-10-CM

## 2021-01-08 DIAGNOSIS — I50.42 CHRONIC COMBINED SYSTOLIC AND DIASTOLIC HEART FAILURE (HCC): ICD-10-CM

## 2021-01-08 DIAGNOSIS — Z74.09 IMPAIRED MOBILITY AND ADLS: ICD-10-CM

## 2021-01-08 DIAGNOSIS — I48.0 PAROXYSMAL ATRIAL FIBRILLATION (HCC): ICD-10-CM

## 2021-01-08 PROCEDURE — 99308 SBSQ NF CARE LOW MDM 20: CPT | Performed by: NURSE PRACTITIONER

## 2021-01-08 NOTE — PROGRESS NOTES
Dariel Barrett., 36/1950, 65year old, male    Chief Complaint:  Patient presents with:   Follow - Up: right foot wound, IV antibiotics, osteomyelitis, ADL dysfunction  Lab Results       Subjective:   PMH significant for Parkinson's dz, COPD, KANE (not deferred  RESPIRATORY: clear, no crackles, no dyspnea, no cough, room air  CARDIOVASCULAR: RRR, S1 and S2, no murmurs, no edema  ABDOMEN:  normal active BS+, soft, nondistended; no organomegaly, no masses; nontender, no guarding, no rebound tenderness.    0 - 12 mm/Hr 45 (H)     Assessment and plan:    Acute osteomyelitis of the second MTP, cellulitis and ulcers, severe PAD status post angioplasty and stent of right superficial femoral artery  On IV Ancef for 6 weeks per ID Dr. Aminta Desouza  Weekly CBC, CMP, ESR, rhinitis  Flonase 1 spray each nostril 2 times daily  mucinex D BID     Dizziness  Meclizine 25 mg 3 times daily as needed     BPH  Tamsulosin 0.4 mg daily     Bowel management  MiraLAX 17 g daily  Dulcolax suppository daily as needed        DVT prophylaxi

## 2021-01-12 ENCOUNTER — SNF VISIT (OUTPATIENT)
Dept: INTERNAL MEDICINE CLINIC | Age: 71
End: 2021-01-12

## 2021-01-12 VITALS
RESPIRATION RATE: 18 BRPM | DIASTOLIC BLOOD PRESSURE: 76 MMHG | WEIGHT: 213.19 LBS | TEMPERATURE: 98 F | BODY MASS INDEX: 30 KG/M2 | SYSTOLIC BLOOD PRESSURE: 124 MMHG | HEART RATE: 71 BPM | OXYGEN SATURATION: 96 %

## 2021-01-12 DIAGNOSIS — Z78.9 IMPAIRED MOBILITY AND ADLS: ICD-10-CM

## 2021-01-12 DIAGNOSIS — I48.0 PAROXYSMAL ATRIAL FIBRILLATION (HCC): ICD-10-CM

## 2021-01-12 DIAGNOSIS — R26.81 GAIT INSTABILITY: ICD-10-CM

## 2021-01-12 DIAGNOSIS — I50.42 CHRONIC COMBINED SYSTOLIC AND DIASTOLIC HEART FAILURE (HCC): ICD-10-CM

## 2021-01-12 DIAGNOSIS — Z74.09 IMPAIRED MOBILITY AND ADLS: ICD-10-CM

## 2021-01-12 DIAGNOSIS — M86.171 OTHER ACUTE OSTEOMYELITIS OF RIGHT FOOT (HCC): Primary | ICD-10-CM

## 2021-01-12 PROCEDURE — 99308 SBSQ NF CARE LOW MDM 20: CPT | Performed by: NURSE PRACTITIONER

## 2021-01-12 NOTE — PROGRESS NOTES
Greg Salazar., 36/1950, 65year old, male    Chief Complaint:  Patient presents with:   Follow - Up: right foot wound, IV antibiotics, osteomyelitis, ADL dysfunction  Lab Results: reviewed with patient       Subjective:   PMH significant for Aisha dyspnea, no cough, room air  CARDIOVASCULAR: RRR, S1 and S2, no murmurs, no edema  ABDOMEN:  normal active BS+, soft, nondistended; no organomegaly, no masses; nontender, no guarding, no rebound tenderness.   :no suprapubic distension  LYMPHATIC:no lymphe RATE      0 - 12 mm/Hr 45 (H)     Assessment and plan:    Acute osteomyelitis of the second MTP, cellulitis and ulcers, severe PAD status post angioplasty and stent of right superficial femoral artery  On IV Ancef for 6 weeks per ID Dr. Jenny Husain, monitor     Allergic rhinitis  Flonase 1 spray each nostril 2 times daily  mucinex D BID     Dizziness  Meclizine 25 mg 3 times daily as needed     BPH  Tamsulosin 0.4 mg daily     Bowel management  MiraLAX 17 g daily  Dulcolax suppository daily as needed

## 2021-01-14 ENCOUNTER — NURSE ONLY (OUTPATIENT)
Dept: LAB | Age: 71
End: 2021-01-14
Attending: FAMILY MEDICINE
Payer: MEDICARE

## 2021-01-14 DIAGNOSIS — L40.3 SAPHO SYNDROME (HCC): Primary | ICD-10-CM

## 2021-01-14 DIAGNOSIS — I48.91 ATRIAL FIBRILLATION (HCC): ICD-10-CM

## 2021-01-14 DIAGNOSIS — E56.9 VITAMIN DISEASE: ICD-10-CM

## 2021-01-14 DIAGNOSIS — L70.9 SAPHO SYNDROME (HCC): Primary | ICD-10-CM

## 2021-01-14 DIAGNOSIS — M86.9 SAPHO SYNDROME (HCC): Primary | ICD-10-CM

## 2021-01-14 DIAGNOSIS — M85.80 SAPHO SYNDROME (HCC): Primary | ICD-10-CM

## 2021-01-14 DIAGNOSIS — M65.9 SAPHO SYNDROME (HCC): Primary | ICD-10-CM

## 2021-01-14 DIAGNOSIS — I50.22 CHRONIC SYSTOLIC HEART FAILURE (HCC): ICD-10-CM

## 2021-01-14 LAB
ALBUMIN SERPL-MCNC: 2.9 G/DL (ref 3.4–5)
ALBUMIN/GLOB SERPL: 0.7 {RATIO} (ref 1–2)
ALP LIVER SERPL-CCNC: 87 U/L
ALT SERPL-CCNC: 11 U/L
ANION GAP SERPL CALC-SCNC: 3 MMOL/L (ref 0–18)
AST SERPL-CCNC: 28 U/L (ref 15–37)
BASOPHILS # BLD AUTO: 0.1 X10(3) UL (ref 0–0.2)
BASOPHILS NFR BLD AUTO: 1.6 %
BILIRUB SERPL-MCNC: 0.3 MG/DL (ref 0.1–2)
BUN BLD-MCNC: 28 MG/DL (ref 7–18)
BUN/CREAT SERPL: 31.8 (ref 10–20)
CALCIUM BLD-MCNC: 9.2 MG/DL (ref 8.5–10.1)
CHLORIDE SERPL-SCNC: 103 MMOL/L (ref 98–112)
CO2 SERPL-SCNC: 30 MMOL/L (ref 21–32)
CREAT BLD-MCNC: 0.88 MG/DL
CRP SERPL-MCNC: 0.48 MG/DL (ref ?–0.3)
DEPRECATED RDW RBC AUTO: 46.3 FL (ref 35.1–46.3)
EOSINOPHIL # BLD AUTO: 0.42 X10(3) UL (ref 0–0.7)
EOSINOPHIL NFR BLD AUTO: 6.9 %
ERYTHROCYTE [DISTWIDTH] IN BLOOD BY AUTOMATED COUNT: 13.2 % (ref 11–15)
GLOBULIN PLAS-MCNC: 4.2 G/DL (ref 2.8–4.4)
GLUCOSE BLD-MCNC: 79 MG/DL (ref 70–99)
HCT VFR BLD AUTO: 37.5 %
HGB BLD-MCNC: 12.2 G/DL
IMM GRANULOCYTES # BLD AUTO: 0.02 X10(3) UL (ref 0–1)
IMM GRANULOCYTES NFR BLD: 0.3 %
LYMPHOCYTES # BLD AUTO: 1.29 X10(3) UL (ref 1–4)
LYMPHOCYTES NFR BLD AUTO: 21.3 %
M PROTEIN MFR SERPL ELPH: 7.1 G/DL (ref 6.4–8.2)
MCH RBC QN AUTO: 31.1 PG (ref 26–34)
MCHC RBC AUTO-ENTMCNC: 32.5 G/DL (ref 31–37)
MCV RBC AUTO: 95.7 FL
MONOCYTES # BLD AUTO: 0.92 X10(3) UL (ref 0.1–1)
MONOCYTES NFR BLD AUTO: 15.2 %
NEUTROPHILS # BLD AUTO: 3.32 X10 (3) UL (ref 1.5–7.7)
NEUTROPHILS # BLD AUTO: 3.32 X10(3) UL (ref 1.5–7.7)
NEUTROPHILS NFR BLD AUTO: 54.7 %
OSMOLALITY SERPL CALC.SUM OF ELEC: 286 MOSM/KG (ref 275–295)
PATIENT FASTING Y/N/NP: YES
PLATELET # BLD AUTO: 185 10(3)UL (ref 150–450)
POTASSIUM SERPL-SCNC: 4 MMOL/L (ref 3.5–5.1)
RBC # BLD AUTO: 3.92 X10(6)UL
SED RATE-ML: 37 MM/HR
SODIUM SERPL-SCNC: 136 MMOL/L (ref 136–145)
WBC # BLD AUTO: 6.1 X10(3) UL (ref 4–11)

## 2021-01-14 PROCEDURE — 85025 COMPLETE CBC W/AUTO DIFF WBC: CPT

## 2021-01-14 PROCEDURE — 80053 COMPREHEN METABOLIC PANEL: CPT

## 2021-01-14 PROCEDURE — 85652 RBC SED RATE AUTOMATED: CPT

## 2021-01-14 PROCEDURE — 86140 C-REACTIVE PROTEIN: CPT

## 2021-01-15 ENCOUNTER — SNF DISCHARGE (OUTPATIENT)
Dept: INTERNAL MEDICINE CLINIC | Age: 71
End: 2021-01-15

## 2021-01-15 VITALS
HEART RATE: 76 BPM | SYSTOLIC BLOOD PRESSURE: 128 MMHG | BODY MASS INDEX: 30 KG/M2 | TEMPERATURE: 97 F | DIASTOLIC BLOOD PRESSURE: 74 MMHG | OXYGEN SATURATION: 96 % | RESPIRATION RATE: 18 BRPM | WEIGHT: 213.81 LBS

## 2021-01-15 DIAGNOSIS — I50.42 CHRONIC COMBINED SYSTOLIC AND DIASTOLIC HEART FAILURE (HCC): ICD-10-CM

## 2021-01-15 DIAGNOSIS — Z78.9 IMPAIRED MOBILITY AND ADLS: ICD-10-CM

## 2021-01-15 DIAGNOSIS — Z74.09 IMPAIRED MOBILITY AND ADLS: ICD-10-CM

## 2021-01-15 DIAGNOSIS — J44.9 CHRONIC OBSTRUCTIVE PULMONARY DISEASE, UNSPECIFIED COPD TYPE (HCC): ICD-10-CM

## 2021-01-15 DIAGNOSIS — M86.171 OTHER ACUTE OSTEOMYELITIS OF RIGHT FOOT (HCC): Primary | ICD-10-CM

## 2021-01-15 DIAGNOSIS — I10 BENIGN ESSENTIAL HTN: ICD-10-CM

## 2021-01-15 DIAGNOSIS — I48.0 PAROXYSMAL ATRIAL FIBRILLATION (HCC): ICD-10-CM

## 2021-01-15 PROCEDURE — 1124F ACP DISCUSS-NO DSCNMKR DOCD: CPT | Performed by: NURSE PRACTITIONER

## 2021-01-15 PROCEDURE — 99316 NF DSCHRG MGMT 30 MIN+: CPT | Performed by: NURSE PRACTITIONER

## 2021-01-15 NOTE — PROGRESS NOTES
Gilda Cifuentes, 36/1950, 65year old, male is being discharged from [address] [place] [address] [place] [address] [facility]    Date of Admission: 12/9/2020    Date of Discharge:1/17/2021                                 Admitting Diagnoses: tender, FROM  BREAST: deferred  RESPIRATORY: clear, no crackles, no dyspnea, no cough, room air  CARDIOVASCULAR: RRR, S1 and S2, no murmurs, no edema  ABDOMEN:  normal active BS+, soft, nondistended; no organomegaly, no masses; nontender, no guarding, no r BILT 0.3 01/14/2021    TP 7.1 01/14/2021    ALB 2.9 (L) 01/14/2021    GLOBULIN 4.2 01/14/2021    AGRATIO 1.6 07/12/2014     01/14/2021    K 4.0 01/14/2021     01/14/2021    CO2 30.0 01/14/2021     Component      Latest Ref Rng & Units 1/14/2021 times daily  A1c was 6.3  accucheck daily  Fasting sugars      Major depression in partial remission  No suicidal or homicidal ideation  Admits to missing his spouse whom he lost  Bupropion HCl 150 mg daily  Sertraline 50 mg daily  Lunesta 1 mg p.o.

## 2021-01-28 PROBLEM — F32.4 MAJOR DEPRESSIVE DISORDER IN PARTIAL REMISSION, UNSPECIFIED WHETHER RECURRENT (HCC): Status: ACTIVE | Noted: 2021-01-28

## 2021-01-28 PROBLEM — G63 POLYNEUROPATHY IN DISEASES CLASSIFIED ELSEWHERE (HCC): Status: ACTIVE | Noted: 2021-01-28

## 2021-01-28 PROBLEM — G20 PARKINSON'S DISEASE (HCC): Status: RESOLVED | Noted: 2020-05-18 | Resolved: 2021-01-28

## 2021-01-28 PROBLEM — R73.03 PRE-DIABETES: Status: ACTIVE | Noted: 2021-01-28

## 2021-01-28 PROBLEM — G20.A1 PARKINSON'S DISEASE: Status: RESOLVED | Noted: 2020-05-18 | Resolved: 2021-01-28

## 2021-02-12 ENCOUNTER — TELEPHONE (OUTPATIENT)
Dept: INTERNAL MEDICINE CLINIC | Age: 71
End: 2021-02-12

## 2021-02-12 NOTE — TELEPHONE ENCOUNTER
Sheilla Osgood was discharged from subacute rehab at the [facility] on         Sheilla Osgood called me today upset that he is having increased pain in his low back and legs, increased numbness, and incontinence. He is taking 4 norco 10s to get through the pain.     He has an a

## 2021-03-22 PROBLEM — M65.342 TRIGGER RING FINGER OF LEFT HAND: Status: ACTIVE | Noted: 2021-03-22

## 2021-03-22 PROBLEM — M65.332 TRIGGER MIDDLE FINGER OF LEFT HAND: Status: ACTIVE | Noted: 2021-03-22

## 2021-04-05 ENCOUNTER — LAB ENCOUNTER (OUTPATIENT)
Dept: LAB | Age: 71
End: 2021-04-05
Attending: ORTHOPAEDIC SURGERY
Payer: MEDICARE

## 2021-04-05 DIAGNOSIS — M48.00 SPINAL STENOSIS: ICD-10-CM

## 2021-04-13 DIAGNOSIS — Z01.812 PRE-PROCEDURE LAB EXAM: Primary | ICD-10-CM

## 2021-04-16 RX ORDER — SPIRONOLACTONE 25 MG/1
25 TABLET ORAL DAILY
COMMUNITY

## 2021-04-16 RX ORDER — ALPRAZOLAM 0.5 MG/1
0.5 TABLET ORAL DAILY PRN
COMMUNITY
Start: 2020-02-25

## 2021-04-16 RX ORDER — HYDROCORTISONE 25 MG/G
1 CREAM TOPICAL 2 TIMES DAILY PRN
COMMUNITY

## 2021-04-16 RX ORDER — POLYETHYLENE GLYCOL 3350 17 G/17G
17 POWDER, FOR SOLUTION ORAL DAILY PRN
COMMUNITY

## 2021-04-16 RX ORDER — BISACODYL 10 MG
10 SUPPOSITORY, RECTAL RECTAL DAILY PRN
COMMUNITY

## 2021-04-16 RX ORDER — ESZOPICLONE 1 MG/1
1 TABLET, FILM COATED ORAL
COMMUNITY

## 2021-04-16 RX ORDER — ATORVASTATIN CALCIUM 40 MG/1
40 TABLET, FILM COATED ORAL NIGHTLY
COMMUNITY
Start: 2020-01-08

## 2021-04-16 RX ORDER — DILTIAZEM HYDROCHLORIDE 120 MG/1
120 TABLET, FILM COATED ORAL DAILY
COMMUNITY

## 2021-04-16 RX ORDER — TAMSULOSIN HYDROCHLORIDE 0.4 MG/1
CAPSULE ORAL
COMMUNITY
Start: 2019-11-19

## 2021-04-16 RX ORDER — GABAPENTIN 300 MG/1
800 CAPSULE ORAL 4 TIMES DAILY
COMMUNITY
Start: 2019-09-26

## 2021-04-16 RX ORDER — BUPROPION HYDROCHLORIDE 150 MG/1
TABLET ORAL
COMMUNITY
Start: 2020-01-28

## 2021-04-16 RX ORDER — CLOPIDOGREL BISULFATE 75 MG/1
75 TABLET ORAL DAILY
COMMUNITY
Start: 2019-06-07

## 2021-04-16 RX ORDER — PANTOPRAZOLE SODIUM 40 MG/1
40 TABLET, DELAYED RELEASE ORAL DAILY
COMMUNITY

## 2021-04-16 RX ORDER — HYDROCODONE BITARTRATE AND ACETAMINOPHEN 10; 325 MG/1; MG/1
1 TABLET ORAL EVERY 8 HOURS PRN
COMMUNITY
Start: 2020-02-17

## 2021-04-16 RX ORDER — OXYBUTYNIN CHLORIDE 10 MG/1
10 TABLET, EXTENDED RELEASE ORAL DAILY
COMMUNITY

## 2021-04-16 RX ORDER — FLUTICASONE PROPIONATE 50 MCG
2 SPRAY, SUSPENSION (ML) NASAL DAILY PRN
COMMUNITY

## 2021-04-17 ENCOUNTER — LAB SERVICES (OUTPATIENT)
Dept: LAB | Age: 71
End: 2021-04-17

## 2021-04-17 DIAGNOSIS — Z01.812 PRE-PROCEDURE LAB EXAM: ICD-10-CM

## 2021-04-17 PROCEDURE — U0005 INFEC AGEN DETEC AMPLI PROBE: HCPCS | Performed by: CLINICAL MEDICAL LABORATORY

## 2021-04-17 PROCEDURE — U0003 INFECTIOUS AGENT DETECTION BY NUCLEIC ACID (DNA OR RNA); SEVERE ACUTE RESPIRATORY SYNDROME CORONAVIRUS 2 (SARS-COV-2) (CORONAVIRUS DISEASE [COVID-19]), AMPLIFIED PROBE TECHNIQUE, MAKING USE OF HIGH THROUGHPUT TECHNOLOGIES AS DESCRIBED BY CMS-2020-01-R: HCPCS | Performed by: CLINICAL MEDICAL LABORATORY

## 2021-04-18 LAB
SARS-COV-2 RNA RESP QL NAA+PROBE: NOT DETECTED
SERVICE CMNT-IMP: NORMAL
SERVICE CMNT-IMP: NORMAL

## 2021-04-19 ENCOUNTER — HOSPITAL ENCOUNTER (OUTPATIENT)
Age: 71
Discharge: HOME | End: 2021-04-19
Attending: INTERNAL MEDICINE | Admitting: INTERNAL MEDICINE

## 2021-04-19 VITALS
RESPIRATION RATE: 16 BRPM | BODY MASS INDEX: 27.58 KG/M2 | WEIGHT: 208.11 LBS | DIASTOLIC BLOOD PRESSURE: 74 MMHG | OXYGEN SATURATION: 94 % | HEART RATE: 87 BPM | SYSTOLIC BLOOD PRESSURE: 120 MMHG | TEMPERATURE: 96.3 F | HEIGHT: 73 IN

## 2021-04-19 DIAGNOSIS — I25.10 CORONARY ARTERY DISEASE INVOLVING NATIVE CORONARY ARTERY OF NATIVE HEART WITHOUT ANGINA PECTORIS: ICD-10-CM

## 2021-04-19 LAB
ANION GAP SERPL CALC-SCNC: 9 MMOL/L (ref 10–20)
ATRIAL RATE (BPM): 67
BUN SERPL-MCNC: 21 MG/DL (ref 6–20)
BUN/CREAT SERPL: 19 (ref 7–25)
CALCIUM SERPL-MCNC: 9.2 MG/DL (ref 8.4–10.2)
CHLORIDE SERPL-SCNC: 103 MMOL/L (ref 98–107)
CO2 SERPL-SCNC: 28 MMOL/L (ref 21–32)
CREAT SERPL-MCNC: 1.13 MG/DL (ref 0.67–1.17)
DEPRECATED RDW RBC: 45.5 FL (ref 39–50)
ERYTHROCYTE [DISTWIDTH] IN BLOOD: 13.6 % (ref 11–15)
FASTING DURATION TIME PATIENT: ABNORMAL H
GFR SERPLBLD BASED ON 1.73 SQ M-ARVRAT: 64 ML/MIN/1.73M2
GLUCOSE SERPL-MCNC: 118 MG/DL (ref 65–99)
HCT VFR BLD CALC: 43 % (ref 39–51)
HGB BLD-MCNC: 14.3 G/DL (ref 13–17)
MCH RBC QN AUTO: 30.6 PG (ref 26–34)
MCHC RBC AUTO-ENTMCNC: 33.3 G/DL (ref 32–36.5)
MCV RBC AUTO: 91.9 FL (ref 78–100)
NRBC BLD MANUAL-RTO: 0 /100 WBC
P AXIS (DEGREES): 33
PLATELET # BLD AUTO: 258 K/MCL (ref 140–450)
POTASSIUM SERPL-SCNC: 3.6 MMOL/L (ref 3.4–5.1)
PR-INTERVAL (MSEC): 294
QRS-INTERVAL (MSEC): 94
QT-INTERVAL (MSEC): 408
QTC: 431
R AXIS (DEGREES): 43
RBC # BLD: 4.68 MIL/MCL (ref 4.5–5.9)
REPORT TEXT: NORMAL
SODIUM SERPL-SCNC: 136 MMOL/L (ref 135–145)
T AXIS (DEGREES): 20
VENTRICULAR RATE EKG/MIN (BPM): 67
WBC # BLD: 5.9 K/MCL (ref 4.2–11)

## 2021-04-19 PROCEDURE — 99152 MOD SED SAME PHYS/QHP 5/>YRS: CPT | Performed by: INTERNAL MEDICINE

## 2021-04-19 PROCEDURE — 36415 COLL VENOUS BLD VENIPUNCTURE: CPT | Performed by: INTERNAL MEDICINE

## 2021-04-19 PROCEDURE — 10002800 HB RX 250 W HCPCS: Performed by: INTERNAL MEDICINE

## 2021-04-19 PROCEDURE — C1894 INTRO/SHEATH, NON-LASER: HCPCS | Performed by: INTERNAL MEDICINE

## 2021-04-19 PROCEDURE — 10004651 HB RX, NO CHARGE ITEM: Performed by: INTERNAL MEDICINE

## 2021-04-19 PROCEDURE — C1769 GUIDE WIRE: HCPCS | Performed by: INTERNAL MEDICINE

## 2021-04-19 PROCEDURE — 13000001 HB PHASE II RECOVERY EA 30 MINUTES: Performed by: INTERNAL MEDICINE

## 2021-04-19 PROCEDURE — 93005 ELECTROCARDIOGRAM TRACING: CPT | Performed by: INTERNAL MEDICINE

## 2021-04-19 PROCEDURE — 10006023 HB SUPPLY 272: Performed by: INTERNAL MEDICINE

## 2021-04-19 PROCEDURE — 93459 L HRT ART/GRFT ANGIO: CPT | Performed by: INTERNAL MEDICINE

## 2021-04-19 PROCEDURE — 85027 COMPLETE CBC AUTOMATED: CPT | Performed by: INTERNAL MEDICINE

## 2021-04-19 PROCEDURE — 10002807 HB RX 258: Performed by: INTERNAL MEDICINE

## 2021-04-19 PROCEDURE — 10002801 HB RX 250 W/O HCPCS: Performed by: INTERNAL MEDICINE

## 2021-04-19 PROCEDURE — 80048 BASIC METABOLIC PNL TOTAL CA: CPT | Performed by: INTERNAL MEDICINE

## 2021-04-19 PROCEDURE — 10002805 HB CONTRAST AGENT: Performed by: INTERNAL MEDICINE

## 2021-04-19 PROCEDURE — 10002803 HB RX 637: Performed by: INTERNAL MEDICINE

## 2021-04-19 PROCEDURE — C1887 CATHETER, GUIDING: HCPCS | Performed by: INTERNAL MEDICINE

## 2021-04-19 PROCEDURE — 99153 MOD SED SAME PHYS/QHP EA: CPT | Performed by: INTERNAL MEDICINE

## 2021-04-19 RX ORDER — SODIUM CHLORIDE 9 MG/ML
INJECTION, SOLUTION INTRAVENOUS
Status: DISCONTINUED
Start: 2021-04-19 | End: 2021-04-19 | Stop reason: HOSPADM

## 2021-04-19 RX ORDER — ASPIRIN 325 MG
325 TABLET ORAL ONCE
Status: COMPLETED | OUTPATIENT
Start: 2021-04-19 | End: 2021-04-19

## 2021-04-19 RX ORDER — HYDRALAZINE HYDROCHLORIDE 20 MG/ML
10 INJECTION INTRAMUSCULAR; INTRAVENOUS EVERY 4 HOURS PRN
Status: DISCONTINUED | OUTPATIENT
Start: 2021-04-19 | End: 2021-04-19 | Stop reason: HOSPADM

## 2021-04-19 RX ORDER — CLOPIDOGREL BISULFATE 75 MG/1
TABLET ORAL PRN
Status: DISCONTINUED | OUTPATIENT
Start: 2021-04-19 | End: 2021-04-19 | Stop reason: HOSPADM

## 2021-04-19 RX ORDER — SODIUM CHLORIDE 9 MG/ML
INJECTION, SOLUTION INTRAVENOUS CONTINUOUS
Status: DISCONTINUED | OUTPATIENT
Start: 2021-04-19 | End: 2021-04-19 | Stop reason: HOSPADM

## 2021-04-19 RX ORDER — 0.9 % SODIUM CHLORIDE 0.9 %
2 VIAL (ML) INJECTION EVERY 12 HOURS SCHEDULED
Status: DISCONTINUED | OUTPATIENT
Start: 2021-04-19 | End: 2021-04-19 | Stop reason: HOSPADM

## 2021-04-19 RX ORDER — HEPARIN SODIUM 1000 [USP'U]/ML
INJECTION, SOLUTION INTRAVENOUS; SUBCUTANEOUS PRN
Status: DISCONTINUED | OUTPATIENT
Start: 2021-04-19 | End: 2021-04-19 | Stop reason: HOSPADM

## 2021-04-19 RX ORDER — VERAPAMIL HYDROCHLORIDE 2.5 MG/ML
INJECTION, SOLUTION INTRAVENOUS PRN
Status: DISCONTINUED | OUTPATIENT
Start: 2021-04-19 | End: 2021-04-19 | Stop reason: HOSPADM

## 2021-04-19 RX ORDER — LIDOCAINE HYDROCHLORIDE 20 MG/ML
INJECTION, SOLUTION EPIDURAL; INFILTRATION; INTRACAUDAL; PERINEURAL PRN
Status: DISCONTINUED | OUTPATIENT
Start: 2021-04-19 | End: 2021-04-19 | Stop reason: HOSPADM

## 2021-04-19 RX ORDER — MIDAZOLAM HYDROCHLORIDE 1 MG/ML
INJECTION, SOLUTION INTRAMUSCULAR; INTRAVENOUS PRN
Status: DISCONTINUED | OUTPATIENT
Start: 2021-04-19 | End: 2021-04-19 | Stop reason: HOSPADM

## 2021-04-19 RX ORDER — CLONIDINE HYDROCHLORIDE 0.1 MG/1
0.1 TABLET ORAL EVERY 4 HOURS PRN
Status: DISCONTINUED | OUTPATIENT
Start: 2021-04-19 | End: 2021-04-19 | Stop reason: HOSPADM

## 2021-04-19 RX ADMIN — ASPIRIN 325 MG: 325 TABLET ORAL at 07:28

## 2021-04-19 RX ADMIN — SODIUM CHLORIDE: 9 INJECTION, SOLUTION INTRAVENOUS at 10:30

## 2021-04-19 RX ADMIN — SODIUM CHLORIDE: 9 INJECTION, SOLUTION INTRAVENOUS at 07:29

## 2021-04-19 ASSESSMENT — PAIN SCALES - GENERAL
PAINLEVEL_OUTOF10: 0

## 2021-04-19 ASSESSMENT — NEW YORK HEART ASSOCIATION (NYHA) CLASSIFICATION: NYHA FUNCTIONAL CLASS: NOT CLASS IV

## 2021-05-24 ENCOUNTER — OFFICE VISIT (OUTPATIENT)
Dept: WOUND CARE | Facility: HOSPITAL | Age: 71
End: 2021-05-24
Attending: PODIATRIST
Payer: MEDICARE

## 2021-05-24 VITALS
BODY MASS INDEX: 29.4 KG/M2 | DIASTOLIC BLOOD PRESSURE: 75 MMHG | SYSTOLIC BLOOD PRESSURE: 153 MMHG | RESPIRATION RATE: 16 BRPM | TEMPERATURE: 98 F | HEIGHT: 71 IN | HEART RATE: 76 BPM | WEIGHT: 210 LBS

## 2021-05-24 DIAGNOSIS — L97.512 RIGHT FOOT ULCER, WITH FAT LAYER EXPOSED (HCC): Primary | ICD-10-CM

## 2021-05-24 DIAGNOSIS — G60.3 IDIOPATHIC PROGRESSIVE POLYNEUROPATHY: ICD-10-CM

## 2021-05-24 PROCEDURE — 99214 OFFICE O/P EST MOD 30 MIN: CPT

## 2021-05-24 PROCEDURE — 11042 DBRDMT SUBQ TIS 1ST 20SQCM/<: CPT | Performed by: PODIATRIST

## 2021-05-24 NOTE — PROGRESS NOTES
Patient ID: Carol Rodriguez is a 66year old male. Debridement   Wound 05/24/21 #3 Right;Plantar    Consent obtained? verbal  Consent given by: patient  Risks discussed?  procedural risks discussed  Performed by: provider  Debridement type: surgical  Alonso Raya

## 2021-05-24 NOTE — PROGRESS NOTES
Florentino Damian is a 66year old male presents for right foot ulcer. Did not get AFO yet. Completed course of IV abx  Review of Systems   All other systems reviewed and are negative.       Objective   Objective  Physical Exam  Vitals and nursi Length (cm) 0.6 cm 05/24/21 1324   Wound Width (cm) 0.3 cm 05/24/21 1324   Wound Surface Area (cm^2) 0.18 cm^2 05/24/21 1324   Wound Depth (cm) 0 cm 05/24/21 1324   Wound Volume (cm^3) 0 cm^3 05/24/21 1324   Wound Bed Granulation (%) 50 % 05/24/21 1324   W partial remission, unspecified whether recurrent ([facility]     Pre-diabetes     Polyneuropathy in diseases classified elsewhere [facility])     Trigger middle finger of left hand     Trigger ring finger of left hand      Plan  Orders  Orders Placed This Encounter

## 2021-05-24 NOTE — PROGRESS NOTES
Weekly Wound Education Note    Teaching Provided To: Patient  Training Topics: Cleasing and general instructions;Dressing; Discharge instructions; Off-loading              Notes: Beata and ready. Use off-loading shoe.

## 2021-06-07 ENCOUNTER — OFFICE VISIT (OUTPATIENT)
Dept: WOUND CARE | Facility: HOSPITAL | Age: 71
End: 2021-06-07
Attending: PODIATRIST
Payer: MEDICARE

## 2021-06-07 VITALS
DIASTOLIC BLOOD PRESSURE: 78 MMHG | TEMPERATURE: 98 F | RESPIRATION RATE: 16 BRPM | HEART RATE: 81 BPM | SYSTOLIC BLOOD PRESSURE: 156 MMHG

## 2021-06-07 DIAGNOSIS — L97.512 RIGHT FOOT ULCER, WITH FAT LAYER EXPOSED (HCC): Primary | ICD-10-CM

## 2021-06-07 DIAGNOSIS — G60.3 IDIOPATHIC PROGRESSIVE POLYNEUROPATHY: ICD-10-CM

## 2021-06-07 DIAGNOSIS — L97.521 CHRONIC TOE ULCER, LEFT, LIMITED TO BREAKDOWN OF SKIN (HCC): ICD-10-CM

## 2021-06-07 PROCEDURE — 99213 OFFICE O/P EST LOW 20 MIN: CPT

## 2021-06-07 PROCEDURE — 11042 DBRDMT SUBQ TIS 1ST 20SQCM/<: CPT | Performed by: PODIATRIST

## 2021-06-07 NOTE — PROGRESS NOTES
Weekly Wound Education Note    Teaching Provided To: Patient  Training Topics: Discharge instructions;Dressing;Cleasing and general instructions  Training Method: Explain/Verbal;Demonstration  Training Response: Patient responds and understands        Note

## 2021-06-07 NOTE — PROGRESS NOTES
Patient ID: Nina Franks is a 66year old male. Debridement   Wound 05/24/21 #3 Right;Plantar    Consent obtained? verbal  Consent given by: patient  Risks discussed?  procedural risks discussed  Time out called at 6/7/2021 3:35 PM  Immediately prior

## 2021-06-07 NOTE — PROGRESS NOTES
Subjective   Blayne Valero is a 66year old male. Patient presents with:  Wound Care: Patient is here for a follow up of left  fifth toe and right plantar foot wounds. Patient presents for chronic bilateral foot ulcers. Denies drainage.  Got new AF Granulation (%) 100 % 06/07/21 1523   Wound Bed Slough (%) 50 % 05/24/21 1324   Margins Well-defined edges 06/07/21 1523   Non-staged Wound Description Full thickness 06/07/21 1523         Vital Signs   06/07/21  1517   BP: 156/78   Pulse: 81   Resp: 16 of left hand     Trigger ring finger of left hand      Plan  Orders  Orders Placed This Encounter      Wound Care Off-Loading      OP Wound Dressing  Debridement   Wound 05/24/21 #3 Right;Plantar    Consent obtained? verbal  Consent given by: patient  Risk

## 2021-06-21 ENCOUNTER — OFFICE VISIT (OUTPATIENT)
Dept: WOUND CARE | Facility: HOSPITAL | Age: 71
End: 2021-06-21
Attending: PODIATRIST
Payer: MEDICARE

## 2021-06-21 VITALS
DIASTOLIC BLOOD PRESSURE: 78 MMHG | TEMPERATURE: 98 F | HEART RATE: 64 BPM | RESPIRATION RATE: 14 BRPM | SYSTOLIC BLOOD PRESSURE: 145 MMHG

## 2021-06-21 DIAGNOSIS — L97.512 RIGHT FOOT ULCER, WITH FAT LAYER EXPOSED (HCC): ICD-10-CM

## 2021-06-21 DIAGNOSIS — L03.032 CELLULITIS OF FIFTH TOE OF LEFT FOOT: Primary | ICD-10-CM

## 2021-06-21 DIAGNOSIS — L97.522 CHRONIC TOE ULCER, LEFT, WITH FAT LAYER EXPOSED (HCC): ICD-10-CM

## 2021-06-21 DIAGNOSIS — G60.3 IDIOPATHIC PROGRESSIVE POLYNEUROPATHY: ICD-10-CM

## 2021-06-21 PROCEDURE — 99214 OFFICE O/P EST MOD 30 MIN: CPT

## 2021-06-21 PROCEDURE — 11042 DBRDMT SUBQ TIS 1ST 20SQCM/<: CPT | Performed by: PODIATRIST

## 2021-06-21 RX ORDER — CLINDAMYCIN HYDROCHLORIDE 300 MG/1
300 CAPSULE ORAL 3 TIMES DAILY
Qty: 30 CAPSULE | Refills: 0 | Status: SHIPPED | OUTPATIENT
Start: 2021-06-21 | End: 2021-07-01

## 2021-06-21 NOTE — PROGRESS NOTES
Weekly Wound Education Note    Teaching Provided To: Patient  Training Topics: Discharge instructions;Dressing;Cleasing and general instructions  Training Method: Demonstration;Explain/Verbal  Training Response: Patient responds and understands        No

## 2021-06-21 NOTE — PROGRESS NOTES
Patient ID: Nina Franks is a 66year old male. Debridement   Wound 05/24/21 #3 Right;Plantar    Consent obtained? verbal  Consent given by: patient  Risks discussed?  procedural risks discussed  Time out called at 6/21/2021 2:07 PM  Immediately prio

## 2021-06-21 NOTE — PROGRESS NOTES
Subjective   Isael Osorio is a 66year old male. Patient presents for follow-up bilateral foot wounds. States that the left fifth toe appears to be red. Denies increased drainage.      Objective   Objective  Physical Exam      Wound Assessment  Wou Routine Completed Berta Wetzel DPM   06/07/21 1524 WOUND CARE OFFLOADING Routine Completed Nilda PATEL DPM     - OffLoading shoe wear:    Darco shoe   06/07/21 1524 OP WOUND DRESSING Routine Completed Ahmed, Cherre Seip, DPM     - Cleansing:    Cleanse wit ointment     - Frequency:    Change dressing daily and PRN   06/21/21 1429 WOUND CARE OFFLOADING Routine Completed Pablo Cox DPM     - OffLoading shoe wear:    Darco shoe     - Additional Offloading Shoe Wear:    Heel WB to right foot   06/07/21 2718 deficiency anemia     Chronic diastolic heart failure (HCC)     Cardiomyopathy (HCC)     Combined systolic and diastolic heart failure (HCC)     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

## 2021-06-28 ENCOUNTER — OFFICE VISIT (OUTPATIENT)
Dept: WOUND CARE | Facility: HOSPITAL | Age: 71
End: 2021-06-28
Attending: PODIATRIST
Payer: MEDICARE

## 2021-06-28 VITALS
TEMPERATURE: 98 F | DIASTOLIC BLOOD PRESSURE: 74 MMHG | RESPIRATION RATE: 14 BRPM | SYSTOLIC BLOOD PRESSURE: 138 MMHG | HEART RATE: 89 BPM

## 2021-06-28 DIAGNOSIS — L97.512 RIGHT FOOT ULCER, WITH FAT LAYER EXPOSED (HCC): ICD-10-CM

## 2021-06-28 DIAGNOSIS — G60.3 IDIOPATHIC PROGRESSIVE POLYNEUROPATHY: ICD-10-CM

## 2021-06-28 DIAGNOSIS — L03.032 CELLULITIS OF FIFTH TOE OF LEFT FOOT: Primary | ICD-10-CM

## 2021-06-28 DIAGNOSIS — L97.522 CHRONIC TOE ULCER, LEFT, WITH FAT LAYER EXPOSED (HCC): ICD-10-CM

## 2021-06-28 PROCEDURE — 99214 OFFICE O/P EST MOD 30 MIN: CPT

## 2021-06-28 NOTE — PROGRESS NOTES
Subjective   Ludivina Cervantes is a 66year old male. Patient presents with:  Wound Care: Patients is here for a follow up. Patients has no complain   Denies side effects with PO abx. States redness decreasing.     Objective   Objective  Physical Exam (cm^3) 0.02 cm^3 06/28/21 1317   Shoe Type Tomás post op shoe 06/21/21 1348   Wound Bed Granulation (%) 100 % 06/21/21 1348   Wound Bed Slough (%) 100 % 06/28/21 1317   Undermining 0.4 cm 06/21/21 1348   Undermining Start Clock Position of Wound 12 06/21/2 Primary osteoarthritis of right knee     Paroxysmal atrial fibrillation (HCC)     Hyperglycemia     Cellulitis of right foot     Major depressive disorder in partial remission, unspecified whether recurrent (HCC)     Pre-diabetes     Polyneuropathy in dise

## 2021-06-28 NOTE — PROGRESS NOTES
Weekly Wound Education Note    Teaching Provided To: Patient  Training Topics: Dressing;Cleasing and general instructions; Discharge instructions  Training Method: Demonstration;Explain/Verbal  Training Response: Patient responds and understands        Note

## 2021-06-28 NOTE — PROGRESS NOTES
Patient ID: Zakiya Piedra is a 66year old male. Cellular tissue product application    Date/Time: 6/28/2021 1:24 PM  Performed by: Henrry Saez DPM  Authorized by:  Henrry Saez DPM   Associated Wounds:   Wound 05/24/21 #3 Right;Plantar    Consen

## 2021-07-01 PROBLEM — F11.90 OPIATE USE: Status: ACTIVE | Noted: 2021-07-01

## 2021-07-12 ENCOUNTER — OFFICE VISIT (OUTPATIENT)
Dept: WOUND CARE | Facility: HOSPITAL | Age: 71
End: 2021-07-12
Attending: PODIATRIST
Payer: MEDICARE

## 2021-07-12 VITALS
HEART RATE: 81 BPM | DIASTOLIC BLOOD PRESSURE: 81 MMHG | RESPIRATION RATE: 18 BRPM | TEMPERATURE: 97 F | SYSTOLIC BLOOD PRESSURE: 123 MMHG

## 2021-07-12 DIAGNOSIS — G60.3 IDIOPATHIC PROGRESSIVE POLYNEUROPATHY: ICD-10-CM

## 2021-07-12 DIAGNOSIS — L97.522 CHRONIC TOE ULCER, LEFT, WITH FAT LAYER EXPOSED (HCC): ICD-10-CM

## 2021-07-12 DIAGNOSIS — L97.512 RIGHT FOOT ULCER, WITH FAT LAYER EXPOSED (HCC): Primary | ICD-10-CM

## 2021-07-12 PROCEDURE — 99214 OFFICE O/P EST MOD 30 MIN: CPT

## 2021-07-12 NOTE — PROGRESS NOTES
Subjective   Petr Coats is a 66year old male. Patient presents with:  Wound Care: Pt here for follow up. Pt states no new concerns or pain.     Objective   Objective  Physical Exam      Wound Assessment  Wound 05/24/21 #3 Right;Plantar (Active) 07/12/21 1425   Undermining 12-12 deepest 0.3 07/12/21 1425   Pressure Injury Stage 3 06/28/21 1317   Shoe Type Tomás post op shoe 06/21/21 1348         Vital Signs   07/12/21  1420   BP: 123/81   Pulse: 81   Resp: 18   Temp: 97.2 °F (36.2 °C)       Allerg left hand     Opiate use      Plan  Orders  Orders Placed This Encounter      OP Wound Dressing      Wound Care Off-Loading    Bilateral the wounds continue to be chronic and not healing well  No significant change in size from previous visits.   Bilateral

## 2021-07-12 NOTE — PROGRESS NOTES
Weekly Wound Education Note    Teaching Provided To: Patient  Training Topics: Cleasing and general instructions;Dressing; Discharge instructions  Training Method: Demonstration;Explain/Verbal  Training Response: Patient responds and understands        Note

## 2021-07-12 NOTE — PROGRESS NOTES
Patient ID: Erica Gray is a 66year old male. Cellular tissue product application    Date/Time: 7/12/2021 2:46 PM  Performed by: Ilda Hinkle DPM  Authorized by:  Ilda Hinkle DPM   Associated Wounds:   Wound 05/24/21 #3 Right;Plantar    Consen

## 2021-07-26 ENCOUNTER — TELEPHONE (OUTPATIENT)
Dept: WOUND CARE | Facility: HOSPITAL | Age: 71
End: 2021-07-26

## 2021-07-26 ENCOUNTER — APPOINTMENT (OUTPATIENT)
Dept: WOUND CARE | Facility: HOSPITAL | Age: 71
End: 2021-07-26
Attending: PODIATRIST
Payer: MEDICARE

## 2021-08-02 ENCOUNTER — OFFICE VISIT (OUTPATIENT)
Dept: WOUND CARE | Facility: HOSPITAL | Age: 71
End: 2021-08-02
Attending: PODIATRIST
Payer: MEDICARE

## 2021-08-02 VITALS
TEMPERATURE: 98 F | SYSTOLIC BLOOD PRESSURE: 137 MMHG | DIASTOLIC BLOOD PRESSURE: 73 MMHG | RESPIRATION RATE: 16 BRPM | HEART RATE: 75 BPM

## 2021-08-02 DIAGNOSIS — L97.512 RIGHT FOOT ULCER, WITH FAT LAYER EXPOSED (HCC): Primary | ICD-10-CM

## 2021-08-02 DIAGNOSIS — L97.522 CHRONIC TOE ULCER, LEFT, WITH FAT LAYER EXPOSED (HCC): ICD-10-CM

## 2021-08-02 DIAGNOSIS — G60.3 IDIOPATHIC PROGRESSIVE POLYNEUROPATHY: ICD-10-CM

## 2021-08-02 DIAGNOSIS — M21.372 BILATERAL FOOT-DROP: ICD-10-CM

## 2021-08-02 DIAGNOSIS — M21.371 BILATERAL FOOT-DROP: ICD-10-CM

## 2021-08-02 PROCEDURE — 99214 OFFICE O/P EST MOD 30 MIN: CPT

## 2021-08-02 PROCEDURE — 11042 DBRDMT SUBQ TIS 1ST 20SQCM/<: CPT | Performed by: PODIATRIST

## 2021-08-02 NOTE — PROGRESS NOTES
Subjective   Fabrizio Hays is a 66year old male. Patient presents with:  Wound Care: Pt here for follow up. States he wants to know if the x-ray results have been reviewed and if he'll need surgery.       Objective   Objective  Physical Exam      Wou 1323   Wound Bed Granulation (%) 100 % 08/02/21 1323   Wound Bed Slough (%) 100 % 06/28/21 1317   Wound Odor None 08/02/21 1323   Undermining 12-12 deepest 0.3 07/12/21 1425   Pressure Injury Stage 3 06/28/21 1317   Shoe Type Tomás post op shoe 06/21/21 13 recurrent ([address]     Pre-diabetes     Polyneuropathy in diseases classified elsewhere [facility])     Trigger middle finger of left hand     Trigger ring finger of left hand     Opiate use      Plan  Orders  Orders Placed This Encounter      Debridement      OP W Return in about 2 weeks (around 8/16/2021).

## 2021-08-02 NOTE — PROGRESS NOTES
Patient ID: Petr Coats is a 66year old male. Debridement   Consent obtained? verbal  Consent given by: patient  Risks discussed?  procedural risks discussed  Time out called at 8/2/2021 1:49 PM  Immediately prior to the procedure a time out was ca

## 2021-08-04 ENCOUNTER — TELEPHONE (OUTPATIENT)
Dept: WOUND CARE | Facility: HOSPITAL | Age: 71
End: 2021-08-04

## 2021-08-04 NOTE — TELEPHONE ENCOUNTER
Returned call to patient who LVM asking if the provider wanted his eliquis and plavix discontinued before surgery Monday. Spoke with the provider regarding this and informed the patient he said there is no need to discontinue either medication at this time.

## 2021-08-06 ENCOUNTER — ANESTHESIA EVENT (OUTPATIENT)
Dept: SURGERY | Facility: HOSPITAL | Age: 71
End: 2021-08-06
Payer: MEDICARE

## 2021-08-06 RX ORDER — MULTIVIT-MIN/IRON FUM/FOLIC AC 7.5 MG-4
1 TABLET ORAL DAILY
COMMUNITY

## 2021-08-09 ENCOUNTER — APPOINTMENT (OUTPATIENT)
Dept: GENERAL RADIOLOGY | Facility: HOSPITAL | Age: 71
End: 2021-08-09
Attending: PODIATRIST
Payer: MEDICARE

## 2021-08-09 ENCOUNTER — HOSPITAL ENCOUNTER (OUTPATIENT)
Facility: HOSPITAL | Age: 71
Setting detail: HOSPITAL OUTPATIENT SURGERY
Discharge: HOME | End: 2021-08-09
Attending: PODIATRIST | Admitting: PODIATRIST
Payer: MEDICARE

## 2021-08-09 ENCOUNTER — ANESTHESIA (OUTPATIENT)
Dept: SURGERY | Facility: HOSPITAL | Age: 71
End: 2021-08-09
Payer: MEDICARE

## 2021-08-09 VITALS
TEMPERATURE: 98 F | DIASTOLIC BLOOD PRESSURE: 62 MMHG | WEIGHT: 216.69 LBS | HEART RATE: 76 BPM | BODY MASS INDEX: 30.34 KG/M2 | OXYGEN SATURATION: 91 % | HEIGHT: 71 IN | RESPIRATION RATE: 18 BRPM | SYSTOLIC BLOOD PRESSURE: 111 MMHG

## 2021-08-09 DIAGNOSIS — G62.9 POLYNEUROPATHY: ICD-10-CM

## 2021-08-09 DIAGNOSIS — M21.371 FOOT DROP, RIGHT: ICD-10-CM

## 2021-08-09 DIAGNOSIS — S91.301D OPEN WOUND OF RIGHT FOOT, SUBSEQUENT ENCOUNTER: ICD-10-CM

## 2021-08-09 DIAGNOSIS — S91.105D OPEN WOUND OF FIFTH TOE OF LEFT FOOT, SUBSEQUENT ENCOUNTER: ICD-10-CM

## 2021-08-09 PROCEDURE — 88311 DECALCIFY TISSUE: CPT | Performed by: PODIATRIST

## 2021-08-09 PROCEDURE — 88304 TISSUE EXAM BY PATHOLOGIST: CPT | Performed by: PODIATRIST

## 2021-08-09 PROCEDURE — 76000 FLUOROSCOPY <1 HR PHYS/QHP: CPT | Performed by: PODIATRIST

## 2021-08-09 PROCEDURE — 0SGQ04Z FUSION OF LEFT TOE PHALANGEAL JOINT WITH INTERNAL FIXATION DEVICE, OPEN APPROACH: ICD-10-PCS | Performed by: PODIATRIST

## 2021-08-09 PROCEDURE — 0QSN04Z REPOSITION RIGHT METATARSAL WITH INTERNAL FIXATION DEVICE, OPEN APPROACH: ICD-10-PCS | Performed by: PODIATRIST

## 2021-08-09 PROCEDURE — 0QBN0ZZ EXCISION OF RIGHT METATARSAL, OPEN APPROACH: ICD-10-PCS | Performed by: PODIATRIST

## 2021-08-09 DEVICE — WIRE K SMALL .062: Type: IMPLANTABLE DEVICE | Site: TOE | Status: FUNCTIONAL

## 2021-08-09 RX ORDER — POVIDONE-IODINE 10 MG/G
OINTMENT TOPICAL AS NEEDED
Status: DISCONTINUED | OUTPATIENT
Start: 2021-08-09 | End: 2021-08-09

## 2021-08-09 RX ORDER — CEFAZOLIN SODIUM/WATER 2 G/20 ML
2 SYRINGE (ML) INTRAVENOUS ONCE
Status: COMPLETED | OUTPATIENT
Start: 2021-08-09 | End: 2021-08-09

## 2021-08-09 RX ORDER — LIDOCAINE HYDROCHLORIDE 10 MG/ML
INJECTION, SOLUTION EPIDURAL; INFILTRATION; INTRACAUDAL; PERINEURAL AS NEEDED
Status: DISCONTINUED | OUTPATIENT
Start: 2021-08-09 | End: 2021-08-09 | Stop reason: SURG

## 2021-08-09 RX ORDER — MIDAZOLAM HYDROCHLORIDE 1 MG/ML
INJECTION INTRAMUSCULAR; INTRAVENOUS AS NEEDED
Status: DISCONTINUED | OUTPATIENT
Start: 2021-08-09 | End: 2021-08-09 | Stop reason: SURG

## 2021-08-09 RX ORDER — SODIUM CHLORIDE, SODIUM LACTATE, POTASSIUM CHLORIDE, CALCIUM CHLORIDE 600; 310; 30; 20 MG/100ML; MG/100ML; MG/100ML; MG/100ML
INJECTION, SOLUTION INTRAVENOUS CONTINUOUS
Status: DISCONTINUED | OUTPATIENT
Start: 2021-08-09 | End: 2021-08-09

## 2021-08-09 RX ORDER — NALOXONE HYDROCHLORIDE 0.4 MG/ML
80 INJECTION, SOLUTION INTRAMUSCULAR; INTRAVENOUS; SUBCUTANEOUS AS NEEDED
Status: DISCONTINUED | OUTPATIENT
Start: 2021-08-09 | End: 2021-08-09

## 2021-08-09 RX ORDER — HYDROMORPHONE HYDROCHLORIDE 1 MG/ML
0.5 INJECTION, SOLUTION INTRAMUSCULAR; INTRAVENOUS; SUBCUTANEOUS EVERY 5 MIN PRN
Status: DISCONTINUED | OUTPATIENT
Start: 2021-08-09 | End: 2021-08-09

## 2021-08-09 RX ORDER — LABETALOL HYDROCHLORIDE 5 MG/ML
5 INJECTION, SOLUTION INTRAVENOUS EVERY 5 MIN PRN
Status: DISCONTINUED | OUTPATIENT
Start: 2021-08-09 | End: 2021-08-09

## 2021-08-09 RX ORDER — DEXTROSE MONOHYDRATE 25 G/50ML
50 INJECTION, SOLUTION INTRAVENOUS
Status: DISCONTINUED | OUTPATIENT
Start: 2021-08-09 | End: 2021-08-09

## 2021-08-09 RX ORDER — HYDROCODONE BITARTRATE AND ACETAMINOPHEN 5; 325 MG/1; MG/1
2 TABLET ORAL AS NEEDED
Status: COMPLETED | OUTPATIENT
Start: 2021-08-09 | End: 2021-08-09

## 2021-08-09 RX ORDER — HYDROCODONE BITARTRATE AND ACETAMINOPHEN 5; 325 MG/1; MG/1
1 TABLET ORAL AS NEEDED
Status: COMPLETED | OUTPATIENT
Start: 2021-08-09 | End: 2021-08-09

## 2021-08-09 RX ORDER — ONDANSETRON 2 MG/ML
4 INJECTION INTRAMUSCULAR; INTRAVENOUS AS NEEDED
Status: DISCONTINUED | OUTPATIENT
Start: 2021-08-09 | End: 2021-08-09

## 2021-08-09 RX ORDER — ACETAMINOPHEN 500 MG
1000 TABLET ORAL ONCE
Status: COMPLETED | OUTPATIENT
Start: 2021-08-09 | End: 2021-08-09

## 2021-08-09 RX ADMIN — LIDOCAINE HYDROCHLORIDE 30 MG: 10 INJECTION, SOLUTION EPIDURAL; INFILTRATION; INTRACAUDAL; PERINEURAL at 16:45:00

## 2021-08-09 RX ADMIN — SODIUM CHLORIDE, SODIUM LACTATE, POTASSIUM CHLORIDE, CALCIUM CHLORIDE: 600; 310; 30; 20 INJECTION, SOLUTION INTRAVENOUS at 17:56:00

## 2021-08-09 RX ADMIN — SODIUM CHLORIDE, SODIUM LACTATE, POTASSIUM CHLORIDE, CALCIUM CHLORIDE: 600; 310; 30; 20 INJECTION, SOLUTION INTRAVENOUS at 16:41:00

## 2021-08-09 RX ADMIN — CEFAZOLIN SODIUM/WATER 2 G: 2 G/20 ML SYRINGE (ML) INTRAVENOUS at 16:47:00

## 2021-08-09 RX ADMIN — MIDAZOLAM HYDROCHLORIDE 2 MG: 1 INJECTION INTRAMUSCULAR; INTRAVENOUS at 16:41:00

## 2021-08-09 NOTE — H&P
[facility] Hospitalist History and Physical      Chief Complaint:    PCP: Alyssa Kwon MD      History of Present Illness: Patient is a 66year old male with PMH sig for afib, CAD sp cabf, Dm2, parkinsons, prostate ca, polyneuropathy presents for planned podiatry OTHER SURGICAL HISTORY      rhinoplasty   • OTHER SURGICAL HISTORY      tsa right 1/11/2010   • REMV CATARACT EXTRACAP,INSERT LENS Left 8/13/2014    Procedure: LEFT PHACOEMULSIFICATION OF CATARACT WITH INTRAOCULAR LENS IMPLANT 79347;  Surgeon:  Aamir Stewart gums normal.   Neck: Supple, symmetrical, trachea midline, no cervical or supraclavicular lymph adenopathy, thyroid: no enlargment/tenderness/nodules appreciated   Lungs:   Clear to auscultation bilaterally.  Normal effort   Chest wall:  No tenderness or de BILATERAL(CPT=76881)    Result Date: 8/9/2021  DATE OF SERVICE: [phone]  GROIN, BILATERAL(CPT=76881) CLINICAL INDICATION: 66years-old Male with  Left groin pain. COMPARISON STUDY: None available.  TECHNIQUE: Grayscale imaging was performed over the p

## 2021-08-09 NOTE — ANESTHESIA POSTPROCEDURE EVALUATION
Sammy Vicente Patient Status:  Hospital Outpatient Surgery   Age/Gender 66year old male MRN [id number]   [facility] SURGERY Attending Matheus Ignacio, [facility], [address] [facility] Day # 0 PCP Cherrie Garcia MD       Anesthesia Post-op Note

## 2021-08-09 NOTE — ANESTHESIA PREPROCEDURE EVALUATION
PRE-OP EVALUATION    Patient Name: Ravinder Faust    Admit Diagnosis: Open wound of right foot, subsequent encounter [S91.301D]  Open wound of fifth toe of left foot, subsequent encounter [S91.105D]  Polyneuropathy [G62.9]  Foot drop, right [M21.371] by mouth daily. , Disp: 90 tablet, Rfl: 1, 8/8/2021 at 0900  Eszopiclone (LUNESTA) 1 MG Oral Tab, Take 1 tablet (1 mg total) by mouth nightly., Disp: 30 tablet, Rfl: 3, Past Week at Unknown time  tamsulosin (FLOMAX) cap, Take 1 capsule (0.4 mg total) by miguel ángel LV regional perfusion:     Prepared and signed by   Alex Reyes M.D.,F.A.C.C.   04/06/2021 19:18    ECG reviewed.             (+) hypertension   (+) hyperlipidemia  (+) CAD  (+) past MI  (+) CABG/stent         (+) dysrhythmias and atrial fibrillation ANGIOGRAM  2001   • ANGIOPLASTY (CORONARY)     • CABG  8/2013   • CATARACT      bilateral eyes   • CATH PERCUTANEOUS  TRANSLUMINAL CORONARY ANGIOPLASTY  2001    stent   • COLONOSCOPY     • COLONOSCOPY N/A 6/11/2018    4 mm tubular adenoma, diverticulosis,  (H) 08/05/2021    CA 9.1 08/05/2021            Airway      Mallampati: II  Mouth opening: >3 FB  TM distance: 4 - 6 cm  Neck ROM: full Cardiovascular      Rhythm: regular  Rate: normal     Dental    No notable dental history.          Pulmonary

## 2021-08-10 NOTE — OPERATIVE REPORT
Date of surgery: 08/09/21     Preoperative Diagnosis:  1. Chronic nonhealing right foot ulcer  2. Right foot drop  3. Chronic nonhealing left fifth toe ulcer  4. Rigid fifth hammertoe, left foot  5.   Peripheral neuropathy    Postoperative Diagnosis: the head was allowed to float. Surgical site was then irrigated and then closed with 3-0 nylon. Next, attention was directed to the plantar aspect of the right foot where a chronic nonhealing ulcer was present plantar second MTP joint.   The ulcer was e

## 2021-08-16 ENCOUNTER — OFFICE VISIT (OUTPATIENT)
Dept: WOUND CARE | Facility: HOSPITAL | Age: 71
End: 2021-08-16
Attending: PODIATRIST
Payer: MEDICARE

## 2021-08-16 VITALS
DIASTOLIC BLOOD PRESSURE: 72 MMHG | HEART RATE: 75 BPM | TEMPERATURE: 97 F | SYSTOLIC BLOOD PRESSURE: 141 MMHG | RESPIRATION RATE: 16 BRPM

## 2021-08-16 DIAGNOSIS — G60.3 IDIOPATHIC PROGRESSIVE POLYNEUROPATHY: ICD-10-CM

## 2021-08-16 DIAGNOSIS — L97.522 CHRONIC TOE ULCER, LEFT, WITH FAT LAYER EXPOSED (HCC): ICD-10-CM

## 2021-08-16 DIAGNOSIS — L97.512 RIGHT FOOT ULCER, WITH FAT LAYER EXPOSED (HCC): Primary | ICD-10-CM

## 2021-08-16 PROCEDURE — 99214 OFFICE O/P EST MOD 30 MIN: CPT

## 2021-08-16 NOTE — PROGRESS NOTES
Weekly Wound Education Note    Teaching Provided To: Patient  Training Topics: Discharge instructions;Dressing;Cleasing and general instructions  Training Method: Demonstration;Explain/Verbal  Training Response: Patient responds and understands        Note

## 2021-08-17 NOTE — PROGRESS NOTES
Subjective   Diane Spencer is a 66year old male. Patient presents with:  Wound Care: follow-up, pain much improved.      Objective   Objective  Physical Exam      Wound Assessment  Wound 08/16/21 #5 Right foot Old surgical Foot Right (Active)   Wound Encounter Diagnosis  Right foot ulcer, with fat layer exposed ([facility] [address]  (primary encounter diagnosis)  Idiopathic progressive polyneuropathy  Chronic toe ulcer, left, with fat layer exposed [facility])    Problem List  Patient Active Problem List:     Prostate CA ( loose and this was removed  Patient states that he had to pull his dressings and the wire pulled out.   Betadine paint and sterile dressings were applied  Continue limited activities  Elevate to reduce edema  Always ambulate in Darco shoes bilaterally  Foll plt 15

## 2021-08-30 ENCOUNTER — OFFICE VISIT (OUTPATIENT)
Dept: WOUND CARE | Facility: HOSPITAL | Age: 71
End: 2021-08-30
Attending: PODIATRIST
Payer: MEDICARE

## 2021-08-30 VITALS
DIASTOLIC BLOOD PRESSURE: 77 MMHG | TEMPERATURE: 98 F | RESPIRATION RATE: 18 BRPM | SYSTOLIC BLOOD PRESSURE: 126 MMHG | HEART RATE: 75 BPM

## 2021-08-30 DIAGNOSIS — L97.512 RIGHT FOOT ULCER, WITH FAT LAYER EXPOSED (HCC): Primary | ICD-10-CM

## 2021-08-30 DIAGNOSIS — L97.522 CHRONIC TOE ULCER, LEFT, WITH FAT LAYER EXPOSED (HCC): ICD-10-CM

## 2021-08-30 DIAGNOSIS — G60.3 IDIOPATHIC PROGRESSIVE POLYNEUROPATHY: ICD-10-CM

## 2021-08-30 PROCEDURE — 99213 OFFICE O/P EST LOW 20 MIN: CPT

## 2021-08-30 NOTE — PROGRESS NOTES
Subjective   Michelle Mccracken is a 66year old male. Patient presents with:  Wound Care: Pt here for follow up. He states no new concerns or pain.     Objective   Objective  Physical Exam      Wound Assessment  Wound 08/16/21 #5 Right foot Old surgical F Temp: 97.8 °F (36.6 °C)         Allergies    Pollen                  OTHER (SEE COMMENTS)    Comment:Nasal congestion    Assessment   Assessment    Encounter Diagnosis  Right foot ulcer, with fat layer exposed ([address]  (primary encounter diagnosis)  Idiopath transition into his AFO braces and orthopedic shoes  Patient is discharged from wound care clinic.   Follow-up with outpatient podiatry

## 2021-10-06 PROBLEM — Z47.1 AFTERCARE FOLLOWING RIGHT SHOULDER JOINT REPLACEMENT SURGERY: Status: ACTIVE | Noted: 2021-10-06

## 2021-10-06 PROBLEM — Z96.611 AFTERCARE FOLLOWING RIGHT SHOULDER JOINT REPLACEMENT SURGERY: Status: ACTIVE | Noted: 2021-10-06

## 2021-10-14 PROBLEM — M54.12 RIGHT CERVICAL RADICULOPATHY: Status: ACTIVE | Noted: 2021-10-14

## 2021-11-18 NOTE — DISCHARGE SUMMARY
General Medicine Discharge Summary     Patient ID:  Dariel Barrett.  68year old  2/6/1950    Admit date: 9/23/2019    Discharge date and time: 9/28/19    Attending Physician: Nabila Porras MD     Primary Care Physician: Tammi Rausch MD     Reason f daily.      CONTINUE these medications which have CHANGED    HYDROcodone-acetaminophen  MG Oral Tab  Take 1-2 tablets by mouth every 6 (six) hours as needed for Pain.       CONTINUE these medications which have NOT CHANGED    CARBIDOPA-LEVODOPA  opportunity to ask questions and state understand and agree with therapeutic plan as outlined above.      Thank Lyudmila Persaud MD Post-Care Instructions: I reviewed with the patient in detail post-care instructions. Patient is to wear sunprotection, and avoid picking at any of the treated lesions. Pt may apply Vaseline to crusted or scabbing areas. Render Post-Care Instructions In Note?: yes Duration Of Freeze Thaw-Cycle (Seconds): 3 Detail Level: Simple Consent: The patient's consent was obtained including but not limited to risks of crusting, scabbing, blistering, scarring, darker or lighter pigmentary change, recurrence, incomplete removal and infection. Render Note In Bullet Format When Appropriate: No

## 2021-11-22 PROBLEM — Z47.1 AFTERCARE FOLLOWING RIGHT SHOULDER JOINT REPLACEMENT SURGERY: Status: RESOLVED | Noted: 2021-10-06 | Resolved: 2021-11-22

## 2021-11-22 PROBLEM — Z96.611 AFTERCARE FOLLOWING RIGHT SHOULDER JOINT REPLACEMENT SURGERY: Status: RESOLVED | Noted: 2021-10-06 | Resolved: 2021-11-22

## 2021-11-22 PROBLEM — M48.061 LUMBAR STENOSIS: Status: RESOLVED | Noted: 2017-04-21 | Resolved: 2021-11-22

## 2021-11-22 PROBLEM — Z89.439: Status: RESOLVED | Noted: 2020-05-18 | Resolved: 2021-11-22

## 2021-11-22 PROBLEM — L03.115 CELLULITIS OF RIGHT FOOT: Status: RESOLVED | Noted: 2020-12-01 | Resolved: 2021-11-22

## 2021-11-22 PROBLEM — M17.11 PRIMARY OSTEOARTHRITIS OF RIGHT KNEE: Status: RESOLVED | Noted: 2020-08-17 | Resolved: 2021-11-22

## 2021-11-22 PROBLEM — I70.263: Status: RESOLVED | Noted: 2019-09-23 | Resolved: 2021-11-22

## 2021-11-22 PROBLEM — I50.43 ACUTE ON CHRONIC COMBINED SYSTOLIC AND DIASTOLIC CHF (CONGESTIVE HEART FAILURE) (HCC): Status: RESOLVED | Noted: 2019-04-16 | Resolved: 2021-11-22

## 2021-11-22 PROBLEM — M65.342 TRIGGER RING FINGER OF LEFT HAND: Status: RESOLVED | Noted: 2021-03-22 | Resolved: 2021-11-22

## 2021-11-22 PROBLEM — R73.03 PRE-DIABETES: Status: RESOLVED | Noted: 2021-01-28 | Resolved: 2021-11-22

## 2021-11-22 PROBLEM — M65.332 TRIGGER MIDDLE FINGER OF LEFT HAND: Status: RESOLVED | Noted: 2021-03-22 | Resolved: 2021-11-22

## 2021-11-22 PROBLEM — M54.12 RADICULITIS OF LEFT CERVICAL REGION: Status: RESOLVED | Noted: 2020-08-17 | Resolved: 2021-11-22

## 2021-11-22 PROBLEM — I50.32 CHRONIC DIASTOLIC HEART FAILURE (HCC): Status: RESOLVED | Noted: 2019-05-07 | Resolved: 2021-11-22

## 2021-11-22 PROBLEM — D50.9 IRON DEFICIENCY ANEMIA: Status: RESOLVED | Noted: 2019-04-30 | Resolved: 2021-11-22

## 2021-11-22 PROBLEM — I48.4 ATYPICAL ATRIAL FLUTTER (HCC): Status: RESOLVED | Noted: 2017-02-13 | Resolved: 2021-11-22
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
## 2022-01-03 ENCOUNTER — LAB ENCOUNTER (OUTPATIENT)
Dept: LAB | Age: 72
DRG: 638 | End: 2022-01-03
Attending: ORTHOPAEDIC SURGERY
Payer: MEDICARE

## 2022-01-03 DIAGNOSIS — Z85.46 HISTORY OF PROSTATE CANCER: ICD-10-CM

## 2022-01-03 DIAGNOSIS — Z01.818 PRE-OP TESTING: ICD-10-CM

## 2022-01-03 DIAGNOSIS — Z01.818 PREOPERATIVE TESTING: ICD-10-CM

## 2022-01-03 LAB
ALBUMIN SERPL-MCNC: 3.5 G/DL (ref 3.4–5)
ALBUMIN/GLOB SERPL: 0.8 {RATIO} (ref 1–2)
ALP LIVER SERPL-CCNC: 88 U/L
ALT SERPL-CCNC: 28 U/L
ANION GAP SERPL CALC-SCNC: 7 MMOL/L (ref 0–18)
APTT PPP: 35.6 SECONDS (ref 23.3–35.6)
AST SERPL-CCNC: 27 U/L (ref 15–37)
BILIRUB SERPL-MCNC: 0.9 MG/DL (ref 0.1–2)
BILIRUB UR QL STRIP.AUTO: NEGATIVE
BUN BLD-MCNC: 14 MG/DL (ref 7–18)
CALCIUM BLD-MCNC: 9.1 MG/DL (ref 8.5–10.1)
CHLORIDE SERPL-SCNC: 105 MMOL/L (ref 98–112)
CO2 SERPL-SCNC: 24 MMOL/L (ref 21–32)
COLOR UR AUTO: YELLOW
CREAT BLD-MCNC: 0.97 MG/DL
ERYTHROCYTE [DISTWIDTH] IN BLOOD BY AUTOMATED COUNT: 15 %
FASTING STATUS PATIENT QL REPORTED: YES
GLOBULIN PLAS-MCNC: 4.2 G/DL (ref 2.8–4.4)
GLUCOSE BLD-MCNC: 133 MG/DL (ref 70–99)
GLUCOSE UR STRIP.AUTO-MCNC: NEGATIVE MG/DL
HCT VFR BLD AUTO: 43.7 %
HGB BLD-MCNC: 14.4 G/DL
INR BLD: 1.31 (ref 0.8–1.2)
KETONES UR STRIP.AUTO-MCNC: NEGATIVE MG/DL
LEUKOCYTE ESTERASE UR QL STRIP.AUTO: NEGATIVE
MCH RBC QN AUTO: 29.6 PG (ref 26–34)
MCHC RBC AUTO-ENTMCNC: 33 G/DL (ref 31–37)
MCV RBC AUTO: 89.9 FL
NITRITE UR QL STRIP.AUTO: NEGATIVE
OSMOLALITY SERPL CALC.SUM OF ELEC: 284 MOSM/KG (ref 275–295)
PH UR STRIP.AUTO: 5 [PH] (ref 5–8)
PLATELET # BLD AUTO: 216 10(3)UL (ref 150–450)
POTASSIUM SERPL-SCNC: 4.6 MMOL/L (ref 3.5–5.1)
PROT SERPL-MCNC: 7.7 G/DL (ref 6.4–8.2)
PROT UR STRIP.AUTO-MCNC: NEGATIVE MG/DL
PROTHROMBIN TIME: 16.4 SECONDS (ref 11.6–14.8)
PSA SERPL-MCNC: <0.01 NG/ML (ref ?–4)
RBC # BLD AUTO: 4.86 X10(6)UL
RBC UR QL AUTO: NEGATIVE
SODIUM SERPL-SCNC: 136 MMOL/L (ref 136–145)
SP GR UR STRIP.AUTO: 1.02 (ref 1–1.03)
UROBILINOGEN UR STRIP.AUTO-MCNC: <2 MG/DL
WBC # BLD AUTO: 7.7 X10(3) UL (ref 4–11)

## 2022-01-03 PROCEDURE — 87081 CULTURE SCREEN ONLY: CPT

## 2022-01-03 PROCEDURE — 84153 ASSAY OF PSA TOTAL: CPT

## 2022-01-03 PROCEDURE — 85610 PROTHROMBIN TIME: CPT

## 2022-01-03 PROCEDURE — 80053 COMPREHEN METABOLIC PANEL: CPT

## 2022-01-03 PROCEDURE — 85027 COMPLETE CBC AUTOMATED: CPT

## 2022-01-03 PROCEDURE — 85730 THROMBOPLASTIN TIME PARTIAL: CPT

## 2022-01-03 PROCEDURE — 81003 URINALYSIS AUTO W/O SCOPE: CPT

## 2022-01-04 ENCOUNTER — HOSPITAL ENCOUNTER (INPATIENT)
Facility: HOSPITAL | Age: 72
LOS: 3 days | Discharge: HOME HEALTH | DRG: 638 | End: 2022-01-07
Attending: EMERGENCY MEDICINE | Admitting: INTERNAL MEDICINE
Payer: MEDICARE

## 2022-01-04 ENCOUNTER — APPOINTMENT (OUTPATIENT)
Dept: GENERAL RADIOLOGY | Facility: HOSPITAL | Age: 72
DRG: 638 | End: 2022-01-04
Attending: EMERGENCY MEDICINE
Payer: MEDICARE

## 2022-01-04 ENCOUNTER — APPOINTMENT (OUTPATIENT)
Dept: MRI IMAGING | Facility: HOSPITAL | Age: 72
DRG: 638 | End: 2022-01-04
Attending: HOSPITALIST
Payer: MEDICARE

## 2022-01-04 DIAGNOSIS — L03.115 CELLULITIS OF RIGHT FOOT: Primary | ICD-10-CM

## 2022-01-04 LAB
ALBUMIN SERPL-MCNC: 3 G/DL (ref 3.4–5)
ALBUMIN/GLOB SERPL: 0.6 {RATIO} (ref 1–2)
ALP LIVER SERPL-CCNC: 78 U/L
ALT SERPL-CCNC: 27 U/L
ANION GAP SERPL CALC-SCNC: 4 MMOL/L (ref 0–18)
AST SERPL-CCNC: 43 U/L (ref 15–37)
BASOPHILS # BLD AUTO: 0.03 X10(3) UL (ref 0–0.2)
BASOPHILS NFR BLD AUTO: 0.5 %
BILIRUB SERPL-MCNC: 0.6 MG/DL (ref 0.1–2)
BUN BLD-MCNC: 20 MG/DL (ref 7–18)
CALCIUM BLD-MCNC: 8.7 MG/DL (ref 8.5–10.1)
CHLORIDE SERPL-SCNC: 108 MMOL/L (ref 98–112)
CO2 SERPL-SCNC: 23 MMOL/L (ref 21–32)
CREAT BLD-MCNC: 1.02 MG/DL
CRP SERPL-MCNC: 4.05 MG/DL (ref ?–0.3)
EOSINOPHIL # BLD AUTO: 0.11 X10(3) UL (ref 0–0.7)
EOSINOPHIL NFR BLD AUTO: 1.7 %
ERYTHROCYTE [DISTWIDTH] IN BLOOD BY AUTOMATED COUNT: 14.5 %
ERYTHROCYTE [SEDIMENTATION RATE] IN BLOOD: 51 MM/HR
EST. AVERAGE GLUCOSE BLD GHB EST-MCNC: 134 MG/DL (ref 68–126)
GLOBULIN PLAS-MCNC: 4.8 G/DL (ref 2.8–4.4)
GLUCOSE BLD-MCNC: 109 MG/DL (ref 70–99)
GLUCOSE BLD-MCNC: 125 MG/DL (ref 70–99)
HBA1C MFR BLD: 6.3 % (ref ?–5.7)
HCT VFR BLD AUTO: 42.3 %
HGB BLD-MCNC: 13.6 G/DL
IMM GRANULOCYTES # BLD AUTO: 0.03 X10(3) UL (ref 0–1)
IMM GRANULOCYTES NFR BLD: 0.5 %
LYMPHOCYTES # BLD AUTO: 0.66 X10(3) UL (ref 1–4)
LYMPHOCYTES NFR BLD AUTO: 10 %
MCH RBC QN AUTO: 28.9 PG (ref 26–34)
MCHC RBC AUTO-ENTMCNC: 32.2 G/DL (ref 31–37)
MCV RBC AUTO: 90 FL
MONOCYTES # BLD AUTO: 0.65 X10(3) UL (ref 0.1–1)
MONOCYTES NFR BLD AUTO: 9.8 %
NEUTROPHILS # BLD AUTO: 5.14 X10 (3) UL (ref 1.5–7.7)
NEUTROPHILS # BLD AUTO: 5.14 X10(3) UL (ref 1.5–7.7)
NEUTROPHILS NFR BLD AUTO: 77.5 %
OSMOLALITY SERPL CALC.SUM OF ELEC: 284 MOSM/KG (ref 275–295)
PLATELET # BLD AUTO: 190 10(3)UL (ref 150–450)
POTASSIUM SERPL-SCNC: 5.5 MMOL/L (ref 3.5–5.1)
PROT SERPL-MCNC: 7.8 G/DL (ref 6.4–8.2)
RBC # BLD AUTO: 4.7 X10(6)UL
SARS-COV-2 RNA RESP QL NAA+PROBE: NOT DETECTED
SARS-COV-2 RNA RESP QL NAA+PROBE: NOT DETECTED
SODIUM SERPL-SCNC: 135 MMOL/L (ref 136–145)
WBC # BLD AUTO: 6.6 X10(3) UL (ref 4–11)

## 2022-01-04 PROCEDURE — 86140 C-REACTIVE PROTEIN: CPT | Performed by: EMERGENCY MEDICINE

## 2022-01-04 PROCEDURE — 73720 MRI LWR EXTREMITY W/O&W/DYE: CPT | Performed by: HOSPITALIST

## 2022-01-04 PROCEDURE — 87186 SC STD MICRODIL/AGAR DIL: CPT | Performed by: HOSPITALIST

## 2022-01-04 PROCEDURE — 96366 THER/PROPH/DIAG IV INF ADDON: CPT

## 2022-01-04 PROCEDURE — 80053 COMPREHEN METABOLIC PANEL: CPT | Performed by: EMERGENCY MEDICINE

## 2022-01-04 PROCEDURE — 87147 CULTURE TYPE IMMUNOLOGIC: CPT | Performed by: HOSPITALIST

## 2022-01-04 PROCEDURE — 87077 CULTURE AEROBIC IDENTIFY: CPT | Performed by: HOSPITALIST

## 2022-01-04 PROCEDURE — 85025 COMPLETE CBC W/AUTO DIFF WBC: CPT | Performed by: EMERGENCY MEDICINE

## 2022-01-04 PROCEDURE — 0HDMXZZ EXTRACTION OF RIGHT FOOT SKIN, EXTERNAL APPROACH: ICD-10-PCS | Performed by: PODIATRIST

## 2022-01-04 PROCEDURE — 96365 THER/PROPH/DIAG IV INF INIT: CPT

## 2022-01-04 PROCEDURE — A9575 INJ GADOTERATE MEGLUMI 0.1ML: HCPCS | Performed by: HOSPITALIST

## 2022-01-04 PROCEDURE — 99285 EMERGENCY DEPT VISIT HI MDM: CPT

## 2022-01-04 PROCEDURE — 87075 CULTR BACTERIA EXCEPT BLOOD: CPT | Performed by: HOSPITALIST

## 2022-01-04 PROCEDURE — 87070 CULTURE OTHR SPECIMN AEROBIC: CPT | Performed by: HOSPITALIST

## 2022-01-04 PROCEDURE — 73630 X-RAY EXAM OF FOOT: CPT | Performed by: EMERGENCY MEDICINE

## 2022-01-04 PROCEDURE — 87205 SMEAR GRAM STAIN: CPT | Performed by: HOSPITALIST

## 2022-01-04 PROCEDURE — 83036 HEMOGLOBIN GLYCOSYLATED A1C: CPT | Performed by: HOSPITALIST

## 2022-01-04 PROCEDURE — 85652 RBC SED RATE AUTOMATED: CPT | Performed by: EMERGENCY MEDICINE

## 2022-01-04 PROCEDURE — 82962 GLUCOSE BLOOD TEST: CPT

## 2022-01-04 RX ORDER — METOCLOPRAMIDE HYDROCHLORIDE 5 MG/ML
10 INJECTION INTRAMUSCULAR; INTRAVENOUS EVERY 8 HOURS PRN
Status: DISCONTINUED | OUTPATIENT
Start: 2022-01-04 | End: 2022-01-07

## 2022-01-04 RX ORDER — POLYETHYLENE GLYCOL 3350 17 G/17G
17 POWDER, FOR SOLUTION ORAL DAILY PRN
Status: DISCONTINUED | OUTPATIENT
Start: 2022-01-04 | End: 2022-01-07

## 2022-01-04 RX ORDER — LEVOFLOXACIN 5 MG/ML
750 INJECTION, SOLUTION INTRAVENOUS ONCE
Status: COMPLETED | OUTPATIENT
Start: 2022-01-04 | End: 2022-01-04

## 2022-01-04 RX ORDER — AMILORIDE HYDROCHLORIDE 5 MG/1
10 TABLET ORAL DAILY
Status: DISCONTINUED | OUTPATIENT
Start: 2022-01-04 | End: 2022-01-07

## 2022-01-04 RX ORDER — MECLIZINE HYDROCHLORIDE 25 MG/1
25 TABLET ORAL 3 TIMES DAILY PRN
Status: DISCONTINUED | OUTPATIENT
Start: 2022-01-04 | End: 2022-01-07

## 2022-01-04 RX ORDER — FLUTICASONE PROPIONATE 50 MCG
2 SPRAY, SUSPENSION (ML) NASAL DAILY
Status: DISCONTINUED | OUTPATIENT
Start: 2022-01-04 | End: 2022-01-07

## 2022-01-04 RX ORDER — ONDANSETRON 2 MG/ML
4 INJECTION INTRAMUSCULAR; INTRAVENOUS EVERY 6 HOURS PRN
Status: DISCONTINUED | OUTPATIENT
Start: 2022-01-04 | End: 2022-01-07

## 2022-01-04 RX ORDER — BUPROPION HYDROCHLORIDE 150 MG/1
150 TABLET ORAL DAILY
Status: DISCONTINUED | OUTPATIENT
Start: 2022-01-04 | End: 2022-01-07

## 2022-01-04 RX ORDER — PANTOPRAZOLE SODIUM 40 MG/1
40 TABLET, DELAYED RELEASE ORAL
Status: DISCONTINUED | OUTPATIENT
Start: 2022-01-05 | End: 2022-01-07

## 2022-01-04 RX ORDER — LEVOFLOXACIN 5 MG/ML
500 INJECTION, SOLUTION INTRAVENOUS ONCE
Status: DISCONTINUED | OUTPATIENT
Start: 2022-01-04 | End: 2022-01-04 | Stop reason: DRUGHIGH

## 2022-01-04 RX ORDER — GABAPENTIN 400 MG/1
800 CAPSULE ORAL 2 TIMES DAILY
Status: DISCONTINUED | OUTPATIENT
Start: 2022-01-04 | End: 2022-01-07

## 2022-01-04 RX ORDER — TAMSULOSIN HYDROCHLORIDE 0.4 MG/1
0.4 CAPSULE ORAL DAILY
Status: DISCONTINUED | OUTPATIENT
Start: 2022-01-04 | End: 2022-01-07

## 2022-01-04 RX ORDER — SODIUM PHOSPHATE, DIBASIC AND SODIUM PHOSPHATE, MONOBASIC 7; 19 G/133ML; G/133ML
1 ENEMA RECTAL ONCE AS NEEDED
Status: DISCONTINUED | OUTPATIENT
Start: 2022-01-04 | End: 2022-01-07

## 2022-01-04 RX ORDER — FLUTICASONE PROPIONATE 50 MCG
2 SPRAY, SUSPENSION (ML) NASAL DAILY
COMMUNITY

## 2022-01-04 RX ORDER — ALPRAZOLAM 0.5 MG/1
0.5 TABLET ORAL DAILY PRN
Status: DISCONTINUED | OUTPATIENT
Start: 2022-01-04 | End: 2022-01-07

## 2022-01-04 RX ORDER — ZOLPIDEM TARTRATE 5 MG/1
5 TABLET ORAL NIGHTLY PRN
Status: DISCONTINUED | OUTPATIENT
Start: 2022-01-04 | End: 2022-01-07

## 2022-01-04 RX ORDER — BISACODYL 10 MG
10 SUPPOSITORY, RECTAL RECTAL
Status: DISCONTINUED | OUTPATIENT
Start: 2022-01-04 | End: 2022-01-07

## 2022-01-04 RX ORDER — ATORVASTATIN CALCIUM 40 MG/1
40 TABLET, FILM COATED ORAL DAILY
Status: DISCONTINUED | OUTPATIENT
Start: 2022-01-04 | End: 2022-01-07

## 2022-01-04 RX ORDER — HEPARIN SODIUM 5000 [USP'U]/ML
5000 INJECTION, SOLUTION INTRAVENOUS; SUBCUTANEOUS EVERY 8 HOURS SCHEDULED
Status: DISCONTINUED | OUTPATIENT
Start: 2022-01-04 | End: 2022-01-05

## 2022-01-04 RX ORDER — OXYCODONE AND ACETAMINOPHEN 10; 325 MG/1; MG/1
1 TABLET ORAL EVERY 6 HOURS PRN
Status: DISCONTINUED | OUTPATIENT
Start: 2022-01-04 | End: 2022-01-07

## 2022-01-04 RX ORDER — DILTIAZEM HYDROCHLORIDE 120 MG/1
120 CAPSULE, EXTENDED RELEASE ORAL DAILY
Status: DISCONTINUED | OUTPATIENT
Start: 2022-01-05 | End: 2022-01-07

## 2022-01-04 RX ORDER — LEVOFLOXACIN 5 MG/ML
750 INJECTION, SOLUTION INTRAVENOUS EVERY 24 HOURS
Status: DISCONTINUED | OUTPATIENT
Start: 2022-01-05 | End: 2022-01-04

## 2022-01-04 NOTE — H&P
[facility]    History and Physical     [facility] Patient Status:  Emergency    1950 MRN [id number]   Location [address] Attending Laureano Aguirre MD   Hosp Day # 0 PCP Areli Lund MD     Chief Complaint: Rudolpho Slimmer Laterality Date   • ANGIOGRAM  2001   • ANGIOPLASTY (CORONARY)     • CABG  8/2013   • CATARACT      bilateral eyes   • CATH PERCUTANEOUS  TRANSLUMINAL CORONARY ANGIOPLASTY  2001    stent   • COLONOSCOPY     • COLONOSCOPY N/A 6/11/2018    4 mm tubular adeno facility-administered medications on file prior to encounter. ESZOPICLONE 1 MG Oral Tab, TAKE 1 TABLET(1 MG) BY MOUTH EVERY NIGHT, Disp: 30 tablet, Rfl: 0  aMILoride 5 MG Oral Tab, Take 2 tablets (10 mg total) by mouth daily. , Disp: 60 tablet, Rfl: 3  buP daily., Disp: 90 tablet, Rfl: 3  atorvastatin 40 MG Oral Tab, Take 1 tablet (40 mg total) by mouth daily. , Disp: 90 tablet, Rfl: 3  Pantoprazole Sodium 40 MG Oral Tab EC, Take 1 tablet (40 mg total) by mouth daily. , Disp: 90 tablet, Rfl: 2  bisacodyl 10 MG 3.0*    135*   K 4.6 5.5*    108   CO2 24.0 23.0   ALKPHO 88 78   AST 27 43*   ALT 28 27   BILT 0.9 0.6   TP 7.7 7.8       Estimated Creatinine Clearance: 70.8 mL/min (based on SCr of 1.02 mg/dL).     Recent Labs   Lab 01/03/22  0939   PTP 16.4*

## 2022-01-04 NOTE — PLAN OF CARE
Patient came from ER to [address] [place] [address] via stretcher. AOx3, vss, ra, lungs clear, NSR.ositive bowel sounds, last bm this am, no edema. Patients right foot is red, sensitive to touch. Xray shows no osteomyelitits.   Takes Percocet for extreme lower back pain

## 2022-01-04 NOTE — CONSULTS
INFECTIOUS DISEASE CONSULT NOTE    Hiro Slater Patient Status:  Inpatient    1950 MRN [id number]   [facility] 2NE-A Attending Angélica Helm MD   Hosp Day # 0 PCP Chhaya Mobridge COLONOSCOPY N/A 6/11/2018    4 mm tubular adenoma, diverticulosis, int hem, rpt 2023   • HIP REPLACEMENT SURGERY  3/2015    right hip replacement    • ORTHOPEDIC SURG (PBP) Bilateral     shoulder surgery   • OTHER Right 2013    femoral endarterectomy   • O per tab 1 tablet, 1 tablet, Oral, Q6H PRN  •  pantoprazole (PROTONIX) EC tab 40 mg, 40 mg, Oral, Daily  •  polyethylene glycol (PEG 3350) (MIRALAX) powder packet 17 g, 17 g, Oral, Daily PRN  •  Heparin Sodium (Porcine) 5000 UNIT/ML injection 5,000 Units, 5 for new arthralgias, arthritis. Neurological: Negative for dizziness, focal neuro deficits  Behavioral/Psych: Negative for anxiety or depression    Physical Exam:    General: No acute distress. Alert and oriented x 3.   Vital signs: Blood pressure 124/72, Microbiology    Reviewed in EMR,   No results found for this visit on 01/04/22.       Radiology: XR FOOT, COMPLETE (MIN 3 VIEWS), RIGHT (CPT=73630)    Result Date: 1/4/2022  PROCEDURE:  XR FOOT, COMPLETE (MIN 3 VIEWS), RIGHT (CPT=73630)  TECHNIQUE:  AP, to participate in the care of this patient. Please do not hesitate to call if you have any questions. I will continue to follow with you and will make further recommendations based on his progress.     Osbaldo Joseph MD  1/4/2022  3:51 PM

## 2022-01-04 NOTE — ED QUICK NOTES
Orders for admission, patient is aware of plan and ready to go upstairs. Any questions, please call ED RN deandre at extension [phone].      Patient Covid vaccination status: Fully vaccinated     COVID Test Ordered in ED: Rapid SARS-CoV-2 by PCR  covid negati

## 2022-01-04 NOTE — ED PROVIDER NOTES
Patient Seen in: [facility] Emergency Department      History   Patient presents with:   Foot Pain    Stated Complaint: right foot pain    Subjective:   HPI    Patient is a 66year old male with PMH sig for afib, CAD sp cabf, Dm2, parkinsons, among o replacement    • ORTHOPEDIC SURG (PBP) Bilateral     shoulder surgery   • OTHER Right 2013    femoral endarterectomy   • OTHER  08/2021    bilateral foot/toe surgery-ulcers   • OTHER SURGICAL HISTORY      left knee ligament repair   • OTHER SURGICAL HISTOR Physical Exam  Vitals and nursing note reviewed. Constitutional:       General: He is not in acute distress. Appearance: He is well-developed. He is not toxic-appearing. HENT:      Head: Normocephalic and atraumatic.    Eyes:      General: N (*)     All other components within normal limits   BASIC METABOLIC PANEL (8) - Abnormal; Notable for the following components:    Glucose 115 (*)     All other components within normal limits   POCT GLUCOSE - Abnormal; Notable for the following components 135 (*)     Potassium 5.5 (*)     BUN 20 (*)     AST 43 (*)     Albumin 3.0 (*)     Globulin  4.8 (*)     A/G Ratio 0.6 (*)     All other components within normal limits   SED RATE, WESTERGREN (AUTOMATED) - Abnormal; Notable for the following components: panel order CBC With Differential With Platelet.   Procedure                               Abnormality         Status                     ---------                               -----------         ------                     CBC W/ DIFFERENTIAL[[id number]] mass lesions     are noted. There is mild soft tissue/subcutaneous edema about the mid to     distal forefoot, perhaps     representing cellulitis. No soft tissue abscess is identified.                                    =====    CONCLUSION:      1.  No e chronic deformity of the interphalangeal joints of     the 3rd through 5th digits. Interval development of chronic appearing fracture deformity of the 3rd     metatarsal head/neck.                 Dictated by (CST): Jazz Hatfield MD on 1/04/2022 at 9

## 2022-01-05 LAB
ANION GAP SERPL CALC-SCNC: 7 MMOL/L (ref 0–18)
BASOPHILS # BLD AUTO: 0.03 X10(3) UL (ref 0–0.2)
BASOPHILS NFR BLD AUTO: 0.6 %
BUN BLD-MCNC: 18 MG/DL (ref 7–18)
CALCIUM BLD-MCNC: 8.7 MG/DL (ref 8.5–10.1)
CHLORIDE SERPL-SCNC: 107 MMOL/L (ref 98–112)
CO2 SERPL-SCNC: 22 MMOL/L (ref 21–32)
CREAT BLD-MCNC: 0.82 MG/DL
EOSINOPHIL # BLD AUTO: 0.24 X10(3) UL (ref 0–0.7)
EOSINOPHIL NFR BLD AUTO: 4.4 %
ERYTHROCYTE [DISTWIDTH] IN BLOOD BY AUTOMATED COUNT: 14.6 %
GLUCOSE BLD-MCNC: 109 MG/DL (ref 70–99)
GLUCOSE BLD-MCNC: 115 MG/DL (ref 70–99)
GLUCOSE BLD-MCNC: 116 MG/DL (ref 70–99)
HCT VFR BLD AUTO: 41.7 %
HGB BLD-MCNC: 13.3 G/DL
IMM GRANULOCYTES # BLD AUTO: 0.03 X10(3) UL (ref 0–1)
IMM GRANULOCYTES NFR BLD: 0.6 %
LYMPHOCYTES # BLD AUTO: 0.72 X10(3) UL (ref 1–4)
LYMPHOCYTES NFR BLD AUTO: 13.2 %
MCH RBC QN AUTO: 29.1 PG (ref 26–34)
MCHC RBC AUTO-ENTMCNC: 31.9 G/DL (ref 31–37)
MCV RBC AUTO: 91.2 FL
MONOCYTES # BLD AUTO: 0.6 X10(3) UL (ref 0.1–1)
MONOCYTES NFR BLD AUTO: 11 %
NEUTROPHILS # BLD AUTO: 3.83 X10 (3) UL (ref 1.5–7.7)
NEUTROPHILS # BLD AUTO: 3.83 X10(3) UL (ref 1.5–7.7)
NEUTROPHILS NFR BLD AUTO: 70.2 %
OSMOLALITY SERPL CALC.SUM OF ELEC: 285 MOSM/KG (ref 275–295)
PLATELET # BLD AUTO: 183 10(3)UL (ref 150–450)
POTASSIUM SERPL-SCNC: 4.1 MMOL/L (ref 3.5–5.1)
RBC # BLD AUTO: 4.57 X10(6)UL
SODIUM SERPL-SCNC: 136 MMOL/L (ref 136–145)
WBC # BLD AUTO: 5.5 X10(3) UL (ref 4–11)

## 2022-01-05 PROCEDURE — 85025 COMPLETE CBC W/AUTO DIFF WBC: CPT | Performed by: HOSPITALIST

## 2022-01-05 PROCEDURE — 80048 BASIC METABOLIC PNL TOTAL CA: CPT | Performed by: HOSPITALIST

## 2022-01-05 PROCEDURE — 82962 GLUCOSE BLOOD TEST: CPT

## 2022-01-05 RX ORDER — VANCOMYCIN HYDROCHLORIDE
1250 EVERY 12 HOURS
Status: DISCONTINUED | OUTPATIENT
Start: 2022-01-06 | End: 2022-01-07

## 2022-01-05 RX ORDER — VANCOMYCIN 2 GRAM/500 ML IN 0.9 % SODIUM CHLORIDE INTRAVENOUS
25 ONCE
Status: COMPLETED | OUTPATIENT
Start: 2022-01-05 | End: 2022-01-05

## 2022-01-05 NOTE — PLAN OF CARE
Pt Aox4, RA, pain in right foot; pericot given for relief. Foot cleaned with betadine, gauze & mepilex.      Problem: Diabetes/Glucose Control  Goal: Glucose maintained within prescribed range  Description: INTERVENTIONS:  - Monitor Blood Glucose as ordered

## 2022-01-05 NOTE — CONSULTS
[facility]  Report of Consultation    Hiro Vimalroxanna Patient Status:  Inpatient    1950 MRN [id number]   [facility] 2NE-A Attending Angélica Helm MD   Hosp Day # 0 PCP Chhaya Augustin MD     Date of Admission:  2022  Date of C 2013    femoral endarterectomy   • OTHER  08/2021    bilateral foot/toe surgery-ulcers   • OTHER SURGICAL HISTORY      left knee ligament repair   • OTHER SURGICAL HISTORY      left hand laceration repair 4th, 5th fingers   • OTHER SURGICAL HISTORY      rh PRN  •  Heparin Sodium (Porcine) 5000 UNIT/ML injection 5,000 Units, 5,000 Units, Subcutaneous, Q8H [address]  •  bisacodyl (DULCOLAX) rectal suppository 10 mg, 10 mg, Rectal, Daily PRN  •  fleet enema (FLEET) 7-19 GM/118ML enema 133 mL, 1 enema, Rectal, Once [place] Surrounding erythema and edema to plantar midfoot.       Laboratory Data:  Recent Labs   Lab 01/04/22  0859   RBC 4.70   HGB 13.6   HCT 42.3   MCV 90.0   MCH 28.9   MCHC 32.2   RDW 14.5   NEPRELIM 5.14   WBC 6.6   .0       Assessment:  Bullae right f

## 2022-01-05 NOTE — PROGRESS NOTES
[facility] [place] Hospitalist Progress Note     Josue Crandall Patient Status:  Inpatient    1950 MRN [id number]   [facility] 2NE-A Attending [place], MD   Hosp Day # 1 PCP Nicole Cavazos MD     Chief Complaint: Patient pre PCT in the last 168 hours. Cardiac  No results for input(s): TROP, PBNP in the last 168 hours. Creatinine Kinase  No results for input(s): CK in the last 168 hours.     Inflammatory Markers  Recent Labs   Lab 01/04/22  0859   CRP 4.05*       Imaging: days pending final cultures, antibiotic recommendations      Plan of care discussed with patient and/or family at [address] Barbie Avila MD  [address] [place] [address]

## 2022-01-06 LAB
ANION GAP SERPL CALC-SCNC: 7 MMOL/L (ref 0–18)
BASOPHILS # BLD AUTO: 0.04 X10(3) UL (ref 0–0.2)
BASOPHILS NFR BLD AUTO: 1 %
BUN BLD-MCNC: 16 MG/DL (ref 7–18)
CALCIUM BLD-MCNC: 8.5 MG/DL (ref 8.5–10.1)
CHLORIDE SERPL-SCNC: 106 MMOL/L (ref 98–112)
CO2 SERPL-SCNC: 25 MMOL/L (ref 21–32)
CREAT BLD-MCNC: 0.83 MG/DL
EOSINOPHIL # BLD AUTO: 0.26 X10(3) UL (ref 0–0.7)
EOSINOPHIL NFR BLD AUTO: 6.5 %
ERYTHROCYTE [DISTWIDTH] IN BLOOD BY AUTOMATED COUNT: 14.6 %
GLUCOSE BLD-MCNC: 129 MG/DL (ref 70–99)
GLUCOSE BLD-MCNC: 156 MG/DL (ref 70–99)
HCT VFR BLD AUTO: 41.4 %
HGB BLD-MCNC: 13.3 G/DL
IMM GRANULOCYTES # BLD AUTO: 0.01 X10(3) UL (ref 0–1)
IMM GRANULOCYTES NFR BLD: 0.2 %
LYMPHOCYTES # BLD AUTO: 0.64 X10(3) UL (ref 1–4)
LYMPHOCYTES NFR BLD AUTO: 15.9 %
MCH RBC QN AUTO: 29 PG (ref 26–34)
MCHC RBC AUTO-ENTMCNC: 32.1 G/DL (ref 31–37)
MCV RBC AUTO: 90.4 FL
MONOCYTES # BLD AUTO: 0.57 X10(3) UL (ref 0.1–1)
MONOCYTES NFR BLD AUTO: 14.1 %
NEUTROPHILS # BLD AUTO: 2.51 X10 (3) UL (ref 1.5–7.7)
NEUTROPHILS # BLD AUTO: 2.51 X10(3) UL (ref 1.5–7.7)
NEUTROPHILS NFR BLD AUTO: 62.3 %
OSMOLALITY SERPL CALC.SUM OF ELEC: 290 MOSM/KG (ref 275–295)
PLATELET # BLD AUTO: 172 10(3)UL (ref 150–450)
POTASSIUM SERPL-SCNC: 4.2 MMOL/L (ref 3.5–5.1)
RBC # BLD AUTO: 4.58 X10(6)UL
SODIUM SERPL-SCNC: 138 MMOL/L (ref 136–145)
WBC # BLD AUTO: 4 X10(3) UL (ref 4–11)

## 2022-01-06 PROCEDURE — 97161 PT EVAL LOW COMPLEX 20 MIN: CPT

## 2022-01-06 PROCEDURE — 82962 GLUCOSE BLOOD TEST: CPT

## 2022-01-06 PROCEDURE — 80048 BASIC METABOLIC PNL TOTAL CA: CPT | Performed by: HOSPITALIST

## 2022-01-06 PROCEDURE — 97116 GAIT TRAINING THERAPY: CPT

## 2022-01-06 PROCEDURE — 85025 COMPLETE CBC W/AUTO DIFF WBC: CPT | Performed by: HOSPITALIST

## 2022-01-06 RX ORDER — DILTIAZEM HYDROCHLORIDE 60 MG/1
120 TABLET, FILM COATED ORAL DAILY
Status: DISCONTINUED | OUTPATIENT
Start: 2022-01-06 | End: 2022-01-06

## 2022-01-06 RX ORDER — OXYCODONE AND ACETAMINOPHEN 10; 325 MG/1; MG/1
1 TABLET ORAL EVERY 8 HOURS PRN
Qty: 9 TABLET | Refills: 0 | Status: SHIPPED | OUTPATIENT
Start: 2022-01-06 | End: 2022-01-09

## 2022-01-06 RX ORDER — CLOPIDOGREL BISULFATE 75 MG/1
75 TABLET ORAL DAILY
Status: DISCONTINUED | OUTPATIENT
Start: 2022-01-06 | End: 2022-01-07

## 2022-01-06 NOTE — PROGRESS NOTES
INFECTIOUS DISEASE PROGRESS NOTE    Lola Arndt Patient Status:  Inpatient    1950 MRN [id number]   [facility] 2NE-A Attending Suman Preciado MD   Hosp Day # 1 PCP Treasure Moe 3.375 g in dextrose 5% 100 mL IVPB-MBP, 3.375 g, Intravenous, Q8H    Review of Systems:    Completed. See pertinent positives and negatives as above    Physical Exam:    General: No acute distress. Alert and oriented x 3.   Vital signs: Blood pressure 122/6 Negative   BLOODURINE Negative   PHURINE 5.0   PROUR Negative   UROBILINOGEN <2.0   NITRITE Negative   LEUUR Negative        Microbiology    Reviewed in EMR,   Hospital Encounter on 01/04/22   1.  Aerobic Bacterial Culture     Status: Abnormal (Preliminary through 5th digits. Interval development of chronic appearing fracture deformity of the 3rd metatarsal head/neck.     Dictated by (CST): Macrina Stacy MD on 1/04/2022 at 9:17 AM     Finalized by (CST): Macrina Stacy MD on 1/04/2022 at 9:20 AM

## 2022-01-06 NOTE — PROGRESS NOTES
[facility] Dosing Service    Initial Pharmacokinetic Consult for Vancomycin AUC Dosing    Jayro Rivera is a 66year old patient who is being treated for cellulitis and diabetic foot. Pharmacy has been asked to dose vancomycin by Dr. Beena Cary .     Weigh

## 2022-01-06 NOTE — PROGRESS NOTES
[facility] [place] Hospitalist Progress Note     Melody Arnold Patient Status:  Inpatient    1950 MRN [id number]   [facility] 2NE-A Attending Soham Neal MD   Hosp Day # 2 PCP Costa Berry MD     Chief Complaint: Patient pre CK in the last 168 hours. Inflammatory Markers  Recent Labs   Lab 01/04/22  0859   CRP 4.05*       Imaging: Imaging data reviewed in Epic.     Medications:   • apixaban  5 mg Oral BID   • vancomycin  1,250 mg Intravenous Q12H   • gabapentin  800 mg Oral

## 2022-01-06 NOTE — PHYSICAL THERAPY NOTE
PHYSICAL THERAPY EVALUATION - INPATIENT     Room Number: 0766/7296-I  Evaluation Date: 1/6/2022  Type of Evaluation: Initial  Physician Order: PT Eval and Treat    Presenting Problem: cellulitis of R foot, subacute 3rd metatarsal fx  Co-Morbidities : 3-5x/week  Number of Visits to Meet Established Goals: 2      CURRENT GOALS    Goal #1 Patient is able to demonstrate supine - sit EOB @ level: modified independent     Goal #2 Patient is able to demonstrate transfers Sit to/from Stand at assistance level: AM-PAC '6-Clicks' INPATIENT SHORT FORM - BASIC MOBILITY  How much difficulty does the patient currently have. ..   Patient Difficulty: Turning over in bed (including adjusting bedclothes, sheets and blankets)?: None   Patient Difficulty: Sitting down o cervical spine sx, was educated on spine precautions and log rolling for bed mobility.      Exercise/Education Provided:  Bed mobility  Energy conservation  Functional activity tolerated  Gait training  Posture  Strengthening  Transfer training    Patient E

## 2022-01-06 NOTE — PROGRESS NOTES
INFECTIOUS DISEASE PROGRESS NOTE    Jayro Rivera Patient Status:  Inpatient    1950 MRN [id number]   [facility] 2NE-A Attending Jaspal Lundy MD   Hosp Day # 2 [facility] Systems:  Completed. See pertinent positives and negatives as above    Physical Exam:    General: No acute distress. Alert and oriented x 3. Vital signs: Blood pressure 126/49, pulse 77, temperature 98.2 °F (36.8 °C), temperature source Oral, resp.  rate 1 01/03/22  [address] [place] [address] 1.021   GLUUR Negative   BILUR Negative   KETUR Negative   BLOODURINE Negative   PHURINE 5.0   PROUR Negative   UROBILINOGEN <2.0   NITRITE Negative   LEUUR Negative        Microbiology    Revie portions of the 1st and 2nd metatarsals. Stable chronic deformity of the interphalangeal joints of the 3rd through 5th digits. Interval development of chronic appearing fracture deformity of the 3rd metatarsal head/neck.     Dictated by (CST): Darlin Victor An

## 2022-01-06 NOTE — PHYSICAL THERAPY NOTE
PT orders received and chart reviewed. Per podiatry pt is cleared to ambulate with sx shoe. Per discussion with pt, pt does not have a sx shoe at this time. CD called for one. Will follow and re-attempt as able and appropriate. RN aware.

## 2022-01-07 VITALS
HEIGHT: 71 IN | BODY MASS INDEX: 29.12 KG/M2 | WEIGHT: 208 LBS | SYSTOLIC BLOOD PRESSURE: 154 MMHG | DIASTOLIC BLOOD PRESSURE: 79 MMHG | RESPIRATION RATE: 18 BRPM | HEART RATE: 75 BPM | TEMPERATURE: 98 F | OXYGEN SATURATION: 96 %

## 2022-01-07 LAB
ANION GAP SERPL CALC-SCNC: 4 MMOL/L (ref 0–18)
BASOPHILS # BLD AUTO: 0.06 X10(3) UL (ref 0–0.2)
BASOPHILS NFR BLD AUTO: 1.2 %
BUN BLD-MCNC: 15 MG/DL (ref 7–18)
CALCIUM BLD-MCNC: 9 MG/DL (ref 8.5–10.1)
CHLORIDE SERPL-SCNC: 106 MMOL/L (ref 98–112)
CO2 SERPL-SCNC: 27 MMOL/L (ref 21–32)
CREAT BLD-MCNC: 0.71 MG/DL
EOSINOPHIL # BLD AUTO: 0.29 X10(3) UL (ref 0–0.7)
EOSINOPHIL NFR BLD AUTO: 5.6 %
ERYTHROCYTE [DISTWIDTH] IN BLOOD BY AUTOMATED COUNT: 14.6 %
GLUCOSE BLD-MCNC: 112 MG/DL (ref 70–99)
GLUCOSE BLD-MCNC: 114 MG/DL (ref 70–99)
HCT VFR BLD AUTO: 43.4 %
HGB BLD-MCNC: 13.7 G/DL
IMM GRANULOCYTES # BLD AUTO: 0.02 X10(3) UL (ref 0–1)
IMM GRANULOCYTES NFR BLD: 0.4 %
LYMPHOCYTES # BLD AUTO: 0.97 X10(3) UL (ref 1–4)
LYMPHOCYTES NFR BLD AUTO: 18.7 %
MCH RBC QN AUTO: 28.7 PG (ref 26–34)
MCHC RBC AUTO-ENTMCNC: 31.6 G/DL (ref 31–37)
MCV RBC AUTO: 90.8 FL
MONOCYTES # BLD AUTO: 0.67 X10(3) UL (ref 0.1–1)
MONOCYTES NFR BLD AUTO: 12.9 %
NEUTROPHILS # BLD AUTO: 3.19 X10 (3) UL (ref 1.5–7.7)
NEUTROPHILS # BLD AUTO: 3.19 X10(3) UL (ref 1.5–7.7)
NEUTROPHILS NFR BLD AUTO: 61.2 %
OSMOLALITY SERPL CALC.SUM OF ELEC: 286 MOSM/KG (ref 275–295)
PLATELET # BLD AUTO: 195 10(3)UL (ref 150–450)
POTASSIUM SERPL-SCNC: 4.3 MMOL/L (ref 3.5–5.1)
RBC # BLD AUTO: 4.78 X10(6)UL
SODIUM SERPL-SCNC: 137 MMOL/L (ref 136–145)
WBC # BLD AUTO: 5.2 X10(3) UL (ref 4–11)

## 2022-01-07 PROCEDURE — 85025 COMPLETE CBC W/AUTO DIFF WBC: CPT | Performed by: HOSPITALIST

## 2022-01-07 PROCEDURE — 80048 BASIC METABOLIC PNL TOTAL CA: CPT | Performed by: HOSPITALIST

## 2022-01-07 PROCEDURE — 82962 GLUCOSE BLOOD TEST: CPT

## 2022-01-07 RX ORDER — DOXYCYCLINE HYCLATE 100 MG/1
100 CAPSULE ORAL EVERY 12 HOURS SCHEDULED
Qty: 20 CAPSULE | Refills: 0 | Status: SHIPPED | OUTPATIENT
Start: 2022-01-07 | End: 2022-01-17

## 2022-01-07 RX ORDER — DOXYCYCLINE HYCLATE 100 MG/1
100 CAPSULE ORAL EVERY 12 HOURS SCHEDULED
Status: DISCONTINUED | OUTPATIENT
Start: 2022-01-07 | End: 2022-01-07

## 2022-01-07 NOTE — PLAN OF CARE
Assumed care of patient at [address] [place] [address]. Pt A/Ox 4. O2 sats maintained on room air. NSR on tele. Voiding without difficulty in urinal. Pt denies any pain. IV Vancomycin Q12 for right leg cellulitis. New dressing applied to right foot, betadine and Mepilex used.  Pt

## 2022-01-07 NOTE — PROGRESS NOTES
[facility] [place] Hospitalist Progress Note     Tomas Finnegan Patient Status:  Inpatient    1950 MRN [id number]   [facility] 2NE-A Attending Jaya Joaquin MD   Hosp Day # 3 PCP Amie Polo MD     Chief Complaint: Patient pre Estimated Creatinine Clearance: 98.7 mL/min (based on SCr of 0.71 mg/dL).     Recent Labs   Lab 01/03/22  0939   PTP 16.4*   INR 1.31*            COVID-19 Lab Results    COVID-19  Lab Results   Component Value Date    COVID19 Not Detected 01/04/2022 inhalers      Impaired Fasting Glucose  - A1c 6.3%  - accuchecks, SSI     DVT Ppx: SQH   Code Status: FULL--confirmed on admission      Dispo: inpatient, per ID     Plan of care discussed with patient and/or family at . Sharron Hudson

## 2022-01-07 NOTE — CM/SW NOTE
Chart reviewed, PT recommending home health. Orders and clinicals sent over for referral.     SW/ CM to follow up with pt/family on recommendations.      David 106, LSW

## 2022-01-07 NOTE — PROGRESS NOTES
INFECTIOUS DISEASE PROGRESS NOTE    Josue Crandall Patient Status:  Inpatient    1950 MRN [id number]   [facility] 2NE-A Attending [place], MD   Hosp Day # 3 PCP Leslie Gilbert Daily    Review of Systems:  Completed. See pertinent positives and negatives as above    Physical Exam:    General: No acute distress. Alert and oriented x 3.   Vital signs: Blood pressure 126/69, pulse 73, temperature 98.2 °F (36.8 °C), temperature source 51 (H)      C-Reactive Protein (mg/dL)   Date Value   01/04/2022 4.05 (H)      No results found for: ALIA  Recent Labs   Lab 01/03/22  [facility] 1.021   GLUUR Negative   BILUR Negative   KETUR Negative   Jia Daniel deformity of the interphalangeal joints of the 3rd through 5th digits. Interval development of chronic appearing fracture deformity of the 3rd metatarsal head/neck. Stable scattered mild osteoarthritic changes in the TMT joints.   Mild to moderate plantar

## 2022-01-07 NOTE — CM/SW NOTE
SW received call that pt is current with [facility]. Reserved agency in [place]. AVS updated.     Kwasi Chávez [address] [facility] [address], [address] [place] [address]  Phone: [phone]  Fax: [phone]    Jil Pallas,

## 2022-01-08 NOTE — PLAN OF CARE
Pt and VS remained stable this shift. Denies CP/SOB. sats maintained in 90's on RA. Right foot wound cleansed and dressing changed arlene well. ID aware of lab MRSA in right foot wound. IV ABT's changed to 10 days oral ABT. Rx for ABT's and percocet given.  AV

## 2022-01-17 NOTE — DISCHARGE SUMMARY
General Medicine Discharge Summary     Patient ID:  Issa Roa  66year old  2/6/1950    Admit date: 1/4/2022    Discharge date and time: 1/7/2022  6:17 PM     Attending Physician: No att. providers found     Consults: IP CONSULT TO PODIATRY  IP CONS Tabs  Commonly known as: XANAX  Take 1 tablet (0.5 mg total) by mouth daily as needed for Anxiety. Hold for sedation. Do not combine with pain medication     aMILoride 5 MG Tabs  Commonly known as: MIDAMOR  Take 2 tablets (10 mg total) by mouth daily. Get Your Medications      These medications were sent to [place] [address] [address] [facility], [address], [address] [facility] [address]    Phone: [phone]   · doxycycline 100 MG Caps

## 2022-03-22 ENCOUNTER — LAB ENCOUNTER (OUTPATIENT)
Dept: LAB | Facility: HOSPITAL | Age: 72
DRG: 472 | End: 2022-03-22
Attending: ORTHOPAEDIC SURGERY
Payer: MEDICARE

## 2022-03-22 DIAGNOSIS — Z01.818 PREOPERATIVE TESTING: ICD-10-CM

## 2022-03-22 DIAGNOSIS — Z01.818 PREOP TESTING: ICD-10-CM

## 2022-03-22 LAB
MRSA DNA SPEC QL NAA+PROBE: NEGATIVE
SARS-COV-2 RNA RESP QL NAA+PROBE: NOT DETECTED

## 2022-03-22 PROCEDURE — 87641 MR-STAPH DNA AMP PROBE: CPT

## 2022-03-24 RX ORDER — CEFAZOLIN SODIUM/WATER 2 G/20 ML
2 SYRINGE (ML) INTRAVENOUS ONCE
Status: CANCELLED | OUTPATIENT
Start: 2022-03-24

## 2022-03-25 ENCOUNTER — HOSPITAL ENCOUNTER (INPATIENT)
Facility: HOSPITAL | Age: 72
LOS: 1 days | Discharge: HOME HEALTH | DRG: 472 | End: 2022-03-26
Attending: ORTHOPAEDIC SURGERY | Admitting: ORTHOPAEDIC SURGERY
Payer: MEDICARE

## 2022-03-25 ENCOUNTER — ANESTHESIA EVENT (OUTPATIENT)
Dept: SURGERY | Facility: HOSPITAL | Age: 72
DRG: 472 | End: 2022-03-25
Payer: MEDICARE

## 2022-03-25 ENCOUNTER — ANESTHESIA (OUTPATIENT)
Dept: SURGERY | Facility: HOSPITAL | Age: 72
DRG: 472 | End: 2022-03-25
Payer: MEDICARE

## 2022-03-25 ENCOUNTER — APPOINTMENT (OUTPATIENT)
Dept: GENERAL RADIOLOGY | Facility: HOSPITAL | Age: 72
DRG: 472 | End: 2022-03-25
Attending: ORTHOPAEDIC SURGERY
Payer: MEDICARE

## 2022-03-25 DIAGNOSIS — Z01.818 PREOPERATIVE TESTING: Primary | ICD-10-CM

## 2022-03-25 DIAGNOSIS — Z01.818 PREOP TESTING: ICD-10-CM

## 2022-03-25 DIAGNOSIS — I25.810 CORONARY ARTERY DISEASE INVOLVING CORONARY BYPASS GRAFT OF NATIVE HEART WITHOUT ANGINA PECTORIS: ICD-10-CM

## 2022-03-25 LAB
GLUCOSE BLDC GLUCOMTR-MCNC: 124 MG/DL (ref 70–99)
GLUCOSE BLDC GLUCOMTR-MCNC: 133 MG/DL (ref 70–99)
GLUCOSE BLDC GLUCOMTR-MCNC: 133 MG/DL (ref 70–99)

## 2022-03-25 PROCEDURE — 0RG20J0 FUSION OF 2 OR MORE CERVICAL VERTEBRAL JOINTS WITH SYNTHETIC SUBSTITUTE, ANTERIOR APPROACH, ANTERIOR COLUMN, OPEN APPROACH: ICD-10-PCS | Performed by: ORTHOPAEDIC SURGERY

## 2022-03-25 PROCEDURE — 4A11X4G MONITORING OF PERIPHERAL NERVOUS ELECTRICAL ACTIVITY, INTRAOPERATIVE, EXTERNAL APPROACH: ICD-10-PCS | Performed by: ORTHOPAEDIC SURGERY

## 2022-03-25 PROCEDURE — 76000 FLUOROSCOPY <1 HR PHYS/QHP: CPT | Performed by: ORTHOPAEDIC SURGERY

## 2022-03-25 PROCEDURE — 97530 THERAPEUTIC ACTIVITIES: CPT

## 2022-03-25 PROCEDURE — 97116 GAIT TRAINING THERAPY: CPT

## 2022-03-25 PROCEDURE — 82962 GLUCOSE BLOOD TEST: CPT

## 2022-03-25 PROCEDURE — 97162 PT EVAL MOD COMPLEX 30 MIN: CPT

## 2022-03-25 PROCEDURE — 95860 NEEDLE EMG 1 EXTREMITY: CPT | Performed by: ORTHOPAEDIC SURGERY

## 2022-03-25 PROCEDURE — 95939 C MOTOR EVOKED UPR&LWR LIMBS: CPT | Performed by: ORTHOPAEDIC SURGERY

## 2022-03-25 PROCEDURE — 95938 SOMATOSENSORY TESTING: CPT | Performed by: ORTHOPAEDIC SURGERY

## 2022-03-25 PROCEDURE — 0RG20A0 FUSION OF 2 OR MORE CERVICAL VERTEBRAL JOINTS WITH INTERBODY FUSION DEVICE, ANTERIOR APPROACH, ANTERIOR COLUMN, OPEN APPROACH: ICD-10-PCS | Performed by: ORTHOPAEDIC SURGERY

## 2022-03-25 PROCEDURE — 01N10ZZ RELEASE CERVICAL NERVE, OPEN APPROACH: ICD-10-PCS | Performed by: ORTHOPAEDIC SURGERY

## 2022-03-25 PROCEDURE — 0RT30ZZ RESECTION OF CERVICAL VERTEBRAL DISC, OPEN APPROACH: ICD-10-PCS | Performed by: ORTHOPAEDIC SURGERY

## 2022-03-25 DEVICE — OSTEOCEL PRO SMALL: Type: IMPLANTABLE DEVICE | Site: NECK | Status: FUNCTIONAL

## 2022-03-25 DEVICE — CAGE COHERE CERVICAL 7 7X16X14: Type: IMPLANTABLE DEVICE | Site: NECK | Status: FUNCTIONAL

## 2022-03-25 DEVICE — IMPLANTABLE DEVICE: Type: IMPLANTABLE DEVICE | Site: NECK | Status: FUNCTIONAL

## 2022-03-25 RX ORDER — OXYCODONE HYDROCHLORIDE 5 MG/1
10 TABLET ORAL EVERY 4 HOURS PRN
Status: DISCONTINUED | OUTPATIENT
Start: 2022-03-25 | End: 2022-03-26

## 2022-03-25 RX ORDER — ACETAMINOPHEN 500 MG
1000 TABLET ORAL ONCE
Status: DISCONTINUED | OUTPATIENT
Start: 2022-03-25 | End: 2022-03-25 | Stop reason: HOSPADM

## 2022-03-25 RX ORDER — SODIUM CHLORIDE, SODIUM LACTATE, POTASSIUM CHLORIDE, CALCIUM CHLORIDE 600; 310; 30; 20 MG/100ML; MG/100ML; MG/100ML; MG/100ML
INJECTION, SOLUTION INTRAVENOUS CONTINUOUS
Status: DISCONTINUED | OUTPATIENT
Start: 2022-03-25 | End: 2022-03-25 | Stop reason: HOSPADM

## 2022-03-25 RX ORDER — HYDROMORPHONE HYDROCHLORIDE 1 MG/ML
0.2 INJECTION, SOLUTION INTRAMUSCULAR; INTRAVENOUS; SUBCUTANEOUS EVERY 2 HOUR PRN
Status: DISCONTINUED | OUTPATIENT
Start: 2022-03-25 | End: 2022-03-26

## 2022-03-25 RX ORDER — GLYCOPYRROLATE 0.2 MG/ML
INJECTION, SOLUTION INTRAMUSCULAR; INTRAVENOUS AS NEEDED
Status: DISCONTINUED | OUTPATIENT
Start: 2022-03-25 | End: 2022-03-25 | Stop reason: SURG

## 2022-03-25 RX ORDER — TAMSULOSIN HYDROCHLORIDE 0.4 MG/1
0.4 CAPSULE ORAL DAILY
Status: DISCONTINUED | OUTPATIENT
Start: 2022-03-25 | End: 2022-03-26

## 2022-03-25 RX ORDER — HYDROMORPHONE HYDROCHLORIDE 1 MG/ML
0.6 INJECTION, SOLUTION INTRAMUSCULAR; INTRAVENOUS; SUBCUTANEOUS EVERY 5 MIN PRN
Status: DISCONTINUED | OUTPATIENT
Start: 2022-03-25 | End: 2022-03-25 | Stop reason: HOSPADM

## 2022-03-25 RX ORDER — DEXAMETHASONE SODIUM PHOSPHATE 4 MG/ML
VIAL (ML) INJECTION AS NEEDED
Status: DISCONTINUED | OUTPATIENT
Start: 2022-03-25 | End: 2022-03-25 | Stop reason: SURG

## 2022-03-25 RX ORDER — DIPHENHYDRAMINE HCL 25 MG
25 CAPSULE ORAL EVERY 4 HOURS PRN
Status: DISCONTINUED | OUTPATIENT
Start: 2022-03-25 | End: 2022-03-26

## 2022-03-25 RX ORDER — CEFAZOLIN SODIUM/WATER 2 G/20 ML
2 SYRINGE (ML) INTRAVENOUS EVERY 8 HOURS
Status: COMPLETED | OUTPATIENT
Start: 2022-03-25 | End: 2022-03-25

## 2022-03-25 RX ORDER — NALOXONE HYDROCHLORIDE 0.4 MG/ML
80 INJECTION, SOLUTION INTRAMUSCULAR; INTRAVENOUS; SUBCUTANEOUS AS NEEDED
Status: DISCONTINUED | OUTPATIENT
Start: 2022-03-25 | End: 2022-03-25 | Stop reason: HOSPADM

## 2022-03-25 RX ORDER — ONDANSETRON 2 MG/ML
INJECTION INTRAMUSCULAR; INTRAVENOUS AS NEEDED
Status: DISCONTINUED | OUTPATIENT
Start: 2022-03-25 | End: 2022-03-25 | Stop reason: SURG

## 2022-03-25 RX ORDER — PANTOPRAZOLE SODIUM 40 MG/1
40 TABLET, DELAYED RELEASE ORAL DAILY
Status: DISCONTINUED | OUTPATIENT
Start: 2022-03-26 | End: 2022-03-26

## 2022-03-25 RX ORDER — HYDROMORPHONE HYDROCHLORIDE 1 MG/ML
0.2 INJECTION, SOLUTION INTRAMUSCULAR; INTRAVENOUS; SUBCUTANEOUS EVERY 5 MIN PRN
Status: DISCONTINUED | OUTPATIENT
Start: 2022-03-25 | End: 2022-03-25 | Stop reason: HOSPADM

## 2022-03-25 RX ORDER — HYDROCODONE BITARTRATE AND ACETAMINOPHEN 5; 325 MG/1; MG/1
1 TABLET ORAL AS NEEDED
Status: DISCONTINUED | OUTPATIENT
Start: 2022-03-25 | End: 2022-03-25 | Stop reason: HOSPADM

## 2022-03-25 RX ORDER — HYDROCODONE BITARTRATE AND ACETAMINOPHEN 5; 325 MG/1; MG/1
2 TABLET ORAL AS NEEDED
Status: DISCONTINUED | OUTPATIENT
Start: 2022-03-25 | End: 2022-03-25 | Stop reason: HOSPADM

## 2022-03-25 RX ORDER — SODIUM CHLORIDE, SODIUM LACTATE, POTASSIUM CHLORIDE, CALCIUM CHLORIDE 600; 310; 30; 20 MG/100ML; MG/100ML; MG/100ML; MG/100ML
INJECTION, SOLUTION INTRAVENOUS CONTINUOUS
Status: DISCONTINUED | OUTPATIENT
Start: 2022-03-25 | End: 2022-03-26

## 2022-03-25 RX ORDER — ONDANSETRON 2 MG/ML
4 INJECTION INTRAMUSCULAR; INTRAVENOUS EVERY 4 HOURS PRN
Status: ACTIVE | OUTPATIENT
Start: 2022-03-25 | End: 2022-03-26

## 2022-03-25 RX ORDER — MAGNESIUM HYDROXIDE 1200 MG/15ML
LIQUID ORAL CONTINUOUS PRN
Status: COMPLETED | OUTPATIENT
Start: 2022-03-25 | End: 2022-03-25

## 2022-03-25 RX ORDER — BISACODYL 10 MG
10 SUPPOSITORY, RECTAL RECTAL
Status: DISCONTINUED | OUTPATIENT
Start: 2022-03-25 | End: 2022-03-26

## 2022-03-25 RX ORDER — OXYCODONE HYDROCHLORIDE 5 MG/1
5 TABLET ORAL EVERY 4 HOURS PRN
Status: DISCONTINUED | OUTPATIENT
Start: 2022-03-25 | End: 2022-03-26

## 2022-03-25 RX ORDER — EPHEDRINE SULFATE 50 MG/ML
INJECTION, SOLUTION INTRAVENOUS AS NEEDED
Status: DISCONTINUED | OUTPATIENT
Start: 2022-03-25 | End: 2022-03-25 | Stop reason: SURG

## 2022-03-25 RX ORDER — SODIUM CHLORIDE 9 MG/ML
INJECTION, SOLUTION INTRAVENOUS CONTINUOUS PRN
Status: DISCONTINUED | OUTPATIENT
Start: 2022-03-25 | End: 2022-03-25 | Stop reason: SURG

## 2022-03-25 RX ORDER — MORPHINE SULFATE 4 MG/ML
2 INJECTION, SOLUTION INTRAMUSCULAR; INTRAVENOUS EVERY 10 MIN PRN
Status: DISCONTINUED | OUTPATIENT
Start: 2022-03-25 | End: 2022-03-25 | Stop reason: HOSPADM

## 2022-03-25 RX ORDER — ACETAMINOPHEN 325 MG/1
650 TABLET ORAL EVERY 4 HOURS PRN
Status: DISCONTINUED | OUTPATIENT
Start: 2022-03-25 | End: 2022-03-26

## 2022-03-25 RX ORDER — PROCHLORPERAZINE EDISYLATE 5 MG/ML
10 INJECTION INTRAMUSCULAR; INTRAVENOUS EVERY 6 HOURS PRN
Status: DISCONTINUED | OUTPATIENT
Start: 2022-03-25 | End: 2022-03-26

## 2022-03-25 RX ORDER — VANCOMYCIN HYDROCHLORIDE
15 ONCE
Status: COMPLETED | OUTPATIENT
Start: 2022-03-25 | End: 2022-03-25

## 2022-03-25 RX ORDER — LIDOCAINE HYDROCHLORIDE 10 MG/ML
INJECTION, SOLUTION EPIDURAL; INFILTRATION; INTRACAUDAL; PERINEURAL AS NEEDED
Status: DISCONTINUED | OUTPATIENT
Start: 2022-03-25 | End: 2022-03-25 | Stop reason: SURG

## 2022-03-25 RX ORDER — DIAZEPAM 2 MG/1
2 TABLET ORAL EVERY 6 HOURS PRN
Status: DISCONTINUED | OUTPATIENT
Start: 2022-03-25 | End: 2022-03-26

## 2022-03-25 RX ORDER — GABAPENTIN 400 MG/1
800 CAPSULE ORAL 2 TIMES DAILY PRN
Status: DISCONTINUED | OUTPATIENT
Start: 2022-03-25 | End: 2022-03-26

## 2022-03-25 RX ORDER — SODIUM PHOSPHATE, DIBASIC AND SODIUM PHOSPHATE, MONOBASIC 7; 19 G/133ML; G/133ML
1 ENEMA RECTAL ONCE AS NEEDED
Status: DISCONTINUED | OUTPATIENT
Start: 2022-03-25 | End: 2022-03-26

## 2022-03-25 RX ORDER — DIPHENHYDRAMINE HYDROCHLORIDE 50 MG/ML
25 INJECTION INTRAMUSCULAR; INTRAVENOUS EVERY 4 HOURS PRN
Status: DISCONTINUED | OUTPATIENT
Start: 2022-03-25 | End: 2022-03-26

## 2022-03-25 RX ORDER — NICOTINE POLACRILEX 4 MG
30 LOZENGE BUCCAL
Status: DISCONTINUED | OUTPATIENT
Start: 2022-03-25 | End: 2022-03-25 | Stop reason: HOSPADM

## 2022-03-25 RX ORDER — DEXTROSE MONOHYDRATE 25 G/50ML
50 INJECTION, SOLUTION INTRAVENOUS
Status: DISCONTINUED | OUTPATIENT
Start: 2022-03-25 | End: 2022-03-25 | Stop reason: HOSPADM

## 2022-03-25 RX ORDER — HALOPERIDOL 5 MG/ML
0.25 INJECTION INTRAMUSCULAR ONCE AS NEEDED
Status: DISCONTINUED | OUTPATIENT
Start: 2022-03-25 | End: 2022-03-25 | Stop reason: HOSPADM

## 2022-03-25 RX ORDER — POLYETHYLENE GLYCOL 3350 17 G/17G
17 POWDER, FOR SOLUTION ORAL DAILY PRN
Status: DISCONTINUED | OUTPATIENT
Start: 2022-03-25 | End: 2022-03-26

## 2022-03-25 RX ORDER — HYDROMORPHONE HYDROCHLORIDE 1 MG/ML
0.4 INJECTION, SOLUTION INTRAMUSCULAR; INTRAVENOUS; SUBCUTANEOUS EVERY 2 HOUR PRN
Status: DISCONTINUED | OUTPATIENT
Start: 2022-03-25 | End: 2022-03-26

## 2022-03-25 RX ORDER — MIDAZOLAM HYDROCHLORIDE 1 MG/ML
INJECTION INTRAMUSCULAR; INTRAVENOUS AS NEEDED
Status: DISCONTINUED | OUTPATIENT
Start: 2022-03-25 | End: 2022-03-25 | Stop reason: SURG

## 2022-03-25 RX ORDER — MORPHINE SULFATE 4 MG/ML
4 INJECTION, SOLUTION INTRAMUSCULAR; INTRAVENOUS EVERY 10 MIN PRN
Status: DISCONTINUED | OUTPATIENT
Start: 2022-03-25 | End: 2022-03-25 | Stop reason: HOSPADM

## 2022-03-25 RX ORDER — MORPHINE SULFATE 10 MG/ML
6 INJECTION, SOLUTION INTRAMUSCULAR; INTRAVENOUS EVERY 10 MIN PRN
Status: DISCONTINUED | OUTPATIENT
Start: 2022-03-25 | End: 2022-03-25 | Stop reason: HOSPADM

## 2022-03-25 RX ORDER — HYDROMORPHONE HYDROCHLORIDE 1 MG/ML
0.8 INJECTION, SOLUTION INTRAMUSCULAR; INTRAVENOUS; SUBCUTANEOUS EVERY 2 HOUR PRN
Status: DISCONTINUED | OUTPATIENT
Start: 2022-03-25 | End: 2022-03-26

## 2022-03-25 RX ORDER — HYDROMORPHONE HYDROCHLORIDE 1 MG/ML
0.4 INJECTION, SOLUTION INTRAMUSCULAR; INTRAVENOUS; SUBCUTANEOUS EVERY 5 MIN PRN
Status: DISCONTINUED | OUTPATIENT
Start: 2022-03-25 | End: 2022-03-25 | Stop reason: HOSPADM

## 2022-03-25 RX ORDER — ROCURONIUM BROMIDE 10 MG/ML
INJECTION, SOLUTION INTRAVENOUS AS NEEDED
Status: DISCONTINUED | OUTPATIENT
Start: 2022-03-25 | End: 2022-03-25 | Stop reason: SURG

## 2022-03-25 RX ORDER — DOCUSATE SODIUM 100 MG/1
100 CAPSULE, LIQUID FILLED ORAL 2 TIMES DAILY
Status: DISCONTINUED | OUTPATIENT
Start: 2022-03-25 | End: 2022-03-26

## 2022-03-25 RX ORDER — PHENYLEPHRINE HCL 10 MG/ML
VIAL (ML) INJECTION AS NEEDED
Status: DISCONTINUED | OUTPATIENT
Start: 2022-03-25 | End: 2022-03-25 | Stop reason: SURG

## 2022-03-25 RX ORDER — ONDANSETRON 2 MG/ML
4 INJECTION INTRAMUSCULAR; INTRAVENOUS ONCE AS NEEDED
Status: DISCONTINUED | OUTPATIENT
Start: 2022-03-25 | End: 2022-03-25 | Stop reason: HOSPADM

## 2022-03-25 RX ORDER — FLUTICASONE PROPIONATE 50 MCG
2 SPRAY, SUSPENSION (ML) NASAL DAILY
Status: DISCONTINUED | OUTPATIENT
Start: 2022-03-25 | End: 2022-03-26

## 2022-03-25 RX ORDER — BUPIVACAINE HYDROCHLORIDE AND EPINEPHRINE 5; 5 MG/ML; UG/ML
INJECTION, SOLUTION PERINEURAL AS NEEDED
Status: DISCONTINUED | OUTPATIENT
Start: 2022-03-25 | End: 2022-03-25 | Stop reason: HOSPADM

## 2022-03-25 RX ORDER — PROCHLORPERAZINE EDISYLATE 5 MG/ML
5 INJECTION INTRAMUSCULAR; INTRAVENOUS ONCE AS NEEDED
Status: DISCONTINUED | OUTPATIENT
Start: 2022-03-25 | End: 2022-03-25 | Stop reason: HOSPADM

## 2022-03-25 RX ORDER — NICOTINE POLACRILEX 4 MG
15 LOZENGE BUCCAL
Status: DISCONTINUED | OUTPATIENT
Start: 2022-03-25 | End: 2022-03-25 | Stop reason: HOSPADM

## 2022-03-25 RX ORDER — SENNOSIDES 8.6 MG
17.2 TABLET ORAL NIGHTLY
Status: DISCONTINUED | OUTPATIENT
Start: 2022-03-25 | End: 2022-03-26

## 2022-03-25 RX ORDER — BUPROPION HYDROCHLORIDE 150 MG/1
150 TABLET ORAL DAILY
Status: DISCONTINUED | OUTPATIENT
Start: 2022-03-26 | End: 2022-03-26

## 2022-03-25 RX ADMIN — PHENYLEPHRINE HCL 100 MCG: 10 MG/ML VIAL (ML) INJECTION at 09:20:00

## 2022-03-25 RX ADMIN — ROCURONIUM BROMIDE 5 MG: 10 INJECTION, SOLUTION INTRAVENOUS at 07:57:00

## 2022-03-25 RX ADMIN — EPHEDRINE SULFATE 10 MG: 50 INJECTION, SOLUTION INTRAVENOUS at 08:06:00

## 2022-03-25 RX ADMIN — ROCURONIUM BROMIDE 25 MG: 10 INJECTION, SOLUTION INTRAVENOUS at 08:33:00

## 2022-03-25 RX ADMIN — GLYCOPYRROLATE 0.2 MG: 0.2 INJECTION, SOLUTION INTRAMUSCULAR; INTRAVENOUS at 07:57:00

## 2022-03-25 RX ADMIN — PHENYLEPHRINE HCL 100 MCG: 10 MG/ML VIAL (ML) INJECTION at 10:04:00

## 2022-03-25 RX ADMIN — SODIUM CHLORIDE, SODIUM LACTATE, POTASSIUM CHLORIDE, CALCIUM CHLORIDE: 600; 310; 30; 20 INJECTION, SOLUTION INTRAVENOUS at 10:18:00

## 2022-03-25 RX ADMIN — PHENYLEPHRINE HCL 100 MCG: 10 MG/ML VIAL (ML) INJECTION at 09:45:00

## 2022-03-25 RX ADMIN — EPHEDRINE SULFATE 10 MG: 50 INJECTION, SOLUTION INTRAVENOUS at 09:51:00

## 2022-03-25 RX ADMIN — PHENYLEPHRINE HCL 100 MCG: 10 MG/ML VIAL (ML) INJECTION at 08:18:00

## 2022-03-25 RX ADMIN — EPHEDRINE SULFATE 10 MG: 50 INJECTION, SOLUTION INTRAVENOUS at 08:33:00

## 2022-03-25 RX ADMIN — SODIUM CHLORIDE: 9 INJECTION, SOLUTION INTRAVENOUS at 08:09:00

## 2022-03-25 RX ADMIN — PHENYLEPHRINE HCL 100 MCG: 10 MG/ML VIAL (ML) INJECTION at 09:26:00

## 2022-03-25 RX ADMIN — PHENYLEPHRINE HCL 100 MCG: 10 MG/ML VIAL (ML) INJECTION at 08:33:00

## 2022-03-25 RX ADMIN — MIDAZOLAM HYDROCHLORIDE 2 MG: 1 INJECTION INTRAMUSCULAR; INTRAVENOUS at 07:53:00

## 2022-03-25 RX ADMIN — PHENYLEPHRINE HCL 100 MCG: 10 MG/ML VIAL (ML) INJECTION at 08:35:00

## 2022-03-25 RX ADMIN — PHENYLEPHRINE HCL 100 MCG: 10 MG/ML VIAL (ML) INJECTION at 10:22:00

## 2022-03-25 RX ADMIN — PHENYLEPHRINE HCL 100 MCG: 10 MG/ML VIAL (ML) INJECTION at 09:24:00

## 2022-03-25 RX ADMIN — SODIUM CHLORIDE, SODIUM LACTATE, POTASSIUM CHLORIDE, CALCIUM CHLORIDE: 600; 310; 30; 20 INJECTION, SOLUTION INTRAVENOUS at 07:53:00

## 2022-03-25 RX ADMIN — EPHEDRINE SULFATE 10 MG: 50 INJECTION, SOLUTION INTRAVENOUS at 08:08:00

## 2022-03-25 RX ADMIN — PHENYLEPHRINE HCL 100 MCG: 10 MG/ML VIAL (ML) INJECTION at 08:24:00

## 2022-03-25 RX ADMIN — ONDANSETRON 4 MG: 2 INJECTION INTRAMUSCULAR; INTRAVENOUS at 10:21:00

## 2022-03-25 RX ADMIN — DEXAMETHASONE SODIUM PHOSPHATE 4 MG: 4 MG/ML VIAL (ML) INJECTION at 08:10:00

## 2022-03-25 RX ADMIN — LIDOCAINE HYDROCHLORIDE 25 MG: 10 INJECTION, SOLUTION EPIDURAL; INFILTRATION; INTRACAUDAL; PERINEURAL at 07:59:00

## 2022-03-25 RX ADMIN — PHENYLEPHRINE HCL 100 MCG: 10 MG/ML VIAL (ML) INJECTION at 08:59:00

## 2022-03-25 RX ADMIN — ROCURONIUM BROMIDE 10 MG: 10 INJECTION, SOLUTION INTRAVENOUS at 08:36:00

## 2022-03-25 RX ADMIN — PHENYLEPHRINE HCL 100 MCG: 10 MG/ML VIAL (ML) INJECTION at 08:09:00

## 2022-03-25 NOTE — ANESTHESIA POSTPROCEDURE EVALUATION
Patient: Desire General    Procedure Summary     Date: 03/25/22 Room / Location: [address] [place] [address] MAIN OR 10 / [address] [place] [address] MAIN OR    Anesthesia Start: 2489 Anesthesia Stop: 3719    Procedure: Cervical four- cervical five, cervical five - cervical six anterior cervical discectomy fusion (N/A Spine Cervical) Diagnosis: (stenosis, C6 nerve palsy)    Surgeons: Baron Loan MD Anesthesiologist: Karoline Guerra MD    Anesthesia Type: general ASA Status: 3          Anesthesia Type: general    Vitals Value Taken Time   /83 03/25/22 1057   Temp 97.7 03/25/22 1057   Pulse 69 03/25/22 1056   Resp 14 03/25/22 1056   SpO2 93 % 03/25/22 1056   Vitals shown include unvalidated device data.     [address] [place] [address] AN Post Evaluation:   Patient Evaluated in PACU  Patient Participation: complete - patient participated  Level of Consciousness: awake  Pain Management: adequate  Airway Patency:patent  Dental exam unchanged from preop  Yes    Cardiovascular Status: acceptable  Respiratory Status: acceptable  Postoperative Hydration acceptable  Comments: Awake, following commands, verbal appropriate, moves all extremities to command      [address], CRNA  3/25/2022 10:57 AM

## 2022-03-25 NOTE — ANESTHESIA PROCEDURE NOTES
Peripheral IV  Date/Time: 3/25/2022 8:09 AM  Inserted by: Teresa Turk CRNA    Placement  Needle size: 18 G  Laterality: left  Location: hand  Local anesthetic: none  Site prep: alcohol  Technique: anatomical landmarks  Attempts: 1

## 2022-03-25 NOTE — RESPIRATORY THERAPY NOTE
KANE ASSESSMENT:    Pt does have a previous diagnosis of KANE. Pt does not routinely use a CPAP device at home.      Pt to be placed on CPAP: no

## 2022-03-25 NOTE — ANESTHESIA PROCEDURE NOTES
Airway  Date/Time: 3/25/2022 8:02 AM  Urgency: Elective    Airway not difficult    General Information and Staff    Patient location during procedure: OR  Anesthesiologist: Dian Logan MD  Resident/CRNA: Norma Biswas CRNA  Performed: CRNA     Indications and Patient Condition  Indications for airway management: anesthesia  Sedation level: deep  Preoxygenated: yes  Patient position: sniffing  Mask difficulty assessment: 1 - vent by mask    Final Airway Details  Final airway type: endotracheal airway      Successful airway: ETT  Cuffed: yes   Successful intubation technique: Video laryngoscopy  Facilitating devices/methods: intubating stylet  Endotracheal tube insertion site: oral  Blade: GlideScope  Blade size: #4  ETT size (mm): 7.5    Cormack-Lehane Classification: grade I - full view of glottis  Placement verified by: chest auscultation and capnometry   Measured from: lips  ETT to lips (cm): 22  Number of attempts at approach: 1    Additional Comments  Neck maintained in neutral position during induction and intubation.

## 2022-03-26 ENCOUNTER — APPOINTMENT (OUTPATIENT)
Dept: GENERAL RADIOLOGY | Facility: HOSPITAL | Age: 72
DRG: 472 | End: 2022-03-26
Attending: PHYSICIAN ASSISTANT
Payer: MEDICARE

## 2022-03-26 VITALS
OXYGEN SATURATION: 98 % | DIASTOLIC BLOOD PRESSURE: 86 MMHG | SYSTOLIC BLOOD PRESSURE: 125 MMHG | TEMPERATURE: 98 F | WEIGHT: 204 LBS | BODY MASS INDEX: 28.56 KG/M2 | HEART RATE: 77 BPM | HEIGHT: 71 IN | RESPIRATION RATE: 18 BRPM

## 2022-03-26 LAB
DEPRECATED RDW RBC AUTO: 50.4 FL (ref 35.1–46.3)
HCT VFR BLD AUTO: 36.7 %
MCH RBC QN AUTO: 29.9 PG (ref 26–34)
MCHC RBC AUTO-ENTMCNC: 32.7 G/DL (ref 31–37)
MCV RBC AUTO: 91.5 FL
PLATELET # BLD AUTO: 177 10(3)UL (ref 150–450)
RBC # BLD AUTO: 4.01 X10(6)UL
WBC # BLD AUTO: 7.7 X10(3) UL (ref 4–11)

## 2022-03-26 PROCEDURE — 97116 GAIT TRAINING THERAPY: CPT

## 2022-03-26 PROCEDURE — 72040 X-RAY EXAM NECK SPINE 2-3 VW: CPT | Performed by: PHYSICIAN ASSISTANT

## 2022-03-26 PROCEDURE — 85027 COMPLETE CBC AUTOMATED: CPT | Performed by: PHYSICIAN ASSISTANT

## 2022-03-26 PROCEDURE — 97530 THERAPEUTIC ACTIVITIES: CPT

## 2022-03-26 PROCEDURE — 97110 THERAPEUTIC EXERCISES: CPT

## 2022-03-26 PROCEDURE — 97535 SELF CARE MNGMENT TRAINING: CPT

## 2022-03-26 PROCEDURE — 97166 OT EVAL MOD COMPLEX 45 MIN: CPT

## 2022-03-26 RX ORDER — CLOPIDOGREL BISULFATE 75 MG/1
75 TABLET ORAL DAILY
Qty: 90 TABLET | Refills: 1 | Status: SHIPPED | COMMUNITY
Start: 2022-03-31

## 2022-03-26 NOTE — HOME CARE LIAISON
Received referral via [facility] for Home Health services. Spoke w/ patient and [facility], patient specifically requesting Residential [facility] (Pt has hx of [facility]). Agency reserved in [facility] and contact information placed on AVS.Financial interest disclosure provided to patient. Notified CM/Yessenia.

## 2022-03-26 NOTE — PHYSICAL THERAPY NOTE
PHYSICAL THERAPY TREATMENT NOTE - INPATIENT     Room Number: 407/407-A       Presenting Problem: s/p C4-6 ACDF  Co-Morbidities : L great toe amputation, R C6 nerve palsy, hx MRSA    Problem List  Active Problems:    Preoperative testing      PHYSICAL THERAPY ASSESSMENT     RN approved PT session and pt willing to work on session. Therapist donned with PPE: mask, gown and gloves. Reviewed cervical precaution with pt with soft cervical brace on. Bed mobility: supine to sit by log rolling SBA. Sit to stand with RW SBA/Supervision. Pt amb with RW on hallways 200 ft. Steps negotiation 12 steps with 1 HR and CGA. Educated on proper sequencing and safety with transfers and amb. After amb pt position in chair with all needs in reach. RN aware of pt progress. Pt stated he is alone. The patient's Approx Degree of Impairment: 11.2% has been calculated based on documentation in the [facility] '6 clicks' Inpatient Basic Mobility Short Form. Research supports that patients with this level of impairment may benefit from home with [address] recommendation:  Home with home health PT. PLAN  PT Treatment Plan: Bed mobility; Body mechanics; Energy conservation;Patient education;Gait training;Strengthening;Stair training;Transfer training;Balance training    SUBJECTIVE  I am living alone. OBJECTIVE  Precautions: Spine (soft cervical collar for comfort)    WEIGHT BEARING RESTRICTION                PAIN ASSESSMENT   Ratin  Location: neck area  Management Techniques: Body mechanics; Relaxation;Repositioning    BALANCE  Static Sitting: Good  Dynamic Sitting: Good  Static Standing: Fair +  Dynamic Standing: Fair    ACTIVITY TOLERANCE                          O2 WALK       AM-PAC '6-Clicks' INPATIENT SHORT FORM - BASIC MOBILITY  How much difficulty does the patient currently have. ..   Patient Difficulty: Turning over in bed (including adjusting bedclothes, sheets and blankets)?: None   Patient Difficulty: Sitting down on and standing up from a chair with arms (e.g., wheelchair, bedside commode, etc.): None   Patient Difficulty: Moving from lying on back to sitting on the side of the bed?: None   How much help from another person does the patient currently need. .. Help from Another: Moving to and from a bed to a chair (including a wheelchair)?: None   Help from Another: Need to walk in hospital room?: None   Help from Another: Climbing 3-5 steps with a railing?: A Little     AM-PAC Score:  Raw Score: 23   Approx Degree of Impairment: 11.2%   Standardized Score (AM-PAC Scale): 56.93   CMS Modifier (G-Code): CI    FUNCTIONAL ABILITY STATUS  Functional Mobility/Gait Assessment  Gait Assistance: Supervision  Distance (ft): 200 ft x 2  Assistive Device: Rolling walker  Pattern: Within Functional Limits  Stairs: Stairs  How Many Stairs: 12  Device: 1 Rail  Assist: Contact guard assist  Pattern: Ascend and Descend  Ascend and Descend : Step to    Additional information:     THERAPEUTIC EXERCISES  Lower Extremity Alternating marching  Ankle pumps  Knee extension     Position Sitting       Patient End of Session: Up in chair; With [facility] staff;Needs met;Call light within reach;RN aware of session/findings;Bracing education provided per handout; All patient questions and concerns addressed    CURRENT GOALS     Goals to be met by: 4/1/22  Patient Goal Patient's self-stated goal is: to be able to lift my right arm without weakness, get dressed IND without difficulty   Goal #1 Patient is able to demonstrate supine - sit EOB @ level: modified independent     Goal #1   Current Status SBA    Goal #2 Patient is able to demonstrate transfers Sit to/from Stand at assistance level: modified independent with  least restrictive assistive device   Goal #2  Current Status SBA/Supervision   Goal #3 Patient is able to ambulate 300 feet with assist device:  least restrictive assistive device at assistance level: modified independent Goal #3   Current Status Pt amb 200 ft with RW   Goal #4 Patient will negotiate 12 stairs/one curb w/ assistive device and supervision   Goal #4   Current Status 12 steps with RW 1 HR SBA   Goal #5 Patient to demonstrate independence with home activity/exercise instructions provided to patient in preparation for discharge. Goal #5   Current Status In progress   Goal #6 Pt able to recall 3/3 spine precautions and maintain during mobility to ensure safety and proper healing   Goal #6  Current Status In progress.

## 2022-03-26 NOTE — CM/SW NOTE
PER RN, patient cleared to discharge home w/ arrangments for [place]. Pt lives alone. CM uploaded [place] referrals in aidin w/HH orders and face to face order. PLAN: home w/[facility] [place]    CM/SW to remain available for support and/or discharge planning.     Lelia Keller RN, [facility]    Ext. [phone]

## 2022-03-26 NOTE — OCCUPATIONAL THERAPY NOTE
Order rec'd and chart reviewed, Pt just rec'd pain meds and nurse asked for OT to return late, will f/u.

## 2022-03-26 NOTE — PLAN OF CARE
Problem: Patient Centered Care  Goal: Patient preferences are identified and integrated in the patient's plan of care  Description: Interventions:  - What would you like us to know as we care for you?   - Provide timely, complete, and accurate information to patient/family  - Incorporate patient and family knowledge, values, beliefs, and cultural backgrounds into the planning and delivery of care  - Encourage patient/family to participate in care and decision-making at the level they choose  - Honor patient and family perspectives and choices  Outcome: Progressing     Problem: PAIN - ADULT  Goal: Verbalizes/displays adequate comfort level or patient's stated pain goal  Description: INTERVENTIONS:  - Encourage pt to monitor pain and request assistance  - Assess pain using appropriate pain scale  - Administer analgesics based on type and severity of pain and evaluate response  - Implement non-pharmacological measures as appropriate and evaluate response  - Consider cultural and social influences on pain and pain management  - Manage/alleviate anxiety  - Utilize distraction and/or relaxation techniques  - Monitor for opioid side effects  - Notify MD/LIP if interventions unsuccessful or patient reports new pain  - Anticipate increased pain with activity and pre-medicate as appropriate  Outcome: Progressing     Problem: RISK FOR INFECTION - ADULT  Goal: Absence of fever/infection during anticipated neutropenic period  Description: INTERVENTIONS  - Monitor WBC  - Administer growth factors as ordered  - Implement neutropenic guidelines  Outcome: Progressing     Problem: SAFETY ADULT - FALL  Goal: Free from fall injury  Description: INTERVENTIONS:  - Assess pt frequently for physical needs  - Identify cognitive and physical deficits and behaviors that affect risk of falls.   - [place] fall precautions as indicated by assessment.  - Educate pt/family on patient safety including physical limitations  - Instruct pt to call for assistance with activity based on assessment  - Modify environment to reduce risk of injury  - Provide assistive devices as appropriate  - Consider OT/PT consult to assist with strengthening/mobility  - Encourage toileting schedule  Outcome: Progressing     Problem: DISCHARGE PLANNING  Goal: Discharge to home or other facility with appropriate resources  Description: INTERVENTIONS:  - Identify barriers to discharge w/pt and caregiver  - Include patient/family/discharge partner in discharge planning  - Arrange for needed discharge resources and transportation as appropriate  - Identify discharge learning needs (meds, wound care, etc)  - Arrange for interpreters to assist at discharge as needed  - Consider post-discharge preferences of patient/family/discharge partner  - Complete POLST form as appropriate  - Assess patient's ability to be responsible for managing their own health  - Refer to Case Management Department for coordinating discharge planning if the patient needs post-hospital services based on physician/LIP order or complex needs related to functional status, cognitive ability or social support system  Outcome: Progressing     Problem: SKIN/TISSUE INTEGRITY - ADULT  Goal: Incision(s), wounds(s) or drain site(s) healing without S/S of infection  Description: INTERVENTIONS:  - Assess and document risk factors for pressure ulcer development  - Assess and document skin integrity  - Assess and document dressing/incision, wound bed, drain sites and surrounding tissue  - Implement wound care per orders  - Initiate isolation precautions as appropriate  - Initiate Pressure Ulcer prevention bundle as indicated  Outcome: Progressing     Problem: MUSCULOSKELETAL - ADULT  Goal: Return mobility to safest level of function  Description: INTERVENTIONS:  - Assess patient stability and activity tolerance for standing, transferring and ambulating w/ or w/o assistive devices  - Assist with transfers and ambulation using safe patient handling equipment as needed  - Ensure adequate protection for wounds/incisions during mobilization  - Obtain PT/OT consults as needed  - Advance activity as appropriate  - Communicate ordered activity level and limitations with patient/family  Outcome: Karron Cogan was resting comfortably up on the recliner, soft collar on for comfort. He's up and ambulating with minimal assist in room. He's voiding freely in the bathroom. Pain was managed with PRN Oxy. Pt on tele and hemovac drain was in place. Plan for discharge back to home when medically stable, tentatively later today.

## 2022-03-26 NOTE — PLAN OF CARE
Problem: PAIN - ADULT  Goal: Verbalizes/displays adequate comfort level or patient's stated pain goal  Description: INTERVENTIONS:  - Encourage pt to monitor pain and request assistance  - Assess pain using appropriate pain scale  - Administer analgesics based on type and severity of pain and evaluate response  - Implement non-pharmacological measures as appropriate and evaluate response  - Consider cultural and social influences on pain and pain management  - Manage/alleviate anxiety  - Utilize distraction and/or relaxation techniques  - Monitor for opioid side effects  - Notify MD/LIP if interventions unsuccessful or patient reports new pain  - Anticipate increased pain with activity and pre-medicate as appropriate  Outcome: Adequate for Discharge     Problem: RISK FOR INFECTION - ADULT  Goal: Absence of fever/infection during anticipated neutropenic period  Description: INTERVENTIONS  - Monitor WBC  - Administer growth factors as ordered  - Implement neutropenic guidelines  Outcome: Adequate for Discharge     Problem: SAFETY ADULT - FALL  Goal: Free from fall injury  Description: INTERVENTIONS:  - Assess pt frequently for physical needs  - Identify cognitive and physical deficits and behaviors that affect risk of falls.   - [place] fall precautions as indicated by assessment.  - Educate pt/family on patient safety including physical limitations  - Instruct pt to call for assistance with activity based on assessment  - Modify environment to reduce risk of injury  - Provide assistive devices as appropriate  - Consider OT/PT consult to assist with strengthening/mobility  - Encourage toileting schedule  Outcome: Adequate for Discharge     Problem: DISCHARGE PLANNING  Goal: Discharge to home or other facility with appropriate resources  Description: INTERVENTIONS:  - Identify barriers to discharge w/pt and caregiver  - Include patient/family/discharge partner in discharge planning  - Arrange for needed discharge resources and transportation as appropriate  - Identify discharge learning needs (meds, wound care, etc)  - Arrange for interpreters to assist at discharge as needed  - Consider post-discharge preferences of patient/family/discharge partner  - Complete POLST form as appropriate  - Assess patient's ability to be responsible for managing their own health  - Refer to Case Management Department for coordinating discharge planning if the patient needs post-hospital services based on physician/LIP order or complex needs related to functional status, cognitive ability or social support system  Outcome: Adequate for Discharge     Problem: SKIN/TISSUE INTEGRITY - ADULT  Goal: Incision(s), wounds(s) or drain site(s) healing without S/S of infection  Description: INTERVENTIONS:  - Assess and document risk factors for pressure ulcer development  - Assess and document skin integrity  - Assess and document dressing/incision, wound bed, drain sites and surrounding tissue  - Implement wound care per orders  - Initiate isolation precautions as appropriate  - Initiate Pressure Ulcer prevention bundle as indicated  Outcome: Adequate for Discharge     Problem: MUSCULOSKELETAL - ADULT  Goal: Return mobility to safest level of function  Description: INTERVENTIONS:  - Assess patient stability and activity tolerance for standing, transferring and ambulating w/ or w/o assistive devices  - Assist with transfers and ambulation using safe patient handling equipment as needed  - Ensure adequate protection for wounds/incisions during mobilization  - Obtain PT/OT consults as needed  - Advance activity as appropriate  - Communicate ordered activity level and limitations with patient/family  Outcome: Adequate for Discharge  Therapy worked with him and he is clear to go home. Going home with home health. Voiding freely. Oxy ir as needed for pain. Pt lives alone so he is going home with home health and  arranged that.  Hemovac is d/c'ed and xarelto and plavix can restart in 3 days

## 2022-03-28 NOTE — OPERATIVE REPORT
PATIENT  Tyrone Lehman    DATE  3/25/22    SURGEON  Patricia Doran MD     ASSISTANT  JULIANO Campos     PREOPERATIVE DIAGNOSIS  Cervical myeloradiculopathy  Severe spinal stenosis C4-6     POSTOPERATIVE DIAGNOSIS    Same      PROCEDURE   1. Anterior cervical diskectomy-foraminotomy and fusion, C4-5, C5-6  2. Application of biomechanical interbody cage, C4-5, C5-6  3. Anterior cervical spinal plate instrumentation, C4-5, C5-6  4. Application of local bone autograft and allograft (1 mL nanOss). 5. Intraoperative neuromonitoring (somatosensory evoked potentials). 6. Intraoperative microscope. 7. Intraoperative fluoroscopy. DESCRIPTION OF PROCEDURE     ANESTHESIA  General endotrachial anesthesia     ESTIMATED BLOOD LOSS  Less than 25 ml     URINE OUTPUT   n/a    DRAINS  Medium hemovac        IMPLANTS  Demarcus ACDF cage with anterior plate, screws 09IK    COMPLICATIONS   None. INDICATIONS  The patient is a 74year old male, with a persistant cervical radiculopathy and neck pain despite physical therapy, injection therapy, and medical treatment. Risks and benefits of anterior spinal fusion surgery were explained to the patient and family/guardian in great detail. Risks included infection, nerve damage, blood loss requiring transfusion, reoperation for any reason, and the general risks of anesthesia. A consent was signed. PROCEDURE  The patient was met in the preoperative area. The neck was marked on the operative site. ROSALINA hose stockings were placed on the bilateral lower extremities. They was transferred to the operating room where general anesthesia was administered via endotracheal intubation. They were given antibiotic medication for infectious prophylaxis. Neuromonitoring leads were placed on the extremities. SCDs were placed on the bilateral lower extremities and turned on for DVT prophylaxis. The patient was repositioned supine on the operative table without a bump.  Arms were draped at the sides with arm cradles, padding the elbows. All other bony prominences were thoroughly padded. The shoulders were depressed using a small amount of traction by taping the shoulders down to the bed. We obtained a lateral fluoroscopic view to nat our start site and confirm adequate bony visualization. The neck was then prepped and draped in the usual sterile fashion. A timeout was performed in conjunction with WHO guidelines, and everyone in the room was in agreement with regard to the patient, procedure, surgical site, instrumentation and antibiotics. We began by making a transverse incision in line with one of the patient's neck crease. We sharply incised through the skin down to the level of the platysma muscle. The platysma was sharply defined with a knife. We undermined the skin slightly above the platysma in order to create extra mobility. We then divided the platysma with Bovie electrocautery. Metzenbaum scissors were used to spread under the platysma, and it was further divided in a transverse fashion. We then undermined the platysma in both the cephalad and caudal directions. We next developed the interval between the strap muscles and the sternocleidomastoid and proceeded with the standard approach down to the anterior cervical spine. The omohyoid was retracted inferiorly and medially. The plane between the midline structures and SCM muscle was gently opened with scissors dissection. Care was taken to maintain hemostasis at every step using retraction and electrocautery. The cervical spine was identified, and the prevertebral fascia was divided using the Metzenbaum scissors. Peanut retractors were then used to spread and open the fascia from a medial to lateral direction. A spinal needle was placed in the presumed C4 body, and its position was confirmed on lateral fluoroscopy. We then completely exposed the C4-5 disk. A Shadow-Line retractor was then placed under the longus colli.  [place] pins were placed in C4 and C5 body. For the superior body, the caspar pin was removed and bone marrow aspirate was removed. We again used fluoroscopy to confirm that we were at the correct level. An annulotomy was performed using a 15 blade Under the microscope, diskectomy and endplate preparation was performed with a series of pituitary rongeurs, Kerrison rongeurs and curved and straight curettes. We then scraped the endplates with the curette. The anteroinferior ridge at C4 was removed with a #3 Kerrison punch after distracting through the [place] pins. We then used a bur to remove 1 to 2 mm of the sclerotic endplate from the superior aspect of the superior body. Curved curettes were used to pass around the posterior vertebral body. The posterior longitudinal ligament was identified and resected with a #1 Kerrison. Foraminotomies were performed bilaterally as we had dissected out to the level of the uncovertebral joints and then undercut posteriorly. After completing our diskectomy and decompression at this level, we irrigated, then achieved hemostasis with FloSeal and a torsten. We then sized and placed our cage implant. Following this we placed our final cage for this level. We then removed the caspar pins and placed bone wax in the hole. The identical procedure was performed at C5-6. Following this we measured for our final anterior cervical plate. The plate was separate from the interbody. Screws were placed in a medial fashion through the plate. We then confirmed the proper location of the plate with flouroscopy. Final AP and lateral fluoroscopic views were obtained, confirming appropriate position of the plate. Hemostatsis was again confirmed prior to beginning closure. A hemovac drain was placed deep, overlying the vertebral bodies. We then began our closure. Closure was performed with 3-0 Vicryl to close the platysmal layer and the deep dermal layer.  Skin was closed with a running 4-0 monocryl for the subcuticular layer. All sponge and needle counts were correct prior to closure. There were no changes in our baseline SSEP neuromonitoring signals throughout the case. I was present and performed all critical aspects of this case. Due to patient body habitus, severe stenosis requiring extensive decompression, increased time and complexity was required for the operation.

## 2022-07-20 VITALS — HEIGHT: 71 IN | WEIGHT: 205 LBS | BODY MASS INDEX: 28.7 KG/M2

## 2022-07-22 ENCOUNTER — HOSPITAL ENCOUNTER (OUTPATIENT)
Dept: INTERVENTIONAL RADIOLOGY/VASCULAR | Facility: HOSPITAL | Age: 72
Discharge: HOME | End: 2022-07-22
Attending: SURGERY
Payer: MEDICARE

## 2022-08-23 ENCOUNTER — HOSPITAL ENCOUNTER (OUTPATIENT)
Dept: INTERVENTIONAL RADIOLOGY/VASCULAR | Facility: HOSPITAL | Age: 72
Discharge: HOME | End: 2022-08-23
Attending: SURGERY | Admitting: SURGERY
Payer: MEDICARE

## 2022-08-23 VITALS
BODY MASS INDEX: 27.3 KG/M2 | OXYGEN SATURATION: 98 % | RESPIRATION RATE: 22 BRPM | HEIGHT: 71 IN | SYSTOLIC BLOOD PRESSURE: 130 MMHG | TEMPERATURE: 98 F | WEIGHT: 195 LBS | DIASTOLIC BLOOD PRESSURE: 69 MMHG | HEART RATE: 58 BPM

## 2022-08-23 DIAGNOSIS — I70.292 ATHEROSCLEROSIS OF NATIVE ARTERY OF LEFT LOWER EXTREMITY WITH OTHER CLINICAL MANIFESTATION (HCC): ICD-10-CM

## 2022-08-23 PROCEDURE — 75710 ARTERY X-RAYS ARM/LEG: CPT | Performed by: SURGERY

## 2022-08-23 PROCEDURE — B41G1ZZ FLUOROSCOPY OF LEFT LOWER EXTREMITY ARTERIES USING LOW OSMOLAR CONTRAST: ICD-10-PCS | Performed by: SURGERY

## 2022-08-23 PROCEDURE — 99152 MOD SED SAME PHYS/QHP 5/>YRS: CPT | Performed by: SURGERY

## 2022-08-23 PROCEDURE — 99153 MOD SED SAME PHYS/QHP EA: CPT | Performed by: SURGERY

## 2022-08-23 PROCEDURE — 047N34Z DILATION OF LEFT POPLITEAL ARTERY WITH DRUG-ELUTING INTRALUMINAL DEVICE, PERCUTANEOUS APPROACH: ICD-10-PCS | Performed by: SURGERY

## 2022-08-23 PROCEDURE — 37226 HC TRANSLUM ANGIOPLASTY W STENT FEM POP UNILAT: CPT | Performed by: SURGERY

## 2022-08-23 RX ORDER — HYDROCODONE BITARTRATE AND ACETAMINOPHEN 5; 325 MG/1; MG/1
2 TABLET ORAL EVERY 4 HOURS PRN
Status: DISCONTINUED | OUTPATIENT
Start: 2022-08-23 | End: 2022-08-23

## 2022-08-23 RX ORDER — SODIUM CHLORIDE 9 MG/ML
INJECTION, SOLUTION INTRAVENOUS CONTINUOUS
Status: DISCONTINUED | OUTPATIENT
Start: 2022-08-23 | End: 2022-08-23

## 2022-08-23 RX ORDER — ACETAMINOPHEN 325 MG/1
650 TABLET ORAL EVERY 4 HOURS PRN
Status: DISCONTINUED | OUTPATIENT
Start: 2022-08-23 | End: 2022-08-23

## 2022-08-23 RX ORDER — MORPHINE SULFATE 4 MG/ML
2 INJECTION, SOLUTION INTRAMUSCULAR; INTRAVENOUS EVERY 2 HOUR PRN
Status: DISCONTINUED | OUTPATIENT
Start: 2022-08-23 | End: 2022-08-23

## 2022-08-23 RX ORDER — HEPARIN SODIUM 5000 [USP'U]/ML
INJECTION, SOLUTION INTRAVENOUS; SUBCUTANEOUS
Status: COMPLETED
Start: 2022-08-23 | End: 2022-08-23

## 2022-08-23 RX ORDER — CEFAZOLIN SODIUM/WATER 2 G/20 ML
SYRINGE (ML) INTRAVENOUS
Status: COMPLETED
Start: 2022-08-23 | End: 2022-08-23

## 2022-08-23 RX ORDER — MIDAZOLAM HYDROCHLORIDE 1 MG/ML
INJECTION INTRAMUSCULAR; INTRAVENOUS
Status: COMPLETED
Start: 2022-08-23 | End: 2022-08-23

## 2022-08-23 RX ORDER — LIDOCAINE HYDROCHLORIDE 10 MG/ML
INJECTION, SOLUTION EPIDURAL; INFILTRATION; INTRACAUDAL; PERINEURAL
Status: COMPLETED
Start: 2022-08-23 | End: 2022-08-23

## 2022-08-23 RX ORDER — HYDROCODONE BITARTRATE AND ACETAMINOPHEN 10; 325 MG/1; MG/1
1 TABLET ORAL EVERY 4 HOURS PRN
Status: DISCONTINUED | OUTPATIENT
Start: 2022-08-23 | End: 2022-08-23

## 2022-08-23 RX ORDER — HYDROCODONE BITARTRATE AND ACETAMINOPHEN 5; 325 MG/1; MG/1
1 TABLET ORAL EVERY 4 HOURS PRN
Status: DISCONTINUED | OUTPATIENT
Start: 2022-08-23 | End: 2022-08-23

## 2022-08-23 RX ORDER — IODIXANOL 320 MG/ML
100 INJECTION, SOLUTION INTRAVASCULAR
Status: COMPLETED | OUTPATIENT
Start: 2022-08-23 | End: 2022-08-23

## 2022-08-23 RX ORDER — CLOPIDOGREL BISULFATE 75 MG/1
TABLET ORAL
Status: COMPLETED
Start: 2022-08-23 | End: 2022-08-23

## 2022-08-23 RX ORDER — MORPHINE SULFATE 4 MG/ML
4 INJECTION, SOLUTION INTRAMUSCULAR; INTRAVENOUS EVERY 2 HOUR PRN
Status: DISCONTINUED | OUTPATIENT
Start: 2022-08-23 | End: 2022-08-23

## 2022-08-23 RX ORDER — MORPHINE SULFATE 4 MG/ML
1 INJECTION, SOLUTION INTRAMUSCULAR; INTRAVENOUS EVERY 2 HOUR PRN
Status: DISCONTINUED | OUTPATIENT
Start: 2022-08-23 | End: 2022-08-23

## 2022-08-23 RX ADMIN — SODIUM CHLORIDE: 9 INJECTION, SOLUTION INTRAVENOUS at 12:15:00

## 2022-08-23 RX ADMIN — IODIXANOL 100 ML: 320 INJECTION, SOLUTION INTRAVASCULAR at 15:34:00

## 2022-08-23 NOTE — PROGRESS NOTES
S/p PV angio with stent, right groin soft C/D/I. Pt arrived via bed  without complaints, denies pain 0/10 on pain scale. MD speaking with pt/friend greg. Discharge instructions given/explained to pt/friend greg, all questions answered, verbalized understanding. Report given to [facility], right groin assessed, all questions answered.

## 2022-08-23 NOTE — PROGRESS NOTES
Sam Verdugo notified  And left message that pts procedure is completed and return to [phone] to speak with MD.

## 2022-08-23 NOTE — PROGRESS NOTES
Pt able to eat, drink, ambulate and void without difficulty. Right groin remains intact. Discharge instructions reviewed with pt. IV discontinued, [facility]. Pt's friend driving pt home.

## 2022-08-23 NOTE — BRIEF OP NOTE
Pre-Operative Diagnosis: Atherosclerosis with ulcer left foot, hammer toe     Post-Operative Diagnosis: same     Procedure Performed:   1. US percutaneous access right common femoral artery   2. Selection of left common femoral artery and angiogram left leg  3. Balloon angioplasty and stent of mid popliteal artery with 7x60 biomimics self expanding stent    Anesthesia 4 V 100 F, start 1429, finish 1527    Assistant(s):        Surgical Findings:   1.  Left common femoral artery and profunda patent, left SFA patent, mid popliteal occlusion treated with angioplasty and stent, anterior tibial artery and posterior tibial runoff to foot, peroneal to ankle     Specimen: none     Estimated Blood Loss: 50 mL    Tom Lorenzo MD  8/23/2022  3:34 PM

## 2022-08-24 NOTE — PROCEDURES
[facility]    PATIENT'S NAME: Soumya Larsen   ATTENDING PHYSICIAN: Ruma Jay M.D. OPERATING PHYSICIAN: Ruma Jay M.D. PATIENT ACCOUNT#:   [de-identified]    [facility]  MEDICAL RECORD #:   [id number]       YOB: 1950  ADMISSION DATE:       08/23/2022      OPERATION DATE:  08/23/2022    CARDIAC PROCEDURE TRANSCRIPTION    PERIPHERAL ANGIOGRAPHY/PERCUTANEOUS PERIPHERAL INTERVENTION     PREOPERATIVE DIAGNOSIS:  Atherosclerosis with ulcer, left foot, and hammertoe. POSTOPERATIVE DIAGNOSIS:  Atherosclerosis with ulcer, left foot, and hammertoe. PROCEDURE PERFORMED:  1. Ultrasound-guided percutaneous access, right common femoral artery. 2.   Selection of left common femoral artery and angiogram of the left leg. 3.   Balloon angioplasty and stenting of the mid popliteal artery with a 7 x 60 BioMimics self-expanding stent. ASSISTANT:  Catheterization lab staff. SURGICAL FINDINGS:  Left common femoral artery and profunda were patent. Left SFA was patent. Mid popliteal artery occlusion treated with angioplasty and stent. Anterior tibial artery and posterior tibial artery runoff to the foot, peroneal to the ankle. SPECIMEN:  None. ESTIMATED BLOOD LOSS:  50 mL. BRIEF HISTORY:  This is a 73-year-old male with significant history of a right common femoral artery Dacron patch performed by one of my previous partners and previous SFA and popliteal stents on the right. He has developed hammertoe of the third and first toes of his left foot and has developed recurrent ulcers associated with hammertoes and requires surgery. On noninvasive testing, he has a popliteal artery occlusion. Prior to proceeding with his hammertoe operation, we are proceeding with angiogram as described below. ANESTHESIA:  Moderate conscious sedation with 4 mg of Versed and 100 mcg of fentanyl. Start time was 1429, finish was 1527 for a total of 58 minutes.     DETAILS OF PROCEDURE:  Patient was taken to the catheterization lab, prepped and draped in sterile fashion. Moderate conscious sedation was initiated and monitored by a qualified nurse under my supervision. Using ultrasound, I percutaneously accessed the right common femoral artery Dacron patch with a micropuncture sheath. I then advanced a Glidewire Advantage into the aorta and then dilated the tract first with the 5 dilator, then the 5-Japanese sheath was placed. I then advanced a Crossover catheter into the aorta, hooked the aortic bifurcation, and did an angiogram of the left common iliac and external iliac, both of which were widely patent. I then advanced both down to the level of the left common femoral artery and from the position of the left common femoral artery, did an angiogram of the left leg. The left common femoral artery and profunda were patent. Left SFA was patent. The left proximal popliteal was patent, but the mid popliteal artery at the level of the patella was occluded. There was reconstitution through collaterals of the below-knee popliteal artery, and the anterior tibial artery and posterior tibial artery with runoff to the foot, and the peroneal was patent to the ankle. Having completed the diagnostic, I then advanced the Glidewire Advantage into the superficial femoral artery. I then exchanged the 5-Japanese sheath for a 6-Japanese 65 cm sheath and the sheath went up and over the aortic bifurcation and into the superficial femoral artery with the tip in the superficial femoral artery. The patient was systemically heparinized, and I then advanced the Glidewire Advantage and the Navicross down to the level of the occlusion. There was a large collateral, and attempts at engaging with the Glidewire Advantage and the Navicross and engaging with the catheter were unsuccessful due to the catheter was preferentially going into the collaterals.   I then exchanged for a Gladius wire and eventually, with the Gladius wire, I was able to engage the chronic total occlusion. I had to switch the Navicross out for a Quick-Cross 0.018 and was ultimately able to engage the  more efficiently and was able to cross the chronic total occlusion with the Gladius wire and the Glidewire Advantage and stayed intraluminal and entered into the below-knee popliteal artery. This was confirmed with an angiogram through the Marletta Hillock in the below-knee popliteal artery. After confirming intraluminal re-entry through the Marletta Hillock, I then placed an 0.018 Glidewire Advantage and advanced the catheter into the peroneal artery. I then selected a 5 x 60, 0.018 balloon and balloon angioplastied the total occlusion in the mid popliteal artery. This resulted in a suboptimal result. I then, over the Navicross, exchanged the 0.018 Glidewire Advantage for an 0.035 J-tip Sorensen wire and positioned the tip of it in the below-knee popliteal artery. I then selected a 7 x 60 [employer] stent, and the stent was deployed in standard fashion in the mid popliteal artery. This was then profiled with a 6 x 60 balloon throughout the length of it and a 7 x 20 balloon in the proximal segment. Repeat angiogram showed an excellent technical result with a widely patent stent. I then, to further evaluate, took the magnification off the II and did a completion run and there was wide patency of the anterior tibial artery, the posterior tibial artery, both of which were patent to the foot and the peroneal was patent to the ankle. Satisfied with this result, I then removed all devices and placed a Perclose device. Patient was then awakened from anesthesia and taken to Recovery in stable condition.     Dictated By Raphael Ramirez M.D.  d: 08/23/2022 21:52:28  t: 08/24/2022 00:23:31  Job [id number]  KGY/

## 2022-10-21 ENCOUNTER — HOSPITAL ENCOUNTER (EMERGENCY)
Facility: HOSPITAL | Age: 72
Discharge: HOME | End: 2022-10-21
Attending: EMERGENCY MEDICINE
Payer: MEDICARE

## 2022-10-21 VITALS
WEIGHT: 195 LBS | HEIGHT: 71 IN | SYSTOLIC BLOOD PRESSURE: 118 MMHG | DIASTOLIC BLOOD PRESSURE: 67 MMHG | OXYGEN SATURATION: 96 % | BODY MASS INDEX: 27.3 KG/M2 | HEART RATE: 57 BPM | RESPIRATION RATE: 18 BRPM | TEMPERATURE: 97 F

## 2022-10-21 DIAGNOSIS — T14.8XXA BLEEDING FROM WOUND: Primary | ICD-10-CM

## 2022-10-21 PROCEDURE — 99283 EMERGENCY DEPT VISIT LOW MDM: CPT | Performed by: EMERGENCY MEDICINE

## 2022-10-21 NOTE — ED INITIAL ASSESSMENT (HPI)
Pt states he has had osteomyelitis in his right foot over the last couple years. States the wound has opened up and is bleeding. Unable to get in the wound clinic to have it evaluated.

## 2022-10-31 ENCOUNTER — OFFICE VISIT (OUTPATIENT)
Dept: WOUND CARE | Facility: HOSPITAL | Age: 72
End: 2022-10-31
Attending: INTERNAL MEDICINE
Payer: MEDICARE

## 2022-10-31 VITALS
HEART RATE: 82 BPM | DIASTOLIC BLOOD PRESSURE: 83 MMHG | HEIGHT: 70 IN | RESPIRATION RATE: 18 BRPM | SYSTOLIC BLOOD PRESSURE: 168 MMHG | BODY MASS INDEX: 27.92 KG/M2 | TEMPERATURE: 98 F | WEIGHT: 195 LBS

## 2022-10-31 DIAGNOSIS — M21.372 BILATERAL FOOT-DROP: ICD-10-CM

## 2022-10-31 DIAGNOSIS — L97.512 RIGHT FOOT ULCER, WITH FAT LAYER EXPOSED (HCC): Primary | ICD-10-CM

## 2022-10-31 DIAGNOSIS — R73.09 IMPAIRED GLUCOSE METABOLISM: ICD-10-CM

## 2022-10-31 DIAGNOSIS — M21.371 BILATERAL FOOT-DROP: ICD-10-CM

## 2022-10-31 DIAGNOSIS — G60.3 IDIOPATHIC PROGRESSIVE POLYNEUROPATHY: ICD-10-CM

## 2022-10-31 PROCEDURE — 99214 OFFICE O/P EST MOD 30 MIN: CPT

## 2022-10-31 PROCEDURE — 87205 SMEAR GRAM STAIN: CPT | Performed by: INTERNAL MEDICINE

## 2022-10-31 PROCEDURE — 87070 CULTURE OTHR SPECIMN AEROBIC: CPT | Performed by: INTERNAL MEDICINE

## 2022-10-31 PROCEDURE — 11042 DBRDMT SUBQ TIS 1ST 20SQCM/<: CPT | Performed by: INTERNAL MEDICINE

## 2022-10-31 RX ORDER — GENTAMICIN SULFATE 1 MG/G
1 OINTMENT TOPICAL 3 TIMES DAILY
Qty: 30 G | Refills: 3 | Status: SHIPPED | OUTPATIENT
Start: 2022-10-31

## 2022-10-31 NOTE — PATIENT INSTRUCTIONS
Orders Placed This Encounter      CBC With Differential With Platelet          Standing Status: Future          Standing Expiration Date: 10/31/2023      Prealbumin          Standing Status: Future          Standing Expiration Date: 10/31/2023      Hemoglobin A1C          Standing Status: Future          Standing Expiration Date: 10/31/2023      C-Reactive Protein          Standing Status: Future          Standing Expiration Date: 10/31/2023      Sed [place] (Automated)          Standing Status: Future          Standing Expiration Date: 10/31/2023      Comp Metabolic Panel (14)          Standing Status: Future          Standing Expiration Date: 10/31/2023      gentamicin 0.1 % External Ointment          Sig: Apply 1 Application topically 3 (three) times daily. Dispense:  30 g          Refill:  3        Wound Cleaning and Dressings:    Wash your hands with soap and water. Always wear gloves while changing dressings. Donot touch wound / shanel-wound skin with un-gloved hands. Remove old dressing, discard and place into trash. DRESSINGS: GENTAMYCIN BID / GAUZE  Change dressing daily. Compression Therapy : spandagrip  Compression Therapy Instructions:  1. Put on first thing in the morning and may remove at bedside. Okay to wear overnight      if comfortable. Do not let stockings roll up/down and kink. Hand wash stockings and      hang dry as needed. 2.  Avoid prolonged standing in one place. It is better to have your calf muscles moving       to pump fluid out of the legs. 3.  Elevate leg(s) above the level of the heart when sitting or as much as possible. 4.  Take your diuretics as directed by your provider. Do not skip doses or change doses      unless instructed to do so by your provider. 5. Do not get leg(s) with compression wrap wet.  If wraps are too tight as indicated        By pain, numbness/tingling or discoloration of toes remove wrap completely       and call the wound center. Off-Loading: DARCOE SHOE    Miscellaneous Instructions:  Supplement with a daily multivitamin   Low salt diet  Intense blood sugar control - Goal Blood sugar below 180 at all times recommended. Increase protein intake / consider protein supplements - see below  Elevate extremities at all times when sitting / laying down. DIETARY MODIFICATIONS TO HELP WITH WOUND HEALING:    Protein: Meats, beans, eggs, milk and yogurt particularly [place] yogurt), tofu, soy nuts, soy protein products    Vitamin C: Citrus fruits and juices, strawberries, tomatoes, tomato juice, peppers, baked potatoes, spinach, broccoli, cauliflower, [place] sprouts, cabbage    Vitamin A: Dark green, leafy vegetables, orange or yellow vegetables, cantaloupe, fortified dairy products, liver, fortified cereals    Zinc: Fortified cereals, red meats, seafood    Consider Christ by [employer] (These are essential branch chain amino acids that help with tissue building and wound healing) and take 2 packets/day. you can order online at [employer]ott or [address] [place] [address] Drive REMINDERS:    The treatment plan has been discussed at length with you and your provider. Follow all instructions carefully, it is very important. If you do not follow all instructions, you are at  risk of your wound not healing, infection, possible loss of limb and even end of life. Please call the clinic during regular business hours ( 7:30 AM - 5:30 PM) if you notice increased bleeding, redness, warmth, pain or pus like drainage or start running a fever greater than 100.3. For after hour emergencies, please call your primary physician or go to the nearest emergency room.

## 2022-10-31 NOTE — PROGRESS NOTES
Patient ID: Sanchez Barton is a 74year old male.     Debridement   Wound 10/31/22 #2 Pressure Injury Foot Right;Plantar    Consent obtained? verbal  Consent given by: patient    Performed by: provider  Debridement type: surgical  Level of debridement: subcutaneous tissue  Pain control: lidocaine 4%  Pre-debridement measurements  Length (cm): 0.3  Width (cm): 0.6  Depth (cm): 0.6  Surface Area (cm^2): 0.18  Volume (cm^3): 0.11    Post-debridement measurements  Length (cm): 0.3  Width (cm): 0.6  Depth (cm): 0.3  Percent debrided: 100%  Surface Area (cm^2): 0.18  Area debrided (cm^2): 0.18  Volume (cm^3): 0.05  Tissue and other material debrided: subcutaneous tissue  Devitalized tissue debrided: biofilm, exudate and slough  Instrument(s) utilized: blade and forceps  Bleeding: small  Hemostasis obtained with: pressure  Procedural pain (0-10): 0  Post-procedural pain: 0   Response to treatment: procedure was tolerated well    Debridement   Wound 10/31/22 #3 Pressure Injury Foot Right;Medial    Consent obtained? verbal  Consent given by: patient    Performed by: provider  Debridement type: surgical  Level of debridement: subcutaneous tissue  Pain control: lidocaine 4%  Pre-debridement measurements  Length (cm): 0.8  Width (cm): 0.7  Depth (cm): 0.1  Surface Area (cm^2): 0.56  Volume (cm^3): 0.06    Post-debridement measurements  Length (cm): 0.8  Width (cm): 0.8  Depth (cm): 0.1  Percent debrided: 100%  Surface Area (cm^2): 0.64  Area debrided (cm^2): 0.64  Volume (cm^3): 0.06  Tissue and other material debrided: subcutaneous tissue  Devitalized tissue debrided: biofilm and slough  Instrument(s) utilized: blade and forceps  Bleeding: small  Hemostasis obtained with: silver nitrate  Procedural pain (0-10): 0  Post-procedural pain: 0   Response to treatment: procedure was tolerated well

## 2022-10-31 NOTE — PROGRESS NOTES
.Weekly Wound Education Note    Teaching Provided To: Patient  Training Topics: Off-loading;Cleasing and general instructions;Dressing; Discharge instructions  Training Method: Explain/Verbal;Written  Training Response: Patient responds and understands           Patient states he has an appointment with Dr. Heather Ridley next week. Right medial foot wound cultured this visit. Labs and xray ordered. Gentamicin ordered, patient to apply BID. Darco shoe with peg insert provided.

## 2022-11-04 ENCOUNTER — LAB ENCOUNTER (OUTPATIENT)
Dept: LAB | Facility: HOSPITAL | Age: 72
End: 2022-11-04
Attending: INTERNAL MEDICINE
Payer: MEDICARE

## 2022-11-04 ENCOUNTER — HOSPITAL ENCOUNTER (OUTPATIENT)
Dept: GENERAL RADIOLOGY | Facility: HOSPITAL | Age: 72
Discharge: HOME | End: 2022-11-04
Attending: INTERNAL MEDICINE
Payer: MEDICARE

## 2022-11-04 DIAGNOSIS — L97.512 RIGHT FOOT ULCER, WITH FAT LAYER EXPOSED (HCC): ICD-10-CM

## 2022-11-04 LAB
ALBUMIN SERPL-MCNC: 3.5 G/DL (ref 3.4–5)
ALBUMIN/GLOB SERPL: 0.9 {RATIO} (ref 1–2)
ALP LIVER SERPL-CCNC: 94 U/L
ALT SERPL-CCNC: 26 U/L
ANION GAP SERPL CALC-SCNC: 2 MMOL/L (ref 0–18)
AST SERPL-CCNC: 24 U/L (ref 15–37)
BASOPHILS # BLD AUTO: 0.04 X10(3) UL (ref 0–0.2)
BASOPHILS NFR BLD AUTO: 0.8 %
BILIRUB SERPL-MCNC: 0.7 MG/DL (ref 0.1–2)
BUN BLD-MCNC: 10 MG/DL (ref 7–18)
CALCIUM BLD-MCNC: 8.8 MG/DL (ref 8.5–10.1)
CHLORIDE SERPL-SCNC: 107 MMOL/L (ref 98–112)
CO2 SERPL-SCNC: 31 MMOL/L (ref 21–32)
CREAT BLD-MCNC: 0.88 MG/DL
CRP SERPL-MCNC: 0.49 MG/DL (ref ?–0.3)
EOSINOPHIL # BLD AUTO: 0.18 X10(3) UL (ref 0–0.7)
EOSINOPHIL NFR BLD AUTO: 3.6 %
ERYTHROCYTE [DISTWIDTH] IN BLOOD BY AUTOMATED COUNT: 16.2 %
ERYTHROCYTE [SEDIMENTATION RATE] IN BLOOD: 59 MM/HR
EST. AVERAGE GLUCOSE BLD GHB EST-MCNC: 143 MG/DL (ref 68–126)
FASTING STATUS PATIENT QL REPORTED: NO
GFR SERPLBLD BASED ON 1.73 SQ M-ARVRAT: 90 ML/MIN/1.73M2 (ref 60–?)
GLOBULIN PLAS-MCNC: 4 G/DL (ref 2.8–4.4)
GLUCOSE BLD-MCNC: 116 MG/DL (ref 70–99)
HBA1C MFR BLD: 6.6 % (ref ?–5.7)
HCT VFR BLD AUTO: 43.3 %
HGB BLD-MCNC: 14 G/DL
IMM GRANULOCYTES # BLD AUTO: 0.01 X10(3) UL (ref 0–1)
IMM GRANULOCYTES NFR BLD: 0.2 %
LYMPHOCYTES # BLD AUTO: 1.3 X10(3) UL (ref 1–4)
LYMPHOCYTES NFR BLD AUTO: 25.7 %
MCH RBC QN AUTO: 29.1 PG (ref 26–34)
MCHC RBC AUTO-ENTMCNC: 32.3 G/DL (ref 31–37)
MCV RBC AUTO: 90 FL
MONOCYTES # BLD AUTO: 0.52 X10(3) UL (ref 0.1–1)
MONOCYTES NFR BLD AUTO: 10.3 %
NEUTROPHILS # BLD AUTO: 3 X10 (3) UL (ref 1.5–7.7)
NEUTROPHILS # BLD AUTO: 3 X10(3) UL (ref 1.5–7.7)
NEUTROPHILS NFR BLD AUTO: 59.4 %
OSMOLALITY SERPL CALC.SUM OF ELEC: 290 MOSM/KG (ref 275–295)
PLATELET # BLD AUTO: 194 10(3)UL (ref 150–450)
POTASSIUM SERPL-SCNC: 4.3 MMOL/L (ref 3.5–5.1)
PREALB SERPL-MCNC: 18.6 MG/DL (ref 20–40)
PROT SERPL-MCNC: 7.5 G/DL (ref 6.4–8.2)
RBC # BLD AUTO: 4.81 X10(6)UL
SODIUM SERPL-SCNC: 140 MMOL/L (ref 136–145)
WBC # BLD AUTO: 5.1 X10(3) UL (ref 4–11)

## 2022-11-04 PROCEDURE — 86140 C-REACTIVE PROTEIN: CPT

## 2022-11-04 PROCEDURE — 73630 X-RAY EXAM OF FOOT: CPT | Performed by: INTERNAL MEDICINE

## 2022-11-04 PROCEDURE — 85025 COMPLETE CBC W/AUTO DIFF WBC: CPT

## 2022-11-04 PROCEDURE — 84134 ASSAY OF PREALBUMIN: CPT

## 2022-11-04 PROCEDURE — 85652 RBC SED RATE AUTOMATED: CPT

## 2022-11-04 PROCEDURE — 36415 COLL VENOUS BLD VENIPUNCTURE: CPT

## 2022-11-04 PROCEDURE — 83036 HEMOGLOBIN GLYCOSYLATED A1C: CPT

## 2022-11-04 PROCEDURE — 80053 COMPREHEN METABOLIC PANEL: CPT

## 2022-11-04 NOTE — PROGRESS NOTES
Spoke to patient - Labs reviewed by provider, kidneys ok - oral abx ordered. New order discussed with patient. Informed him his protein is low and provider recommends increasing protein intake (ie: protein shakes, andres). He verbalized understanding.

## 2022-11-07 ENCOUNTER — OFFICE VISIT (OUTPATIENT)
Dept: WOUND CARE | Facility: HOSPITAL | Age: 72
End: 2022-11-07
Attending: INTERNAL MEDICINE
Payer: MEDICARE

## 2022-11-07 VITALS
SYSTOLIC BLOOD PRESSURE: 140 MMHG | TEMPERATURE: 98 F | HEART RATE: 100 BPM | RESPIRATION RATE: 18 BRPM | DIASTOLIC BLOOD PRESSURE: 82 MMHG

## 2022-11-07 DIAGNOSIS — L97.512 RIGHT FOOT ULCER, WITH FAT LAYER EXPOSED (HCC): Primary | ICD-10-CM

## 2022-11-07 DIAGNOSIS — L08.9 INFECTED WOUND: ICD-10-CM

## 2022-11-07 DIAGNOSIS — T14.8XXA INFECTED WOUND: ICD-10-CM

## 2022-11-07 DIAGNOSIS — A49.01 STAPH AUREUS INFECTION: ICD-10-CM

## 2022-11-07 DIAGNOSIS — A49.8 E COLI INFECTION: ICD-10-CM

## 2022-11-07 DIAGNOSIS — S91.102A OPEN WOUND OF LEFT GREAT TOE, INITIAL ENCOUNTER: ICD-10-CM

## 2022-11-07 DIAGNOSIS — R73.09 IMPAIRED GLUCOSE METABOLISM: ICD-10-CM

## 2022-11-07 DIAGNOSIS — G60.3 IDIOPATHIC PROGRESSIVE POLYNEUROPATHY: ICD-10-CM

## 2022-11-07 PROCEDURE — 99214 OFFICE O/P EST MOD 30 MIN: CPT

## 2022-11-07 NOTE — PATIENT INSTRUCTIONS
Wound Cleaning and Dressings:    Wash your hands with soap and water. Always wear gloves while changing dressings. Donot touch wound / shanel-wound skin with un-gloved hands. Remove old dressing, discard and place into trash. DRESSINGS: GENTAMYCIN BID / GAUZE on all wounds  Change dressing daily. Compression Therapy : spandagrip  Compression Therapy Instructions:  1. Put on first thing in the morning and may remove at bedside. Okay to wear overnight      if comfortable. Do not let stockings roll up/down and kink. Hand wash stockings and      hang dry as needed. 2.  Avoid prolonged standing in one place. It is better to have your calf muscles moving       to pump fluid out of the legs. 3.  Elevate leg(s) above the level of the heart when sitting or as much as possible. 4.  Take your diuretics as directed by your provider. Do not skip doses or change doses      unless instructed to do so by your provider. 5. Do not get leg(s) with compression wrap wet. If wraps are too tight as indicated        By pain, numbness/tingling or discoloration of toes remove wrap completely       and call the   wound center. Off-Loading: DARCOE SHOE    Miscellaneous Instructions:  Supplement with a daily multivitamin   Low salt diet  Intense blood sugar control - Goal Blood sugar below 180 at all times recommended. Increase protein intake / consider protein supplements - see below  Elevate extremities at all times when sitting / laying down.     DIETARY MODIFICATIONS TO HELP WITH WOUND HEALING:    Protein: Meats, beans, eggs, milk and yogurt particularly [place] yogurt), tofu, soy nuts, soy protein products    Vitamin C: Citrus fruits and juices, strawberries, tomatoes, tomato juice, peppers, baked potatoes, spinach, broccoli, cauliflower, [place] sprouts, cabbage    Vitamin A: Dark green, leafy vegetables, orange or yellow vegetables, cantaloupe, fortified dairy products, liver, fortified cereals    Zinc: Fortified cereals, red meats, seafood    Consider Christ by [employer] (These are essential branch chain amino acids that help with tissue building and wound healing) and take 2 packets/day. you can order online at [employer] or [facility] REMINDERS:    The treatment plan has been discussed at length with you and your provider. Follow all instructions carefully, it is very important. If you do not follow all instructions, you are at  risk of your wound not healing, infection, possible loss of limb and even end of life. Please call the clinic during regular business hours ( 7:30 AM - 5:30 PM) if you notice increased bleeding, redness, warmth, pain or pus like drainage or start running a fever greater than 100.3. For after hour emergencies, please call your primary physician or go to the nearest emergency room.

## 2022-11-07 NOTE — PROGRESS NOTES
.Weekly Wound Education Note    Teaching Provided To: Patient  Training Topics: Test/procedures;Cleasing and general instructions;Dressing; Discharge instructions; Off-loading  Training Method: Explain/Verbal;Written  Training Response: Patient responds and understands           Patient to continue applying Gentamicin ointment to wounds twice daily. Continue taking Bactrim. Patient to continue wearing Darco shoe. Appointment with Dr. José Miguel Mendoza 11/9/22.

## 2022-11-14 ENCOUNTER — OFFICE VISIT (OUTPATIENT)
Dept: WOUND CARE | Facility: HOSPITAL | Age: 72
End: 2022-11-14
Attending: INTERNAL MEDICINE
Payer: MEDICARE

## 2022-11-14 VITALS
TEMPERATURE: 98 F | SYSTOLIC BLOOD PRESSURE: 166 MMHG | DIASTOLIC BLOOD PRESSURE: 95 MMHG | RESPIRATION RATE: 18 BRPM | HEART RATE: 100 BPM

## 2022-11-14 DIAGNOSIS — R73.09 IMPAIRED GLUCOSE METABOLISM: ICD-10-CM

## 2022-11-14 DIAGNOSIS — L97.512 RIGHT FOOT ULCER, WITH FAT LAYER EXPOSED (HCC): Primary | ICD-10-CM

## 2022-11-14 DIAGNOSIS — S91.102D OPEN WOUND OF LEFT GREAT TOE, SUBSEQUENT ENCOUNTER: ICD-10-CM

## 2022-11-14 DIAGNOSIS — G60.3 IDIOPATHIC PROGRESSIVE POLYNEUROPATHY: ICD-10-CM

## 2022-11-14 PROCEDURE — 97597 DBRDMT OPN WND 1ST 20 CM/<: CPT | Performed by: INTERNAL MEDICINE

## 2022-11-14 NOTE — PROGRESS NOTES
Patient ID: Gabriella Pope is a 74year old male.     Debridement   Wound 10/31/22 #2 Pressure Injury Foot Right;Plantar    Consent obtained? verbal  Consent given by: patient    Performed by: provider  Debridement type: selective    Pre-debridement measurements  Length (cm): 0.4  Width (cm): 0.5  Depth (cm): 0.2  Surface Area (cm^2): 0.2  Volume (cm^3): 0.04    Post-debridement measurements  Length (cm): 0.4  Width (cm): 0.5  Depth (cm): 0.3  Percent debrided: 100%  Surface Area (cm^2): 0.2  Area debrided (cm^2): 0.2  Volume (cm^3): 0.06  Devitalized tissue debrided: biofilm and slough  Instrument(s) utilized: curette  Bleeding: small  Hemostasis obtained with: pressure  Procedural pain (0-10): 0  Post-procedural pain: 0   Response to treatment: procedure was tolerated well    Debridement   Wound 10/31/22 #3 Pressure Injury Foot Right;Medial    Consent obtained? verbal  Consent given by: patient    Performed by: provider  Debridement type: selective    Pre-debridement measurements  Length (cm): 0.3  Width (cm): 0.2  Depth (cm): 0.1  Surface Area (cm^2): 0.06  Volume (cm^3): 0.01    Post-debridement measurements  Length (cm): 0.3  Width (cm): 0.2  Depth (cm): 0.2  Percent debrided: 100%  Surface Area (cm^2): 0.06  Area debrided (cm^2): 0.06  Volume (cm^3): 0.01  Devitalized tissue debrided: biofilm and slough  Instrument(s) utilized: curette  Bleeding: small  Hemostasis obtained with: not applicable  Procedural pain (0-10): 0  Post-procedural pain: 0   Response to treatment: procedure was tolerated well

## 2022-11-14 NOTE — PATIENT INSTRUCTIONS
Wound Cleaning and Dressings:    Wash your hands with soap and water. Always wear gloves while changing dressings. Donot touch wound / shanel-wound skin with un-gloved hands. Remove old dressing, discard and place into trash. DRESSINGS: GENTAMYCIN BID / GAUZE on all wounds  Change dressing daily. Compression Therapy : spandagrip  Compression Therapy Instructions:  1. Put on first thing in the morning and may remove at bedside. Okay to wear overnight      if comfortable. Do not let stockings roll up/down and kink. Hand wash stockings and      hang dry as needed. 2.  Avoid prolonged standing in one place. It is better to have your calf muscles moving       to pump fluid out of the legs. 3.  Elevate leg(s) above the level of the heart when sitting or as much as possible. 4.  Take your diuretics as directed by your provider. Do not skip doses or change doses      unless instructed to do so by your provider. 5. Do not get leg(s) with compression wrap wet. If wraps are too tight as indicated        By pain, numbness/tingling or discoloration of toes remove wrap completely       and call the   wound center. Off-Loading: DARCOE SHOE    Miscellaneous Instructions:  Supplement with a daily multivitamin   Low salt diet  Intense blood sugar control - Goal Blood sugar below 180 at all times recommended. Increase protein intake / consider protein supplements - see below  Elevate extremities at all times when sitting / laying down.     DIETARY MODIFICATIONS TO HELP WITH WOUND HEALING:    Protein: Meats, beans, eggs, milk and yogurt particularly [place] yogurt), tofu, soy nuts, soy protein products    Vitamin C: Citrus fruits and juices, strawberries, tomatoes, tomato juice, peppers, baked potatoes, spinach, broccoli, cauliflower, [place] sprouts, cabbage    Vitamin A: Dark green, leafy vegetables, orange or yellow vegetables, cantaloupe, fortified dairy products, liver, fortified cereals    Zinc: Fortified cereals, red meats, seafood    Consider Christ by [employer] (These are essential branch chain amino acids that help with tissue building and wound healing) and take 2 packets/day. you can order online at [employer]bott or [employer] [facility] REMINDERS:    The treatment plan has been discussed at length with you and your provider. Follow all instructions carefully, it is very important. If you do not follow all instructions, you are at  risk of your wound not healing, infection, possible loss of limb and even end of life. Please call the clinic during regular business hours ( 7:30 AM - 5:30 PM) if you notice increased bleeding, redness, warmth, pain or pus like drainage or start running a fever greater than 100.3. For after hour emergencies, please call your primary physician or go to the nearest emergency room.

## 2022-11-14 NOTE — PROGRESS NOTES
.Weekly Wound Education Note    Teaching Provided To: Patient  Training Topics: Dressing; Discharge instructions;Cleasing and general instructions; Off-loading  Training Method: Explain/Verbal;Written  Training Response: Patient responds and understands           Patient to continue applying Gentamicin ointment for the next 2 weeks to all wounds. Instructed to offload as much as possible and wear Darco shoes when not driving. Patient states he will schedule with Dr. Jackson James when his wounds are almost healed, he had cancelled his appointment on Nov. 9th.

## 2022-11-29 ENCOUNTER — APPOINTMENT (OUTPATIENT)
Dept: GENERAL RADIOLOGY | Age: 72
End: 2022-11-29
Attending: NURSE PRACTITIONER
Payer: MEDICARE

## 2022-11-29 ENCOUNTER — HOSPITAL ENCOUNTER (OUTPATIENT)
Age: 72
Discharge: HOME | End: 2022-11-29
Payer: MEDICARE

## 2022-11-29 VITALS
SYSTOLIC BLOOD PRESSURE: 146 MMHG | RESPIRATION RATE: 20 BRPM | HEART RATE: 95 BPM | TEMPERATURE: 99 F | DIASTOLIC BLOOD PRESSURE: 90 MMHG | OXYGEN SATURATION: 94 %

## 2022-11-29 DIAGNOSIS — S40.011A CONTUSION OF RIGHT SHOULDER, INITIAL ENCOUNTER: ICD-10-CM

## 2022-11-29 DIAGNOSIS — S20.219A CONTUSION OF RIB, UNSPECIFIED LATERALITY, INITIAL ENCOUNTER: Primary | ICD-10-CM

## 2022-11-29 DIAGNOSIS — S70.01XA CONTUSION OF RIGHT HIP, INITIAL ENCOUNTER: ICD-10-CM

## 2022-11-29 PROCEDURE — 73030 X-RAY EXAM OF SHOULDER: CPT | Performed by: NURSE PRACTITIONER

## 2022-11-29 PROCEDURE — 99203 OFFICE O/P NEW LOW 30 MIN: CPT | Performed by: NURSE PRACTITIONER

## 2022-11-29 PROCEDURE — 72072 X-RAY EXAM THORAC SPINE 3VWS: CPT | Performed by: NURSE PRACTITIONER

## 2022-11-29 PROCEDURE — 73502 X-RAY EXAM HIP UNI 2-3 VIEWS: CPT | Performed by: NURSE PRACTITIONER

## 2022-11-29 PROCEDURE — 71111 X-RAY EXAM RIBS/CHEST4/> VWS: CPT | Performed by: NURSE PRACTITIONER

## 2022-11-29 NOTE — DISCHARGE INSTRUCTIONS
Please follow-up with your primary care provider. Any abdominal pain, headache, dizziness, nausea, vomiting or any other concerns please immediately go to the emergency department.

## 2022-11-29 NOTE — ED INITIAL ASSESSMENT (HPI)
Pt presents with fall this past Thursday 11/24/22. Pt reports fell down 6-7 stairs. Pt reports striking head - right posterior. No LOC reported. Pt also has bruising to left rib area and bruising to right upper arm  Pt has good ROM to all extremities. Pt takes Plavix and another \"blood thinner similar to Eliquis\", per pt. Pt reports no vision changes and no new dizziness since fall.

## 2022-12-05 ENCOUNTER — OFFICE VISIT (OUTPATIENT)
Dept: WOUND CARE | Facility: HOSPITAL | Age: 72
End: 2022-12-05
Attending: INTERNAL MEDICINE
Payer: MEDICARE

## 2022-12-05 VITALS
TEMPERATURE: 98 F | DIASTOLIC BLOOD PRESSURE: 85 MMHG | SYSTOLIC BLOOD PRESSURE: 147 MMHG | RESPIRATION RATE: 17 BRPM | HEART RATE: 85 BPM

## 2022-12-05 DIAGNOSIS — S91.102D OPEN WOUND OF LEFT GREAT TOE, SUBSEQUENT ENCOUNTER: ICD-10-CM

## 2022-12-05 DIAGNOSIS — G60.3 IDIOPATHIC PROGRESSIVE POLYNEUROPATHY: ICD-10-CM

## 2022-12-05 DIAGNOSIS — L97.512 RIGHT FOOT ULCER, WITH FAT LAYER EXPOSED (HCC): Primary | ICD-10-CM

## 2022-12-05 DIAGNOSIS — R73.09 IMPAIRED GLUCOSE METABOLISM: ICD-10-CM

## 2022-12-05 PROCEDURE — 99214 OFFICE O/P EST MOD 30 MIN: CPT

## 2022-12-05 NOTE — PROGRESS NOTES
.Weekly Wound Education Note    Teaching Provided To: Patient  Training Topics: Discharge instructions;Dressing;Cleasing and general instructions; Off-loading  Training Method: Explain/Verbal;Written  Training Response: Patient responds and understands           Patient to make an appointment with his podiatrist.  Alternate between honey gel and Gentamicin ointment to right plantar foot wound daily.

## 2022-12-05 NOTE — PATIENT INSTRUCTIONS
Make followup appointment with podiatrist Dr. Matt Awan for further foot care. Call us and schedule appointment with us if wound reopens. Wound Cleaning and Dressings:    Wash your hands with soap and water. Always wear gloves while changing dressings. Donot touch wound / shanel-wound skin with un-gloved hands. Remove old dressing, discard and place into trash. DRESSINGS: GENTAMYCIN on all previosuly wounded areas for full decolonization. Change dressing daily. Compression Therapy : spandagrip  Compression Therapy Instructions:  1. Put on first thing in the morning and may remove at bedside. Okay to wear overnight      if comfortable. Do not let stockings roll up/down and kink. Hand wash stockings and      hang dry as needed. 2.  Avoid prolonged standing in one place. It is better to have your calf muscles moving       to pump fluid out of the legs. 3.  Elevate leg(s) above the level of the heart when sitting or as much as possible. 4.  Take your diuretics as directed by your provider. Do not skip doses or change doses      unless instructed to do so by your provider. 5. Do not get leg(s) with compression wrap wet. If wraps are too tight as indicated        By pain, numbness/tingling or discoloration of toes remove wrap completely       and call the   wound center. Off-Loading: DARCOE SHOE    Miscellaneous Instructions:  Supplement with a daily multivitamin   Low salt diet  Intense blood sugar control - Goal Blood sugar below 180 at all times recommended. Increase protein intake / consider protein supplements - see below  Elevate extremities at all times when sitting / laying down.     DIETARY MODIFICATIONS TO HELP WITH WOUND HEALING:    Protein: Meats, beans, eggs, milk and yogurt particularly [place] yogurt), tofu, soy nuts, soy protein products    Vitamin C: Citrus fruits and juices, strawberries, tomatoes, tomato juice, peppers, baked potatoes, spinach, broccoli, cauliflower, [place] sprouts, cabbage    Vitamin A: Dark green, leafy vegetables, orange or yellow vegetables, cantaloupe, fortified dairy products, liver, fortified cereals    Zinc: Fortified cereals, red meats, seafood    Consider Christ by [employer] (These are essential branch chain amino acids that help with tissue building and wound healing) and take 2 packets/day. you can order online at [employer]bott or [address] REMINDERS:    The treatment plan has been discussed at length with you and your provider. Follow all instructions carefully, it is very important. If you do not follow all instructions, you are at  risk of your wound not healing, infection, possible loss of limb and even end of life. Please call the clinic during regular business hours ( 7:30 AM - 5:30 PM) if you notice increased bleeding, redness, warmth, pain or pus like drainage or start running a fever greater than 100.3. For after hour emergencies, please call your primary physician or go to the nearest emergency room.

## 2023-01-01 ENCOUNTER — APPOINTMENT (OUTPATIENT)
Dept: CT IMAGING | Facility: HOSPITAL | Age: 73
DRG: 057 | End: 2023-01-01
Attending: EMERGENCY MEDICINE
Payer: MEDICARE

## 2023-01-01 ENCOUNTER — APPOINTMENT (OUTPATIENT)
Dept: GENERAL RADIOLOGY | Facility: HOSPITAL | Age: 73
DRG: 057 | End: 2023-01-01
Attending: EMERGENCY MEDICINE
Payer: MEDICARE

## 2023-01-01 ENCOUNTER — APPOINTMENT (OUTPATIENT)
Dept: ULTRASOUND IMAGING | Facility: HOSPITAL | Age: 73
DRG: 057 | End: 2023-01-01
Attending: NURSE PRACTITIONER
Payer: MEDICARE

## 2023-01-01 ENCOUNTER — HOSPITAL ENCOUNTER (INPATIENT)
Facility: HOSPITAL | Age: 73
LOS: 2 days | Discharge: HOME HEALTH | DRG: 057 | End: 2023-01-01
Attending: EMERGENCY MEDICINE | Admitting: INTERNAL MEDICINE
Payer: MEDICARE

## 2023-01-01 VITALS
SYSTOLIC BLOOD PRESSURE: 103 MMHG | OXYGEN SATURATION: 96 % | TEMPERATURE: 98 F | HEART RATE: 81 BPM | BODY MASS INDEX: 24 KG/M2 | RESPIRATION RATE: 16 BRPM | WEIGHT: 172 LBS | DIASTOLIC BLOOD PRESSURE: 76 MMHG

## 2023-01-01 DIAGNOSIS — D64.9 ANEMIA, UNSPECIFIED TYPE: ICD-10-CM

## 2023-01-01 DIAGNOSIS — R55 SYNCOPE AND COLLAPSE: Primary | ICD-10-CM

## 2023-01-01 DIAGNOSIS — R29.6 RECURRENT FALLS: ICD-10-CM

## 2023-01-01 LAB
ALBUMIN SERPL-MCNC: 2.9 G/DL (ref 3.4–5)
ALBUMIN/GLOB SERPL: 0.7 {RATIO} (ref 1–2)
ALP LIVER SERPL-CCNC: 111 U/L
ALT SERPL-CCNC: 26 U/L
ANION GAP SERPL CALC-SCNC: 3 MMOL/L (ref 0–18)
ANION GAP SERPL CALC-SCNC: 6 MMOL/L (ref 0–18)
ANION GAP SERPL CALC-SCNC: 6 MMOL/L (ref 0–18)
APTT PPP: 49.7 SECONDS (ref 23.3–35.6)
AST SERPL-CCNC: 56 U/L (ref 15–37)
ATRIAL RATE: 267 BPM
BASOPHILS # BLD AUTO: 0.08 X10(3) UL (ref 0–0.2)
BASOPHILS NFR BLD AUTO: 1.1 %
BILIRUB SERPL-MCNC: 1.3 MG/DL (ref 0.1–2)
BILIRUB UR QL STRIP.AUTO: NEGATIVE
BUN BLD-MCNC: 11 MG/DL (ref 7–18)
BUN BLD-MCNC: 15 MG/DL (ref 7–18)
BUN BLD-MCNC: 25 MG/DL (ref 7–18)
CALCIUM BLD-MCNC: 8.1 MG/DL (ref 8.5–10.1)
CALCIUM BLD-MCNC: 8.4 MG/DL (ref 8.5–10.1)
CALCIUM BLD-MCNC: 8.7 MG/DL (ref 8.5–10.1)
CHLORIDE SERPL-SCNC: 101 MMOL/L (ref 98–112)
CHLORIDE SERPL-SCNC: 102 MMOL/L (ref 98–112)
CHLORIDE SERPL-SCNC: 105 MMOL/L (ref 98–112)
CO2 SERPL-SCNC: 24 MMOL/L (ref 21–32)
CO2 SERPL-SCNC: 26 MMOL/L (ref 21–32)
CO2 SERPL-SCNC: 27 MMOL/L (ref 21–32)
COLOR UR AUTO: YELLOW
CREAT BLD-MCNC: 0.75 MG/DL
CREAT BLD-MCNC: 0.8 MG/DL
CREAT BLD-MCNC: 1.09 MG/DL
EOSINOPHIL # BLD AUTO: 0.08 X10(3) UL (ref 0–0.7)
EOSINOPHIL NFR BLD AUTO: 1.1 %
ERYTHROCYTE [DISTWIDTH] IN BLOOD BY AUTOMATED COUNT: 23.3 %
ERYTHROCYTE [DISTWIDTH] IN BLOOD BY AUTOMATED COUNT: 23.6 %
ERYTHROCYTE [DISTWIDTH] IN BLOOD BY AUTOMATED COUNT: 23.8 %
GFR SERPLBLD BASED ON 1.73 SQ M-ARVRAT: 71 ML/MIN/1.73M2 (ref 60–?)
GFR SERPLBLD BASED ON 1.73 SQ M-ARVRAT: 92 ML/MIN/1.73M2 (ref 60–?)
GFR SERPLBLD BASED ON 1.73 SQ M-ARVRAT: 94 ML/MIN/1.73M2 (ref 60–?)
GLOBULIN PLAS-MCNC: 4.2 G/DL (ref 2.8–4.4)
GLUCOSE BLD-MCNC: 114 MG/DL (ref 70–99)
GLUCOSE BLD-MCNC: 118 MG/DL (ref 70–99)
GLUCOSE BLD-MCNC: 124 MG/DL (ref 70–99)
GLUCOSE BLD-MCNC: 126 MG/DL (ref 70–99)
GLUCOSE BLD-MCNC: 133 MG/DL (ref 70–99)
GLUCOSE BLD-MCNC: 135 MG/DL (ref 70–99)
GLUCOSE BLD-MCNC: 146 MG/DL (ref 70–99)
GLUCOSE BLD-MCNC: 167 MG/DL (ref 70–99)
GLUCOSE BLD-MCNC: 174 MG/DL (ref 70–99)
GLUCOSE BLD-MCNC: 186 MG/DL (ref 70–99)
GLUCOSE BLD-MCNC: 211 MG/DL (ref 70–99)
GLUCOSE UR STRIP.AUTO-MCNC: NEGATIVE MG/DL
HCT VFR BLD AUTO: 30.2 %
HCT VFR BLD AUTO: 32 %
HCT VFR BLD AUTO: 33.5 %
HGB BLD-MCNC: 10.2 G/DL
HGB BLD-MCNC: 9.3 G/DL
HGB BLD-MCNC: 9.9 G/DL
HYALINE CASTS #/AREA URNS AUTO: PRESENT /LPF
IMM GRANULOCYTES # BLD AUTO: 0.02 X10(3) UL (ref 0–1)
IMM GRANULOCYTES NFR BLD: 0.3 %
INR BLD: 2.54 (ref 0.85–1.16)
LACTATE SERPL-SCNC: 1.3 MMOL/L (ref 0.4–2)
LYMPHOCYTES # BLD AUTO: 0.62 X10(3) UL (ref 1–4)
LYMPHOCYTES NFR BLD AUTO: 8.6 %
MAGNESIUM SERPL-MCNC: 2.3 MG/DL (ref 1.6–2.6)
MAGNESIUM SERPL-MCNC: 2.4 MG/DL (ref 1.6–2.6)
MCH RBC QN AUTO: 25.5 PG (ref 26–34)
MCH RBC QN AUTO: 25.8 PG (ref 26–34)
MCH RBC QN AUTO: 25.8 PG (ref 26–34)
MCHC RBC AUTO-ENTMCNC: 30.4 G/DL (ref 31–37)
MCHC RBC AUTO-ENTMCNC: 30.8 G/DL (ref 31–37)
MCHC RBC AUTO-ENTMCNC: 30.9 G/DL (ref 31–37)
MCV RBC AUTO: 83 FL
MCV RBC AUTO: 83.6 FL
MCV RBC AUTO: 84.6 FL
MONOCYTES # BLD AUTO: 0.78 X10(3) UL (ref 0.1–1)
MONOCYTES NFR BLD AUTO: 10.8 %
NEUTROPHILS # BLD AUTO: 5.65 X10 (3) UL (ref 1.5–7.7)
NEUTROPHILS # BLD AUTO: 5.65 X10(3) UL (ref 1.5–7.7)
NEUTROPHILS NFR BLD AUTO: 78.1 %
NITRITE UR QL STRIP.AUTO: POSITIVE
OSMOLALITY SERPL CALC.SUM OF ELEC: 278 MOSM/KG (ref 275–295)
OSMOLALITY SERPL CALC.SUM OF ELEC: 280 MOSM/KG (ref 275–295)
OSMOLALITY SERPL CALC.SUM OF ELEC: 280 MOSM/KG (ref 275–295)
PH UR STRIP.AUTO: 5 [PH] (ref 5–8)
PLATELET # BLD AUTO: 140 10(3)UL (ref 150–450)
PLATELET # BLD AUTO: 153 10(3)UL (ref 150–450)
PLATELET # BLD AUTO: 185 10(3)UL (ref 150–450)
PLATELET MORPHOLOGY: NORMAL
POTASSIUM SERPL-SCNC: 3.9 MMOL/L (ref 3.5–5.1)
POTASSIUM SERPL-SCNC: 3.9 MMOL/L (ref 3.5–5.1)
POTASSIUM SERPL-SCNC: 4.3 MMOL/L (ref 3.5–5.1)
PROCALCITONIN SERPL-MCNC: 0.05 NG/ML (ref ?–0.16)
PROT SERPL-MCNC: 7.1 G/DL (ref 6.4–8.2)
PROT UR STRIP.AUTO-MCNC: 30 MG/DL
PROTHROMBIN TIME: 27 SECONDS (ref 11.6–14.8)
Q-T INTERVAL: 398 MS
QRS DURATION: 100 MS
QTC CALCULATION (BEZET): 478 MS
R AXIS: 38 DEGREES
RBC # BLD AUTO: 3.64 X10(6)UL
RBC # BLD AUTO: 3.83 X10(6)UL
RBC # BLD AUTO: 3.96 X10(6)UL
RBC UR QL AUTO: NEGATIVE
SODIUM SERPL-SCNC: 132 MMOL/L (ref 136–145)
SODIUM SERPL-SCNC: 133 MMOL/L (ref 136–145)
SODIUM SERPL-SCNC: 135 MMOL/L (ref 136–145)
SP GR UR STRIP.AUTO: 1.02 (ref 1–1.03)
T AXIS: -2 DEGREES
TROPONIN I HIGH SENSITIVITY: 17 NG/L
UROBILINOGEN UR STRIP.AUTO-MCNC: 2 MG/DL
VENTRICULAR RATE: 87 BPM
WBC # BLD AUTO: 4.9 X10(3) UL (ref 4–11)
WBC # BLD AUTO: 5 X10(3) UL (ref 4–11)
WBC # BLD AUTO: 7.2 X10(3) UL (ref 4–11)
WBC #/AREA URNS AUTO: >50 /HPF

## 2023-01-01 PROCEDURE — 93880 EXTRACRANIAL BILAT STUDY: CPT | Performed by: NURSE PRACTITIONER

## 2023-01-01 PROCEDURE — 99232 SBSQ HOSP IP/OBS MODERATE 35: CPT | Performed by: NURSE PRACTITIONER

## 2023-01-01 PROCEDURE — 73502 X-RAY EXAM HIP UNI 2-3 VIEWS: CPT | Performed by: EMERGENCY MEDICINE

## 2023-01-01 PROCEDURE — 71101 X-RAY EXAM UNILAT RIBS/CHEST: CPT | Performed by: EMERGENCY MEDICINE

## 2023-01-01 PROCEDURE — 99223 1ST HOSP IP/OBS HIGH 75: CPT | Performed by: OTHER

## 2023-01-01 PROCEDURE — 73080 X-RAY EXAM OF ELBOW: CPT | Performed by: EMERGENCY MEDICINE

## 2023-01-01 PROCEDURE — 73030 X-RAY EXAM OF SHOULDER: CPT | Performed by: EMERGENCY MEDICINE

## 2023-01-01 PROCEDURE — 70450 CT HEAD/BRAIN W/O DYE: CPT | Performed by: EMERGENCY MEDICINE

## 2023-01-01 RX ORDER — ASPIRIN 81 MG/1
81 TABLET ORAL DAILY
Status: DISCONTINUED | OUTPATIENT
Start: 2023-01-01 | End: 2023-01-01

## 2023-01-01 RX ORDER — TAMSULOSIN HYDROCHLORIDE 0.4 MG/1
0.4 CAPSULE ORAL DAILY
Status: DISCONTINUED | OUTPATIENT
Start: 2023-01-01 | End: 2023-01-01

## 2023-01-01 RX ORDER — BUPROPION HYDROCHLORIDE 150 MG/1
150 TABLET ORAL DAILY
Status: DISCONTINUED | OUTPATIENT
Start: 2023-01-01 | End: 2023-01-01

## 2023-01-01 RX ORDER — METOPROLOL SUCCINATE 25 MG/1
25 TABLET, EXTENDED RELEASE ORAL
Qty: 90 TABLET | Refills: 3 | Status: SHIPPED | OUTPATIENT
Start: 2023-01-01 | End: 2023-01-01

## 2023-01-01 RX ORDER — OXYCODONE AND ACETAMINOPHEN 10; 325 MG/1; MG/1
1 TABLET ORAL EVERY 6 HOURS PRN
Status: CANCELLED | OUTPATIENT
Start: 2023-01-01

## 2023-01-01 RX ORDER — METOPROLOL SUCCINATE 25 MG/1
25 TABLET, EXTENDED RELEASE ORAL
Status: DISCONTINUED | OUTPATIENT
Start: 2023-01-01 | End: 2023-01-01

## 2023-01-01 RX ORDER — MELATONIN
100 DAILY
Qty: 30 TABLET | Refills: 2 | Status: SHIPPED | OUTPATIENT
Start: 2023-01-01 | End: 2023-11-16

## 2023-01-01 RX ORDER — CEPHALEXIN 500 MG/1
500 CAPSULE ORAL 4 TIMES DAILY
Qty: 24 CAPSULE | Refills: 0 | Status: SHIPPED | OUTPATIENT
Start: 2023-01-01 | End: 2023-01-01

## 2023-01-01 RX ORDER — PANTOPRAZOLE SODIUM 40 MG/1
40 TABLET, DELAYED RELEASE ORAL
Status: DISCONTINUED | OUTPATIENT
Start: 2023-01-01 | End: 2023-01-01

## 2023-01-01 RX ORDER — OXYCODONE AND ACETAMINOPHEN 10; 325 MG/1; MG/1
2 TABLET ORAL EVERY 6 HOURS PRN
Status: DISCONTINUED | OUTPATIENT
Start: 2023-01-01 | End: 2023-01-01

## 2023-01-01 RX ORDER — OXYCODONE AND ACETAMINOPHEN 10; 325 MG/1; MG/1
1 TABLET ORAL EVERY 6 HOURS PRN
Status: DISCONTINUED | OUTPATIENT
Start: 2023-01-01 | End: 2023-01-01

## 2023-01-01 RX ORDER — PANTOPRAZOLE SODIUM 40 MG/1
1 TABLET, DELAYED RELEASE ORAL EVERY MORNING
COMMUNITY
Start: 2023-01-01

## 2023-01-01 RX ORDER — ALPRAZOLAM 0.5 MG/1
0.5 TABLET ORAL
Status: DISCONTINUED | OUTPATIENT
Start: 2023-01-01 | End: 2023-01-01

## 2023-01-01 RX ORDER — MELATONIN
100 DAILY
Status: DISCONTINUED | OUTPATIENT
Start: 2023-01-01 | End: 2023-01-01

## 2023-01-01 RX ORDER — ATORVASTATIN CALCIUM 40 MG/1
40 TABLET, FILM COATED ORAL DAILY
Status: DISCONTINUED | OUTPATIENT
Start: 2023-01-01 | End: 2023-01-01

## 2023-01-01 RX ORDER — SERTRALINE HYDROCHLORIDE 100 MG/1
100 TABLET, FILM COATED ORAL EVERY MORNING
Status: DISCONTINUED | OUTPATIENT
Start: 2023-01-01 | End: 2023-01-01

## 2023-01-01 RX ORDER — DILTIAZEM HYDROCHLORIDE 60 MG/1
120 TABLET, FILM COATED ORAL DAILY
Status: DISCONTINUED | OUTPATIENT
Start: 2023-01-01 | End: 2023-01-01

## 2023-01-07 ENCOUNTER — APPOINTMENT (OUTPATIENT)
Dept: CT IMAGING | Facility: HOSPITAL | Age: 73
End: 2023-01-07
Attending: EMERGENCY MEDICINE
Payer: MEDICARE

## 2023-01-07 ENCOUNTER — HOSPITAL ENCOUNTER (EMERGENCY)
Facility: HOSPITAL | Age: 73
Discharge: HOME | End: 2023-01-07
Attending: EMERGENCY MEDICINE
Payer: MEDICARE

## 2023-01-07 VITALS
HEART RATE: 74 BPM | DIASTOLIC BLOOD PRESSURE: 78 MMHG | SYSTOLIC BLOOD PRESSURE: 136 MMHG | OXYGEN SATURATION: 96 % | TEMPERATURE: 99 F | RESPIRATION RATE: 18 BRPM | WEIGHT: 185 LBS | BODY MASS INDEX: 27 KG/M2

## 2023-01-07 DIAGNOSIS — R31.0 GROSS HEMATURIA: Primary | ICD-10-CM

## 2023-01-07 DIAGNOSIS — N32.89 BLADDER MASS: ICD-10-CM

## 2023-01-07 LAB
ALBUMIN SERPL-MCNC: 3.5 G/DL (ref 3.4–5)
ALBUMIN/GLOB SERPL: 0.9 {RATIO} (ref 1–2)
ALP LIVER SERPL-CCNC: 93 U/L
ALT SERPL-CCNC: 25 U/L
ANION GAP SERPL CALC-SCNC: 2 MMOL/L (ref 0–18)
AST SERPL-CCNC: 28 U/L (ref 15–37)
BASOPHILS # BLD AUTO: 0.06 X10(3) UL (ref 0–0.2)
BASOPHILS NFR BLD AUTO: 1.3 %
BILIRUB SERPL-MCNC: 0.6 MG/DL (ref 0.1–2)
BILIRUB UR QL CFM: NEGATIVE
BUN BLD-MCNC: 13 MG/DL (ref 7–18)
CALCIUM BLD-MCNC: 9 MG/DL (ref 8.5–10.1)
CHLORIDE SERPL-SCNC: 107 MMOL/L (ref 98–112)
CO2 SERPL-SCNC: 29 MMOL/L (ref 21–32)
CREAT BLD-MCNC: 0.86 MG/DL
EOSINOPHIL # BLD AUTO: 0.3 X10(3) UL (ref 0–0.7)
EOSINOPHIL NFR BLD AUTO: 6.6 %
ERYTHROCYTE [DISTWIDTH] IN BLOOD BY AUTOMATED COUNT: 16.5 %
GFR SERPLBLD BASED ON 1.73 SQ M-ARVRAT: 91 ML/MIN/1.73M2 (ref 60–?)
GLOBULIN PLAS-MCNC: 4.1 G/DL (ref 2.8–4.4)
GLUCOSE BLD-MCNC: 130 MG/DL (ref 70–99)
GLUCOSE UR STRIP.AUTO-MCNC: 100 MG/DL
HCT VFR BLD AUTO: 41.6 %
HGB BLD-MCNC: 13.5 G/DL
IMM GRANULOCYTES # BLD AUTO: 0.01 X10(3) UL (ref 0–1)
IMM GRANULOCYTES NFR BLD: 0.2 %
KETONES UR STRIP.AUTO-MCNC: 40 MG/DL
LYMPHOCYTES # BLD AUTO: 1.22 X10(3) UL (ref 1–4)
LYMPHOCYTES NFR BLD AUTO: 27 %
MCH RBC QN AUTO: 28.6 PG (ref 26–34)
MCHC RBC AUTO-ENTMCNC: 32.5 G/DL (ref 31–37)
MCV RBC AUTO: 88.1 FL
MONOCYTES # BLD AUTO: 0.69 X10(3) UL (ref 0.1–1)
MONOCYTES NFR BLD AUTO: 15.3 %
NEUTROPHILS # BLD AUTO: 2.24 X10 (3) UL (ref 1.5–7.7)
NEUTROPHILS # BLD AUTO: 2.24 X10(3) UL (ref 1.5–7.7)
NEUTROPHILS NFR BLD AUTO: 49.6 %
NITRITE UR QL STRIP.AUTO: POSITIVE
OSMOLALITY SERPL CALC.SUM OF ELEC: 288 MOSM/KG (ref 275–295)
PH UR STRIP.AUTO: 8.5 [PH] (ref 5–8)
PLATELET # BLD AUTO: 176 10(3)UL (ref 150–450)
POTASSIUM SERPL-SCNC: 4 MMOL/L (ref 3.5–5.1)
PROT SERPL-MCNC: 7.6 G/DL (ref 6.4–8.2)
PROT UR STRIP.AUTO-MCNC: >=300 MG/DL
RBC # BLD AUTO: 4.72 X10(6)UL
RBC #/AREA URNS AUTO: >10 /HPF
SODIUM SERPL-SCNC: 138 MMOL/L (ref 136–145)
SP GR UR STRIP.AUTO: 1.01 (ref 1–1.03)
UROBILINOGEN UR STRIP.AUTO-MCNC: >=8 MG/DL
WBC # BLD AUTO: 4.5 X10(3) UL (ref 4–11)

## 2023-01-07 PROCEDURE — 51702 INSERT TEMP BLADDER CATH: CPT

## 2023-01-07 PROCEDURE — 81001 URINALYSIS AUTO W/SCOPE: CPT | Performed by: EMERGENCY MEDICINE

## 2023-01-07 PROCEDURE — 96361 HYDRATE IV INFUSION ADD-ON: CPT

## 2023-01-07 PROCEDURE — 99285 EMERGENCY DEPT VISIT HI MDM: CPT

## 2023-01-07 PROCEDURE — 81015 MICROSCOPIC EXAM OF URINE: CPT | Performed by: EMERGENCY MEDICINE

## 2023-01-07 PROCEDURE — 87086 URINE CULTURE/COLONY COUNT: CPT | Performed by: EMERGENCY MEDICINE

## 2023-01-07 PROCEDURE — 99284 EMERGENCY DEPT VISIT MOD MDM: CPT

## 2023-01-07 PROCEDURE — 51700 IRRIGATION OF BLADDER: CPT

## 2023-01-07 PROCEDURE — 96360 HYDRATION IV INFUSION INIT: CPT

## 2023-01-07 PROCEDURE — 80053 COMPREHEN METABOLIC PANEL: CPT | Performed by: EMERGENCY MEDICINE

## 2023-01-07 PROCEDURE — 85025 COMPLETE CBC W/AUTO DIFF WBC: CPT | Performed by: EMERGENCY MEDICINE

## 2023-01-07 PROCEDURE — 74177 CT ABD & PELVIS W/CONTRAST: CPT | Performed by: EMERGENCY MEDICINE

## 2023-01-07 RX ORDER — LIDOCAINE HYDROCHLORIDE 20 MG/ML
JELLY TOPICAL
Status: COMPLETED
Start: 2023-01-07 | End: 2023-01-07

## 2023-01-07 RX ORDER — CEPHALEXIN 500 MG/1
500 CAPSULE ORAL 4 TIMES DAILY
Qty: 40 CAPSULE | Refills: 0 | Status: SHIPPED | OUTPATIENT
Start: 2023-01-07 | End: 2023-01-13

## 2023-01-07 RX ORDER — LIDOCAINE HYDROCHLORIDE 20 MG/ML
10 JELLY TOPICAL ONCE
Status: COMPLETED | OUTPATIENT
Start: 2023-01-07 | End: 2023-01-07

## 2023-01-09 ENCOUNTER — HOSPITAL ENCOUNTER (INPATIENT)
Facility: HOSPITAL | Age: 73
LOS: 3 days | Discharge: HOME | End: 2023-01-13
Attending: EMERGENCY MEDICINE | Admitting: STUDENT IN AN ORGANIZED HEALTH CARE EDUCATION/TRAINING PROGRAM
Payer: MEDICARE

## 2023-01-09 DIAGNOSIS — N17.9 AKI (ACUTE KIDNEY INJURY) (HCC): Primary | ICD-10-CM

## 2023-01-09 DIAGNOSIS — R31.0 GROSS HEMATURIA: ICD-10-CM

## 2023-01-09 DIAGNOSIS — R33.9 URINARY RETENTION: ICD-10-CM

## 2023-01-09 DIAGNOSIS — I25.810 CORONARY ARTERY DISEASE INVOLVING CORONARY BYPASS GRAFT OF NATIVE HEART WITHOUT ANGINA PECTORIS: ICD-10-CM

## 2023-01-09 DIAGNOSIS — Z85.46 HISTORY OF PROSTATE CANCER: ICD-10-CM

## 2023-01-09 DIAGNOSIS — Z87.440 HISTORY OF UTI: ICD-10-CM

## 2023-01-09 LAB
ALBUMIN SERPL-MCNC: 2.8 G/DL (ref 3.4–5)
ALBUMIN/GLOB SERPL: 0.6 {RATIO} (ref 1–2)
ALP LIVER SERPL-CCNC: 93 U/L
ALT SERPL-CCNC: 23 U/L
ANION GAP SERPL CALC-SCNC: 7 MMOL/L (ref 0–18)
AST SERPL-CCNC: 20 U/L (ref 15–37)
BILIRUB SERPL-MCNC: 0.8 MG/DL (ref 0.1–2)
BILIRUB UR QL CFM: NEGATIVE
BUN BLD-MCNC: 51 MG/DL (ref 7–18)
CALCIUM BLD-MCNC: 9.3 MG/DL (ref 8.5–10.1)
CHLORIDE SERPL-SCNC: 102 MMOL/L (ref 98–112)
CO2 SERPL-SCNC: 21 MMOL/L (ref 21–32)
CREAT BLD-MCNC: 5.24 MG/DL
ERYTHROCYTE [DISTWIDTH] IN BLOOD BY AUTOMATED COUNT: 17.8 %
GFR SERPLBLD BASED ON 1.73 SQ M-ARVRAT: 11 ML/MIN/1.73M2 (ref 60–?)
GLOBULIN PLAS-MCNC: 4.7 G/DL (ref 2.8–4.4)
GLUCOSE BLD-MCNC: 163 MG/DL (ref 70–99)
HCT VFR BLD AUTO: 42.2 %
HGB BLD-MCNC: 13.9 G/DL
MCH RBC QN AUTO: 28.7 PG (ref 26–34)
MCHC RBC AUTO-ENTMCNC: 32.9 G/DL (ref 31–37)
MCV RBC AUTO: 87 FL
NEUTROPHILS # BLD AUTO: 20.05 X10 (3) UL (ref 1.5–7.7)
OSMOLALITY SERPL CALC.SUM OF ELEC: 287 MOSM/KG (ref 275–295)
PLATELET # BLD AUTO: 224 10(3)UL (ref 150–450)
POTASSIUM SERPL-SCNC: 5.8 MMOL/L (ref 3.5–5.1)
PROT SERPL-MCNC: 7.5 G/DL (ref 6.4–8.2)
RBC # BLD AUTO: 4.85 X10(6)UL
RBC #/AREA URNS AUTO: >10 /HPF
RBC #/AREA URNS AUTO: >10 /HPF
SARS-COV-2 RNA RESP QL NAA+PROBE: NOT DETECTED
SODIUM SERPL-SCNC: 130 MMOL/L (ref 136–145)
WBC # BLD AUTO: 23.1 X10(3) UL (ref 4–11)

## 2023-01-09 PROCEDURE — 85027 COMPLETE CBC AUTOMATED: CPT

## 2023-01-09 PROCEDURE — 85007 BL SMEAR W/DIFF WBC COUNT: CPT

## 2023-01-09 PROCEDURE — 93010 ELECTROCARDIOGRAM REPORT: CPT

## 2023-01-09 PROCEDURE — 80053 COMPREHEN METABOLIC PANEL: CPT

## 2023-01-09 PROCEDURE — 85007 BL SMEAR W/DIFF WBC COUNT: CPT | Performed by: EMERGENCY MEDICINE

## 2023-01-09 PROCEDURE — 81015 MICROSCOPIC EXAM OF URINE: CPT | Performed by: EMERGENCY MEDICINE

## 2023-01-09 PROCEDURE — 80053 COMPREHEN METABOLIC PANEL: CPT | Performed by: EMERGENCY MEDICINE

## 2023-01-09 PROCEDURE — 99285 EMERGENCY DEPT VISIT HI MDM: CPT

## 2023-01-09 PROCEDURE — 85027 COMPLETE CBC AUTOMATED: CPT | Performed by: EMERGENCY MEDICINE

## 2023-01-09 PROCEDURE — 85025 COMPLETE CBC W/AUTO DIFF WBC: CPT

## 2023-01-09 PROCEDURE — 36415 COLL VENOUS BLD VENIPUNCTURE: CPT

## 2023-01-09 RX ORDER — LIDOCAINE HYDROCHLORIDE 20 MG/ML
10 JELLY TOPICAL ONCE
Status: COMPLETED | OUTPATIENT
Start: 2023-01-09 | End: 2023-01-09

## 2023-01-09 RX ORDER — LIDOCAINE HYDROCHLORIDE 20 MG/ML
JELLY TOPICAL
Status: COMPLETED
Start: 2023-01-09 | End: 2023-01-09

## 2023-01-10 ENCOUNTER — APPOINTMENT (OUTPATIENT)
Dept: CT IMAGING | Facility: HOSPITAL | Age: 73
End: 2023-01-10
Attending: EMERGENCY MEDICINE
Payer: MEDICARE

## 2023-01-10 PROBLEM — R31.0 GROSS HEMATURIA: Status: ACTIVE | Noted: 2023-01-10

## 2023-01-10 PROBLEM — R33.9 URINARY RETENTION: Status: ACTIVE | Noted: 2023-01-10

## 2023-01-10 PROBLEM — R33.9 URINARY RETENTION: Status: ACTIVE | Noted: 2023-01-01

## 2023-01-10 PROBLEM — Z87.440 HISTORY OF UTI: Status: ACTIVE | Noted: 2023-01-01

## 2023-01-10 PROBLEM — N17.9 AKI (ACUTE KIDNEY INJURY) (HCC): Status: ACTIVE | Noted: 2023-01-10

## 2023-01-10 PROBLEM — Z87.440 HISTORY OF UTI: Status: ACTIVE | Noted: 2023-01-10

## 2023-01-10 PROBLEM — N17.9 AKI (ACUTE KIDNEY INJURY) (HCC): Status: ACTIVE | Noted: 2023-01-01

## 2023-01-10 PROBLEM — R31.0 GROSS HEMATURIA: Status: ACTIVE | Noted: 2023-01-01

## 2023-01-10 LAB
ANION GAP SERPL CALC-SCNC: 9 MMOL/L (ref 0–18)
ATRIAL RATE: 113 BPM
BASOPHILS # BLD: 0 X10(3) UL (ref 0–0.2)
BASOPHILS NFR BLD: 0 %
BUN BLD-MCNC: 45 MG/DL (ref 7–18)
CALCIUM BLD-MCNC: 9.1 MG/DL (ref 8.5–10.1)
CHLORIDE SERPL-SCNC: 107 MMOL/L (ref 98–112)
CO2 SERPL-SCNC: 23 MMOL/L (ref 21–32)
CREAT BLD-MCNC: 2.96 MG/DL
EOSINOPHIL # BLD: 0 X10(3) UL (ref 0–0.7)
EOSINOPHIL NFR BLD: 0 %
GFR SERPLBLD BASED ON 1.73 SQ M-ARVRAT: 21 ML/MIN/1.73M2 (ref 60–?)
GLUCOSE BLD-MCNC: 130 MG/DL (ref 70–99)
LACTATE SERPL-SCNC: 1.2 MMOL/L (ref 0.4–2)
LYMPHOCYTES NFR BLD: 0.46 X10(3) UL (ref 1–4)
LYMPHOCYTES NFR BLD: 2 %
MONOCYTES # BLD: 2.08 X10(3) UL (ref 0.1–1)
MONOCYTES NFR BLD: 9 %
NEUTROPHILS NFR BLD: 78 %
NEUTS BAND NFR BLD: 11 %
NEUTS HYPERSEG # BLD: 20.56 X10(3) UL (ref 1.5–7.7)
OSMOLALITY SERPL CALC.SUM OF ELEC: 301 MOSM/KG (ref 275–295)
PLATELET MORPHOLOGY: NORMAL
POTASSIUM SERPL-SCNC: 4.2 MMOL/L (ref 3.5–5.1)
Q-T INTERVAL: 324 MS
QRS DURATION: 86 MS
QTC CALCULATION (BEZET): 430 MS
R AXIS: 51 DEGREES
SODIUM SERPL-SCNC: 139 MMOL/L (ref 136–145)
T AXIS: 152 DEGREES
TOTAL CELLS COUNTED BLD: 100
VENTRICULAR RATE: 106 BPM

## 2023-01-10 PROCEDURE — 87040 BLOOD CULTURE FOR BACTERIA: CPT | Performed by: EMERGENCY MEDICINE

## 2023-01-10 PROCEDURE — 96365 THER/PROPH/DIAG IV INF INIT: CPT

## 2023-01-10 PROCEDURE — 83605 ASSAY OF LACTIC ACID: CPT | Performed by: EMERGENCY MEDICINE

## 2023-01-10 PROCEDURE — 93005 ELECTROCARDIOGRAM TRACING: CPT

## 2023-01-10 PROCEDURE — 80048 BASIC METABOLIC PNL TOTAL CA: CPT | Performed by: STUDENT IN AN ORGANIZED HEALTH CARE EDUCATION/TRAINING PROGRAM

## 2023-01-10 PROCEDURE — 94799 UNLISTED PULMONARY SVC/PX: CPT

## 2023-01-10 PROCEDURE — 74176 CT ABD & PELVIS W/O CONTRAST: CPT | Performed by: EMERGENCY MEDICINE

## 2023-01-10 RX ORDER — SODIUM CHLORIDE 9 MG/ML
INJECTION, SOLUTION INTRAVENOUS ONCE
Status: COMPLETED | OUTPATIENT
Start: 2023-01-10 | End: 2023-01-10

## 2023-01-10 RX ORDER — BUPROPION HYDROCHLORIDE 150 MG/1
150 TABLET ORAL DAILY
Status: DISCONTINUED | OUTPATIENT
Start: 2023-01-10 | End: 2023-01-13

## 2023-01-10 RX ORDER — ONDANSETRON 2 MG/ML
4 INJECTION INTRAMUSCULAR; INTRAVENOUS EVERY 6 HOURS PRN
Status: DISCONTINUED | OUTPATIENT
Start: 2023-01-10 | End: 2023-01-13

## 2023-01-10 RX ORDER — OXYCODONE AND ACETAMINOPHEN 10; 325 MG/1; MG/1
1 TABLET ORAL EVERY 6 HOURS PRN
Status: DISCONTINUED | OUTPATIENT
Start: 2023-01-10 | End: 2023-01-11

## 2023-01-10 RX ORDER — ACETAMINOPHEN 500 MG
500 TABLET ORAL EVERY 4 HOURS PRN
Status: DISCONTINUED | OUTPATIENT
Start: 2023-01-10 | End: 2023-01-13

## 2023-01-10 RX ORDER — AMILORIDE HYDROCHLORIDE 5 MG/1
10 TABLET ORAL DAILY
Status: CANCELLED | OUTPATIENT
Start: 2023-01-11

## 2023-01-10 RX ORDER — METOCLOPRAMIDE HYDROCHLORIDE 5 MG/ML
5 INJECTION INTRAMUSCULAR; INTRAVENOUS EVERY 8 HOURS PRN
Status: DISCONTINUED | OUTPATIENT
Start: 2023-01-10 | End: 2023-01-13

## 2023-01-10 RX ORDER — SODIUM CHLORIDE, SODIUM LACTATE, POTASSIUM CHLORIDE, CALCIUM CHLORIDE 600; 310; 30; 20 MG/100ML; MG/100ML; MG/100ML; MG/100ML
INJECTION, SOLUTION INTRAVENOUS CONTINUOUS
Status: DISCONTINUED | OUTPATIENT
Start: 2023-01-10 | End: 2023-01-13

## 2023-01-10 RX ORDER — ATORVASTATIN CALCIUM 40 MG/1
40 TABLET, FILM COATED ORAL NIGHTLY
Status: DISCONTINUED | OUTPATIENT
Start: 2023-01-10 | End: 2023-01-13

## 2023-01-10 RX ORDER — MAGNESIUM HYDROXIDE 1200 MG/15ML
3000 LIQUID ORAL CONTINUOUS
Status: DISCONTINUED | OUTPATIENT
Start: 2023-01-10 | End: 2023-01-13

## 2023-01-10 RX ORDER — ALPRAZOLAM 0.5 MG/1
0.5 TABLET ORAL
Status: DISCONTINUED | OUTPATIENT
Start: 2023-01-10 | End: 2023-01-13

## 2023-01-10 NOTE — ED QUICK NOTES
Bladder scan showed 770cc of urine still in bladder despite oliveros. Patient manually irrigated several times without difficulty. CBI started per Dr. Casie Clancy.

## 2023-01-10 NOTE — PROGRESS NOTES
NURSING ADMISSION NOTE      Patient admitted via Cart  Oriented to room. Safety precautions initiated. Bed in low position. Call light in reach. Admitted to  403 with urinary retention. Barnett placed in ED. CBI started and maintained on floor. Med rec complete with pt. Alert x3, VSS. No complaint of pain. No further needs at this time. Will continue to monitor.

## 2023-01-10 NOTE — ED QUICK NOTES
Orders for admission, patient is aware of plan and ready to go upstairs. Any questions, please call ED CHRISTINE Farley at extension [phone].      Patient Covid vaccination status: Fully vaccinated     COVID Test Ordered in ED: Rapid SARS-CoV-2 by PCR    COVID Suspicion at Admission: N/A    Running Infusions:  Sodium Chloride 83mL/hr    Mental Status/LOC at time of transport: AOx4    Other pertinent information: 3 way catheter in, CBI restarted  CIWA score: N/A   NIH score:  N/A

## 2023-01-10 NOTE — ED INITIAL ASSESSMENT (HPI)
PT reports no urine output for 2 days. PT states he was catheterized at that time. Denies fever. C/o abdominal pain. Denies vomiting or diarrhea.

## 2023-01-10 NOTE — ED QUICK NOTES
Catheter stopped draining and difficulty manually irrigating. CBI stopped. Patient having lower abdominal pain.   Catheter to be switched to 22Fr per MD.

## 2023-01-11 ENCOUNTER — ANESTHESIA EVENT (OUTPATIENT)
Dept: SURGERY | Facility: HOSPITAL | Age: 73
End: 2023-01-11
Payer: MEDICARE

## 2023-01-11 LAB
ALBUMIN SERPL-MCNC: 2.1 G/DL (ref 3.4–5)
ANION GAP SERPL CALC-SCNC: 7 MMOL/L (ref 0–18)
BUN BLD-MCNC: 27 MG/DL (ref 7–18)
CALCIUM BLD-MCNC: 8.8 MG/DL (ref 8.5–10.1)
CHLORIDE SERPL-SCNC: 107 MMOL/L (ref 98–112)
CO2 SERPL-SCNC: 24 MMOL/L (ref 21–32)
CREAT BLD-MCNC: 1.04 MG/DL
GFR SERPLBLD BASED ON 1.73 SQ M-ARVRAT: 75 ML/MIN/1.73M2 (ref 60–?)
GLUCOSE BLD-MCNC: 93 MG/DL (ref 70–99)
OSMOLALITY SERPL CALC.SUM OF ELEC: 291 MOSM/KG (ref 275–295)
PHOSPHATE SERPL-MCNC: 2.8 MG/DL (ref 2.5–4.9)
POTASSIUM SERPL-SCNC: 3.7 MMOL/L (ref 3.5–5.1)
SODIUM SERPL-SCNC: 138 MMOL/L (ref 136–145)

## 2023-01-11 PROCEDURE — 80069 RENAL FUNCTION PANEL: CPT | Performed by: INTERNAL MEDICINE

## 2023-01-11 RX ORDER — OXYCODONE AND ACETAMINOPHEN 10; 325 MG/1; MG/1
1-2 TABLET ORAL EVERY 6 HOURS PRN
Status: DISCONTINUED | OUTPATIENT
Start: 2023-01-11 | End: 2023-01-13

## 2023-01-11 RX ORDER — HYDROCORTISONE 25 MG/G
CREAM TOPICAL 2 TIMES DAILY
Status: DISCONTINUED | OUTPATIENT
Start: 2023-01-11 | End: 2023-01-13

## 2023-01-12 ENCOUNTER — ANESTHESIA (OUTPATIENT)
Dept: SURGERY | Facility: HOSPITAL | Age: 73
End: 2023-01-12
Payer: MEDICARE

## 2023-01-12 LAB
ALBUMIN SERPL-MCNC: 2 G/DL (ref 3.4–5)
ANION GAP SERPL CALC-SCNC: 8 MMOL/L (ref 0–18)
BASOPHILS # BLD AUTO: 0.05 X10(3) UL (ref 0–0.2)
BASOPHILS NFR BLD AUTO: 0.6 %
BUN BLD-MCNC: 15 MG/DL (ref 7–18)
CALCIUM BLD-MCNC: 8.5 MG/DL (ref 8.5–10.1)
CHLORIDE SERPL-SCNC: 108 MMOL/L (ref 98–112)
CO2 SERPL-SCNC: 24 MMOL/L (ref 21–32)
CREAT BLD-MCNC: 0.8 MG/DL
EOSINOPHIL # BLD AUTO: 0.31 X10(3) UL (ref 0–0.7)
EOSINOPHIL NFR BLD AUTO: 3.6 %
ERYTHROCYTE [DISTWIDTH] IN BLOOD BY AUTOMATED COUNT: 17.1 %
GFR SERPLBLD BASED ON 1.73 SQ M-ARVRAT: 93 ML/MIN/1.73M2 (ref 60–?)
GLUCOSE BLD-MCNC: 117 MG/DL (ref 70–99)
HCT VFR BLD AUTO: 34.1 %
HGB BLD-MCNC: 10.9 G/DL
IMM GRANULOCYTES # BLD AUTO: 0.04 X10(3) UL (ref 0–1)
IMM GRANULOCYTES NFR BLD: 0.5 %
LYMPHOCYTES # BLD AUTO: 0.62 X10(3) UL (ref 1–4)
LYMPHOCYTES NFR BLD AUTO: 7.1 %
MCH RBC QN AUTO: 28.5 PG (ref 26–34)
MCHC RBC AUTO-ENTMCNC: 32 G/DL (ref 31–37)
MCV RBC AUTO: 89.3 FL
MONOCYTES # BLD AUTO: 0.8 X10(3) UL (ref 0.1–1)
MONOCYTES NFR BLD AUTO: 9.2 %
NEUTROPHILS # BLD AUTO: 6.86 X10 (3) UL (ref 1.5–7.7)
NEUTROPHILS # BLD AUTO: 6.86 X10(3) UL (ref 1.5–7.7)
NEUTROPHILS NFR BLD AUTO: 79 %
OSMOLALITY SERPL CALC.SUM OF ELEC: 292 MOSM/KG (ref 275–295)
PHOSPHATE SERPL-MCNC: 2.9 MG/DL (ref 2.5–4.9)
PLATELET # BLD AUTO: 129 10(3)UL (ref 150–450)
POTASSIUM SERPL-SCNC: 3.7 MMOL/L (ref 3.5–5.1)
RBC # BLD AUTO: 3.82 X10(6)UL
SODIUM SERPL-SCNC: 140 MMOL/L (ref 136–145)
WBC # BLD AUTO: 8.7 X10(3) UL (ref 4–11)

## 2023-01-12 PROCEDURE — 80069 RENAL FUNCTION PANEL: CPT | Performed by: INTERNAL MEDICINE

## 2023-01-12 PROCEDURE — 85025 COMPLETE CBC W/AUTO DIFF WBC: CPT | Performed by: PHYSICIAN ASSISTANT

## 2023-01-12 PROCEDURE — 0TBB8ZX EXCISION OF BLADDER, VIA NATURAL OR ARTIFICIAL OPENING ENDOSCOPIC, DIAGNOSTIC: ICD-10-PCS | Performed by: UROLOGY

## 2023-01-12 PROCEDURE — 88305 TISSUE EXAM BY PATHOLOGIST: CPT | Performed by: UROLOGY

## 2023-01-12 PROCEDURE — 88341 IMHCHEM/IMCYTCHM EA ADD ANTB: CPT | Performed by: UROLOGY

## 2023-01-12 PROCEDURE — 88342 IMHCHEM/IMCYTCHM 1ST ANTB: CPT | Performed by: UROLOGY

## 2023-01-12 RX ORDER — NALOXONE HYDROCHLORIDE 0.4 MG/ML
80 INJECTION, SOLUTION INTRAMUSCULAR; INTRAVENOUS; SUBCUTANEOUS AS NEEDED
Status: DISCONTINUED | OUTPATIENT
Start: 2023-01-12 | End: 2023-01-12 | Stop reason: HOSPADM

## 2023-01-12 RX ORDER — LIDOCAINE HYDROCHLORIDE 10 MG/ML
INJECTION, SOLUTION EPIDURAL; INFILTRATION; INTRACAUDAL; PERINEURAL AS NEEDED
Status: DISCONTINUED | OUTPATIENT
Start: 2023-01-12 | End: 2023-01-12 | Stop reason: SURG

## 2023-01-12 RX ORDER — DEXAMETHASONE SODIUM PHOSPHATE 4 MG/ML
VIAL (ML) INJECTION AS NEEDED
Status: DISCONTINUED | OUTPATIENT
Start: 2023-01-12 | End: 2023-01-12 | Stop reason: SURG

## 2023-01-12 RX ORDER — HYDROMORPHONE HYDROCHLORIDE 1 MG/ML
INJECTION, SOLUTION INTRAMUSCULAR; INTRAVENOUS; SUBCUTANEOUS
Status: COMPLETED
Start: 2023-01-12 | End: 2023-01-12

## 2023-01-12 RX ORDER — HYDROMORPHONE HYDROCHLORIDE 1 MG/ML
0.6 INJECTION, SOLUTION INTRAMUSCULAR; INTRAVENOUS; SUBCUTANEOUS EVERY 5 MIN PRN
Status: DISCONTINUED | OUTPATIENT
Start: 2023-01-12 | End: 2023-01-12 | Stop reason: HOSPADM

## 2023-01-12 RX ORDER — DEXTROSE MONOHYDRATE 25 G/50ML
50 INJECTION, SOLUTION INTRAVENOUS
Status: DISCONTINUED | OUTPATIENT
Start: 2023-01-12 | End: 2023-01-12 | Stop reason: HOSPADM

## 2023-01-12 RX ORDER — NICOTINE POLACRILEX 4 MG
15 LOZENGE BUCCAL
Status: DISCONTINUED | OUTPATIENT
Start: 2023-01-12 | End: 2023-01-12 | Stop reason: HOSPADM

## 2023-01-12 RX ORDER — PHENYLEPHRINE HCL 10 MG/ML
VIAL (ML) INJECTION AS NEEDED
Status: DISCONTINUED | OUTPATIENT
Start: 2023-01-12 | End: 2023-01-12 | Stop reason: SURG

## 2023-01-12 RX ORDER — NICOTINE POLACRILEX 4 MG
30 LOZENGE BUCCAL
Status: DISCONTINUED | OUTPATIENT
Start: 2023-01-12 | End: 2023-01-12 | Stop reason: HOSPADM

## 2023-01-12 RX ORDER — SODIUM CHLORIDE, SODIUM LACTATE, POTASSIUM CHLORIDE, CALCIUM CHLORIDE 600; 310; 30; 20 MG/100ML; MG/100ML; MG/100ML; MG/100ML
INJECTION, SOLUTION INTRAVENOUS CONTINUOUS
Status: DISCONTINUED | OUTPATIENT
Start: 2023-01-12 | End: 2023-01-12 | Stop reason: HOSPADM

## 2023-01-12 RX ORDER — ONDANSETRON 2 MG/ML
4 INJECTION INTRAMUSCULAR; INTRAVENOUS EVERY 6 HOURS PRN
Status: DISCONTINUED | OUTPATIENT
Start: 2023-01-12 | End: 2023-01-12 | Stop reason: HOSPADM

## 2023-01-12 RX ORDER — HYDROCODONE BITARTRATE AND ACETAMINOPHEN 5; 325 MG/1; MG/1
2 TABLET ORAL ONCE AS NEEDED
Status: DISCONTINUED | OUTPATIENT
Start: 2023-01-12 | End: 2023-01-12 | Stop reason: HOSPADM

## 2023-01-12 RX ORDER — HYDROMORPHONE HYDROCHLORIDE 1 MG/ML
0.2 INJECTION, SOLUTION INTRAMUSCULAR; INTRAVENOUS; SUBCUTANEOUS EVERY 5 MIN PRN
Status: DISCONTINUED | OUTPATIENT
Start: 2023-01-12 | End: 2023-01-12 | Stop reason: HOSPADM

## 2023-01-12 RX ORDER — ACETAMINOPHEN 500 MG
1000 TABLET ORAL ONCE AS NEEDED
Status: DISCONTINUED | OUTPATIENT
Start: 2023-01-12 | End: 2023-01-12 | Stop reason: HOSPADM

## 2023-01-12 RX ORDER — HYDROCODONE BITARTRATE AND ACETAMINOPHEN 5; 325 MG/1; MG/1
1 TABLET ORAL ONCE AS NEEDED
Status: DISCONTINUED | OUTPATIENT
Start: 2023-01-12 | End: 2023-01-12 | Stop reason: HOSPADM

## 2023-01-12 RX ORDER — METOCLOPRAMIDE HYDROCHLORIDE 5 MG/ML
10 INJECTION INTRAMUSCULAR; INTRAVENOUS EVERY 8 HOURS PRN
Status: DISCONTINUED | OUTPATIENT
Start: 2023-01-12 | End: 2023-01-12 | Stop reason: HOSPADM

## 2023-01-12 RX ORDER — HYDROMORPHONE HYDROCHLORIDE 1 MG/ML
0.4 INJECTION, SOLUTION INTRAMUSCULAR; INTRAVENOUS; SUBCUTANEOUS EVERY 5 MIN PRN
Status: DISCONTINUED | OUTPATIENT
Start: 2023-01-12 | End: 2023-01-12 | Stop reason: HOSPADM

## 2023-01-12 RX ORDER — ONDANSETRON 2 MG/ML
INJECTION INTRAMUSCULAR; INTRAVENOUS AS NEEDED
Status: DISCONTINUED | OUTPATIENT
Start: 2023-01-12 | End: 2023-01-12 | Stop reason: SURG

## 2023-01-12 RX ADMIN — ONDANSETRON 4 MG: 2 INJECTION INTRAMUSCULAR; INTRAVENOUS at 09:34:00

## 2023-01-12 RX ADMIN — PHENYLEPHRINE HCL 100 MCG: 10 MG/ML VIAL (ML) INJECTION at 09:35:00

## 2023-01-12 RX ADMIN — LIDOCAINE HYDROCHLORIDE 50 MG: 10 INJECTION, SOLUTION EPIDURAL; INFILTRATION; INTRACAUDAL; PERINEURAL at 09:30:00

## 2023-01-12 RX ADMIN — DEXAMETHASONE SODIUM PHOSPHATE 4 MG: 4 MG/ML VIAL (ML) INJECTION at 09:34:00

## 2023-01-12 RX ADMIN — SODIUM CHLORIDE, SODIUM LACTATE, POTASSIUM CHLORIDE, CALCIUM CHLORIDE: 600; 310; 30; 20 INJECTION, SOLUTION INTRAVENOUS at 09:25:00

## 2023-01-12 RX ADMIN — PHENYLEPHRINE HCL 100 MCG: 10 MG/ML VIAL (ML) INJECTION at 09:42:00

## 2023-01-12 NOTE — ANESTHESIA PROCEDURE NOTES
Airway  Date/Time: 1/12/2023 9:31 AM  Urgency: elective    Airway not difficult    General Information and Staff    Patient location during procedure: OR  Anesthesiologist: Nadeen Villa MD  Resident/CRNA: Mk Miller CRNA  Performed: CRNA     Indications and Patient Condition  Indications for airway management: anesthesia  Spontaneous Ventilation: absent  Sedation level: deep  Preoxygenated: yes  Patient position: sniffing  Mask difficulty assessment: 0 - not attempted    Final Airway Details  Final airway type: supraglottic airway      Successful airway: classic  Size 3       Number of attempts at approach: 1  Number of other approaches attempted: 0    Additional Comments  Dentition per pre op

## 2023-01-12 NOTE — INTERVAL H&P NOTE
Pre-op Diagnosis: INPATIENT    The above referenced H&P was reviewed by Daina Hernandez MD on 1/12/2023, the patient was examined and no significant changes have occurred in the patient's condition since the H&P was performed. I discussed with the patient and/or legal representative the potential benefits, risks and side effects of this procedure; the likelihood of the patient achieving goals; and potential problems that might occur during recuperation. I discussed reasonable alternatives to the procedure, including risks, benefits and side effects related to the alternatives and risks related to not receiving this procedure. We will proceed with procedure as planned. Anesthesia reviewing care with patient  Still has CBI catheter  Had progressing hematuria, temporally since a fall at [facility] in Nov.  Had seen my partner last week for the hematuria but then started with increased severity to the gross hematuria prompting Er evaluation, CBI catheter, and admission. Has a remote history of prostate cancer treated with brachytherapy. Reviewed cystoscopy goals for diagnosis, possible biopsy, and evaluation to see if we can determine the etiology of the bleeding and help to prevent future recurrences. Reviewed post op expectation for oliveros cathter and after such cystoscopy risks of incontinence/retention, UTI, and continued recurring hematuria. No new questions, NPO for surgery 1/12/2023.  On standing order of q24 ceftriaxone

## 2023-01-12 NOTE — OPERATIVE REPORT
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 Some added change in the anterior dome. And right lateral wall. Less on the left lateral wall. Some resolving ecchymotic change on the right lateral wall but no acute bleeding. Mild bulbar urethral stricture, scope 22. 5FR passed without difficulty. Small non obstructing prostate    Anesthesia: General    Complications: no    Implants: no    Specimen: bladder biopsy posterior wall    Drains: 22fr 3 way oliveros catheter    Condition: stable    Estimated Blood Loss: 1cc    INDICATION FOR PROCEDURE: Mac Hoff is a 74year old year old-year-old male with history of prostate cancer treated with brachytherapy who after a fall has had increasing gross hematuria over the last 1 month. Admitted for CBI. We discussed evaluation with cystoscopy. His blood thinners are on hold the last few days. We reviewed risks of cancer and need for biopsy and formal pathology evaluation. The patient understands the risks of the procedure, including but not limited to a general anesthetic, bleeding, infection, bladder injury, urethral injury, bladder perforation, the possible need for Oliveros catheter, postoperative dysuria, urgency, frequency, incontinence, and retention. The patient understands the risk of recurrence. The patient would like to proceed. PROCEDURE IN DETAIL: The patient was brought to the operating room after being correctly identified and surgical consent was obtained. All questions were answered. General anesthesia was induced without difficulty. Existing oliveros catheter was removed.   The legs were placed in the dorsal lithotomy position, padded and secured, and the area were prepped and draped in the usual sterile fashion. A 22. 5-Icelandic cystoscope was used to inspect the bladder and urethra. The urethra was normal distally. In the bulbar urethra there is some chronic stricture change but the scope passes without difficulty. The prostate is small and not obstructing. Panendoscopy with the 30 and 70 degree lens was performed. There are no retained blood clots. There is notable fibrinous chronic change especially in the posterior wall but also at the anterior dome and more on the right lateral wall without much change on the left. All of this looks like radiation cystitis. The trigone interestingly looks ok and both UOs are visible and patent. There is some evolving ecchymotic change to the mucosa in the right lateral wall. Nothing is acutely bleeding. Using the flexible cold cup biopsy forceps, the posterior wall was biopsied as this is the most grossly abnormal mucosa. The bugbee electrode was used for hemostasis. There was no concern for bladder perforation. Scope was removed. 3 way oliveros was replaced. The meatus and fossa probably are at the limit of size with the 22French catheters. At the end of the procedure, the legs were brought down to the supine position, and the patient was extubated, and transported to the recovery room in stable condition. I was present and performed the entire procedure as stated above. There were no complications. Pathology is pending. Ange Camejo will follow up with us in urology in the next couple of weeks to review things and re-evaluate his hematuria. His oliveros likely can be removed tomorrow morning in anticipation of discharge. Hopefully in the next couple of days his blood thinners can be resumed. He should keep his activities light and prevent constipation with a bowel regimen if needed.     Cricket Myles MD

## 2023-01-12 NOTE — ANESTHESIA POSTPROCEDURE EVALUATION
Sammy Vicente Patient Status:  Inpatient   Age/Gender 74year old male MRN [id number]   Location [address] [facility] Attending Josee Thompson MD   [facility] Day # 2 PCP Amie Polo MD       Anesthesia Post-op Note    CYSTOSCOPY, BLADDER BIOPSY AND FULGURATION    Procedure Summary     Date: 01/12/23 Room / Location: [facility] MAIN OR 03 / [facility] MAIN OR    Anesthesia Start: 5739 Anesthesia Stop: 9991    Procedure: CYSTOSCOPY, BLADDER BIOPSY AND FULGURATION (Bladder) Diagnosis:       Gross hematuria      History of prostate cancer      (GROSS HEMATURIA; HISTORY OF PROSTATE CANCER)    Surgeons: Asiya Nino MD Anesthesiologist: Sarah Hutton MD    Anesthesia Type: general ASA Status: 3          Anesthesia Type: general    Vitals Value Taken Time   /78 01/12/23 1018   Temp 97.7 01/12/23 1018   Pulse 92 01/12/23 1018   Resp 12 01/12/23 1018   SpO2 100 01/12/23 1018       Patient Location: PACU    Anesthesia Type: general    Airway Patency: patent and extubated    Postop Pain Control: adequate    Mental Status: preanesthetic baseline    Nausea/Vomiting: none    Cardiopulmonary/Hydration status: stable euvolemic    Complications: no apparent anesthesia related complications    Postop vital signs: stable    Dental Exam: Unchanged from Preop    Patient to be discharged from PACU when criteria met.

## 2023-01-13 VITALS
WEIGHT: 185 LBS | DIASTOLIC BLOOD PRESSURE: 77 MMHG | BODY MASS INDEX: 25.9 KG/M2 | RESPIRATION RATE: 17 BRPM | HEART RATE: 66 BPM | HEIGHT: 71 IN | SYSTOLIC BLOOD PRESSURE: 105 MMHG | TEMPERATURE: 98 F | OXYGEN SATURATION: 100 %

## 2023-01-13 LAB
ALBUMIN SERPL-MCNC: 2 G/DL (ref 3.4–5)
ANION GAP SERPL CALC-SCNC: 5 MMOL/L (ref 0–18)
BUN BLD-MCNC: 16 MG/DL (ref 7–18)
CALCIUM BLD-MCNC: 8.4 MG/DL (ref 8.5–10.1)
CHLORIDE SERPL-SCNC: 104 MMOL/L (ref 98–112)
CO2 SERPL-SCNC: 28 MMOL/L (ref 21–32)
CREAT BLD-MCNC: 0.72 MG/DL
GFR SERPLBLD BASED ON 1.73 SQ M-ARVRAT: 96 ML/MIN/1.73M2 (ref 60–?)
GLUCOSE BLD-MCNC: 183 MG/DL (ref 70–99)
OSMOLALITY SERPL CALC.SUM OF ELEC: 290 MOSM/KG (ref 275–295)
PHOSPHATE SERPL-MCNC: 2.9 MG/DL (ref 2.5–4.9)
POTASSIUM SERPL-SCNC: 3.9 MMOL/L (ref 3.5–5.1)
SODIUM SERPL-SCNC: 137 MMOL/L (ref 136–145)

## 2023-01-13 PROCEDURE — 80069 RENAL FUNCTION PANEL: CPT | Performed by: UROLOGY

## 2023-01-13 RX ORDER — CLOPIDOGREL BISULFATE 75 MG/1
75 TABLET ORAL DAILY
Qty: 90 TABLET | Refills: 1 | Status: SHIPPED | COMMUNITY
Start: 2023-01-16 | End: 2023-01-28

## 2023-01-13 NOTE — PROGRESS NOTES
Patient chart reviewed for discharge: Medication Reconciliation completed, Specialist/PCP follow up listed, and disease specific Instructions/Education included in After Visit Summary. Discharge RN notified patient's RN of AVS completion and verified all consultants have signed off. Patient's RN to notify DC RN if discharge status changes. Pending voiding trial today.

## 2023-01-14 NOTE — PLAN OF CARE
NURSING ADMISSION NOTE      Patient admitted via Wheelchair  Oriented to room. Safety precautions initiated. Bed in low position. Call light in reach. Patient A+Ox4. Discharged home via wheelchair. Brother here to take home. Discharge instructions reviewed with patient and brother. Barnett care education given again, verbalized understanding. Patient knows he needs to follow up with urology MD. PIV removed. All belongings taken by patient.

## 2023-01-25 ENCOUNTER — HOSPITAL ENCOUNTER (OUTPATIENT)
Facility: HOSPITAL | Age: 73
Setting detail: OBSERVATION
Discharge: HOME HEALTH | End: 2023-01-28
Attending: EMERGENCY MEDICINE | Admitting: INTERNAL MEDICINE
Payer: MEDICARE

## 2023-01-25 DIAGNOSIS — R31.0 GROSS HEMATURIA: Primary | ICD-10-CM

## 2023-01-25 LAB
ALBUMIN SERPL-MCNC: 2.8 G/DL (ref 3.4–5)
ALBUMIN/GLOB SERPL: 0.7 {RATIO} (ref 1–2)
ALP LIVER SERPL-CCNC: 93 U/L
ALT SERPL-CCNC: 20 U/L
ANION GAP SERPL CALC-SCNC: 7 MMOL/L (ref 0–18)
APTT PPP: 44.9 SECONDS (ref 23.3–35.6)
AST SERPL-CCNC: 20 U/L (ref 15–37)
BASOPHILS # BLD AUTO: 0.07 X10(3) UL (ref 0–0.2)
BASOPHILS NFR BLD AUTO: 0.9 %
BILIRUB SERPL-MCNC: 0.8 MG/DL (ref 0.1–2)
BUN BLD-MCNC: 21 MG/DL (ref 7–18)
CALCIUM BLD-MCNC: 8.7 MG/DL (ref 8.5–10.1)
CHLORIDE SERPL-SCNC: 106 MMOL/L (ref 98–112)
CO2 SERPL-SCNC: 24 MMOL/L (ref 21–32)
CREAT BLD-MCNC: 1.25 MG/DL
EOSINOPHIL # BLD AUTO: 0.18 X10(3) UL (ref 0–0.7)
EOSINOPHIL NFR BLD AUTO: 2.2 %
ERYTHROCYTE [DISTWIDTH] IN BLOOD BY AUTOMATED COUNT: 17.3 %
GFR SERPLBLD BASED ON 1.73 SQ M-ARVRAT: 60 ML/MIN/1.73M2 (ref 60–?)
GLOBULIN PLAS-MCNC: 4.2 G/DL (ref 2.8–4.4)
GLUCOSE BLD-MCNC: 130 MG/DL (ref 70–99)
HCT VFR BLD AUTO: 29.4 %
HGB BLD-MCNC: 9.4 G/DL
IMM GRANULOCYTES # BLD AUTO: 0.03 X10(3) UL (ref 0–1)
IMM GRANULOCYTES NFR BLD: 0.4 %
INR BLD: 1.69 (ref 0.85–1.16)
LYMPHOCYTES # BLD AUTO: 1.37 X10(3) UL (ref 1–4)
LYMPHOCYTES NFR BLD AUTO: 16.9 %
MCH RBC QN AUTO: 28.2 PG (ref 26–34)
MCHC RBC AUTO-ENTMCNC: 32 G/DL (ref 31–37)
MCV RBC AUTO: 88.3 FL
MONOCYTES # BLD AUTO: 0.95 X10(3) UL (ref 0.1–1)
MONOCYTES NFR BLD AUTO: 11.7 %
NEUTROPHILS # BLD AUTO: 5.53 X10 (3) UL (ref 1.5–7.7)
NEUTROPHILS # BLD AUTO: 5.53 X10(3) UL (ref 1.5–7.7)
NEUTROPHILS NFR BLD AUTO: 67.9 %
OSMOLALITY SERPL CALC.SUM OF ELEC: 289 MOSM/KG (ref 275–295)
PLATELET # BLD AUTO: 290 10(3)UL (ref 150–450)
POTASSIUM SERPL-SCNC: 4 MMOL/L (ref 3.5–5.1)
PROT SERPL-MCNC: 7 G/DL (ref 6.4–8.2)
PROTHROMBIN TIME: 19.8 SECONDS (ref 11.6–14.8)
RBC # BLD AUTO: 3.33 X10(6)UL
SARS-COV-2 RNA RESP QL NAA+PROBE: NOT DETECTED
SODIUM SERPL-SCNC: 137 MMOL/L (ref 136–145)
WBC # BLD AUTO: 8.1 X10(3) UL (ref 4–11)

## 2023-01-25 PROCEDURE — 99285 EMERGENCY DEPT VISIT HI MDM: CPT

## 2023-01-25 PROCEDURE — 51700 IRRIGATION OF BLADDER: CPT

## 2023-01-25 PROCEDURE — 85025 COMPLETE CBC W/AUTO DIFF WBC: CPT | Performed by: EMERGENCY MEDICINE

## 2023-01-25 PROCEDURE — 36415 COLL VENOUS BLD VENIPUNCTURE: CPT

## 2023-01-25 PROCEDURE — 80053 COMPREHEN METABOLIC PANEL: CPT | Performed by: EMERGENCY MEDICINE

## 2023-01-25 PROCEDURE — 85610 PROTHROMBIN TIME: CPT | Performed by: EMERGENCY MEDICINE

## 2023-01-25 PROCEDURE — 85730 THROMBOPLASTIN TIME PARTIAL: CPT | Performed by: EMERGENCY MEDICINE

## 2023-01-25 RX ORDER — MAGNESIUM HYDROXIDE 1200 MG/15ML
3000 LIQUID ORAL CONTINUOUS
Status: DISCONTINUED | OUTPATIENT
Start: 2023-01-25 | End: 2023-01-28

## 2023-01-26 LAB
ANION GAP SERPL CALC-SCNC: 5 MMOL/L (ref 0–18)
BILIRUB UR QL STRIP.AUTO: NEGATIVE
BUN BLD-MCNC: 15 MG/DL (ref 7–18)
CALCIUM BLD-MCNC: 8.2 MG/DL (ref 8.5–10.1)
CHLORIDE SERPL-SCNC: 109 MMOL/L (ref 98–112)
CO2 SERPL-SCNC: 25 MMOL/L (ref 21–32)
COLOR UR AUTO: YELLOW
CREAT BLD-MCNC: 0.86 MG/DL
ERYTHROCYTE [DISTWIDTH] IN BLOOD BY AUTOMATED COUNT: 17.3 %
GFR SERPLBLD BASED ON 1.73 SQ M-ARVRAT: 91 ML/MIN/1.73M2 (ref 60–?)
GLUCOSE BLD-MCNC: 101 MG/DL (ref 70–99)
GLUCOSE UR STRIP.AUTO-MCNC: NEGATIVE MG/DL
HCT VFR BLD AUTO: 25.4 %
HGB BLD-MCNC: 8.2 G/DL
HGB BLD-MCNC: 9.2 G/DL
KETONES UR STRIP.AUTO-MCNC: NEGATIVE MG/DL
MCH RBC QN AUTO: 28.2 PG (ref 26–34)
MCHC RBC AUTO-ENTMCNC: 32.3 G/DL (ref 31–37)
MCV RBC AUTO: 87.3 FL
NITRITE UR QL STRIP.AUTO: NEGATIVE
OSMOLALITY SERPL CALC.SUM OF ELEC: 289 MOSM/KG (ref 275–295)
PH UR STRIP.AUTO: 6 [PH] (ref 5–8)
PLATELET # BLD AUTO: 210 10(3)UL (ref 150–450)
POTASSIUM SERPL-SCNC: 3.7 MMOL/L (ref 3.5–5.1)
PROT UR STRIP.AUTO-MCNC: 30 MG/DL
RBC # BLD AUTO: 2.91 X10(6)UL
RBC #/AREA URNS AUTO: >10 /HPF
SODIUM SERPL-SCNC: 139 MMOL/L (ref 136–145)
SP GR UR STRIP.AUTO: 1.01 (ref 1–1.03)
UROBILINOGEN UR STRIP.AUTO-MCNC: <2 MG/DL
WBC # BLD AUTO: 5.4 X10(3) UL (ref 4–11)

## 2023-01-26 PROCEDURE — 87086 URINE CULTURE/COLONY COUNT: CPT | Performed by: PHYSICIAN ASSISTANT

## 2023-01-26 PROCEDURE — 85027 COMPLETE CBC AUTOMATED: CPT | Performed by: HOSPITALIST

## 2023-01-26 PROCEDURE — 97116 GAIT TRAINING THERAPY: CPT

## 2023-01-26 PROCEDURE — 97530 THERAPEUTIC ACTIVITIES: CPT

## 2023-01-26 PROCEDURE — 85018 HEMOGLOBIN: CPT | Performed by: INTERNAL MEDICINE

## 2023-01-26 PROCEDURE — 97162 PT EVAL MOD COMPLEX 30 MIN: CPT

## 2023-01-26 PROCEDURE — 81001 URINALYSIS AUTO W/SCOPE: CPT | Performed by: PHYSICIAN ASSISTANT

## 2023-01-26 PROCEDURE — 80048 BASIC METABOLIC PNL TOTAL CA: CPT | Performed by: HOSPITALIST

## 2023-01-26 RX ORDER — AMILORIDE HYDROCHLORIDE 5 MG/1
10 TABLET ORAL DAILY
Status: DISCONTINUED | OUTPATIENT
Start: 2023-01-26 | End: 2023-01-28

## 2023-01-26 RX ORDER — ZOLPIDEM TARTRATE 5 MG/1
5 TABLET ORAL NIGHTLY PRN
Status: DISCONTINUED | OUTPATIENT
Start: 2023-01-26 | End: 2023-01-28

## 2023-01-26 RX ORDER — SENNA AND DOCUSATE SODIUM 50; 8.6 MG/1; MG/1
2 TABLET, FILM COATED ORAL 2 TIMES DAILY
Status: DISCONTINUED | OUTPATIENT
Start: 2023-01-26 | End: 2023-01-28

## 2023-01-26 RX ORDER — ATOMOXETINE 25 MG/1
25 CAPSULE ORAL DAILY
Status: DISCONTINUED | OUTPATIENT
Start: 2023-01-26 | End: 2023-01-28

## 2023-01-26 RX ORDER — OXYCODONE AND ACETAMINOPHEN 10; 325 MG/1; MG/1
1 TABLET ORAL EVERY 6 HOURS PRN
Status: DISCONTINUED | OUTPATIENT
Start: 2023-01-26 | End: 2023-01-28

## 2023-01-26 RX ORDER — POLYETHYLENE GLYCOL 3350 17 G/17G
17 POWDER, FOR SOLUTION ORAL DAILY
Status: DISCONTINUED | OUTPATIENT
Start: 2023-01-26 | End: 2023-01-28

## 2023-01-26 RX ORDER — MECLIZINE HCL 12.5 MG/1
25 TABLET ORAL 3 TIMES DAILY PRN
Status: DISCONTINUED | OUTPATIENT
Start: 2023-01-26 | End: 2023-01-28

## 2023-01-26 RX ORDER — ALPRAZOLAM 0.5 MG/1
0.5 TABLET ORAL
Status: DISCONTINUED | OUTPATIENT
Start: 2023-01-26 | End: 2023-01-28

## 2023-01-26 RX ORDER — BUPROPION HYDROCHLORIDE 150 MG/1
150 TABLET ORAL DAILY
Status: DISCONTINUED | OUTPATIENT
Start: 2023-01-26 | End: 2023-01-28

## 2023-01-26 RX ORDER — POTASSIUM CHLORIDE 20 MEQ/1
40 TABLET, EXTENDED RELEASE ORAL ONCE
Status: COMPLETED | OUTPATIENT
Start: 2023-01-26 | End: 2023-01-26

## 2023-01-26 RX ORDER — SERTRALINE HYDROCHLORIDE 100 MG/1
100 TABLET, FILM COATED ORAL EVERY MORNING
Status: DISCONTINUED | OUTPATIENT
Start: 2023-01-26 | End: 2023-01-28

## 2023-01-26 RX ORDER — DILTIAZEM HYDROCHLORIDE 60 MG/1
120 TABLET, FILM COATED ORAL DAILY
Status: DISCONTINUED | OUTPATIENT
Start: 2023-01-26 | End: 2023-01-28

## 2023-01-26 RX ORDER — ATORVASTATIN CALCIUM 40 MG/1
40 TABLET, FILM COATED ORAL NIGHTLY
Status: DISCONTINUED | OUTPATIENT
Start: 2023-01-26 | End: 2023-01-28

## 2023-01-26 NOTE — ED INITIAL ASSESSMENT (HPI)
Gross hematuria to oliveros bag since am. Emptied bag in am. Minimal urine output. Currently on blood thinners.

## 2023-01-26 NOTE — PROGRESS NOTES
01/26/23 1053   Clinical Encounter Type   Visited With Patient   Routine Visit Introduction   Continue Visiting No   Referral From Nurse   Referral To    Taxonomy   Intended Effects Build relationship of care and support   Methods Collaborate with care team member   Interventions Assist someone with Advance Directives   Created a new POA for Healthcare document that, per the patient, accurately reflects their wishes. Gave the patient the original POA document along with copies. A copy of the new POA document has been placed on the patient's paper chart. Spiritual care can be requested via an Epic consult.    [facility]

## 2023-01-27 LAB
ANION GAP SERPL CALC-SCNC: 4 MMOL/L (ref 0–18)
BASOPHILS # BLD AUTO: 0.04 X10(3) UL (ref 0–0.2)
BASOPHILS # BLD AUTO: 0.07 X10(3) UL (ref 0–0.2)
BASOPHILS NFR BLD AUTO: 0.7 %
BASOPHILS NFR BLD AUTO: 1.1 %
BUN BLD-MCNC: 16 MG/DL (ref 7–18)
CALCIUM BLD-MCNC: 8.6 MG/DL (ref 8.5–10.1)
CHLORIDE SERPL-SCNC: 106 MMOL/L (ref 98–112)
CO2 SERPL-SCNC: 26 MMOL/L (ref 21–32)
CREAT BLD-MCNC: 0.83 MG/DL
DEPRECATED HBV CORE AB SER IA-ACNC: 59 NG/ML
EOSINOPHIL # BLD AUTO: 0.15 X10(3) UL (ref 0–0.7)
EOSINOPHIL # BLD AUTO: 0.22 X10(3) UL (ref 0–0.7)
EOSINOPHIL NFR BLD AUTO: 2.6 %
EOSINOPHIL NFR BLD AUTO: 3.4 %
ERYTHROCYTE [DISTWIDTH] IN BLOOD BY AUTOMATED COUNT: 17.2 %
ERYTHROCYTE [DISTWIDTH] IN BLOOD BY AUTOMATED COUNT: 17.2 %
GFR SERPLBLD BASED ON 1.73 SQ M-ARVRAT: 92 ML/MIN/1.73M2 (ref 60–?)
GLUCOSE BLD-MCNC: 100 MG/DL (ref 70–99)
HCT VFR BLD AUTO: 26.4 %
HCT VFR BLD AUTO: 28.5 %
HGB BLD-MCNC: 8 G/DL
HGB BLD-MCNC: 8.8 G/DL
IMM GRANULOCYTES # BLD AUTO: 0.02 X10(3) UL (ref 0–1)
IMM GRANULOCYTES # BLD AUTO: 0.02 X10(3) UL (ref 0–1)
IMM GRANULOCYTES NFR BLD: 0.3 %
IMM GRANULOCYTES NFR BLD: 0.3 %
IRON SATN MFR SERPL: 9 %
IRON SERPL-MCNC: 29 UG/DL
LYMPHOCYTES # BLD AUTO: 0.57 X10(3) UL (ref 1–4)
LYMPHOCYTES # BLD AUTO: 1.13 X10(3) UL (ref 1–4)
LYMPHOCYTES NFR BLD AUTO: 17.5 %
LYMPHOCYTES NFR BLD AUTO: 9.8 %
MCH RBC QN AUTO: 27.6 PG (ref 26–34)
MCH RBC QN AUTO: 27.6 PG (ref 26–34)
MCHC RBC AUTO-ENTMCNC: 30.3 G/DL (ref 31–37)
MCHC RBC AUTO-ENTMCNC: 30.9 G/DL (ref 31–37)
MCV RBC AUTO: 89.3 FL
MCV RBC AUTO: 91 FL
MONOCYTES # BLD AUTO: 0.57 X10(3) UL (ref 0.1–1)
MONOCYTES # BLD AUTO: 0.74 X10(3) UL (ref 0.1–1)
MONOCYTES NFR BLD AUTO: 11.4 %
MONOCYTES NFR BLD AUTO: 9.8 %
NEUTROPHILS # BLD AUTO: 4.29 X10 (3) UL (ref 1.5–7.7)
NEUTROPHILS # BLD AUTO: 4.29 X10(3) UL (ref 1.5–7.7)
NEUTROPHILS # BLD AUTO: 4.45 X10 (3) UL (ref 1.5–7.7)
NEUTROPHILS # BLD AUTO: 4.45 X10(3) UL (ref 1.5–7.7)
NEUTROPHILS NFR BLD AUTO: 66.3 %
NEUTROPHILS NFR BLD AUTO: 76.8 %
OSMOLALITY SERPL CALC.SUM OF ELEC: 283 MOSM/KG (ref 275–295)
PLATELET # BLD AUTO: 201 10(3)UL (ref 150–450)
PLATELET # BLD AUTO: 214 10(3)UL (ref 150–450)
POTASSIUM SERPL-SCNC: 4.3 MMOL/L (ref 3.5–5.1)
POTASSIUM SERPL-SCNC: 4.3 MMOL/L (ref 3.5–5.1)
RBC # BLD AUTO: 2.9 X10(6)UL
RBC # BLD AUTO: 3.19 X10(6)UL
SODIUM SERPL-SCNC: 136 MMOL/L (ref 136–145)
TIBC SERPL-MCNC: 331 UG/DL (ref 240–450)
TRANSFERRIN SERPL-MCNC: 222 MG/DL (ref 200–360)
WBC # BLD AUTO: 5.8 X10(3) UL (ref 4–11)
WBC # BLD AUTO: 6.5 X10(3) UL (ref 4–11)

## 2023-01-27 PROCEDURE — 83540 ASSAY OF IRON: CPT | Performed by: INTERNAL MEDICINE

## 2023-01-27 PROCEDURE — 85025 COMPLETE CBC W/AUTO DIFF WBC: CPT | Performed by: INTERNAL MEDICINE

## 2023-01-27 PROCEDURE — 80048 BASIC METABOLIC PNL TOTAL CA: CPT | Performed by: INTERNAL MEDICINE

## 2023-01-27 PROCEDURE — 97165 OT EVAL LOW COMPLEX 30 MIN: CPT

## 2023-01-27 PROCEDURE — 84132 ASSAY OF SERUM POTASSIUM: CPT | Performed by: HOSPITALIST

## 2023-01-27 PROCEDURE — 83550 IRON BINDING TEST: CPT | Performed by: INTERNAL MEDICINE

## 2023-01-27 PROCEDURE — 97530 THERAPEUTIC ACTIVITIES: CPT

## 2023-01-27 PROCEDURE — 82728 ASSAY OF FERRITIN: CPT | Performed by: INTERNAL MEDICINE

## 2023-01-27 RX ORDER — ASPIRIN 81 MG/1
81 TABLET ORAL DAILY
Qty: 90 TABLET | Refills: 3 | Status: SHIPPED | OUTPATIENT
Start: 2023-01-28 | End: 2024-01-23

## 2023-01-27 RX ORDER — ASPIRIN 81 MG/1
81 TABLET ORAL DAILY
Status: DISCONTINUED | OUTPATIENT
Start: 2023-01-28 | End: 2023-01-28

## 2023-01-27 NOTE — HOME CARE LIAISON
Received referral via [employer] for Home Health services. Patient has a history with [facility]. Spoke w/ patient and provided with list of [facility] providers from [address] [place] [address], choice is [facility] [address] again. Agency reserved in [address] [place] [address] and contact information placed on AVS.Financial interest disclosure provided.  Notified Hugh Joe

## 2023-01-27 NOTE — DISCHARGE INSTRUCTIONS
Sometimes managing your health at home requires assistance. The [facility] team has recognized your preference to use [facility]. They can be reached by phone at [phone]. The fax number for your reference is [phone]. . A representative from the home health agency will contact you or your family to schedule your first visit. [facility]  [phone]  [email or web address]    [facility]    [phone]  [email or web address]

## 2023-01-27 NOTE — CM/SW NOTE
01/27/23 1500   CM/SW Referral Data   Referral Source Social Work (self-referral)   Reason for Referral Discharge planning   Informant Patient;EMR   Patient Info   Patient's Current Mental Status at Time of Assessment Oriented; Alert   Patient's [address]   Patient lives with Alone   Discharge Needs   Anticipated D/C needs Home health care     HOME SITUATION  Type of Home: House   Home Layout: Multi-level  Stairs to Enter : 2  Stairs to Bedroom: 6  Lives With: Alone  Drives: Yes     Prior Level of [place]: Stated that he manages his own affairs although completing task may take time. Still tries to do his won errands and drives. No adequate assistance at home but has friends and family  (Per PT evaluation)       Patient is a 75 y/o male who admitted with gross hematuria. PT recommending HH. Sent referrals in aidin. Met with patient, who was alert and oriented, to discuss discharge planning. He lives alone and normally uses a cane. He states he has supportive neighbors and friends that can assist him if needed. Discussed PT rec of [facility] accepted and patient was agreeable as he has used [facility] before. He mentioned he could use assistance with some help at home. Provided DCSS resources, CG and [facility] resources. SW will remain available.       ALLAN Saldana  Discharge Planner  [phone]

## 2023-01-28 VITALS
WEIGHT: 180 LBS | TEMPERATURE: 99 F | RESPIRATION RATE: 18 BRPM | SYSTOLIC BLOOD PRESSURE: 104 MMHG | HEIGHT: 71 IN | HEART RATE: 83 BPM | DIASTOLIC BLOOD PRESSURE: 78 MMHG | BODY MASS INDEX: 25.2 KG/M2 | OXYGEN SATURATION: 98 %

## 2023-01-28 LAB
ANION GAP SERPL CALC-SCNC: 5 MMOL/L (ref 0–18)
BASOPHILS # BLD AUTO: 0.07 X10(3) UL (ref 0–0.2)
BASOPHILS NFR BLD AUTO: 0.9 %
BUN BLD-MCNC: 13 MG/DL (ref 7–18)
CALCIUM BLD-MCNC: 8.7 MG/DL (ref 8.5–10.1)
CHLORIDE SERPL-SCNC: 107 MMOL/L (ref 98–112)
CO2 SERPL-SCNC: 26 MMOL/L (ref 21–32)
CREAT BLD-MCNC: 0.87 MG/DL
EOSINOPHIL # BLD AUTO: 0.29 X10(3) UL (ref 0–0.7)
EOSINOPHIL NFR BLD AUTO: 3.8 %
ERYTHROCYTE [DISTWIDTH] IN BLOOD BY AUTOMATED COUNT: 17.3 %
GFR SERPLBLD BASED ON 1.73 SQ M-ARVRAT: 91 ML/MIN/1.73M2 (ref 60–?)
GLUCOSE BLD-MCNC: 94 MG/DL (ref 70–99)
HCT VFR BLD AUTO: 28.9 %
HGB BLD-MCNC: 9.1 G/DL
IMM GRANULOCYTES # BLD AUTO: 0.03 X10(3) UL (ref 0–1)
IMM GRANULOCYTES NFR BLD: 0.4 %
LYMPHOCYTES # BLD AUTO: 1.04 X10(3) UL (ref 1–4)
LYMPHOCYTES NFR BLD AUTO: 13.8 %
MCH RBC QN AUTO: 27.8 PG (ref 26–34)
MCHC RBC AUTO-ENTMCNC: 31.5 G/DL (ref 31–37)
MCV RBC AUTO: 88.4 FL
MONOCYTES # BLD AUTO: 0.75 X10(3) UL (ref 0.1–1)
MONOCYTES NFR BLD AUTO: 9.9 %
NEUTROPHILS # BLD AUTO: 5.36 X10 (3) UL (ref 1.5–7.7)
NEUTROPHILS # BLD AUTO: 5.36 X10(3) UL (ref 1.5–7.7)
NEUTROPHILS NFR BLD AUTO: 71.2 %
OSMOLALITY SERPL CALC.SUM OF ELEC: 286 MOSM/KG (ref 275–295)
PLATELET # BLD AUTO: 228 10(3)UL (ref 150–450)
POTASSIUM SERPL-SCNC: 4 MMOL/L (ref 3.5–5.1)
RBC # BLD AUTO: 3.27 X10(6)UL
SODIUM SERPL-SCNC: 138 MMOL/L (ref 136–145)
WBC # BLD AUTO: 7.5 X10(3) UL (ref 4–11)

## 2023-01-28 PROCEDURE — 85025 COMPLETE CBC W/AUTO DIFF WBC: CPT | Performed by: INTERNAL MEDICINE

## 2023-01-28 PROCEDURE — 80048 BASIC METABOLIC PNL TOTAL CA: CPT | Performed by: INTERNAL MEDICINE

## 2023-01-28 NOTE — PROGRESS NOTES
NURSING DISCHARGE NOTE    Discharged Home via Wheelchair. Accompanied by Support staff  Belongings Taken by patient/family. VSS, tolerating diet, oliveros intact, pain controlled, tolerating ambulation. Discharge education provided. Reviewed medications and follow up appts. All questions answered and concerns addressed, pt verbalized understanding. IV removed. Pt dc in stable condition.  Left unit via wheelchair at [phone]

## 2023-02-03 ENCOUNTER — HOSPITAL ENCOUNTER (EMERGENCY)
Facility: HOSPITAL | Age: 73
Discharge: HOME | End: 2023-02-04
Attending: EMERGENCY MEDICINE
Payer: MEDICARE

## 2023-02-03 DIAGNOSIS — N30.01 ACUTE CYSTITIS WITH HEMATURIA: Primary | ICD-10-CM

## 2023-02-03 PROCEDURE — 81001 URINALYSIS AUTO W/SCOPE: CPT | Performed by: EMERGENCY MEDICINE

## 2023-02-03 PROCEDURE — 87086 URINE CULTURE/COLONY COUNT: CPT | Performed by: EMERGENCY MEDICINE

## 2023-02-03 PROCEDURE — 87077 CULTURE AEROBIC IDENTIFY: CPT | Performed by: EMERGENCY MEDICINE

## 2023-02-03 PROCEDURE — 99284 EMERGENCY DEPT VISIT MOD MDM: CPT

## 2023-02-03 PROCEDURE — 81015 MICROSCOPIC EXAM OF URINE: CPT | Performed by: EMERGENCY MEDICINE

## 2023-02-03 PROCEDURE — 87186 SC STD MICRODIL/AGAR DIL: CPT | Performed by: EMERGENCY MEDICINE

## 2023-02-03 PROCEDURE — 87184 SC STD DISK METHOD PER PLATE: CPT | Performed by: EMERGENCY MEDICINE

## 2023-02-04 VITALS
BODY MASS INDEX: 26.6 KG/M2 | SYSTOLIC BLOOD PRESSURE: 110 MMHG | RESPIRATION RATE: 18 BRPM | HEART RATE: 87 BPM | HEIGHT: 71 IN | TEMPERATURE: 98 F | OXYGEN SATURATION: 98 % | WEIGHT: 190 LBS | DIASTOLIC BLOOD PRESSURE: 73 MMHG

## 2023-02-04 LAB
BILIRUB UR QL CFM: NEGATIVE
COLOR UR AUTO: YELLOW
GLUCOSE UR STRIP.AUTO-MCNC: NEGATIVE MG/DL
HYALINE CASTS #/AREA URNS AUTO: PRESENT /LPF
HYALINE CASTS #/AREA URNS AUTO: PRESENT /LPF
NITRITE UR QL STRIP.AUTO: NEGATIVE
PH UR STRIP.AUTO: 5.5 [PH] (ref 5–8)
RBC #/AREA URNS AUTO: >10 /HPF
RBC #/AREA URNS AUTO: >10 /HPF
SP GR UR STRIP.AUTO: >=1.03 (ref 1–1.03)
UROBILINOGEN UR STRIP.AUTO-MCNC: 1 MG/DL
WBC #/AREA URNS AUTO: >50 /HPF
WBC #/AREA URNS AUTO: >50 /HPF
WBC CLUMPS UR QL AUTO: PRESENT /HPF
WBC CLUMPS UR QL AUTO: PRESENT /HPF

## 2023-02-04 PROCEDURE — 96372 THER/PROPH/DIAG INJ SC/IM: CPT

## 2023-02-04 RX ORDER — CEPHALEXIN 500 MG/1
500 CAPSULE ORAL 2 TIMES DAILY
Qty: 14 CAPSULE | Refills: 0 | Status: SHIPPED | OUTPATIENT
Start: 2023-02-04 | End: 2023-02-11

## 2023-02-07 RX ORDER — SULFAMETHOXAZOLE AND TRIMETHOPRIM 800; 160 MG/1; MG/1
1 TABLET ORAL 2 TIMES DAILY
Qty: 10 TABLET | Refills: 0 | Status: SHIPPED | OUTPATIENT
Start: 2023-02-07 | End: 2023-02-12

## 2023-02-08 NOTE — PROGRESS NOTES
ED Culture Callback Results Review  Pharmacist reviewed culture results from ED visit     Positive  urine culture noted which is not susceptible to previously prescribed cephalexin. Antibiotic changed to Bactrim 1 DS tab BID for 5 days. New prescription was sent to [facility] by provider. patient contacted by phone. patient verbalized understanding of updated treatment plan, all questions answered at this time.

## 2023-04-05 NOTE — PLAN OF CARE
Pain controlled with [place]. Ace wrap to R foot remains in place. Able to wiggle all toes. Left foot is sensitive to touch and pain increases with movement. Bilateral feet elevated on pillow. VSS. Tele monitoring per KANE protocol.  Voiding in urinal. Updated Satisfactory

## 2023-06-03 ENCOUNTER — HOSPITAL ENCOUNTER (OUTPATIENT)
Facility: HOSPITAL | Age: 73
Setting detail: OBSERVATION
Discharge: HOME | End: 2023-06-05
Attending: EMERGENCY MEDICINE | Admitting: INTERNAL MEDICINE
Payer: MEDICARE

## 2023-06-03 ENCOUNTER — APPOINTMENT (OUTPATIENT)
Dept: GENERAL RADIOLOGY | Facility: HOSPITAL | Age: 73
End: 2023-06-03
Attending: EMERGENCY MEDICINE
Payer: MEDICARE

## 2023-06-03 DIAGNOSIS — R55 SYNCOPE AND COLLAPSE: Primary | ICD-10-CM

## 2023-06-03 DIAGNOSIS — I48.20 ATRIAL FIBRILLATION, CHRONIC (HCC): ICD-10-CM

## 2023-06-03 PROBLEM — D64.9 ANEMIA: Status: ACTIVE | Noted: 2023-01-01

## 2023-06-03 PROBLEM — R79.89 AZOTEMIA: Status: ACTIVE | Noted: 2023-01-01

## 2023-06-03 PROBLEM — R79.89 AZOTEMIA: Status: ACTIVE | Noted: 2023-06-03

## 2023-06-03 PROBLEM — D64.9 ANEMIA: Status: ACTIVE | Noted: 2023-06-03

## 2023-06-03 LAB
ALBUMIN SERPL-MCNC: 3.2 G/DL (ref 3.4–5)
ALBUMIN/GLOB SERPL: 0.8 {RATIO} (ref 1–2)
ALP LIVER SERPL-CCNC: 92 U/L
ALT SERPL-CCNC: 48 U/L
ANION GAP SERPL CALC-SCNC: 7 MMOL/L (ref 0–18)
AST SERPL-CCNC: 135 U/L (ref 15–37)
BASOPHILS # BLD AUTO: 0.04 X10(3) UL (ref 0–0.2)
BASOPHILS NFR BLD AUTO: 0.7 %
BILIRUB SERPL-MCNC: 0.9 MG/DL (ref 0.1–2)
BILIRUB UR QL STRIP.AUTO: NEGATIVE
BUN BLD-MCNC: 19 MG/DL (ref 7–18)
CALCIUM BLD-MCNC: 8.8 MG/DL (ref 8.5–10.1)
CHLORIDE SERPL-SCNC: 105 MMOL/L (ref 98–112)
CLARITY UR REFRACT.AUTO: CLEAR
CO2 SERPL-SCNC: 24 MMOL/L (ref 21–32)
COLOR UR AUTO: YELLOW
CREAT BLD-MCNC: 0.82 MG/DL
EOSINOPHIL # BLD AUTO: 0.06 X10(3) UL (ref 0–0.7)
EOSINOPHIL NFR BLD AUTO: 1 %
ERYTHROCYTE [DISTWIDTH] IN BLOOD BY AUTOMATED COUNT: 23.5 %
GFR SERPLBLD BASED ON 1.73 SQ M-ARVRAT: 92 ML/MIN/1.73M2 (ref 60–?)
GLOBULIN PLAS-MCNC: 3.9 G/DL (ref 2.8–4.4)
GLUCOSE BLD-MCNC: 81 MG/DL (ref 70–99)
GLUCOSE UR STRIP.AUTO-MCNC: NEGATIVE MG/DL
HCT VFR BLD AUTO: 34 %
HGB BLD-MCNC: 10.7 G/DL
HYALINE CASTS #/AREA URNS AUTO: PRESENT /LPF
IMM GRANULOCYTES # BLD AUTO: 0.01 X10(3) UL (ref 0–1)
IMM GRANULOCYTES NFR BLD: 0.2 %
KETONES UR STRIP.AUTO-MCNC: 20 MG/DL
LEUKOCYTE ESTERASE UR QL STRIP.AUTO: NEGATIVE
LYMPHOCYTES # BLD AUTO: 0.6 X10(3) UL (ref 1–4)
LYMPHOCYTES NFR BLD AUTO: 9.9 %
MCH RBC QN AUTO: 24.6 PG (ref 26–34)
MCHC RBC AUTO-ENTMCNC: 31.5 G/DL (ref 31–37)
MCV RBC AUTO: 78.2 FL
MONOCYTES # BLD AUTO: 0.6 X10(3) UL (ref 0.1–1)
MONOCYTES NFR BLD AUTO: 9.9 %
NEUTROPHILS # BLD AUTO: 4.76 X10 (3) UL (ref 1.5–7.7)
NEUTROPHILS # BLD AUTO: 4.76 X10(3) UL (ref 1.5–7.7)
NEUTROPHILS NFR BLD AUTO: 78.3 %
NITRITE UR QL STRIP.AUTO: NEGATIVE
OSMOLALITY SERPL CALC.SUM OF ELEC: 283 MOSM/KG (ref 275–295)
PH UR STRIP.AUTO: 6 [PH] (ref 5–8)
PLATELET # BLD AUTO: 158 10(3)UL (ref 150–450)
PLATELET MORPHOLOGY: NORMAL
POTASSIUM SERPL-SCNC: 3.9 MMOL/L (ref 3.5–5.1)
PROT SERPL-MCNC: 7.1 G/DL (ref 6.4–8.2)
PROT UR STRIP.AUTO-MCNC: 30 MG/DL
RBC # BLD AUTO: 4.35 X10(6)UL
RBC UR QL AUTO: NEGATIVE
SARS-COV-2 RNA RESP QL NAA+PROBE: NOT DETECTED
SODIUM SERPL-SCNC: 136 MMOL/L (ref 136–145)
SP GR UR STRIP.AUTO: 1.02 (ref 1–1.03)
TROPONIN I HIGH SENSITIVITY: 17 NG/L
UROBILINOGEN UR STRIP.AUTO-MCNC: 4 MG/DL
WBC # BLD AUTO: 6.1 X10(3) UL (ref 4–11)

## 2023-06-03 PROCEDURE — 71045 X-RAY EXAM CHEST 1 VIEW: CPT | Performed by: EMERGENCY MEDICINE

## 2023-06-03 PROCEDURE — 93005 ELECTROCARDIOGRAM TRACING: CPT

## 2023-06-03 PROCEDURE — 81001 URINALYSIS AUTO W/SCOPE: CPT | Performed by: EMERGENCY MEDICINE

## 2023-06-03 PROCEDURE — 84484 ASSAY OF TROPONIN QUANT: CPT | Performed by: EMERGENCY MEDICINE

## 2023-06-03 PROCEDURE — 85025 COMPLETE CBC W/AUTO DIFF WBC: CPT | Performed by: EMERGENCY MEDICINE

## 2023-06-03 PROCEDURE — 87430 STREP A AG IA: CPT | Performed by: EMERGENCY MEDICINE

## 2023-06-03 PROCEDURE — 80053 COMPREHEN METABOLIC PANEL: CPT | Performed by: EMERGENCY MEDICINE

## 2023-06-03 RX ORDER — SODIUM CHLORIDE 9 MG/ML
INJECTION, SOLUTION INTRAVENOUS CONTINUOUS
Status: ACTIVE | OUTPATIENT
Start: 2023-06-03 | End: 2023-06-04

## 2023-06-03 RX ORDER — ONDANSETRON 2 MG/ML
4 INJECTION INTRAMUSCULAR; INTRAVENOUS EVERY 6 HOURS PRN
Status: DISCONTINUED | OUTPATIENT
Start: 2023-06-03 | End: 2023-06-05

## 2023-06-03 RX ORDER — MECLIZINE HCL 12.5 MG/1
25 TABLET ORAL 3 TIMES DAILY PRN
Status: DISCONTINUED | OUTPATIENT
Start: 2023-06-03 | End: 2023-06-05

## 2023-06-03 RX ORDER — ATORVASTATIN CALCIUM 40 MG/1
40 TABLET, FILM COATED ORAL NIGHTLY
Status: DISCONTINUED | OUTPATIENT
Start: 2023-06-03 | End: 2023-06-05

## 2023-06-03 RX ORDER — ASPIRIN 81 MG/1
81 TABLET ORAL DAILY
Status: DISCONTINUED | OUTPATIENT
Start: 2023-06-03 | End: 2023-06-05

## 2023-06-03 RX ORDER — FLUTICASONE PROPIONATE 50 MCG
2 SPRAY, SUSPENSION (ML) NASAL DAILY PRN
Status: DISCONTINUED | OUTPATIENT
Start: 2023-06-03 | End: 2023-06-05

## 2023-06-03 RX ORDER — ACETAMINOPHEN 325 MG/1
650 TABLET ORAL EVERY 6 HOURS PRN
Status: DISCONTINUED | OUTPATIENT
Start: 2023-06-03 | End: 2023-06-05

## 2023-06-03 RX ORDER — HYDROMORPHONE HYDROCHLORIDE 1 MG/ML
0.5 INJECTION, SOLUTION INTRAMUSCULAR; INTRAVENOUS; SUBCUTANEOUS EVERY 30 MIN PRN
Status: ACTIVE | OUTPATIENT
Start: 2023-06-03 | End: 2023-06-04

## 2023-06-03 RX ORDER — ZOLPIDEM TARTRATE 5 MG/1
5 TABLET ORAL NIGHTLY PRN
Status: DISCONTINUED | OUTPATIENT
Start: 2023-06-03 | End: 2023-06-05

## 2023-06-03 RX ORDER — BUPROPION HYDROCHLORIDE 150 MG/1
150 TABLET ORAL DAILY
Status: DISCONTINUED | OUTPATIENT
Start: 2023-06-04 | End: 2023-06-05

## 2023-06-03 RX ORDER — ATOMOXETINE 40 MG/1
40 CAPSULE ORAL DAILY
Status: DISCONTINUED | OUTPATIENT
Start: 2023-06-04 | End: 2023-06-05

## 2023-06-03 RX ORDER — ONDANSETRON 2 MG/ML
4 INJECTION INTRAMUSCULAR; INTRAVENOUS EVERY 4 HOURS PRN
Status: ACTIVE | OUTPATIENT
Start: 2023-06-03 | End: 2023-06-04

## 2023-06-03 RX ORDER — HEPARIN SODIUM 5000 [USP'U]/ML
5000 INJECTION, SOLUTION INTRAVENOUS; SUBCUTANEOUS EVERY 8 HOURS SCHEDULED
Status: DISCONTINUED | OUTPATIENT
Start: 2023-06-03 | End: 2023-06-04

## 2023-06-04 ENCOUNTER — APPOINTMENT (OUTPATIENT)
Dept: CV DIAGNOSTICS | Facility: HOSPITAL | Age: 73
End: 2023-06-04
Attending: HOSPITALIST
Payer: MEDICARE

## 2023-06-04 LAB
ANION GAP SERPL CALC-SCNC: 9 MMOL/L (ref 0–18)
BASOPHILS # BLD AUTO: 0.06 X10(3) UL (ref 0–0.2)
BASOPHILS NFR BLD AUTO: 1.3 %
BUN BLD-MCNC: 17 MG/DL (ref 7–18)
CALCIUM BLD-MCNC: 8.9 MG/DL (ref 8.5–10.1)
CHLORIDE SERPL-SCNC: 106 MMOL/L (ref 98–112)
CO2 SERPL-SCNC: 21 MMOL/L (ref 21–32)
CREAT BLD-MCNC: 0.67 MG/DL
EOSINOPHIL # BLD AUTO: 0.14 X10(3) UL (ref 0–0.7)
EOSINOPHIL NFR BLD AUTO: 3.1 %
ERYTHROCYTE [DISTWIDTH] IN BLOOD BY AUTOMATED COUNT: 23.9 %
GFR SERPLBLD BASED ON 1.73 SQ M-ARVRAT: 97 ML/MIN/1.73M2 (ref 60–?)
GLUCOSE BLD-MCNC: 66 MG/DL (ref 70–99)
HCT VFR BLD AUTO: 36.1 %
HGB BLD-MCNC: 10.9 G/DL
IMM GRANULOCYTES # BLD AUTO: 0.02 X10(3) UL (ref 0–1)
IMM GRANULOCYTES NFR BLD: 0.4 %
LYMPHOCYTES # BLD AUTO: 0.79 X10(3) UL (ref 1–4)
LYMPHOCYTES NFR BLD AUTO: 17.4 %
MCH RBC QN AUTO: 24.7 PG (ref 26–34)
MCHC RBC AUTO-ENTMCNC: 30.2 G/DL (ref 31–37)
MCV RBC AUTO: 81.7 FL
MONOCYTES # BLD AUTO: 0.52 X10(3) UL (ref 0.1–1)
MONOCYTES NFR BLD AUTO: 11.5 %
NEUTROPHILS # BLD AUTO: 3.01 X10 (3) UL (ref 1.5–7.7)
NEUTROPHILS # BLD AUTO: 3.01 X10(3) UL (ref 1.5–7.7)
NEUTROPHILS NFR BLD AUTO: 66.3 %
OSMOLALITY SERPL CALC.SUM OF ELEC: 282 MOSM/KG (ref 275–295)
PLATELET # BLD AUTO: 130 10(3)UL (ref 150–450)
POTASSIUM SERPL-SCNC: 4.2 MMOL/L (ref 3.5–5.1)
Q-T INTERVAL: 426 MS
QRS DURATION: 98 MS
QTC CALCULATION (BEZET): 452 MS
R AXIS: 91 DEGREES
RBC # BLD AUTO: 4.42 X10(6)UL
SODIUM SERPL-SCNC: 136 MMOL/L (ref 136–145)
T AXIS: 27 DEGREES
TROPONIN I HIGH SENSITIVITY: 20 NG/L
VENTRICULAR RATE: 68 BPM
WBC # BLD AUTO: 4.5 X10(3) UL (ref 4–11)

## 2023-06-04 PROCEDURE — 97162 PT EVAL MOD COMPLEX 30 MIN: CPT

## 2023-06-04 PROCEDURE — 97116 GAIT TRAINING THERAPY: CPT

## 2023-06-04 PROCEDURE — 93306 TTE W/DOPPLER COMPLETE: CPT | Performed by: HOSPITALIST

## 2023-06-04 PROCEDURE — 85025 COMPLETE CBC W/AUTO DIFF WBC: CPT | Performed by: HOSPITALIST

## 2023-06-04 PROCEDURE — 84484 ASSAY OF TROPONIN QUANT: CPT | Performed by: HOSPITALIST

## 2023-06-04 PROCEDURE — 97530 THERAPEUTIC ACTIVITIES: CPT

## 2023-06-04 PROCEDURE — 96372 THER/PROPH/DIAG INJ SC/IM: CPT

## 2023-06-04 PROCEDURE — 80048 BASIC METABOLIC PNL TOTAL CA: CPT | Performed by: HOSPITALIST

## 2023-06-04 RX ORDER — SULFAMETHOXAZOLE AND TRIMETHOPRIM 800; 160 MG/1; MG/1
1 TABLET ORAL EVERY 12 HOURS SCHEDULED
Status: DISCONTINUED | OUTPATIENT
Start: 2023-06-04 | End: 2023-06-05

## 2023-06-04 RX ORDER — HYDROCODONE BITARTRATE AND ACETAMINOPHEN 10; 325 MG/1; MG/1
1 TABLET ORAL EVERY 6 HOURS PRN
Status: DISCONTINUED | OUTPATIENT
Start: 2023-06-04 | End: 2023-06-05

## 2023-06-04 NOTE — PLAN OF CARE
Assumed care of patient @ 0730. No acute distress noted. C/o chronic back pain. A&Ox4. VSS on RA. C/o slight dizziness when he stands up, but states it goes away within seconds. Afib on tele. Started back on elquis. On bactrim for UTI. Sore on 2nd and 3rd rt toe. Cardiac diet. Ortho q shift. RT hand PIV, SL. Cards following. Up with sba. All needs met at this time. Problem: Patient/Family Goals  Goal: Patient/Family Long Term Goal  Description: Patient's Long Term Goal: no more syncopal episodes  Interventions:  - orthos q shift  Tele monitoring  Cards following  - See additional Care Plan goals for specific interventions  Outcome: Progressing  Goal: Patient/Family Short Term Goal  Description: Patient's Short Term Goal: pain management  Interventions:   - per [facility]  - See additional Care Plan goals for specific interventions  Outcome: Progressing  Problem: CARDIOVASCULAR - ADULT  Goal: Maintains optimal cardiac output and hemodynamic stability  Description: INTERVENTIONS:  - Monitor vital signs, rhythm, and trends  - Monitor for bleeding, hypotension and signs of decreased cardiac output  - Evaluate effectiveness of vasoactive medications to optimize hemodynamic stability  - Monitor arterial and/or venous puncture sites for bleeding and/or hematoma  - Assess quality of pulses, skin color and temperature  - Assess for signs of decreased coronary artery perfusion - ex.  Angina  - Evaluate fluid balance, assess for edema, trend weights  Outcome: Progressing  Goal: Absence of cardiac arrhythmias or at baseline  Description: INTERVENTIONS:  - Continuous cardiac monitoring, monitor vital signs, obtain 12 lead EKG if indicated  - Evaluate effectiveness of antiarrhythmic and heart rate control medications as ordered  - Initiate emergency measures for life threatening arrhythmias  - Monitor electrolytes and administer replacement therapy as ordered  Outcome: Progressing

## 2023-06-04 NOTE — ED QUICK NOTES
Orders for admission, patient is aware of plan and ready to go upstairs. Any questions, please call ED RN Tiffany Chavarria at extension [phone].      Patient Covid vaccination status: Fully vaccinated     COVID Test Ordered in ED: Rapid SARS-CoV-2 by PCR    COVID Suspicion at Admission: N/A    Running Infusions:  None    Mental Status/LOC at time of transport: a/ox4    Other pertinent information:   CIWA score: N/A   NIH score:  N/A

## 2023-06-04 NOTE — PROGRESS NOTES
06/04/23 0940 06/04/23 0942 06/04/23 0945   Vitals   Pulse 87 83 94   /79 141/84 146/83     Orthostats

## 2023-06-04 NOTE — ED INITIAL ASSESSMENT (HPI)
Patient here from home, \"having fainting spells\" per patient. Has been feeling weak for the past couple days. Not on blood thinner.

## 2023-06-05 VITALS
BODY MASS INDEX: 24.18 KG/M2 | TEMPERATURE: 99 F | OXYGEN SATURATION: 97 % | HEART RATE: 59 BPM | WEIGHT: 172.69 LBS | SYSTOLIC BLOOD PRESSURE: 130 MMHG | RESPIRATION RATE: 20 BRPM | HEIGHT: 71 IN | DIASTOLIC BLOOD PRESSURE: 67 MMHG

## 2023-06-05 LAB
ANION GAP SERPL CALC-SCNC: 3 MMOL/L (ref 0–18)
BASOPHILS # BLD AUTO: 0.04 X10(3) UL (ref 0–0.2)
BASOPHILS NFR BLD AUTO: 0.9 %
BUN BLD-MCNC: 19 MG/DL (ref 7–18)
CALCIUM BLD-MCNC: 8.6 MG/DL (ref 8.5–10.1)
CHLORIDE SERPL-SCNC: 107 MMOL/L (ref 98–112)
CO2 SERPL-SCNC: 25 MMOL/L (ref 21–32)
CREAT BLD-MCNC: 0.73 MG/DL
EOSINOPHIL # BLD AUTO: 0.16 X10(3) UL (ref 0–0.7)
EOSINOPHIL NFR BLD AUTO: 3.8 %
ERYTHROCYTE [DISTWIDTH] IN BLOOD BY AUTOMATED COUNT: 23.8 %
GFR SERPLBLD BASED ON 1.73 SQ M-ARVRAT: 95 ML/MIN/1.73M2 (ref 60–?)
GLUCOSE BLD-MCNC: 108 MG/DL (ref 70–99)
HCT VFR BLD AUTO: 37.9 %
HGB BLD-MCNC: 11.7 G/DL
IMM GRANULOCYTES # BLD AUTO: 0.02 X10(3) UL (ref 0–1)
IMM GRANULOCYTES NFR BLD: 0.5 %
LYMPHOCYTES # BLD AUTO: 0.79 X10(3) UL (ref 1–4)
LYMPHOCYTES NFR BLD AUTO: 18.7 %
MCH RBC QN AUTO: 24.6 PG (ref 26–34)
MCHC RBC AUTO-ENTMCNC: 30.9 G/DL (ref 31–37)
MCV RBC AUTO: 79.6 FL
MONOCYTES # BLD AUTO: 0.4 X10(3) UL (ref 0.1–1)
MONOCYTES NFR BLD AUTO: 9.5 %
NEUTROPHILS # BLD AUTO: 2.81 X10 (3) UL (ref 1.5–7.7)
NEUTROPHILS # BLD AUTO: 2.81 X10(3) UL (ref 1.5–7.7)
NEUTROPHILS NFR BLD AUTO: 66.6 %
OSMOLALITY SERPL CALC.SUM OF ELEC: 283 MOSM/KG (ref 275–295)
PLATELET # BLD AUTO: 151 10(3)UL (ref 150–450)
POTASSIUM SERPL-SCNC: 4 MMOL/L (ref 3.5–5.1)
RBC # BLD AUTO: 4.76 X10(6)UL
SODIUM SERPL-SCNC: 135 MMOL/L (ref 136–145)
WBC # BLD AUTO: 4.2 X10(3) UL (ref 4–11)

## 2023-06-05 PROCEDURE — 80048 BASIC METABOLIC PNL TOTAL CA: CPT | Performed by: HOSPITALIST

## 2023-06-05 PROCEDURE — 85025 COMPLETE CBC W/AUTO DIFF WBC: CPT | Performed by: HOSPITALIST

## 2023-06-05 RX ORDER — ATORVASTATIN CALCIUM 40 MG/1
40 TABLET, FILM COATED ORAL NIGHTLY
Status: DISCONTINUED | OUTPATIENT
Start: 2023-06-05 | End: 2023-06-05

## 2023-06-05 RX ORDER — ALPRAZOLAM 0.5 MG/1
0.5 TABLET ORAL
Status: DISCONTINUED | OUTPATIENT
Start: 2023-06-05 | End: 2023-06-05

## 2023-06-05 RX ORDER — GABAPENTIN 400 MG/1
800 CAPSULE ORAL 2 TIMES DAILY
Status: DISCONTINUED | OUTPATIENT
Start: 2023-06-05 | End: 2023-06-05

## 2023-06-05 RX ORDER — LOSARTAN POTASSIUM 50 MG/1
50 TABLET ORAL DAILY
Status: DISCONTINUED | OUTPATIENT
Start: 2023-06-05 | End: 2023-06-05

## 2023-06-05 RX ORDER — TAMSULOSIN HYDROCHLORIDE 0.4 MG/1
0.4 CAPSULE ORAL DAILY
Status: DISCONTINUED | OUTPATIENT
Start: 2023-06-05 | End: 2023-06-05

## 2023-06-05 RX ORDER — GABAPENTIN 400 MG/1
800 CAPSULE ORAL 2 TIMES DAILY
COMMUNITY

## 2023-06-05 RX ORDER — LOSARTAN POTASSIUM 50 MG/1
50 TABLET ORAL DAILY
COMMUNITY

## 2023-06-05 RX ORDER — OXYBUTYNIN CHLORIDE 15 MG/1
15 TABLET, EXTENDED RELEASE ORAL DAILY
COMMUNITY

## 2023-06-05 RX ORDER — ATORVASTATIN CALCIUM 40 MG/1
40 TABLET, FILM COATED ORAL NIGHTLY
COMMUNITY

## 2023-06-05 RX ORDER — TAMSULOSIN HYDROCHLORIDE 0.4 MG/1
0.4 CAPSULE ORAL DAILY
COMMUNITY

## 2023-06-05 RX ORDER — SULFAMETHOXAZOLE AND TRIMETHOPRIM 800; 160 MG/1; MG/1
1 TABLET ORAL EVERY 12 HOURS SCHEDULED
Qty: 14 TABLET | Refills: 0 | Status: SHIPPED | COMMUNITY
Start: 2023-06-05

## 2023-06-05 NOTE — PLAN OF CARE
Assumed care at 1930  A/Ox4, on room air, Afib on tele  Ambulates with standby, voids  VTE prophylaxis with eliquis  Cardiac diet  PRN norco given per mar   Orthostatic BP every shift  IV to right hand saline locked  Patient updated with plan of care  All needs met at this time     PRN ambien given per mar    Problem: Patient/Family Goals  Goal: Patient/Family Long Term Goal  Description: Patient's Long Term Goal: no more syncopal episodes    Interventions:  - orthos q shift  Tele monitoring  Cards following  - See additional Care Plan goals for specific interventions  Outcome: Progressing  Goal: Patient/Family Short Term Goal  Description: Patient's Short Term Goal: pain management    Interventions:   - per [facility]  - See additional Care Plan goals for specific interventions  Outcome: Progressing     Problem: CARDIOVASCULAR - ADULT  Goal: Maintains optimal cardiac output and hemodynamic stability  Description: INTERVENTIONS:  - Monitor vital signs, rhythm, and trends  - Monitor for bleeding, hypotension and signs of decreased cardiac output  - Evaluate effectiveness of vasoactive medications to optimize hemodynamic stability  - Monitor arterial and/or venous puncture sites for bleeding and/or hematoma  - Assess quality of pulses, skin color and temperature  - Assess for signs of decreased coronary artery perfusion - ex.  Angina  - Evaluate fluid balance, assess for edema, trend weights  Outcome: Progressing  Goal: Absence of cardiac arrhythmias or at baseline  Description: INTERVENTIONS:  - Continuous cardiac monitoring, monitor vital signs, obtain 12 lead EKG if indicated  - Evaluate effectiveness of antiarrhythmic and heart rate control medications as ordered  - Initiate emergency measures for life threatening arrhythmias  - Monitor electrolytes and administer replacement therapy as ordered  Outcome: Progressing

## 2023-06-05 NOTE — PROGRESS NOTES
06/05/23 1035 06/05/23 1036 06/05/23 1039   Vitals   /79 140/67 140/67   MAP (mmHg) 92 85 85   BP Location Left arm Left arm Left arm   BP Method Automatic Automatic Automatic   Patient Position Lying Sitting Standing     Orthostatic BP. MD aware. No new orders.

## 2023-06-05 NOTE — PROGRESS NOTES
06/05/23 0935   SIPAP   Resp 18   BiPAP/CPAP Monitored Parameters   Pulse 73   SpO2 98 %   Toleration Refused     Pt refusing to wear cpap @ noc

## 2023-06-05 NOTE — PLAN OF CARE
Assumed care at 0730. A&O 4. Room air. Cardiac diet. Tele- A fib. VTE- Eliquis. See MAR for more details. PRN pain meds utilized this shift. See MAR for more details. Wound care to see. See managed orders for more details. SBA with the walker to the bathroom. Urinal at the bedside. Pt oriented to the room, safety prec initiated, bed is in the lowest position and call light within reach. Pt updated on the plan of care and awaiting discharge. Continued to monitor. Problem: Patient/Family Goals  Goal: Patient/Family Long Term Goal  Description: Patient's Long Term Goal: no more syncopal episodes  Interventions:  - orthos q shift  Tele monitoring  Cards following  - See additional Care Plan goals for specific interventions  Outcome: Progressing  Goal: Patient/Family Short Term Goal  Description: Patient's Short Term Goal: pain management  Interventions:   - per [facility]  - See additional Care Plan goals for specific interventions  Outcome: Progressing     Problem: CARDIOVASCULAR - ADULT  Goal: Maintains optimal cardiac output and hemodynamic stability  Description: INTERVENTIONS:  - Monitor vital signs, rhythm, and trends  - Monitor for bleeding, hypotension and signs of decreased cardiac output  - Evaluate effectiveness of vasoactive medications to optimize hemodynamic stability  - Monitor arterial and/or venous puncture sites for bleeding and/or hematoma  - Assess quality of pulses, skin color and temperature  - Assess for signs of decreased coronary artery perfusion - ex.  Angina  - Evaluate fluid balance, assess for edema, trend weights  Outcome: Progressing  Goal: Absence of cardiac arrhythmias or at baseline  Description: INTERVENTIONS:  - Continuous cardiac monitoring, monitor vital signs, obtain 12 lead EKG if indicated  - Evaluate effectiveness of antiarrhythmic and heart rate control medications as ordered  - Initiate emergency measures for life threatening arrhythmias  - Monitor electrolytes and administer replacement therapy as ordered  Outcome: Progressing

## 2023-06-05 NOTE — PROGRESS NOTES
NURSING DISCHARGE NOTE    Discharged Home via Wheelchair. Accompanied by Support staff  Belongings Taken by patient/family. Peripheral IV discontinued. AVS reviewed and sent home with patient. All questions answered at this time.

## 2023-07-05 ENCOUNTER — APPOINTMENT (OUTPATIENT)
Dept: GENERAL RADIOLOGY | Facility: HOSPITAL | Age: 73
End: 2023-07-05
Attending: EMERGENCY MEDICINE
Payer: MEDICARE

## 2023-07-05 ENCOUNTER — APPOINTMENT (OUTPATIENT)
Dept: CT IMAGING | Facility: HOSPITAL | Age: 73
End: 2023-07-05
Attending: EMERGENCY MEDICINE
Payer: MEDICARE

## 2023-07-05 ENCOUNTER — HOSPITAL ENCOUNTER (INPATIENT)
Facility: HOSPITAL | Age: 73
LOS: 2 days | Discharge: HOME HEALTH | End: 2023-07-07
Attending: EMERGENCY MEDICINE | Admitting: INTERNAL MEDICINE
Payer: MEDICARE

## 2023-07-05 PROBLEM — R29.6 RECURRENT FALLS: Status: ACTIVE | Noted: 2023-07-05

## 2023-07-05 PROBLEM — D64.9 ANEMIA, UNSPECIFIED TYPE: Status: ACTIVE | Noted: 2023-01-01

## 2023-07-05 PROBLEM — D64.9 ANEMIA, UNSPECIFIED TYPE: Status: ACTIVE | Noted: 2023-07-05

## 2023-07-05 PROBLEM — R29.6 RECURRENT FALLS: Status: ACTIVE | Noted: 2023-01-01

## 2023-07-05 NOTE — ED QUICK NOTES
Orders for admission, patient is aware of plan and ready to go upstairs. Any questions, please call ED RN Jabari Loaiza at extension [phone].      Patient Covid vaccination status: Fully vaccinated     COVID Test Ordered in ED: None    COVID Suspicion at Admission: N/A    Running Infusions:  None    Mental Status/LOC at time of transport: AOx4    Other pertinent information:   CIWA score: N/A   NIH score:  N/A

## 2023-07-05 NOTE — ED INITIAL ASSESSMENT (HPI)
Patient with multiple falls the last few days, states increasing falls and foot neuropathy over last 6 months. Patient with wound to left elbow. Bruising to right flank, left middle back and other scattered bruises. Patient states brief periods of LOC when he falls.  Ambulates with cane

## 2023-07-06 ENCOUNTER — NURSE ONLY (OUTPATIENT)
Dept: ELECTROPHYSIOLOGY | Facility: HOSPITAL | Age: 73
End: 2023-07-06
Attending: NURSE PRACTITIONER
Payer: MEDICARE

## 2023-07-06 PROBLEM — G62.9 PERIPHERAL POLYNEUROPATHY: Status: ACTIVE | Noted: 2021-01-28

## 2023-07-06 NOTE — DISCHARGE INSTRUCTIONS
Sometimes managing your health at home requires assistance. The [place] the [facility] team has recognized your preference to use [facility]. They can be reached by phone at [phone]. The fax number for your reference is [phone]. A representative from the home health agency will contact you or your family to schedule your first visit.

## 2023-07-06 NOTE — PHYSICAL THERAPY NOTE
PHYSICAL THERAPY EVALUATION - INPATIENT     Room Number: 4893/2604-Y  Evaluation Date: 7/6/2023  Type of Evaluation: Initial  Physician Order: PT Eval and Treat    Presenting Problem: Fall  Co-Morbidities : Afib, CHF, CAD, COPD, DM2, prostate CA, ? Parkinsons  Reason for Therapy: Mobility Dysfunction and Discharge Planning    History related to current admission: Patient is a 74year old male admitted on 7/5/2023 from home for fall. Previous Admissions:   6/3-6/5/2023: syncope and collapse; rec home  1/25-1/28/2023: gross hematuria; rec HHPT  1/9-1/13//2023: JOSE; not seen by therapy    ASSESSMENT   In this PT evaluation, the patient presents with the following impairments decreased strength, functional mobility, trunk control, endurance, increased fall risk. These impairments and comorbidities manifest themselves as functional limitations in independent bed mobility, transfers, and gait, stairs. The patient is below baseline and would benefit from skilled inpatient PT to address the above deficits to assist patient in returning to prior to level of function. Functional outcome measures completed include modified IRAHETA, AMPAC. The -PAC '6-Clicks' Inpatient Basic Mobility Short Form was completed and this patient is demonstrating a Approx Degree of Impairment: 46.58%  degree of impairment in mobility. Research supports that patients with this level of impairment may benefit from [address] . DISCHARGE RECOMMENDATIONS  PT Discharge Recommendations: Home with home health PT; Intermittent Supervision    PLAN  PT Treatment Plan: Bed mobility; Body mechanics; Endurance; Energy conservation;Patient education; Family education;Gait training;Balance training;Transfer training;Stair training;Strengthening  Rehab Potential : Good  Frequency (Obs):  (2-3x/week)  Number of Visits to Meet Established Goals: 3      CURRENT GOALS    Goal #1 Patient is able to demonstrate supine - sit EOB @ level: modified independent     Goal #2 Patient is able to demonstrate transfers Sit to/from Stand at assistance level: modified independent     Goal #3 Patient is able to ambulate 150 feet with assist device:  LRAD  at assistance level: modified independent     Goal #4 Pt is able to ascend and descend flight of stairs with rail and supervision. Goal #5    Goal #6    Goal Comments: Goals established on 2023    HOME SITUATION  Type of Home: House   Home Layout: Multi-level  Stairs to Enter : 3     Stairs to Bedroom: 14  Railing: Yes    Lives With: Alone  Drives: Yes  Patient Owned Equipment: Cane;Rolling walker       Prior Level of [place]: Pt typically ind with ADLs, mod I with use of cane for ambulation. Was using cane intermittently, however is now using all of the time due to poor balance. Does note some poor sensation in his feet, but it typically controlled by gabapentin. Some difficulty getting mail as his driveway is sloped. Reports his neighbors are helpful. Reports some falls as he just \"blacks out\", but also reports falls from loss of balance as well. SUBJECTIVE  \"I'm more angry than I am in pain, and that's saying a lot since I'm in a lot of pain\"      OBJECTIVE  Precautions: Bed/chair alarm  Fall Risk: High fall risk    WEIGHT BEARING RESTRICTION  Weight Bearing Restriction: None                PAIN ASSESSMENT  Ratin  Location: back  Management Techniques:  Body mechanics    COGNITION  Safety Judgement:  decreased awareness of need for assistance and decreased awareness of need for safety  Awareness of Deficits:  decreased awareness of deficits    RANGE OF MOTION AND STRENGTH ASSESSMENT  See OT note for UE assessment    Lower extremity ROM is within functional limits     Lower extremity strength is grossly 3 to 4/5      BALANCE  Static Sitting: Fair -  Dynamic Sitting: Poor +  Static Standing: Poor +  Dynamic Standing: Poor +    ADDITIONAL TESTS                                    ACTIVITY TOLERANCE  Pulse: 80  Heart Rate Source: Monitor                   O2 WALK  Oxygen Therapy  SPO2% on Room Air at Rest: 94    NEUROLOGICAL FINDINGS                        AM-PAC '6-Clicks' INPATIENT SHORT FORM - BASIC MOBILITY  How much difficulty does the patient currently have. .. Patient Difficulty: Turning over in bed (including adjusting bedclothes, sheets and blankets)?: A Little   Patient Difficulty: Sitting down on and standing up from a chair with arms (e.g., wheelchair, bedside commode, etc.): A Little   Patient Difficulty: Moving from lying on back to sitting on the side of the bed?: A Little   How much help from another person does the patient currently need. .. Help from Another: Moving to and from a bed to a chair (including a wheelchair)?: A Little   Help from Another: Need to walk in hospital room?: A Little   Help from Another: Climbing 3-5 steps with a railing?: A Little       AM-PAC Score:  Raw Score: 18   Approx Degree of Impairment: 46.58%   Standardized Score (AM-PAC Scale): 43.63   CMS Modifier (G-Code): CK    FUNCTIONAL ABILITY STATUS  Gait Assessment   Functional Mobility/Gait Assessment  Gait Assistance: Contact guard assist;Supervision  Distance (ft): 200  Assistive Device: Rolling walker  Pattern: R Foot drop  Stairs: Stairs  How Many Stairs: 5  Device: 1 Rail  Assist: Contact guard assist  Pattern: Ascend and Descend  Ascend and Descend : Step to    Skilled Therapy Provided     Modified Robin Balance Test    1. Sitting to standing                                                                   2  2. Standing unsupported with eyes closed                                2  3. Reaching forward with outstretched arm while standing       2  4.  object from the floor from a standing position           0  5. Turning to look over left and right shoulders while standing   0  6.  Standing unsupported one foot in front                                   0  7. Standing on one leg 0    Pt scored 6/28 which shows high risk for falls. Gait Training:   Pt cued on upright standing posture to improve alignment with UEs  Pt cued on gait sequencing with RW  Pt cued on proper UE placement on RW handles  Pt instructed to continue with utilization of RW for ambulation as pt demonstrates high fall risk on modified IRAHETA balance scale, pt educated on results of IRAHTEA  Pt unable to ambulate without AD without LOB - again educated on benefit of continued use of RW for improved balance, functional mobility, and energy conservation. Therapeutic Activity:   Pt cued on placement of UE and LEs for optimal force generation and safe STS transfer. Pt cued on usage of arm rests for controlled descent into sitting    Bed Mobility:  Rolling: NT  Supine to sit: supervision   Sit to supine: NT     Transfer Mobility:  Sit to stand: CGA   Stand to sit: CGA  Gait = CGA, intermittent supervision    Therapist's Comments: RN cleared for session. Pt agreeable for therapy, received supine. Instructed to call for nursing staff for any needs and OOB mobility. Pt educated on benefit of caregiver to assist with activity as needed. Exercise/Education Provided:  Bed mobility  Body mechanics  Energy conservation  Functional activity tolerated  Gait training  Posture  Strengthening  Transfer training    Patient End of Session: Up in chair;Needs met;Call light within reach;RN aware of session/findings; All patient questions and concerns addressed; Alarm set      Patient Evaluation Complexity Level:  History Moderate - 1 or 2 personal factors and/or co-morbidities   Examination of body systems Low - addressing 1-2 elements   Clinical Presentation Low - Stable   Clinical Decision Making Low - Stable       PT Session Time: 35 minutes  Gait Training: 15 minutes  Therapeutic Activity: 5 minutes

## 2023-07-06 NOTE — PROGRESS NOTES
Assumed care of patient at 0700. A/o x4. RA. Afib on tele. Patient complained of generalized pain to back during the day and PRN Percocet given w/ some relief. Orthostatics completed and were negative; order discontinued. Voiding per urinal and toilet. Consults saw patient today, Cards/Neuro. Xarelto started. EEG completed. US Carotid doppler ordered; awaiting completion. Seizure precautions in place. Currently resting in bed w/ call light within reach & safety precautions in place.

## 2023-07-06 NOTE — PLAN OF CARE
Received into 7627 from ER via cart. A&OX4. Tele - A-Fib. Oriented to room & surroundings. Nursing Hx & assessment completed with asst of pt. Pain continues in back, elbow & ribs. Fall precautions initiated.

## 2023-07-06 NOTE — HOME CARE LIAISON
Received referral via [facility] for Home Health services. Spoke w/ patient and provided with list of Maritza Lemus providers from [facility], choice is Wales[facility] Agarwal. Agency reserved in [facility] and contact information placed on AVS.Financial interest disclosure provided. Notified ROLANAD.

## 2023-07-06 NOTE — PLAN OF CARE
Assumed care of pt around 1900. A/o x4. RA. Afib on tele. C/o generalized pain to back, L arm, ribs. PRN Percocet given w/ some relief. IV Rocephin started. Orthostatics (-). L elbow tear w/ scant SS drainage. Dressing changed. Voiding per urinal.   Plan for consults to see today. Currently resting in bed w/ call light within reach & safety precautions in place.

## 2023-07-06 NOTE — PROGRESS NOTES
Orthostatics (-)   07/05/23 2207 07/05/23 2208 07/05/23 2210   Vital Signs   /60 124/67 121/59   MAP (mmHg) 75 79 74   BP Location Right arm Right arm Right arm   BP Method Automatic Automatic Automatic   Patient Position Lying Sitting Standing

## 2023-07-06 NOTE — PROCEDURES
[facility] - ELECTROENCEPHALOGRAM (EEG) REPORT  Patient Name:  Vannesa Burns   MRN / CSN:  [id number] / [id number]   Date of Birth / Age:  2/6/1950 /  74year old   Encounter Date:  7/6/23         METHODS:  Twenty-two electrodes were applied according to the 10-20-electrode placement system on this routine  audio-video EEG. EKG monitoring, monopolar and bipolar montages are routinely utilized. The record was obtained on a digital system. OBJECT:  This is a 74year old year-old male with episodes of falls and possible syncope     The EEG was requested to assess for epileptiform activity and change in mental status. State(s) of consciousness: awake and drowsy     Relevant medications:    [START ON 7/7/2023] metoprolol succinate ER  25 mg Oral Daily Beta Blocker    rivaroxaban  20 mg Oral Daily with food    iron sucrose  200 mg Intravenous Once    aspirin  81 mg Oral Daily    atorvastatin  40 mg Oral Daily    buPROPion ER  150 mg Oral Daily    gabapentin  400 mg Oral TID & HS    sertraline  100 mg Oral QAM    tamsulosin  0.4 mg Oral Daily    pantoprazole  40 mg Oral QAM AC    cefTRIAXone  1 g Intravenous Q24H       FINDINGS:  1) Background: A bilateral and symmetric posterior-dominant background rhythm of  8-9 Hz with an amplitude of 20-40 microvolts is seen. General background in this state largely consisted of alpha and beta activity. 2) Sleep: drowsiness was seen  3) Abnormalities: No clear ictal or interictal discharges seen. Background was showing reactivity   4) Activation:                    HV: Not performed. IPS: Not performed. Note : salt-bridge artifact seen involving the T4-T6 electrodes giving the appearance for focal slowing. IMPRESSION:   This was an unrevealing awake and drowsy routine EEG. No clear seizures or seizure tendency captured. Salt bridge artifact seen around T4-T6 electrodes. Of note, a routine EEG cannot exclude the possibility for epilepsy. SIGNATURES:  Radha Dillon MD   [facility]

## 2023-07-06 NOTE — CM/SW NOTE
07/06/23 1600   Discharge disposition   Expected discharge disposition Home-Health   Post Acute Care Provider [facility] accpepted.     Mitul Mir LCSW  /Discharge Planner

## 2023-07-07 ENCOUNTER — APPOINTMENT (OUTPATIENT)
Dept: ULTRASOUND IMAGING | Facility: HOSPITAL | Age: 73
End: 2023-07-07
Attending: NURSE PRACTITIONER
Payer: MEDICARE

## 2023-07-07 NOTE — PROGRESS NOTES
Assume care at 1930  VSS   A&Ox4  Afib  RA  C/o generalize pain.  PRN percocet given with some relief  Plan of care discuss with patient  Plan for US Carotid doppler

## 2023-07-07 NOTE — CM/SW NOTE
Pt has [address] accepting at DC. Caregiver list provided to pt. CM/SW will remain available for DC planning and/or support.      JOSEPHINE ErazoN, VIA [facility]    [id number]

## 2023-07-08 NOTE — PROGRESS NOTES
NURSING DISCHARGE NOTE      Assumed care of patient at 07:30 am  A/O x 4. SpO2 maintained on RA. HR Afib rates controlled . Reports pain relief with PRN meds. US Carotids completed. Medically cleared by all consults. Patient instructed to follow-up with podiatry for foot wounds. Discharge AVS completed. Discharge instructions, orders, and prescriptions given and reviewed with patient. All questions answered, patient v/u. Signs and symptoms to call 911 and return to hospital reviewed with patient. All questions answered, patient v/u. Discharged Home via Wheelchair. Accompanied by Friend and Support staff  Belongings Taken by patient/family.

## 2023-07-17 ENCOUNTER — OFFICE VISIT (OUTPATIENT)
Dept: WOUND CARE | Facility: HOSPITAL | Age: 73
End: 2023-07-17
Attending: INTERNAL MEDICINE
Payer: MEDICARE

## 2023-07-17 VITALS
HEIGHT: 71 IN | RESPIRATION RATE: 16 BRPM | HEART RATE: 83 BPM | DIASTOLIC BLOOD PRESSURE: 80 MMHG | WEIGHT: 196 LBS | TEMPERATURE: 98 F | SYSTOLIC BLOOD PRESSURE: 130 MMHG | BODY MASS INDEX: 27.44 KG/M2

## 2023-07-17 DIAGNOSIS — R73.09 IMPAIRED GLUCOSE METABOLISM: ICD-10-CM

## 2023-07-17 DIAGNOSIS — G60.3 IDIOPATHIC PROGRESSIVE POLYNEUROPATHY: ICD-10-CM

## 2023-07-17 DIAGNOSIS — L97.512 RIGHT FOOT ULCER, WITH FAT LAYER EXPOSED (HCC): Primary | ICD-10-CM

## 2023-07-17 DIAGNOSIS — M21.961 DEFORMITY OF RIGHT FOOT: ICD-10-CM

## 2023-07-17 LAB — GLUCOSE BLD-MCNC: 89 MG/DL (ref 70–99)

## 2023-07-17 PROCEDURE — 87070 CULTURE OTHR SPECIMN AEROBIC: CPT | Performed by: INTERNAL MEDICINE

## 2023-07-17 PROCEDURE — 82962 GLUCOSE BLOOD TEST: CPT | Performed by: INTERNAL MEDICINE

## 2023-07-17 PROCEDURE — 99214 OFFICE O/P EST MOD 30 MIN: CPT

## 2023-07-17 NOTE — PROGRESS NOTES
.Weekly Wound Education Note    Teaching Provided To: Patient  Training Topics: Dressing; Discharge instructions;Cleasing and general instructions; Off-loading;Edema control; Compression  Training Method: Explain/Verbal;Written  Training Response: Patient responds and understands; Reinforcement needed            Honey gel to toe wound daily, cover with dry dressing. Toe wound cultured this visit. [place] shoe with peg insert customized for patient, encouraged to wear at all times unless driving.

## 2023-07-17 NOTE — PATIENT INSTRUCTIONS
Wound Cleaning and Dressings:    Wash your hands with soap and water. Always wear gloves while changing dressings. Donot touch wound / shanel-wound skin with un-gloved hands. Remove old dressing, discard and place into trash. DRESSINGS: manuka honey gel /  GAUZE on all wounds  Change dressing daily. Compression Therapy : spandagrip  Compression Therapy Instructions:  1. Put on first thing in the morning and may remove at bedside. Okay to wear overnight      if comfortable. Do not let stockings roll up/down and kink. Hand wash stockings and      hang dry as needed. 2.  Avoid prolonged standing in one place. It is better to have your calf muscles moving       to pump fluid out of the legs. 3.  Elevate leg(s) above the level of the heart when sitting or as much as possible. 4.  Take your diuretics as directed by your provider. Do not skip doses or change doses      unless instructed to do so by your provider. 5. Do not get leg(s) with compression wrap wet. If wraps are too tight as indicated        By pain, numbness/tingling or discoloration of toes remove wrap completely       and call the   wound center. Off-Loading: DAR[facility]E SHOE    Miscellaneous Instructions:  Supplement with a daily multivitamin   Low salt diet  Intense blood sugar control - Goal Blood sugar below 180 at all times recommended. Increase protein intake / consider protein supplements - see below  Elevate extremities at all times when sitting / laying down.     DIETARY MODIFICATIONS TO HELP WITH WOUND HEALING:    Protein: Meats, beans, eggs, milk and yogurt particularly [place] yogurt), tofu, soy nuts, soy protein products    Vitamin C: Citrus fruits and juices, strawberries, tomatoes, tomato juice, peppers, baked potatoes, spinach, broccoli, cauliflower, [place] sprouts, cabbage    Vitamin A: Dark green, leafy vegetables, orange or yellow vegetables, cantaloupe, fortified dairy products, liver, fortified cereals    Zinc: Fortified cereals, red meats, seafood    Consider Christ [employer] (These are essential branch chain amino acids that help with tissue building and wound healing) and take 2 packets/day. you can order online at [employer] or [employer] [facility] OLX REMINDERS:    The treatment plan has been discussed at length with you and your provider. Follow all instructions carefully, it is very important. If you do not follow all instructions, you are at  risk of your wound not healing, infection, possible loss of limb and even end of life. Please call the clinic during regular business hours ( 7:30 AM - 5:30 PM) if you notice increased bleeding, redness, warmth, pain or pus like drainage or start running a fever greater than 100.3. For after hour emergencies, please call your primary physician or go to the nearest emergency room.

## 2023-07-25 ENCOUNTER — APPOINTMENT (OUTPATIENT)
Dept: ULTRASOUND IMAGING | Facility: HOSPITAL | Age: 73
End: 2023-07-25
Attending: EMERGENCY MEDICINE
Payer: MEDICARE

## 2023-07-25 ENCOUNTER — HOSPITAL ENCOUNTER (OUTPATIENT)
Facility: HOSPITAL | Age: 73
Setting detail: OBSERVATION
Discharge: HOME | End: 2023-07-27
Attending: EMERGENCY MEDICINE | Admitting: HOSPITALIST
Payer: MEDICARE

## 2023-07-25 DIAGNOSIS — Z79.01 ANTICOAGULATED: ICD-10-CM

## 2023-07-25 DIAGNOSIS — R58 ECCHYMOSIS: Primary | ICD-10-CM

## 2023-07-25 PROBLEM — D68.9: Status: ACTIVE | Noted: 2023-01-01

## 2023-07-25 PROBLEM — D68.9: Status: ACTIVE | Noted: 2023-07-25

## 2023-07-25 LAB
ALBUMIN SERPL-MCNC: 2.6 G/DL (ref 3.4–5)
ALBUMIN/GLOB SERPL: 0.7 {RATIO} (ref 1–2)
ALP LIVER SERPL-CCNC: 107 U/L
ALT SERPL-CCNC: 22 U/L
ANION GAP SERPL CALC-SCNC: 6 MMOL/L (ref 0–18)
APTT PPP: 56.1 SECONDS (ref 23.3–35.6)
AST SERPL-CCNC: 42 U/L (ref 15–37)
BASOPHILS # BLD AUTO: 0.09 X10(3) UL (ref 0–0.2)
BASOPHILS NFR BLD AUTO: 1.9 %
BILIRUB SERPL-MCNC: 1.4 MG/DL (ref 0.1–2)
BUN BLD-MCNC: 18 MG/DL (ref 7–18)
CALCIUM BLD-MCNC: 8.3 MG/DL (ref 8.5–10.1)
CHLORIDE SERPL-SCNC: 110 MMOL/L (ref 98–112)
CK SERPL-CCNC: 248 U/L
CO2 SERPL-SCNC: 22 MMOL/L (ref 21–32)
CREAT BLD-MCNC: 1.18 MG/DL
EGFRCR SERPLBLD CKD-EPI 2021: 64 ML/MIN/1.73M2 (ref 60–?)
EOSINOPHIL # BLD AUTO: 0.19 X10(3) UL (ref 0–0.7)
EOSINOPHIL NFR BLD AUTO: 4 %
ERYTHROCYTE [DISTWIDTH] IN BLOOD BY AUTOMATED COUNT: 22.5 %
EST. AVERAGE GLUCOSE BLD GHB EST-MCNC: 134 MG/DL (ref 68–126)
GLOBULIN PLAS-MCNC: 3.8 G/DL (ref 2.8–4.4)
GLUCOSE BLD-MCNC: 78 MG/DL (ref 70–99)
HBA1C MFR BLD: 6.3 % (ref ?–5.7)
HCT VFR BLD AUTO: 31.2 %
HGB BLD-MCNC: 9.3 G/DL
IMM GRANULOCYTES # BLD AUTO: 0.02 X10(3) UL (ref 0–1)
IMM GRANULOCYTES NFR BLD: 0.4 %
INR BLD: 5.01 (ref 0.85–1.16)
LYMPHOCYTES # BLD AUTO: 0.7 X10(3) UL (ref 1–4)
LYMPHOCYTES NFR BLD AUTO: 14.8 %
MCH RBC QN AUTO: 26 PG (ref 26–34)
MCHC RBC AUTO-ENTMCNC: 29.8 G/DL (ref 31–37)
MCV RBC AUTO: 87.2 FL
MONOCYTES # BLD AUTO: 0.56 X10(3) UL (ref 0.1–1)
MONOCYTES NFR BLD AUTO: 11.8 %
NEUTROPHILS # BLD AUTO: 3.17 X10 (3) UL (ref 1.5–7.7)
NEUTROPHILS # BLD AUTO: 3.17 X10(3) UL (ref 1.5–7.7)
NEUTROPHILS NFR BLD AUTO: 67.1 %
NT-PROBNP SERPL-MCNC: 2081 PG/ML (ref ?–450)
OSMOLALITY SERPL CALC.SUM OF ELEC: 287 MOSM/KG (ref 275–295)
PLATELET # BLD AUTO: 149 10(3)UL (ref 150–450)
PLATELET MORPHOLOGY: NORMAL
POTASSIUM SERPL-SCNC: 4.9 MMOL/L (ref 3.5–5.1)
PROT SERPL-MCNC: 6.4 G/DL (ref 6.4–8.2)
PROTHROMBIN TIME: 45.6 SECONDS (ref 11.6–14.8)
RBC # BLD AUTO: 3.58 X10(6)UL
SODIUM SERPL-SCNC: 138 MMOL/L (ref 136–145)
WBC # BLD AUTO: 4.7 X10(3) UL (ref 4–11)

## 2023-07-25 PROCEDURE — 93971 EXTREMITY STUDY: CPT | Performed by: EMERGENCY MEDICINE

## 2023-07-25 RX ORDER — TAMSULOSIN HYDROCHLORIDE 0.4 MG/1
0.4 CAPSULE ORAL
Status: DISCONTINUED | OUTPATIENT
Start: 2023-07-26 | End: 2023-07-27

## 2023-07-25 RX ORDER — POLYETHYLENE GLYCOL 3350 17 G/17G
17 POWDER, FOR SOLUTION ORAL DAILY PRN
Status: DISCONTINUED | OUTPATIENT
Start: 2023-07-25 | End: 2023-07-27

## 2023-07-25 RX ORDER — MELATONIN
100 DAILY
Status: DISCONTINUED | OUTPATIENT
Start: 2023-07-26 | End: 2023-07-27

## 2023-07-25 RX ORDER — GABAPENTIN 400 MG/1
400 CAPSULE ORAL EVERY 6 HOURS
Status: DISCONTINUED | OUTPATIENT
Start: 2023-07-25 | End: 2023-07-27

## 2023-07-25 RX ORDER — MELATONIN
3 NIGHTLY PRN
Status: DISCONTINUED | OUTPATIENT
Start: 2023-07-25 | End: 2023-07-27

## 2023-07-25 RX ORDER — BUPROPION HYDROCHLORIDE 150 MG/1
150 TABLET ORAL DAILY
Status: DISCONTINUED | OUTPATIENT
Start: 2023-07-26 | End: 2023-07-27

## 2023-07-25 RX ORDER — OXYCODONE AND ACETAMINOPHEN 10; 325 MG/1; MG/1
1 TABLET ORAL EVERY 6 HOURS PRN
Status: DISCONTINUED | OUTPATIENT
Start: 2023-07-25 | End: 2023-07-27

## 2023-07-25 RX ORDER — SERTRALINE HYDROCHLORIDE 100 MG/1
100 TABLET, FILM COATED ORAL EVERY MORNING
Status: DISCONTINUED | OUTPATIENT
Start: 2023-07-26 | End: 2023-07-27

## 2023-07-25 RX ORDER — ATORVASTATIN CALCIUM 40 MG/1
40 TABLET, FILM COATED ORAL DAILY
Status: DISCONTINUED | OUTPATIENT
Start: 2023-07-26 | End: 2023-07-27

## 2023-07-25 RX ORDER — PANTOPRAZOLE SODIUM 40 MG/1
40 TABLET, DELAYED RELEASE ORAL
Status: DISCONTINUED | OUTPATIENT
Start: 2023-07-26 | End: 2023-07-27

## 2023-07-25 RX ORDER — ATOMOXETINE 40 MG/1
40 CAPSULE ORAL DAILY
Status: DISCONTINUED | OUTPATIENT
Start: 2023-07-26 | End: 2023-07-26

## 2023-07-25 RX ORDER — ONDANSETRON 2 MG/ML
4 INJECTION INTRAMUSCULAR; INTRAVENOUS EVERY 6 HOURS PRN
Status: DISCONTINUED | OUTPATIENT
Start: 2023-07-25 | End: 2023-07-27

## 2023-07-25 RX ORDER — METOCLOPRAMIDE HYDROCHLORIDE 5 MG/ML
10 INJECTION INTRAMUSCULAR; INTRAVENOUS EVERY 8 HOURS PRN
Status: DISCONTINUED | OUTPATIENT
Start: 2023-07-25 | End: 2023-07-27

## 2023-07-25 NOTE — ED PROVIDER NOTES
Patient Seen in: [facility] 4nw-a      History   Patient presents with:  Swelling Edema    Stated Complaint: Edema    Subjective:   HPI    Patient with history of atrial fibrillation, on Xarelto, cardiomyopathy, congestive heart disease, COPD, depression, diabetes, hypertension, history of MRSA in the right foot in January 2022, Parkinson's, peripheral vascular disease, among other processes, here after a home health nurse noticed his left arm and right leg were swollen. Patient says these have been building up over the past 2 weeks or so. He states he is very clumsy, has issues with balance, he has home physical therapy coming, but still has frequent falls. He does not have pain in any specific area, but has been noticing the swelling.     Objective:   Past Medical History:   Diagnosis Date    Anxiety     Arrhythmia     A-Flutter    ATRIAL FIBRILLATION     Cardiomyopathy ([address]     Congestive heart disease ([address]     last 5/2019    COPD     no exac in several years per pt as of 10/18/16, no O2    Coronary artery disease involving coronary bypass graft of native heart without angina pectoris 05/17/2017    Depression     DM2 (diabetes mellitus, type 2) (HCC)     elevated glucose due to steroid use for lung infection    Exposure to medical diagnostic radiation     High blood pressure     History of blood transfusion     Good [employer]    Lumbar stenosis     Mixed hyperlipidemia 10/03/2016    MRSA (methicillin resistant staph aureus) culture positive 01/2022    right foot    KANE (obstructive sleep apnea) Split-2/28/2022    AHI-43    Osteoarthritis     Parkinson's disease ([address]     Peripheral vascular disease ([address]     Prostate cancer ([address] 2010    s/p brachytherapy    Radiation cystitis 01/12/2023    Sleep apnea     no cpap              Past Surgical History:   Procedure Laterality Date    ANGIOGRAM  2001    ANGIOPLASTY (CORONARY)      CABG  08/2013    CATARACT      bilateral eyes    CATH PERCUTANEOUS [address] CORONARY ANGIOPLASTY  2001    stent    COLONOSCOPY      COLONOSCOPY N/A 06/11/2018    4 mm tubular adenoma, diverticulosis, int hem, rpt 2023    CYSTOURETHROSCOPY,BIOPSY  01/12/2023    HIP REPLACEMENT SURGERY  03/2015    right hip replacement     ORTHOPEDIC SURG (PBP) Bilateral     shoulder surgery    OTHER Right 2013    femoral endarterectomy    OTHER  08/2021    bilateral foot/toe surgery-ulcers    OTHER SURGICAL HISTORY      left knee ligament repair    OTHER SURGICAL HISTORY      left hand laceration repair 4th, 5th fingers    OTHER SURGICAL HISTORY      rhinoplasty    OTHER SURGICAL HISTORY      tsa right 1/11/2010    REMV CATARACT EXTRACAP,INSERT LENS Left 08/13/2014    Procedure: LEFT PHACOEMULSIFICATION OF CATARACT WITH INTRAOCULAR LENS IMPLANT [id number];  Surgeon: Solomon Jacobsen MD;  Location: [address] [place] [address]    REMV F.B.,EYE,ANT CHMBR/LENS Left 09/03/2014    Procedure: REMOVAL FOREIGN BODY OF EYE;  Surgeon: Solomon Jacobsen MD;  Location: [address] [place] [address] [place] HERNIA,5+Y/O,REDUCIBL  03/19/2009    Performed by Nancy Ogden at [address] [place] [address] History     Socioeconomic History    Marital status:    Tobacco Use    Smoking status: Former     Packs/day: 0.50     Years: 40.00     Pack years: 20.00     Types: Cigarettes     Quit date: 4/11/2013     Years since quitting: 10.2    Smokeless tobacco: Never   Vaping Use    Vaping Use: Never used   Substance and Sexual Activity    Alcohol use: Yes     Comment: 1 drink per night    Drug use: Yes     Frequency: 5.0 times per week     Types: Cannabis   Other Topics Concern    Seat Belt Yes              Review of Systems    Positive for stated complaint: Edema  Other systems are as noted in HPI. Constitutional and vital signs reviewed. All other systems reviewed and negative except as noted above.     Physical Exam     ED Triage Vitals [07/25/23 1342]   /70   Pulse 55 Resp 16   Temp    Temp src    SpO2 96 %   O2 Device None (Room air)       Current:/70   Pulse 55   Resp 16   SpO2 96%         Physical Exam      General: Well-developed, well-nourished, comfortable, no acute distress  Head and neck: Normocephalic, atraumatic  Cardiovascular: Regular rate and rhythm, normal S1 and S2, no obvious murmurs, rubs, or gallops   Lungs: Clear to auscultation bilaterally with equal breath sounds, no wheezing, no rhonchi   Abdomen: Positive bowel sounds,  soft, no tenderness, no distension, no rebound, no guarding   Extremities: No cyanosis, no clubbing, no edema   Musculoskeletal: No tenderness, swelling, deformity   Skin: Ecchymosis throughout the posterior right lower leg consistent with patient's history of fall onto the buttocks recently and dependent ecchymosis; multiple all other areas of ecchymoses and abrasions  Neuro: Grossly intact to patient's baseline  4.   ED Course     Labs Reviewed   COMP METABOLIC PANEL (14) - Abnormal; Notable for the following components:       Result Value    Calcium, Total 8.3 (*)     AST 42 (*)     Albumin 2.6 (*)     A/G Ratio 0.7 (*)     All other components within normal limits   RBC MORPHOLOGY SCAN - Abnormal; Notable for the following components:    RBC Morphology See morphology below (*)     All other components within normal limits   PTT, ACTIVATED - Abnormal; Notable for the following components:    PTT 56.1 (*)     All other components within normal limits   PROTHROMBIN TIME (PT) - Abnormal; Notable for the following components:    PT 45.6 (*)     INR 5.01 (*)     All other components within normal limits   CBC W/ DIFFERENTIAL - Abnormal; Notable for the following components:    RBC 3.58 (*)     HGB 9.3 (*)     HCT 31.2 (*)     .0 (*)     MCHC 29.8 (*)     Lymphocyte Absolute 0.70 (*)     All other components within normal limits   CK CREATINE KINASE (NOT CREATININE) - Normal   CBC WITH DIFFERENTIAL WITH PLATELET    Narrative: The following orders were created for panel order CBC With Differential With Platelet. Procedure                               Abnormality         Status                     ---------                               -----------         ------                     CBC W/ DIFFERENTIAL[[id number]]          Abnormal            Final result                 Please view results for these tests on the individual orders. RAINBOW DRAW LAVENDER   RAINBOW DRAW LIGHT GREEN   RAINBOW DRAW BLUE   Differential diagnosis includes DVT, ecchymosis, among other processes    Ultrasound venous Doppler images reviewed by myself right lower extremity and left upper extremity showed no evidence for DVT       MDM      Patient with history of atrial fibrillation, on Xarelto, cardiomyopathy, congestive heart disease, COPD, depression, diabetes, hypertension, history of MRSA in the right foot in January 2022, Parkinson's, peripheral vascular disease, among other processes, here after a home health nurse noticed his left arm and right leg were swollen. Ultrasound venous Doppler here is very reassuring there is no evidence for DVT. However patient's level of ecchymosis and swelling is quite marked. In the ED patient had both legs and arm wrapped with Ace wrap and elevated neuro to help compress the area. There is no tightness to the level of compartment syndrome at this point. However patient does not seem in the position to care for himself well at home. He is at high risk for continued falls, continuing and worsening ecchymosis. All of the tracking of the ecchymosis is along the posterior leg indicating patient is likely horizontal for much of the day. He says whenever he gets up to walk he has issues with balance. Believe he will be much safer admitted overnight and getting some help to compress these areas and make sure the swelling is at least going in the right direction.   Case discussed with Dr. Ruth Ann Bentley who will be admitting the patient. Admission disposition: 7/25/2023  3:14 PM                                        [facility]    Disposition and Plan     Clinical Impression:  Abnormality of coagulation ([facility]  (primary encounter diagnosis)  Ecchymosis     Disposition:  Admit  7/25/2023  3:14 pm    Follow-up:  No follow-up provider specified.         Medications Prescribed:  Current Discharge Medication List                          Hospital Problems       Present on Admission  Date Reviewed: 7/17/2023            ICD-10-CM Noted POA    * (Principal) Abnormality of coagulation ([facility] D68.9 7/25/2023 Unknown    Ecchymosis R58 7/25/2023 Unknown

## 2023-07-25 NOTE — H&P
[facility]    History and Physical     Claudene Hamburger Patient Status:  Observation    1950 MRN [id number]   [facility] 4NW-A Attending Jones Hutchison MD   Hosp Day # 0 PCP Eran Wheatley MD     Chief Complaint: Patient presents with:  Swelling Edema      History of Present Illness: Claudene Hamburger is a 74year old male with A-fib on Xarelto, ICM, HFrEF, COPD, CAD, DM 2, HTN who presents to the ED for evaluation of left hand and right foot/calf swelling reviewed by his home health nurse. He reports the swelling has progressively worsened over the previous 2 weeks after multiple mechanical falls. He has not hit his head or had any loss of consciousness. Arrival he was afebrile, hemodynamically stable, saturating on room air. Hemoglobin was 9.3, within recent baseline (8-11.7). Labs were otherwise unremarkable including normal CK. Doppler ultrasound of the lower extremities was negative for DVT but there is concern for degree of worsening swelling and ecchymosis. His extremities were wrapped with Ace and he was admitted for observation. He has a history of recreational alcohol and cannabis consumption, endorses occasionally mixing them.     Past Medical History:  Past Medical History:   Diagnosis Date    Anxiety     Arrhythmia     A-Flutter    ATRIAL FIBRILLATION     Cardiomyopathy ([address]     Congestive heart disease ([address]     last 2019    COPD     no exac in several years per pt as of 10/18/16, no O2    Coronary artery disease involving coronary bypass graft of native heart without angina pectoris 2017    Depression     DM2 (diabetes mellitus, type 2) (HCC)     elevated glucose due to steroid use for lung infection    Exposure to medical diagnostic radiation     High blood pressure     History of blood transfusion     Good [facility]    Lumbar stenosis     Mixed hyperlipidemia 10/03/2016    MRSA (methicillin resistant staph aureus) culture positive 2022    right foot    KANE (obstructive sleep apnea) Split-2/28/2022    AHI-43    Osteoarthritis     Parkinson's disease ([facility]     Peripheral vascular disease ([facility]     Prostate cancer ([facility] 2010    s/p brachytherapy    Radiation cystitis 01/12/2023    Sleep apnea     no cpap        Past Surgical History:   Past Surgical History:   Procedure Laterality Date    ANGIOGRAM  2001    ANGIOPLASTY (CORONARY)      CABG  08/2013    CATARACT      bilateral eyes    CATH PERCUTANEOUS  TRANSLUMINAL CORONARY ANGIOPLASTY  2001    stent    COLONOSCOPY      COLONOSCOPY N/A 06/11/2018    4 mm tubular adenoma, diverticulosis, int hem, rpt 2023    CYSTOURETHROSCOPY,BIOPSY  01/12/2023    HIP REPLACEMENT SURGERY  03/2015    right hip replacement     ORTHOPEDIC SURG (PBP) Bilateral     shoulder surgery    OTHER Right 2013    femoral endarterectomy    OTHER  08/2021    bilateral foot/toe surgery-ulcers    OTHER SURGICAL HISTORY      left knee ligament repair    OTHER SURGICAL HISTORY      left hand laceration repair 4th, 5th fingers    OTHER SURGICAL HISTORY      rhinoplasty    OTHER SURGICAL HISTORY      tsa right 1/11/2010    REMV CATARACT EXTRACAP,INSERT LENS Left 08/13/2014    Procedure: LEFT PHACOEMULSIFICATION OF CATARACT WITH INTRAOCULAR LENS IMPLANT [id number];  Surgeon: Adin Frias MD;  Location: [address] [place] [address]    REMV F.B.,EYE,ANT CHMBR/LENS Left 09/03/2014    Procedure: REMOVAL FOREIGN BODY OF EYE;  Surgeon: Adin Frias MD;  Location: [address] [place] [address] HERNIA,5+Y/O,REDUCIBL  03/19/2009    Performed by Yessenia Ro at [place] [address]         Social History:  reports that he quit smoking about 10 years ago. His smoking use included cigarettes. He has a 20.00 pack-year smoking history. He has never used smokeless tobacco. He reports current alcohol use. He reports current drug use. Frequency: 5.00 times per week. Drug: Cannabis.     Family History:   Family History   Problem Relation Age of Onset    High Blood Pressure Father     High Cholesterol Father     Heart Disorder Father     High Blood Pressure Mother     High Cholesterol Mother     Stroke Mother     Diabetes Paternal Grandmother     High Blood Pressure Sister     High Blood Pressure Brother        Allergies:   Pollen                  OTHER (SEE COMMENTS)    Comment:Nasal congestion    Medications:  [COMPLETED] iron sucrose (Venofer) 20 mg/mL injection 200 mg, 200 mg, Intravenous, Once, Danny Uriarte MD, 200 mg at 23  [] sodium chloride 0.9% infusion, , Intravenous, Continuous, Hugo Salgado MD  [] ondansetron (Zofran) 4 MG/2ML injection 4 mg, 4 mg, Intravenous, Q4H PRN, Vernadine Hugo Torres MD  [] HYDROmorphone (Dilaudid) 1 MG/ML injection 0.5 mg, 0.5 mg, Intravenous, Q30 Min PRN, Reji VÁSQUEZ MD    thiamine 100 MG Oral Tab, Take 1 tablet (100 mg total) by mouth daily. , Disp: 30 tablet, Rfl: 2  metoprolol succinate ER 25 MG Oral Tablet 24 Hr, Take 1 tablet (25 mg total) by mouth Daily Beta Blocker. , Disp: 90 tablet, Rfl: 3  atomoxetine (STRATTERA) 40 MG Oral Cap, Take 1 capsule (40 mg total) by mouth daily. , Disp: 30 capsule, Rfl: 1  eszopiclone (LUNESTA) 2 MG Oral Tab, Take 1 tablet (2 mg total) by mouth nightly., Disp: 30 tablet, Rfl: 1  ALPRAZolam 0.5 MG Oral Tab, Take 1 tablet (0.5 mg total) by mouth daily as needed. , Disp: 30 tablet, Rfl: 1  losartan 50 MG Oral Tab, Take 1 tablet (50 mg total) by mouth daily. , Disp: , Rfl:   gabapentin 400 MG Oral Cap, Take 2 capsules (800 mg total) by mouth 2 (two) times daily. , Disp: , Rfl:   tamsulosin 0.4 MG Oral Cap, Take 1 capsule (0.4 mg total) by mouth daily. , Disp: , Rfl:   Oxybutynin Chloride ER 15 MG Oral Tablet 24 Hr, Take 1 tablet (15 mg total) by mouth daily. , Disp: , Rfl:   Mirabegron ER 50 MG Oral Tablet 24 Hr, Take 1 tablet (50 mg total) by mouth daily. , Disp: , Rfl:   atorvastatin 40 MG Oral Tab, Take 1 tablet (40 mg total) by mouth nightly. , Disp: , Rfl:   rivaroxaban 20 MG Oral Tab, Take 1 tablet (20 mg total) by mouth daily with food. , Disp: 30 tablet, Rfl: 1  sertraline (ZOLOFT) 100 MG Oral Tab, Take 1 tablet (100 mg total) by mouth every morning., Disp: 90 tablet, Rfl: 0  buPROPion  MG Oral Tablet 24 Hr, Take 1 tablet (150 mg total) by mouth daily. , Disp: 90 tablet, Rfl: 0  aspirin 81 MG Oral Tab EC, Take 1 tablet (81 mg total) by mouth daily. , Disp: 90 tablet, Rfl: 3  oxyCODONE-acetaminophen (PERCOCET)  MG Oral Tab, Take 1 tablet by mouth every 6 (six) hours as needed for Pain., Disp: 120 tablet, Rfl: 0  fluticasone propionate 50 MCG/ACT Nasal Suspension, 2 sprays by Each Nare route daily as needed. , Disp: , Rfl:   [] cephalexin 500 MG Oral Cap, Take 1 capsule (500 mg total) by mouth 4 (four) times daily for 6 days. , Disp: 24 capsule, Rfl: 0  pantoprazole 40 MG Oral Tab EC, Take 1 tablet (40 mg total) by mouth every morning., Disp: , Rfl:   Multiple Vitamins-Minerals (MULTI-VITAMIN/MINERALS) Oral Tab, Take 1 tablet by mouth daily. , Disp: , Rfl:   Hydrocortisone (PROCTOSOL HC) 2.5 % External Cream, Apply twice daily as needed, Disp: 30 g, Rfl: 2  PEG 3350 Oral Powd Pack, Take 17 g by mouth daily as needed. , Disp: , Rfl:         Review of Systems:   A comprehensive 14 point review of systems was completed. Pertinent positives and negatives noted in the HPI. Physical Exam:    /70   Pulse 55   Resp 16   SpO2 96%     General: No acute distress. Comfortable, nontoxic. Drowsy, having difficulty keeping his eyes open, but responsive to voice and requires frequent redirecting  HEENT: Normocephalic atraumatic. Moist mucous membranes; Sclera anicteric. Neck: Supple, no JVD  Respiratory: Clear to auscultation bilaterally. Reg resp rate & effort, no wheezes/crackles   Cardiovascular: S1, S2.  IR IR. No murmurs appreciable   Chest and Back: No tenderness or deformity. Abdomen: Soft, nontender, nondistended.   Positive bowel sounds. No rebound, guarding or organomegaly. Neurologic: No focal neurological deficits. CNII-XII grossly intact. Musculoskeletal: Moves all extremities. Extremities: Trace LLE edema; 1+ RLE edema, knee wrapped in Ace, photos of skin noted from ED with dependent ecchymosis  Skin: Diffuse scattered ecchymoses over bilateral upper extremities with multiple abrasions and skin tears in various stages of healing  Psychiatric: Appropriate mood and affect. Diagnostic Data:      Labs:  Recent Labs   Lab 07/25/23  1315   WBC 4.7   HGB 9.3*   MCV 87.2   .0*   INR 5.01*       Recent Labs   Lab 07/25/23  1315   GLU 78   BUN 18   CREATSERUM 1.18   CA 8.3*   ALB 2.6*      K 4.9      CO2 22.0   ALKPHO 107   AST 42*   ALT 22   BILT 1.4   TP 6.4       CrCl cannot be calculated (Unknown ideal weight. ). Recent Labs   Lab 07/25/23  1315   PTP 45.6*   INR 5.01*       Recent Labs   Lab 07/25/23  1315          Imaging: Imaging data reviewed in Epic.       ASSESSMENT / PLAN:     Wesly Noel Is a a 74year old male who presents with recurrent falls and left hand, RLE swelling    Problem List / Diagnoses    Recurrent falls  Suspected polypharmacy  Left hand/RLE swelling  Nonspecified neuropathy  A-fib  Chronic pain  Chronic benzodiazepine and opioid dependence  CAD with history MI s/p CABG  History combined systolic and diastolic CHF, chronic  HTN  GERD  MDD  Anxiety    Plan    # Recurrent falls  # Suspected polypharmacy  -Chronic, has been admitted for same over the past several months with extensive work-up  -Evaluated by [facility] nephrology 7/7, for same, full neurologic work-up unremarkable however noted falls were likely multifactorial in the setting of polypharmacy (Percocet Lunesta gabapentin Xanax diuretics (in the setting of severe neuropathy, osteoarthritis and lumbar spinal stenosis  -[place], patient continues to be on multiple sedating agents and endorses frequently taking double his prescribed dose of oxycodone in the mornings for pain  - In addition to above patient continues to consume alcohol and cannabis which may be exacerbating things as well  -Hold antihypertensives given borderline BP, hold Lasix (recently resumed for worsening edema)  -PT/OT orthostatics in a.m.       # Left hand/RLE swelling  #Soft tissue hematomas   - We will check proBNP but given unilateral distribution doubt related to CHF  - Ultrasound of extremities negative for DVT, which is unlikely given patient is already on Xarelto  - Suspect unilateral swelling is secondary to hematoma from his frequent falls as both extremities have proximal ecchymosis in the affected limb  - Hold Xarelto for now    Nonspecified neuropathy  - Follows with neurology as outpatient  - Resume home gabapentin  - Has had extensive neurologic work-up during previous admissions, defer further evaluation as symptoms are stable    A-fib  -HAS-Bled =4, 8.9% risk of major bleeding event  -CHADS2-VASC= 4 Stroke risk was 4.8% per year in >90,000 patients ([place] Atrial Fibrillation Cohort Study) and 6.7% risk of stroke/TIA/systemic embolism.  -Holding Xarelto for now  - Given his progressive neuropathy and above risk of bleeding, will consult cardiology in a.m. for evaluation of risk-benefit of stopping versus continuing anticoagulation    Chronic pain  Chronic benzodiazepine and opioid dependence  -[place], per above  - Resuming oxycodone, gabapentin  - Hold Lunesta, hold alprazolam given concern for polypharmacy and oversedation  -Psychiatry consult in a.m. for evaluation of medical regimen (for primary psychiatrist is through [facility])    CAD with history MI s/p CABG  History combined systolic and diastolic CHF, chronic  - Appears clinically euvolemic on exam  - Cardiology consulted per above  - Resume home atorvastatin  -Holding metoprolol, losartan given falls    HTN  -Hold antihypertensives given low-normal BPs low will resume as BP allows    GERD  - PPI    MDD  Anxiety  -Resuming a tomoxetine, sertraline, bupropion  - Psychiatry consulted per above  - Holding benzodiazepines per above    DVT Ppx: SCDs, holding chemoprophylaxis given hematomas    Code Status: Full Code    Dispo: Patient; ALAN tomorrow pending clinical stability, PT/OT and specialist evaluations    Plan of care discussed with patient and/or family at bedside.     Wu Lopez MD  [address] [place] [address]

## 2023-07-25 NOTE — ED INITIAL ASSESSMENT (HPI)
Pt sent from home. Home health nurse states today his L hand and R foot/calf are swollen. Pt with no complaints. Home health nurse: Steve Shi [phone].

## 2023-07-25 NOTE — PLAN OF CARE
Admitted from ED with INR of 5.0, patient was taking Xarelto for history of Afib. Patient denies any signs of bleeding such as bloody nose, blood in the urine or black stools. Groggy upon arrival to floor, improved during assessment, oriented x 4, has ecchymosis to right buttock down to the back of the right leg, ace wrap in place. Both arms with bruising and scattered skin tears, has had history of falling a few weeks ago, states he lost his balance coming down the steps. Fall precautions in place.    Problem: SKIN/TISSUE INTEGRITY - ADULT  Goal: Skin integrity remains intact  Description: INTERVENTIONS  - Assess and document risk factors for pressure ulcer development  - Assess and document skin integrity  - Monitor for areas of redness and/or skin breakdown  - Initiate interventions, skin care algorithm/standards of care as needed  Outcome: Progressing     Problem: HEMATOLOGIC - ADULT  Goal: Maintains hematologic stability  Description: INTERVENTIONS  - Assess for signs and symptoms of bleeding or hemorrhage  - Monitor labs and vital signs for trends  - Administer supportive blood products/factors, fluids and medications as ordered and appropriate  - Administer supportive blood products/factors as ordered and appropriate  Outcome: Not Progressing

## 2023-07-26 PROBLEM — F33.0 MILD EPISODE OF RECURRENT MAJOR DEPRESSIVE DISORDER (HCC): Status: ACTIVE | Noted: 2023-01-01

## 2023-07-26 PROBLEM — F33.0 MILD EPISODE OF RECURRENT MAJOR DEPRESSIVE DISORDER (HCC): Status: ACTIVE | Noted: 2023-07-26

## 2023-07-26 PROBLEM — F41.9 ANXIETY DISORDER: Status: ACTIVE | Noted: 2023-01-01

## 2023-07-26 PROBLEM — F41.9 ANXIETY DISORDER: Status: ACTIVE | Noted: 2023-07-26

## 2023-07-26 LAB
AMPHET UR QL SCN: NEGATIVE
ANION GAP SERPL CALC-SCNC: 4 MMOL/L (ref 0–18)
BILIRUB UR QL STRIP.AUTO: NEGATIVE
BUN BLD-MCNC: 20 MG/DL (ref 7–18)
CALCIUM BLD-MCNC: 8.2 MG/DL (ref 8.5–10.1)
CHLORIDE SERPL-SCNC: 108 MMOL/L (ref 98–112)
CLARITY UR REFRACT.AUTO: CLEAR
CO2 SERPL-SCNC: 24 MMOL/L (ref 21–32)
COCAINE UR QL: NEGATIVE
COLOR UR AUTO: YELLOW
CREAT BLD-MCNC: 0.99 MG/DL
CREAT UR-SCNC: 135 MG/DL
EGFRCR SERPLBLD CKD-EPI 2021: 79 ML/MIN/1.73M2 (ref 60–?)
ERYTHROCYTE [DISTWIDTH] IN BLOOD BY AUTOMATED COUNT: 22.5 %
GLUCOSE BLD-MCNC: 103 MG/DL (ref 70–99)
GLUCOSE UR STRIP.AUTO-MCNC: NEGATIVE MG/DL
HCT VFR BLD AUTO: 31.3 %
HGB BLD-MCNC: 9.5 G/DL
KETONES UR STRIP.AUTO-MCNC: NEGATIVE MG/DL
LEUKOCYTE ESTERASE UR QL STRIP.AUTO: NEGATIVE
MCH RBC QN AUTO: 26.5 PG (ref 26–34)
MCHC RBC AUTO-ENTMCNC: 30.4 G/DL (ref 31–37)
MCV RBC AUTO: 87.2 FL
NITRITE UR QL STRIP.AUTO: NEGATIVE
OSMOLALITY SERPL CALC.SUM OF ELEC: 285 MOSM/KG (ref 275–295)
PH UR STRIP.AUTO: 5 [PH] (ref 5–8)
PLATELET # BLD AUTO: 128 10(3)UL (ref 150–450)
POTASSIUM SERPL-SCNC: 4.4 MMOL/L (ref 3.5–5.1)
PROT UR STRIP.AUTO-MCNC: 30 MG/DL
RBC # BLD AUTO: 3.59 X10(6)UL
RBC UR QL AUTO: NEGATIVE
SODIUM SERPL-SCNC: 136 MMOL/L (ref 136–145)
SP GR UR STRIP.AUTO: 1.02 (ref 1–1.03)
UROBILINOGEN UR STRIP.AUTO-MCNC: <2 MG/DL
WBC # BLD AUTO: 7.2 X10(3) UL (ref 4–11)

## 2023-07-26 PROCEDURE — 90792 PSYCH DIAG EVAL W/MED SRVCS: CPT | Performed by: OTHER

## 2023-07-26 NOTE — CONSULTS
[facility]  Report of Psychiatric Consultation    Surinder Gonzalez Patient Status:  Observation    1950 MRN [id number]   [facility] 4NW-A Attending Rashad Rivera MD   Hosp Day # 1 PCP Abeba Rod MD     Date of Admission: 23  Date of Consult: 23  Reason for Consultation: Altered mental status, input on psych meds    Impression:  Primary Psychiatric Diagnosis:  Major depressive disorder, recurrent, mild. Anxiety disorder unspecified. Chronic pain syndrome (spinal stenosis) on chronic opioids. Pertinent Medical Diagnoses:  Parkinsonism. HFpEF, CAD s/p CABG, COPD, KANE. HTN. HLD. DM2. A-fib. Severe cervical and lumbar spinal stenosis. PVD and hx LE claudication. Hx neuropathy. Recommendations:  1) HOLD Xanax prn for now. When he goes home, he can take 0.5mg daily PRN severe anxiety. Advised him NOT to take it at the same time (ie take at least 1 hr apart) as his neurontin or oxycodone. He takes 0.5 to 1mg twice a week on average. 2) Continue Wellbutrin for depression and Zoloft for anxiety/depression. 3) Stay OFF Strattera and Lunesta for now. 4) Continue Neurontin and Oxycodone PRN for now. 5) Advised him NOT to drink alcohol (wine/beer/cocktail) at the same time he takes his oxycodone prn or xanax prn. He drinks a few times a month on average. He also smokes cannabis a few times a month. 6) He will follow up with his outpatient psychiatrist.     Zohreh Mo MD  2023  11:50 AM    History of Present Illness:  73 yo male with hx of anxiety and major depression and chronic pain was admitted on 23 for eval of his left hand and Rt LE swelling. US neg for DVT. Swelling thought to be due to hematomas from his recurrent falls at home. He has a hx of Parkinsonism (per patient his neurologist Dr. Rivera Serum has never said he had Parkinson's disease.     He has a hx of mild major depression and anxiety unspecified treated with Wellbutrin, Zoloft, Strattera (mild stimulant to help him being more alert), and Xanax 0.5mg daily PRN. He has a hx of decreased energy and mood and motivation, but no anhedonia or hopeless/worthless feelings or suicidal ideation. He c/o insomnia, both falling and staying asleep and is on Lunesta at night. He often takes his oxycodone at night to help with insomnia. For his chronic neck and back pain from spinal stenosis, he takes oxycodone prn. He takes 20mg in the AM on average and 10mg in the afternoon. He usually doesn't a dose at night. He takes Neurontin 400mg in the AM, afternoon, and evening on average. He usually doesn't take the 4th PRN dose. He also c/o LE numbness and difficult feeling his traction when he walks (difficulty with proprioception). And Rt thigh fatigue and pain with exertion (has hx of PVD). Currently, he feels tired with mild anxiety and depressed mood. He admits to feeling lonely since his wife  from cancer 5 yrs ago. He feels like his quality of life has decreased due to his physical limitations from the chronic pain. He also describes episodes sounding like drop attacks. He just passes out and falls with no prodrome of lightheadedness or his legs giving out. Past Psychiatric History: Major depressive disorder. Anxiety unspecified. Wellbutrin XL 150mg daily  Zoloft 100mg daily  Strattera 40mg daily   Xanax 0.5mg daily PRN  Lunesta 2mg nightly  Neurontin 400mg 3-4x/day  Oxycodone/Tylenol 10mg/325mg 1 tab four times a day PRN. Dr. Carlos Sawyer is his psychiatrist.     Substance Use History: Smokes cannabis and drinks alcohol several times a month. Psych Family History: None    Social and Developmental History: HS education.  with no children. 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 [employer] cars and mechanical objects.  He has 4 cars and thousands of dollars worth of tools; this is a reason why he doesn't want to downsize or go to a condo or assisted living facility.      Past Medical History:   Diagnosis Date    Anxiety     Arrhythmia     A-Flutter    ATRIAL FIBRILLATION     Cardiomyopathy ([address]     Congestive heart disease ([address]     last 5/2019    COPD     no exac in several years per pt as of 10/18/16, no O2    Coronary artery disease involving coronary bypass graft of native heart without angina pectoris 05/17/2017    Depression     DM2 (diabetes mellitus, type 2) (HCC)     elevated glucose due to steroid use for lung infection    Exposure to medical diagnostic radiation     High blood pressure     History of blood transfusion     [employer]    Lumbar stenosis     Mixed hyperlipidemia 10/03/2016    MRSA (methicillin resistant staph aureus) culture positive 01/2022    right foot    KANE (obstructive sleep apnea) Split-2/28/2022    AHI-43    Osteoarthritis     Parkinson's disease ([address]     Peripheral vascular disease ([address]     Prostate cancer ([facility] 2010    s/p brachytherapy    Radiation cystitis 01/12/2023    Sleep apnea     no cpap     Past Surgical History:   Procedure Laterality Date    ANGIOGRAM  2001    ANGIOPLASTY (CORONARY)      CABG  08/2013    CATARACT      bilateral eyes    CATH PERCUTANEOUS  TRANSLUMINAL CORONARY ANGIOPLASTY  2001    stent    COLONOSCOPY      COLONOSCOPY N/A 06/11/2018    4 mm tubular adenoma, diverticulosis, int hem, rpt 2023    CYSTOURETHROSCOPY,BIOPSY  01/12/2023    HIP REPLACEMENT SURGERY  03/2015    right hip replacement     ORTHOPEDIC SURG (PBP) Bilateral     shoulder surgery    OTHER Right 2013    femoral endarterectomy    OTHER  08/2021    bilateral foot/toe surgery-ulcers    OTHER SURGICAL HISTORY      left knee ligament repair    OTHER SURGICAL HISTORY      left hand laceration repair 4th, 5th fingers    OTHER SURGICAL HISTORY      rhinoplasty    OTHER SURGICAL HISTORY      tsa right 1/11/2010    REMV CATARACT EXTRACAP,INSERT LENS Left 08/13/2014    Procedure: LEFT PHACOEMULSIFICATION OF CATARACT WITH INTRAOCULAR LENS IMPLANT [id number];  Surgeon: Miranda Davis MD;  Location: [address] [place] [address]    DONY CARDOSO.NEREIDA.,EYE,ANT CHMBR/LENS Left 09/03/2014    Procedure: REMOVAL FOREIGN BODY OF EYE;  Surgeon: Miranda Davis MD;  Location: [address] [place] [address] [place] HERNIA,5+Y/O,REDUCIBL  03/19/2009    Performed by Esme Sims at [address] [place] [address]    TONSILLECTOMY       Family History   Problem Relation Age of Onset    High Blood Pressure Father     High Cholesterol Father     Heart Disorder Father     High Blood Pressure Mother     High Cholesterol Mother     Stroke Mother     Diabetes Paternal Grandmother     High Blood Pressure Sister     High Blood Pressure Brother       reports that he quit smoking about 10 years ago. His smoking use included cigarettes. He has a 20.00 pack-year smoking history. He has never used smokeless tobacco. He reports current alcohol use. He reports current drug use. Frequency: 5.00 times per week. Drug: Cannabis.     Allergies:    Pollen                  OTHER (SEE COMMENTS)    Comment:Nasal congestion    Medications:    Current Facility-Administered Medications:     [Held by provider] atomoxetine (Strattera) cap 40 mg, 40 mg, Oral, Daily    atorvastatin (Lipitor) tab 40 mg, 40 mg, Oral, Daily    buPROPion ER (Wellbutrin XL) 24 hr tab 150 mg, 150 mg, Oral, Daily    gabapentin (Neurontin) cap 400 mg, 400 mg, Oral, q6h    [Held by provider] Mirabegron ER (Myrbetriq) 50 MG tablet TB24 50 mg, 50 mg, Oral, Daily    oxybutynin ER (Ditropan-XL) 24 hr tab 15 mg, 15 mg, Oral, Daily    oxyCODONE-acetaminophen (Percocet)  MG per tab 1 tablet, 1 tablet, Oral, Q6H PRN    pantoprazole (Protonix) DR tab 40 mg, 40 mg, Oral, QAM AC    polyethylene glycol (PEG 3350) (Miralax) 17 g oral packet 17 g, 17 g, Oral, Daily PRN    sertraline (Zoloft) tab 100 mg, 100 mg, Oral, QAM    tamsulosin (Flomax) cap 0.4 mg, 0.4 mg, Oral, Daily @ 0700    thiamine (Vitamin B1) tab 100 mg, 100 mg, Oral, Daily    melatonin tab 3 mg, 3 mg, Oral, Nightly PRN    ondansetron (Zofran) 4 MG/2ML injection 4 mg, 4 mg, Intravenous, Q6H PRN    metoclopramide (Reglan) 5 mg/mL injection 10 mg, 10 mg, Intravenous, Q8H PRN    Review of Systems   Constitutional:  Positive for malaise/fatigue. Psychiatry Review Systems: No elevated mood, racing thoughts, decreased need for sleep, grandiose thoughts.     Mental Status Exam:     Objective       07/26/23  0445   BP: 105/76   Pulse: 70   Resp:    Temp:      Appearance: fair grooming  Behavior: normal psychomotor  Attitude: cooperative  Gait: not observed    Speech: normal rate, rhythm and volume, wordy and verbose    Mood: depressed and anxious  Affect: Congruent    Thought process: logical and sequential  Thought content: no hallucinations, no paranoid ideation  Orientation: oriented person, place and oriented situation  Attention and Concentration: fair  Memory:  intact recent and intact remote  Language: Intact naming and repetition  Fund of Knowledge: Able to recite name of [place] president    Insight: fair  Judgment: fair    Laboratory Data:  Lab Results   Component Value Date    WBC 7.2 07/26/2023    HGB 9.5 07/26/2023    HCT 31.3 07/26/2023    .0 07/26/2023    CREATSERUM 0.99 07/26/2023    BUN 20 07/26/2023     07/26/2023    K 4.4 07/26/2023     07/26/2023    CO2 24.0 07/26/2023     07/26/2023    CA 8.2 07/26/2023    ALB 2.6 07/25/2023    ALKPHO 107 07/25/2023    BILT 1.4 07/25/2023    TP 6.4 07/25/2023    AST 42 07/25/2023    ALT 22 07/25/2023    PTT 56.1 07/25/2023    INR 5.01 07/25/2023    PTP 45.6 07/25/2023     07/25/2023

## 2023-07-26 NOTE — PROGRESS NOTES
Patient received alert and oriented x4. VSS on RA. Refuses cpap. Orthostatics done, negative. See below. 07/26/23 0435 07/26/23 0440 07/26/23 0445   Vitals   Pulse 66 69 70   Heart Rate Source Monitor Monitor Monitor   /62 105/75 105/76   MAP (mmHg) 76 85 86   BP Location Left arm Left arm Left arm   BP Method Automatic Automatic Automatic   Patient Position Lying Sitting Standing     All meds given per MAR. Two meds in PTA med list not on formulary, pt contacting neighbor to bring in for him. Pt frequently requesting pain medications that he takes at home, educated on plan of care. Given 1 tablet percocet per MAR this morning along with gabapentin. Pt very unsteady with x2 assist, walker and gait belt. Got patient up into bathroom and pt very unsteady, dangerous without 2 assist. Fall risk precautions in place. Call light within reach. Will continue plan of care.

## 2023-07-26 NOTE — HOME CARE LIAISON
Patient is current with RN ,PT ,OT AND MSW. Patient will need ROSETTA order when the patient becomes inpatient status. Informed CM Devorah Holcomb.  Will continue to monitor

## 2023-07-26 NOTE — PHYSICAL THERAPY NOTE
PHYSICAL THERAPY EVALUATION - INPATIENT     Room Number: 420/420-A  Evaluation Date: 7/26/2023  Type of Evaluation: Initial  Physician Order: PT Eval and Treat    Presenting Problem: Abnormality of coagulation  Co-Morbidities : A-fib on Xarelto, ICM, HFrEF, COPD, CAD, DM 2, HTN  Reason for Therapy: Mobility Dysfunction and Discharge Planning    History related to current admission: Patient is a 74year old male admitted on 7/25/2023 for abnormality of coagulation. 1/25/23-1/28/23 Gross Hematuria discharged home  6/3/23-6/5/23- Synscope, Frequent Falls discharge home  7/5/23-7/7/23 Synscope discharge home with [address]   In this PT evaluation, the patient presents with the following impairments Decrease strength and decrease endurance. These impairments and comorbidities manifest themselves as functional limitations in independent bed mobility, transfers, and gait. The patient is below baseline and would benefit from skilled inpatient PT to address the above deficits to assist patient in returning to prior to level of function. Functional outcome measures completed include [facility]. The AM-PAC '6-Clicks' Inpatient Basic Mobility Short Form was completed and this patient is demonstrating a Approx Degree of Impairment: 54.16%  degree of impairment in mobility. Research supports that patients with this level of impairment may benefit from ANGELINA. DISCHARGE RECOMMENDATIONS  PT Discharge Recommendations: Sub-acute rehabilitation    PLAN  PT Treatment Plan: Bed mobility; Body mechanics; Endurance;Gait training;Family education;Strengthening;Stair training;Transfer training  Rehab Potential : Good  Frequency (Obs): 3-5x/week  Number of Visits to Meet Established Goals: 3      CURRENT GOALS    Goal #1 Patient is able to demonstrate supine - sit EOB @ level: modified independent     Goal #2 Patient is able to demonstrate transfers EOB to/from [facility] at assistance level: modified independent     Goal #3 Patient is able to ambulate 150 feet with assist device: walker - rolling at assistance level: modified independent     Goal #4 Patient is able to perform stairs 12 with railling. Goal #5    Goal #6    Goal Comments: Goals established on 2023    HOME SITUATION  Type of Home: House   Home Layout: Multi-level  Stairs to Enter : 3     Stairs to Bedroom: 14 (14 to the second floor and 14 to the third floor)  Railing: Yes    Lives With: Alone  Drives: Yes     Patient Regularly Uses: Glasses    Prior Level of [place]: Pt reports that he lives in a home alone. Pt states that he has multiple floors due to  the house layout. Pt states that he had multiple episodes of falls at home due to having synscope episodes. Pt uses a cane and is independent with ADLs. SUBJECTIVE  \"I just blackout\"      OBJECTIVE  Precautions: None  Fall Risk: High fall risk    WEIGHT BEARING RESTRICTION  Weight Bearing Restriction: None                PAIN ASSESSMENT  Ratin  Location: R hip  Management Techniques: Activity promotion    COGNITION  Overall Cognitive Status:  WFL - within functional limits    RANGE OF MOTION AND STRENGTH ASSESSMENT  Upper extremity ROM and strength are within functional limits     Lower extremity ROM is within functional limits     Lower extremity strength is within functional limits       BALANCE  Static Sitting: Fair  Dynamic Sitting: Fair  Static Standing: Fair -  Dynamic Standing: Fair -    ADDITIONAL TESTS                                    ACTIVITY TOLERANCE                         O2 WALK       NEUROLOGICAL FINDINGS                        AM-PAC '6-Clicks' INPATIENT SHORT FORM - BASIC MOBILITY  How much difficulty does the patient currently have. ..   Patient Difficulty: Turning over in bed (including adjusting bedclothes, sheets and blankets)?: A Little   Patient Difficulty: Sitting down on and standing up from a chair with arms (e.g., wheelchair, bedside commode, etc.): A Little   Patient Difficulty: Moving from lying on back to sitting on the side of the bed?: A Little   How much help from another person does the patient currently need. .. Help from Another: Moving to and from a bed to a chair (including a wheelchair)?: A Little   Help from Another: Need to walk in hospital room?: A Lot   Help from Another: Climbing 3-5 steps with a railing?: A Lot       AM-PAC Score:  Raw Score: 16   Approx Degree of Impairment: 54.16%   Standardized Score (AM-PAC Scale): 40.78   CMS Modifier (G-Code): CK    FUNCTIONAL ABILITY STATUS  Gait Assessment   Functional Mobility/Gait Assessment  Gait Assistance: Minimum assistance  Distance (ft): 5  Assistive Device: Rolling walker  Pattern: Shuffle    Skilled Therapy Provided     Bed Mobility:  Rolling: CGA  Supine to sit: CGA   Sit to supine: CGA     Transfer Mobility:  Sit to stand: CGA   Stand to sit: CGA  Gait = 5ft with Min A x 2 due to pt feeling unsteady. Therapist's Comments: Pt's blood pressure was monitored during session supine 104/69, sitting 121/45 and standing 84/55. Pt did report any changes in symptoms. Pt was observed to have unsteadiness when performing stand pivot transfers and gait requiring Min A x 2 to assist with transfers. Pt will benefit with ANGELINA stay to improve pt's ability to perform transfer as well as improve balance to prevent unsteadiness and falls. Exercise/Education Provided:  Bed mobility  Body mechanics  Functional activity tolerated  Gait training  Transfer training    Patient End of Session: Up in chair; With [facility] staff;Needs met;Call light within reach; All patient questions and concerns addressed      Patient Evaluation Complexity Level:  History Low - no personal factors and/or co-morbidities   Examination of body systems Low - addressing 1-2 elements   Clinical Presentation Low - Stable   Clinical Decision Making Low - Stable       PT Session Time: 30 minutes  Therapeutic Activity: 15 minutes

## 2023-07-26 NOTE — DISCHARGE INSTRUCTIONS
Stop lunesta and strattera. We recommend avoiding all alcohol and cannabis consumption as they can augment the side effects of your other medications and increase your risk of falling. Sometimes managing your health at home requires assistance. The [facility] team has recognized your preference to use [facility]. They can be reached by phone at [phone]. The fax number for your reference is [phone]. A representative from the home health agency will contact you or your family to schedule your first visit.      Follow-up with your psychiatrist:  Dr. Columba Berumen [address] [place] [address] # 035 [address] [phone]

## 2023-07-26 NOTE — PLAN OF CARE
Alert and oriented, has low back pains, taking Neurontin and has PRN Percoset, out of bed with PT, was unsteady, sat on the chair for several hours, activity as tolerated. Urine + for drug test, will recollect for confirmation. Skin condition is unchanged. Left toe skin breakdown is dry, dressing changes was done.    Problem: Patient/Family Goals  Goal: Patient/Family Long Term Goal  Description: Patient's Long Term Goal:     Interventions:    - See additional Care Plan goals for specific interventions  Outcome: Progressing

## 2023-07-26 NOTE — CM/SW NOTE
PT recommendations received this PM for ANGELINA. Per PT eval note:    HOME SITUATION  Type of Home: House   Home Layout: Multi-level  Stairs to Enter : 3  Stairs to Bedroom: 14 (14 to the second floor and 14 to the third floor)  Railing: Yes     Lives With: Alone  Drives: Yes  Patient Regularly Uses: Glasses     Prior Level of [place]: Pt reports that he lives in a home alone. Pt states that he has multiple floors due to  the house layout. Pt states that he had multiple episodes of falls at home due to having synscope episodes. Pt uses a cane and is independent with ADLs. ANGELINA referrals sent via [employer]. Pt is currently admitted under observation status. Pt would need 3 nights under inpatient status to qualify for ANGELINA benefits. Will discuss w/ pt he could private pay for ANGELINA. CM/SW will provide list of accepting facilities once available. If pt does not wish to privately pay for ANGELINA, he is current w/ [facility], confirmed they can resume care at [place]. CM/SW will remain available for DC planning and/or support.      Jeri Ayala BSN, [facility]    [id number]

## 2023-07-26 NOTE — RESPIRATORY THERAPY NOTE
Patient does not wear his CPAP at home on daily basis and does not want to wear one during this visit.

## 2023-07-27 VITALS
HEART RATE: 70 BPM | OXYGEN SATURATION: 97 % | DIASTOLIC BLOOD PRESSURE: 72 MMHG | TEMPERATURE: 98 F | RESPIRATION RATE: 22 BRPM | SYSTOLIC BLOOD PRESSURE: 130 MMHG

## 2023-07-27 PROBLEM — G93.40 ACUTE ENCEPHALOPATHY: Status: ACTIVE | Noted: 2023-07-27

## 2023-07-27 PROBLEM — G93.40 ACUTE ENCEPHALOPATHY: Status: ACTIVE | Noted: 2023-01-01

## 2023-07-27 PROCEDURE — 99232 SBSQ HOSP IP/OBS MODERATE 35: CPT | Performed by: OTHER

## 2023-07-27 RX ORDER — METOPROLOL SUCCINATE 25 MG/1
25 TABLET, EXTENDED RELEASE ORAL
Status: DISCONTINUED | OUTPATIENT
Start: 2023-07-27 | End: 2023-07-27

## 2023-07-27 NOTE — CM/SW NOTE
07/27/23 1600   Discharge disposition   Expected discharge disposition Home or Self   Discharge transportation [address] [facility] noted that patient is cleared for DC. Patient is agreeable to DC home with [facility] which is already current. [facility] [phone] has been requested to arrange ambulance for  time of 6pm. PCS form completed. SW will continue to follow for plan of care changes and remain available for any additional DC needs or concerns.      Liana Watson MSW, [facility]  Discharge Planner   [id number]

## 2023-07-27 NOTE — PROGRESS NOTES
NURSING DISCHARGE NOTE    Discharged Home via Wheelchair. Accompanied by Support staff  Belongings Taken by patient/family. 1900- Patient contacted regarding new order from Dr. Nichole Sears to discontinue Losartan at home.

## 2023-07-27 NOTE — PROGRESS NOTES
[facility]  Report of Psychiatric Progress Note    Nael Anne Patient Status:  Observation    1950 MRN [id number]   [facility] 4NW-A Attending Sandie Rios MD   Hosp Day # 2 PCP Mundo Cordero MD     Date of Admission: 23  Date of Service: 23  Reason for Consultation: Altered mental status, input on psych meds    Impression:  Primary Psychiatric Diagnosis:  Major depressive disorder, recurrent, mild. Anxiety disorder unspecified. Chronic pain syndrome (spinal stenosis) on chronic opioids. Pertinent Medical Diagnoses:  Parkinsonism. HFpEF, CAD s/p CABG, COPD, KANE. HTN. HLD. DM2. A-fib. Severe cervical and lumbar spinal stenosis. PVD and hx LE claudication. Hx neuropathy. Recommendations:  1) Stay OFF Xanax prn, Strattera, and Lunesta prn to decrease polypharmacy and fall risk. 2) Continue Wellbutrin for depression and Zoloft for anxiety/depression. 3) Continue Neurontin and Oxycodone PRN for now. He was advised to take these meds 1 hr apart and not at the same time. 4) Advised him NOT to drink alcohol (wine/beer/cocktail) at the same time he takes his oxycodone prn. He drinks a few times a month on average. He also smokes cannabis a few times a month. 5) He remains a high fall risk due to his alcohol and cannabis use, Parkinsonism, spinal stenosis, and meds (ie opioids). Agree with cardiology- IF he continues to have recurrent falls, it is better to get off long term anticoagulation. 6) He will follow up with his outpatient psychiatrist.     Walker Gerardo MD    History of Present Illness:  75 yo male with hx of anxiety and major depression and chronic pain was admitted on 23 for eval of his left hand and Rt LE swelling. US neg for DVT. Swelling thought to be due to hematomas from his recurrent falls at home. He has a hx of Parkinsonism (per patient his neurologist Dr. Adama Heredia has never said he had Parkinson's disease.     He has a hx of mild major depression and anxiety unspecified treated with Wellbutrin, Zoloft, Strattera (mild stimulant to help him being more alert), and Xanax 0.5mg daily PRN. He has a hx of decreased energy and mood and motivation, but no anhedonia or hopeless/worthless feelings or suicidal ideation. He c/o insomnia, both falling and staying asleep and is on Lunesta at night. He often takes his oxycodone at night to help with insomnia. For his chronic neck and back pain from spinal stenosis, he takes oxycodone prn. He takes 20mg in the AM on average and 10mg in the afternoon. He usually doesn't a dose at night. He takes Neurontin 400mg in the AM, afternoon, and evening on average. He usually doesn't take the 4th PRN dose. He also c/o LE numbness and difficult feeling his traction when he walks (difficulty with proprioception). And Rt thigh fatigue and pain with exertion (has hx of PVD). Currently, he feels tired with mild anxiety and depressed mood. He admits to feeling lonely since his wife  from cancer 5 yrs ago. He feels like his quality of life has decreased due to his physical limitations from the chronic pain. He also describes episodes sounding like drop attacks. He just passes out and falls with no prodrome of lightheadedness or his legs giving out. Interval Hx:  23- He c/o moderate lower back pain. He has mild anxiety and depressed mood. No irritable mood. No hopeless feelings. Past Psychiatric History: Major depressive disorder. Anxiety unspecified. Wellbutrin XL 150mg daily  Zoloft 100mg daily  Strattera 40mg daily   Xanax 0.5mg daily PRN  Lunesta 2mg nightly  Neurontin 400mg 3-4x/day  Oxycodone/Tylenol 10mg/325mg 1 tab four times a day PRN. Dr. Raheem Hidalgo is his psychiatrist.     Substance Use History: Smokes cannabis and drinks alcohol several times a month. Psych Family History: None    Social and Developmental History: HS education.  with no children. 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 [employer] cars and mechanical objects. He has 4 cars and thousands of dollars worth of tools; this is a reason why he doesn't want to downsize or go to a condo or assisted living facility.      Past Medical History:   Diagnosis Date    Anxiety     Arrhythmia     A-Flutter    ATRIAL FIBRILLATION     Cardiomyopathy ([address]     Congestive heart disease ([address]     last 5/2019    COPD     no exac in several years per pt as of 10/18/16, no O2    Coronary artery disease involving coronary bypass graft of native heart without angina pectoris 05/17/2017    Depression     DM2 (diabetes mellitus, type 2) (HCC)     elevated glucose due to steroid use for lung infection    Exposure to medical diagnostic radiation     High blood pressure     History of blood transfusion     [facility] [employer]    Lumbar stenosis     Mixed hyperlipidemia 10/03/2016    MRSA (methicillin resistant staph aureus) culture positive 01/2022    right foot    KANE (obstructive sleep apnea) Split-2/28/2022    AHI-43    Osteoarthritis     Parkinson's disease ([address]     Peripheral vascular disease ([address]     Prostate cancer ([address] 2010    s/p brachytherapy    Radiation cystitis 01/12/2023    Sleep apnea     no cpap     Past Surgical History:   Procedure Laterality Date    ANGIOGRAM  2001    ANGIOPLASTY (CORONARY)      CABG  08/2013    CATARACT      bilateral eyes    CATH PERCUTANEOUS  TRANSLUMINAL CORONARY ANGIOPLASTY  2001    stent    COLONOSCOPY      COLONOSCOPY N/A 06/11/2018    4 mm tubular adenoma, diverticulosis, int hem, rpt 2023    CYSTOURETHROSCOPY,BIOPSY  01/12/2023    HIP REPLACEMENT SURGERY  03/2015    right hip replacement     ORTHOPEDIC SURG (PBP) Bilateral     shoulder surgery    OTHER Right 2013    femoral endarterectomy    OTHER  08/2021    bilateral foot/toe surgery-ulcers    OTHER SURGICAL HISTORY      left knee ligament repair    OTHER SURGICAL HISTORY      left hand laceration repair 4th, 5th fingers    OTHER SURGICAL HISTORY      rhinoplasty    OTHER SURGICAL HISTORY      tsa right 1/11/2010    REMV CATARACT EXTRACAP,INSERT LENS Left 08/13/2014    Procedure: LEFT PHACOEMULSIFICATION OF CATARACT WITH INTRAOCULAR LENS IMPLANT [id number];  Surgeon: Tez Mix MD;  Location: [address] [place] [address]    REMV F.B.,EYE,ANT CHMBR/LENS Left 09/03/2014    Procedure: REMOVAL FOREIGN BODY OF EYE;  Surgeon: Tez Mix MD;  Location: [address] [place] [address] [place] HERNIA,5+Y/O,REDUCIBL  03/19/2009    Performed by Kacie Hi at [address] [place] [address]    TONSILLECTOMY       Family History   Problem Relation Age of Onset    High Blood Pressure Father     High Cholesterol Father     Heart Disorder Father     High Blood Pressure Mother     High Cholesterol Mother     Stroke Mother     Diabetes Paternal Grandmother     High Blood Pressure Sister     High Blood Pressure Brother       reports that he quit smoking about 10 years ago. His smoking use included cigarettes. He has a 20.00 pack-year smoking history. He has never used smokeless tobacco. He reports current alcohol use. He reports current drug use. Frequency: 5.00 times per week. Drug: Cannabis.     Allergies:    Pollen                  OTHER (SEE COMMENTS)    Comment:Nasal congestion    Medications:    Current Facility-Administered Medications:     rivaroxaban (Xarelto) tab 20 mg, 20 mg, Oral, Daily with food    atorvastatin (Lipitor) tab 40 mg, 40 mg, Oral, Daily    buPROPion ER (Wellbutrin XL) 24 hr tab 150 mg, 150 mg, Oral, Daily    gabapentin (Neurontin) cap 400 mg, 400 mg, Oral, q6h    [Held by provider] Mirabegron ER (Myrbetriq) 50 MG tablet TB24 50 mg, 50 mg, Oral, Daily    oxybutynin ER (Ditropan-XL) 24 hr tab 15 mg, 15 mg, Oral, Daily    oxyCODONE-acetaminophen (Percocet)  MG per tab 1 tablet, 1 tablet, Oral, Q6H PRN    pantoprazole (Protonix) DR tab 40 mg, 40 mg, Oral, QAM AC    polyethylene glycol (PEG 3350) (Miralax) 17 g oral packet 17 g, 17 g, Oral, Daily PRN    sertraline (Zoloft) tab 100 mg, 100 mg, Oral, QAM    tamsulosin (Flomax) cap 0.4 mg, 0.4 mg, Oral, Daily @ 0700    thiamine (Vitamin B1) tab 100 mg, 100 mg, Oral, Daily    melatonin tab 3 mg, 3 mg, Oral, Nightly PRN    ondansetron (Zofran) 4 MG/2ML injection 4 mg, 4 mg, Intravenous, Q6H PRN    metoclopramide (Reglan) 5 mg/mL injection 10 mg, 10 mg, Intravenous, Q8H PRN    Review of Systems   Constitutional:  Positive for malaise/fatigue. Psychiatry Review Systems: No elevated mood, racing thoughts, decreased need for sleep, grandiose thoughts.     Mental Status Exam:     Objective       07/27/23  1151   BP: 118/81   Pulse: 82   Resp:    Temp:      Appearance: fair grooming  Behavior: normal psychomotor  Attitude: cooperative  Gait: not observed    Speech: normal rate, rhythm and volume, wordy and verbose    Mood: depressed and anxious  Affect: Congruent    Thought process: logical and sequential  Thought content: no hallucinations, no paranoid ideation  Orientation: oriented person, place and oriented situation  Attention and Concentration: fair  Memory:  intact recent and intact remote  Language: Intact naming and repetition  Fund of Knowledge: Able to recite name of [place] president    Insight: fair  Judgment: fair

## 2023-07-27 NOTE — PLAN OF CARE
Patient c/o severe lower back pain, Percocet given, with moderate relief. Purplish discoloration noted on hematoma on back of right thigh and right leg. Patient's vital signs stable.

## 2023-07-27 NOTE — PROGRESS NOTES
Pt is alert and oriented and has heplock. Pt has complained about pain. Percocet given with good relief. Pt had urine tests sent this evening. Pt has call light in reach and safety measures in place.

## 2023-07-27 NOTE — CM/SW NOTE
SW met with patient to discuss DC options. Patient is being recommended [facility] placement but is currently admitted under Observation status and [facility] will not be covered by Medicare. Patient reported that he is okay to go home with continued formerly [facility] services from [facility]. Patient will need resume of care orders placed if admitted under inpatient status. SW will continue to follow for plan of care changes and remain available for any additional DC needs or concerns.      Neeta SHIPMAN, [facility]  Discharge Planner   [id number]

## 2023-07-28 LAB — AMPHETAMINE CLASS UR: NEGATIVE

## 2023-07-30 ENCOUNTER — APPOINTMENT (OUTPATIENT)
Dept: CT IMAGING | Facility: HOSPITAL | Age: 73
End: 2023-07-30
Attending: EMERGENCY MEDICINE
Payer: COMMERCIAL

## 2023-07-30 ENCOUNTER — HOSPITAL ENCOUNTER (EMERGENCY)
Facility: HOSPITAL | Age: 73
Discharge: HOME | End: 2023-07-30
Attending: EMERGENCY MEDICINE
Payer: COMMERCIAL

## 2023-07-30 VITALS
SYSTOLIC BLOOD PRESSURE: 120 MMHG | HEIGHT: 71 IN | DIASTOLIC BLOOD PRESSURE: 89 MMHG | RESPIRATION RATE: 25 BRPM | WEIGHT: 196 LBS | BODY MASS INDEX: 27.44 KG/M2 | TEMPERATURE: 98 F | OXYGEN SATURATION: 94 % | HEART RATE: 82 BPM

## 2023-07-30 DIAGNOSIS — S09.90XA CLOSED HEAD INJURY, INITIAL ENCOUNTER: ICD-10-CM

## 2023-07-30 DIAGNOSIS — S01.81XA FACIAL LACERATION, INITIAL ENCOUNTER: Primary | ICD-10-CM
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
## 2023-07-30 DIAGNOSIS — Z79.01 ANTICOAGULATED: ICD-10-CM

## 2023-07-30 LAB
ALBUMIN SERPL-MCNC: 3.1 G/DL (ref 3.4–5)
ALBUMIN/GLOB SERPL: 0.7 {RATIO} (ref 1–2)
ALP LIVER SERPL-CCNC: 131 U/L
ALT SERPL-CCNC: 27 U/L
ANION GAP SERPL CALC-SCNC: 5 MMOL/L (ref 0–18)
AST SERPL-CCNC: 59 U/L (ref 15–37)
BASOPHILS # BLD AUTO: 0.09 X10(3) UL (ref 0–0.2)
BASOPHILS NFR BLD AUTO: 1.5 %
BILIRUB SERPL-MCNC: 1.8 MG/DL (ref 0.1–2)
BUN BLD-MCNC: 20 MG/DL (ref 7–18)
CALCIUM BLD-MCNC: 8.6 MG/DL (ref 8.5–10.1)
CHLORIDE SERPL-SCNC: 105 MMOL/L (ref 98–112)
CO2 SERPL-SCNC: 24 MMOL/L (ref 21–32)
CREAT BLD-MCNC: 1.06 MG/DL
EGFRCR SERPLBLD CKD-EPI 2021: 73 ML/MIN/1.73M2 (ref 60–?)
EOSINOPHIL # BLD AUTO: 0.14 X10(3) UL (ref 0–0.7)
EOSINOPHIL NFR BLD AUTO: 2.4 %
ERYTHROCYTE [DISTWIDTH] IN BLOOD BY AUTOMATED COUNT: 21.6 %
GLOBULIN PLAS-MCNC: 4.3 G/DL (ref 2.8–4.4)
GLUCOSE BLD-MCNC: 74 MG/DL (ref 70–99)
HCT VFR BLD AUTO: 36.3 %
HGB BLD-MCNC: 10.8 G/DL
IMM GRANULOCYTES # BLD AUTO: 0.02 X10(3) UL (ref 0–1)
IMM GRANULOCYTES NFR BLD: 0.3 %
LYMPHOCYTES # BLD AUTO: 0.72 X10(3) UL (ref 1–4)
LYMPHOCYTES NFR BLD AUTO: 12.2 %
MCH RBC QN AUTO: 26 PG (ref 26–34)
MCHC RBC AUTO-ENTMCNC: 29.8 G/DL (ref 31–37)
MCV RBC AUTO: 87.3 FL
MONOCYTES # BLD AUTO: 0.72 X10(3) UL (ref 0.1–1)
MONOCYTES NFR BLD AUTO: 12.2 %
NEUTROPHILS # BLD AUTO: 4.22 X10 (3) UL (ref 1.5–7.7)
NEUTROPHILS # BLD AUTO: 4.22 X10(3) UL (ref 1.5–7.7)
NEUTROPHILS NFR BLD AUTO: 71.4 %
OSMOLALITY SERPL CALC.SUM OF ELEC: 279 MOSM/KG (ref 275–295)
PLATELET # BLD AUTO: 152 10(3)UL (ref 150–450)
POTASSIUM SERPL-SCNC: 5.4 MMOL/L (ref 3.5–5.1)
PROT SERPL-MCNC: 7.4 G/DL (ref 6.4–8.2)
RBC # BLD AUTO: 4.16 X10(6)UL
SODIUM SERPL-SCNC: 134 MMOL/L (ref 136–145)
WBC # BLD AUTO: 5.9 X10(3) UL (ref 4–11)

## 2023-07-30 PROCEDURE — 85025 COMPLETE CBC W/AUTO DIFF WBC: CPT | Performed by: EMERGENCY MEDICINE

## 2023-07-30 PROCEDURE — 99284 EMERGENCY DEPT VISIT MOD MDM: CPT

## 2023-07-30 PROCEDURE — 85025 COMPLETE CBC W/AUTO DIFF WBC: CPT

## 2023-07-30 PROCEDURE — 36415 COLL VENOUS BLD VENIPUNCTURE: CPT

## 2023-07-30 PROCEDURE — 72125 CT NECK SPINE W/O DYE: CPT | Performed by: EMERGENCY MEDICINE

## 2023-07-30 PROCEDURE — 80053 COMPREHEN METABOLIC PANEL: CPT | Performed by: EMERGENCY MEDICINE

## 2023-07-30 PROCEDURE — 70450 CT HEAD/BRAIN W/O DYE: CPT | Performed by: EMERGENCY MEDICINE

## 2023-07-30 NOTE — ED QUICK NOTES
Patient ambulatory around nurses station with a steady gait using his cane from home. Friend [place] to  patient. Patient provided with lunch prior to discharge.
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
## 2023-07-30 NOTE — DISCHARGE INSTRUCTIONS
Acetaminophen 1000 mg every 6 hours as needed for pain. Continue your other medications. Physician follow-up as needed.

## 2023-07-31 ENCOUNTER — APPOINTMENT (OUTPATIENT)
Dept: WOUND CARE | Facility: HOSPITAL | Age: 73
End: 2023-07-31
Attending: INTERNAL MEDICINE
Payer: MEDICARE

## 2023-08-02 LAB
BENZODIAZEPINES CLASS UR: NEGATIVE
CARBOXY THC GCMS UR: 93 NG/MG CREAT
CARBOXY THC GCMS UR: 93 NG/MG CREAT
OPIATES CLASS UR: NEGATIVE NG/ML
OXYCODONE CONFIRMATION, URINE: 1297 NG/ML
OXYCODONE, URINE: POSITIVE
OXYCODONE/OXYMORPHONE, URINE: POSITIVE
OXYMORPHONE, URINE: NEGATIVE

## 2023-08-10 ENCOUNTER — HOSPITAL ENCOUNTER (EMERGENCY)
Facility: HOSPITAL | Age: 73
Discharge: HOME | End: 2023-08-10
Attending: EMERGENCY MEDICINE
Payer: MEDICARE

## 2023-08-10 ENCOUNTER — APPOINTMENT (OUTPATIENT)
Dept: GENERAL RADIOLOGY | Facility: HOSPITAL | Age: 73
End: 2023-08-10
Attending: EMERGENCY MEDICINE
Payer: MEDICARE

## 2023-08-10 DIAGNOSIS — E87.1 HYPONATREMIA: ICD-10-CM

## 2023-08-10 DIAGNOSIS — R60.1 ANASARCA: Primary | ICD-10-CM

## 2023-08-10 DIAGNOSIS — I48.20 ATRIAL FIBRILLATION, CHRONIC (HCC): ICD-10-CM

## 2023-08-10 LAB
ALBUMIN SERPL-MCNC: 2.7 G/DL (ref 3.4–5)
ALBUMIN/GLOB SERPL: 0.7 {RATIO} (ref 1–2)
ALP LIVER SERPL-CCNC: 114 U/L
ALT SERPL-CCNC: 26 U/L
ANION GAP SERPL CALC-SCNC: 7 MMOL/L (ref 0–18)
AST SERPL-CCNC: 56 U/L (ref 15–37)
BASOPHILS # BLD AUTO: 0.07 X10(3) UL (ref 0–0.2)
BASOPHILS NFR BLD AUTO: 1.2 %
BILIRUB SERPL-MCNC: 1.9 MG/DL (ref 0.1–2)
BILIRUB UR QL STRIP.AUTO: NEGATIVE
BUN BLD-MCNC: 27 MG/DL (ref 7–18)
CALCIUM BLD-MCNC: 8.7 MG/DL (ref 8.5–10.1)
CHLORIDE SERPL-SCNC: 101 MMOL/L (ref 98–112)
CLARITY UR REFRACT.AUTO: CLEAR
CO2 SERPL-SCNC: 23 MMOL/L (ref 21–32)
COLOR UR AUTO: YELLOW
CREAT BLD-MCNC: 1.15 MG/DL
EGFRCR SERPLBLD CKD-EPI 2021: 66 ML/MIN/1.73M2 (ref 60–?)
EOSINOPHIL # BLD AUTO: 0.09 X10(3) UL (ref 0–0.7)
EOSINOPHIL NFR BLD AUTO: 1.6 %
ERYTHROCYTE [DISTWIDTH] IN BLOOD BY AUTOMATED COUNT: 21.2 %
GLOBULIN PLAS-MCNC: 3.7 G/DL (ref 2.8–4.4)
GLUCOSE BLD-MCNC: 80 MG/DL (ref 70–99)
GLUCOSE UR STRIP.AUTO-MCNC: NEGATIVE MG/DL
HCT VFR BLD AUTO: 31.1 %
HGB BLD-MCNC: 9.9 G/DL
IMM GRANULOCYTES # BLD AUTO: 0.01 X10(3) UL (ref 0–1)
IMM GRANULOCYTES NFR BLD: 0.2 %
KETONES UR STRIP.AUTO-MCNC: NEGATIVE MG/DL
LEUKOCYTE ESTERASE UR QL STRIP.AUTO: NEGATIVE
LYMPHOCYTES # BLD AUTO: 0.8 X10(3) UL (ref 1–4)
LYMPHOCYTES NFR BLD AUTO: 13.9 %
MAGNESIUM SERPL-MCNC: 2.6 MG/DL (ref 1.6–2.6)
MCH RBC QN AUTO: 26.3 PG (ref 26–34)
MCHC RBC AUTO-ENTMCNC: 31.8 G/DL (ref 31–37)
MCV RBC AUTO: 82.5 FL
MONOCYTES # BLD AUTO: 0.78 X10(3) UL (ref 0.1–1)
MONOCYTES NFR BLD AUTO: 13.5 %
NEUTROPHILS # BLD AUTO: 4.01 X10 (3) UL (ref 1.5–7.7)
NEUTROPHILS # BLD AUTO: 4.01 X10(3) UL (ref 1.5–7.7)
NEUTROPHILS NFR BLD AUTO: 69.6 %
NITRITE UR QL STRIP.AUTO: NEGATIVE
NT-PROBNP SERPL-MCNC: 2613 PG/ML (ref ?–450)
OSMOLALITY SERPL CALC.SUM OF ELEC: 276 MOSM/KG (ref 275–295)
PH UR STRIP.AUTO: 5 [PH] (ref 5–8)
PLATELET # BLD AUTO: 160 10(3)UL (ref 150–450)
POTASSIUM SERPL-SCNC: 4.2 MMOL/L (ref 3.5–5.1)
PROT SERPL-MCNC: 6.4 G/DL (ref 6.4–8.2)
PROT UR STRIP.AUTO-MCNC: 30 MG/DL
RBC # BLD AUTO: 3.77 X10(6)UL
RBC UR QL AUTO: NEGATIVE
SODIUM SERPL-SCNC: 131 MMOL/L (ref 136–145)
SP GR UR STRIP.AUTO: 1.01 (ref 1–1.03)
UROBILINOGEN UR STRIP.AUTO-MCNC: <2 MG/DL
WBC # BLD AUTO: 5.8 X10(3) UL (ref 4–11)

## 2023-08-10 PROCEDURE — 99285 EMERGENCY DEPT VISIT HI MDM: CPT

## 2023-08-10 PROCEDURE — 85025 COMPLETE CBC W/AUTO DIFF WBC: CPT | Performed by: EMERGENCY MEDICINE

## 2023-08-10 PROCEDURE — 83880 ASSAY OF NATRIURETIC PEPTIDE: CPT | Performed by: EMERGENCY MEDICINE

## 2023-08-10 PROCEDURE — 83735 ASSAY OF MAGNESIUM: CPT | Performed by: EMERGENCY MEDICINE

## 2023-08-10 PROCEDURE — 71045 X-RAY EXAM CHEST 1 VIEW: CPT | Performed by: EMERGENCY MEDICINE

## 2023-08-10 PROCEDURE — 80053 COMPREHEN METABOLIC PANEL: CPT | Performed by: EMERGENCY MEDICINE

## 2023-08-10 PROCEDURE — 99284 EMERGENCY DEPT VISIT MOD MDM: CPT

## 2023-08-10 PROCEDURE — 93005 ELECTROCARDIOGRAM TRACING: CPT

## 2023-08-10 PROCEDURE — 81001 URINALYSIS AUTO W/SCOPE: CPT | Performed by: EMERGENCY MEDICINE

## 2023-08-10 PROCEDURE — 36415 COLL VENOUS BLD VENIPUNCTURE: CPT

## 2023-08-10 PROCEDURE — 93010 ELECTROCARDIOGRAM REPORT: CPT

## 2023-08-10 RX ORDER — FUROSEMIDE 20 MG/1
20 TABLET ORAL 2 TIMES DAILY
Qty: 10 TABLET | Refills: 0 | Status: SHIPPED | OUTPATIENT
Start: 2023-08-10 | End: 2023-08-26

## 2023-08-10 NOTE — ED INITIAL ASSESSMENT (HPI)
Pt c/o bilateral testicular swelling. Pt states testicles are size of grapefruit. Pt also c/o generalized edema which is new.

## 2023-08-11 VITALS
WEIGHT: 205 LBS | SYSTOLIC BLOOD PRESSURE: 106 MMHG | HEART RATE: 67 BPM | OXYGEN SATURATION: 96 % | DIASTOLIC BLOOD PRESSURE: 55 MMHG | HEIGHT: 71 IN | TEMPERATURE: 98 F | RESPIRATION RATE: 20 BRPM | BODY MASS INDEX: 28.7 KG/M2

## 2023-08-11 LAB
Q-T INTERVAL: 428 MS
QRS DURATION: 106 MS
QTC CALCULATION (BEZET): 455 MS
R AXIS: 71 DEGREES
T AXIS: 191 DEGREES
VENTRICULAR RATE: 68 BPM

## 2023-08-19 ENCOUNTER — APPOINTMENT (OUTPATIENT)
Dept: GENERAL RADIOLOGY | Facility: HOSPITAL | Age: 73
End: 2023-08-19
Attending: EMERGENCY MEDICINE
Payer: MEDICARE

## 2023-08-19 ENCOUNTER — HOSPITAL ENCOUNTER (INPATIENT)
Facility: HOSPITAL | Age: 73
LOS: 6 days | Discharge: NURSING, REHAB OR LONG-TERM CARE | End: 2023-08-26
Attending: EMERGENCY MEDICINE | Admitting: INTERNAL MEDICINE
Payer: MEDICARE

## 2023-08-19 ENCOUNTER — APPOINTMENT (OUTPATIENT)
Dept: ULTRASOUND IMAGING | Facility: HOSPITAL | Age: 73
End: 2023-08-19
Attending: EMERGENCY MEDICINE
Payer: MEDICARE

## 2023-08-19 DIAGNOSIS — M48.062 NEUROGENIC CLAUDICATION DUE TO LUMBAR SPINAL STENOSIS: ICD-10-CM

## 2023-08-19 DIAGNOSIS — E88.09 HYPOALBUMINEMIA: ICD-10-CM

## 2023-08-19 DIAGNOSIS — I95.9 HYPOTENSION, UNSPECIFIED HYPOTENSION TYPE: ICD-10-CM

## 2023-08-19 DIAGNOSIS — N17.9 AKI (ACUTE KIDNEY INJURY) (HCC): ICD-10-CM

## 2023-08-19 DIAGNOSIS — M25.511 CHRONIC RIGHT SHOULDER PAIN: ICD-10-CM

## 2023-08-19 DIAGNOSIS — M48.061 SPINAL STENOSIS OF LUMBAR REGION, UNSPECIFIED WHETHER NEUROGENIC CLAUDICATION PRESENT: ICD-10-CM

## 2023-08-19 DIAGNOSIS — G89.29 CHRONIC RIGHT SHOULDER PAIN: ICD-10-CM

## 2023-08-19 DIAGNOSIS — M54.12 CERVICAL RADICULOPATHY: ICD-10-CM

## 2023-08-19 DIAGNOSIS — R79.1 SUPRATHERAPEUTIC INR: Primary | ICD-10-CM

## 2023-08-19 DIAGNOSIS — F11.90 OPIATE USE: ICD-10-CM

## 2023-08-19 DIAGNOSIS — M25.511 ACUTE PAIN OF RIGHT SHOULDER: ICD-10-CM

## 2023-08-19 DIAGNOSIS — L03.116 LEFT LEG CELLULITIS: ICD-10-CM

## 2023-08-19 LAB
ALBUMIN SERPL-MCNC: 2.6 G/DL (ref 3.4–5)
ALBUMIN/GLOB SERPL: 0.7 {RATIO} (ref 1–2)
ALP LIVER SERPL-CCNC: 103 U/L
ALT SERPL-CCNC: 28 U/L
ANION GAP SERPL CALC-SCNC: 8 MMOL/L (ref 0–18)
AST SERPL-CCNC: 52 U/L (ref 15–37)
BASOPHILS # BLD AUTO: 0.04 X10(3) UL (ref 0–0.2)
BASOPHILS NFR BLD AUTO: 0.5 %
BILIRUB SERPL-MCNC: 2.1 MG/DL (ref 0.1–2)
BUN BLD-MCNC: 37 MG/DL (ref 7–18)
CALCIUM BLD-MCNC: 8.4 MG/DL (ref 8.5–10.1)
CHLORIDE SERPL-SCNC: 103 MMOL/L (ref 98–112)
CO2 SERPL-SCNC: 25 MMOL/L (ref 21–32)
CREAT BLD-MCNC: 1.76 MG/DL
EGFRCR SERPLBLD CKD-EPI 2021: 40 ML/MIN/1.73M2 (ref 60–?)
EOSINOPHIL # BLD AUTO: 0.08 X10(3) UL (ref 0–0.7)
EOSINOPHIL NFR BLD AUTO: 1.1 %
ERYTHROCYTE [DISTWIDTH] IN BLOOD BY AUTOMATED COUNT: 20.4 %
GLOBULIN PLAS-MCNC: 3.6 G/DL (ref 2.8–4.4)
GLUCOSE BLD-MCNC: 119 MG/DL (ref 70–99)
HCT VFR BLD AUTO: 30.1 %
HGB BLD-MCNC: 9.6 G/DL
IMM GRANULOCYTES # BLD AUTO: 0.04 X10(3) UL (ref 0–1)
IMM GRANULOCYTES NFR BLD: 0.5 %
INR BLD: 7.01 (ref 0.85–1.16)
LACTATE SERPL-SCNC: 1.3 MMOL/L (ref 0.4–2)
LYMPHOCYTES # BLD AUTO: 0.59 X10(3) UL (ref 1–4)
LYMPHOCYTES NFR BLD AUTO: 7.9 %
MCH RBC QN AUTO: 26.4 PG (ref 26–34)
MCHC RBC AUTO-ENTMCNC: 31.9 G/DL (ref 31–37)
MCV RBC AUTO: 82.7 FL
MONOCYTES # BLD AUTO: 0.66 X10(3) UL (ref 0.1–1)
MONOCYTES NFR BLD AUTO: 8.9 %
NEUTROPHILS # BLD AUTO: 6.02 X10 (3) UL (ref 1.5–7.7)
NEUTROPHILS # BLD AUTO: 6.02 X10(3) UL (ref 1.5–7.7)
NEUTROPHILS NFR BLD AUTO: 81.1 %
NT-PROBNP SERPL-MCNC: 4760 PG/ML (ref ?–450)
OSMOLALITY SERPL CALC.SUM OF ELEC: 292 MOSM/KG (ref 275–295)
PLATELET # BLD AUTO: 216 10(3)UL (ref 150–450)
POTASSIUM SERPL-SCNC: 4.1 MMOL/L (ref 3.5–5.1)
PROT SERPL-MCNC: 6.2 G/DL (ref 6.4–8.2)
PROTHROMBIN TIME: 59 SECONDS (ref 11.6–14.8)
RBC # BLD AUTO: 3.64 X10(6)UL
SODIUM SERPL-SCNC: 136 MMOL/L (ref 136–145)
TROPONIN I HIGH SENSITIVITY: 45 NG/L
WBC # BLD AUTO: 7.4 X10(3) UL (ref 4–11)

## 2023-08-19 PROCEDURE — 84484 ASSAY OF TROPONIN QUANT: CPT | Performed by: EMERGENCY MEDICINE

## 2023-08-19 PROCEDURE — 80053 COMPREHEN METABOLIC PANEL: CPT | Performed by: EMERGENCY MEDICINE

## 2023-08-19 PROCEDURE — 96361 HYDRATE IV INFUSION ADD-ON: CPT

## 2023-08-19 PROCEDURE — 96375 TX/PRO/DX INJ NEW DRUG ADDON: CPT

## 2023-08-19 PROCEDURE — 71045 X-RAY EXAM CHEST 1 VIEW: CPT | Performed by: EMERGENCY MEDICINE

## 2023-08-19 PROCEDURE — 93010 ELECTROCARDIOGRAM REPORT: CPT

## 2023-08-19 PROCEDURE — 96366 THER/PROPH/DIAG IV INF ADDON: CPT

## 2023-08-19 PROCEDURE — 87040 BLOOD CULTURE FOR BACTERIA: CPT | Performed by: EMERGENCY MEDICINE

## 2023-08-19 PROCEDURE — 83605 ASSAY OF LACTIC ACID: CPT | Performed by: EMERGENCY MEDICINE

## 2023-08-19 PROCEDURE — 93005 ELECTROCARDIOGRAM TRACING: CPT

## 2023-08-19 PROCEDURE — 99291 CRITICAL CARE FIRST HOUR: CPT

## 2023-08-19 PROCEDURE — 93971 EXTREMITY STUDY: CPT | Performed by: EMERGENCY MEDICINE

## 2023-08-19 PROCEDURE — 85025 COMPLETE CBC W/AUTO DIFF WBC: CPT | Performed by: EMERGENCY MEDICINE

## 2023-08-19 PROCEDURE — 36415 COLL VENOUS BLD VENIPUNCTURE: CPT

## 2023-08-19 PROCEDURE — 85610 PROTHROMBIN TIME: CPT | Performed by: EMERGENCY MEDICINE

## 2023-08-19 PROCEDURE — 83880 ASSAY OF NATRIURETIC PEPTIDE: CPT | Performed by: EMERGENCY MEDICINE

## 2023-08-19 PROCEDURE — 96365 THER/PROPH/DIAG IV INF INIT: CPT

## 2023-08-19 RX ORDER — SODIUM CHLORIDE 9 MG/ML
INJECTION, SOLUTION INTRAVENOUS CONTINUOUS
Status: DISCONTINUED | OUTPATIENT
Start: 2023-08-19 | End: 2023-08-20

## 2023-08-19 RX ORDER — DOPAMINE HYDROCHLORIDE 320 MG/100ML
INJECTION, SOLUTION INTRAVENOUS CONTINUOUS
Status: DISCONTINUED | OUTPATIENT
Start: 2023-08-19 | End: 2023-08-20

## 2023-08-19 RX ORDER — CEFAZOLIN SODIUM/WATER 2 G/20 ML
2 SYRINGE (ML) INTRAVENOUS ONCE
Status: COMPLETED | OUTPATIENT
Start: 2023-08-19 | End: 2023-08-19

## 2023-08-20 ENCOUNTER — APPOINTMENT (OUTPATIENT)
Dept: CV DIAGNOSTICS | Facility: HOSPITAL | Age: 73
End: 2023-08-20
Attending: INTERNAL MEDICINE
Payer: MEDICARE

## 2023-08-20 ENCOUNTER — APPOINTMENT (OUTPATIENT)
Dept: ULTRASOUND IMAGING | Facility: HOSPITAL | Age: 73
End: 2023-08-20
Attending: STUDENT IN AN ORGANIZED HEALTH CARE EDUCATION/TRAINING PROGRAM
Payer: MEDICARE

## 2023-08-20 ENCOUNTER — APPOINTMENT (OUTPATIENT)
Dept: CT IMAGING | Facility: HOSPITAL | Age: 73
End: 2023-08-20
Attending: EMERGENCY MEDICINE
Payer: MEDICARE

## 2023-08-20 PROBLEM — E88.09 HYPOALBUMINEMIA: Status: ACTIVE | Noted: 2023-08-20

## 2023-08-20 PROBLEM — L03.116 LEFT LEG CELLULITIS: Status: ACTIVE | Noted: 2023-01-01

## 2023-08-20 PROBLEM — L03.116 LEFT LEG CELLULITIS: Status: ACTIVE | Noted: 2023-08-20

## 2023-08-20 PROBLEM — I95.9 HYPOTENSION, UNSPECIFIED HYPOTENSION TYPE: Status: ACTIVE | Noted: 2023-08-20

## 2023-08-20 PROBLEM — I95.9 HYPOTENSION, UNSPECIFIED HYPOTENSION TYPE: Status: ACTIVE | Noted: 2023-01-01

## 2023-08-20 PROBLEM — E88.09 HYPOALBUMINEMIA: Status: ACTIVE | Noted: 2023-01-01

## 2023-08-20 LAB
ANION GAP SERPL CALC-SCNC: 3 MMOL/L (ref 0–18)
APTT PPP: 221.2 SECONDS (ref 23.3–35.6)
APTT PPP: 53.8 SECONDS (ref 23.3–35.6)
APTT PPP: 80.2 SECONDS (ref 23.3–35.6)
ATRIAL RATE: 88 BPM
BASOPHILS # BLD AUTO: 0.05 X10(3) UL (ref 0–0.2)
BASOPHILS NFR BLD AUTO: 0.6 %
BILIRUB UR QL STRIP.AUTO: NEGATIVE
BUN BLD-MCNC: 32 MG/DL (ref 7–18)
CALCIUM BLD-MCNC: 7.9 MG/DL (ref 8.5–10.1)
CHLORIDE SERPL-SCNC: 104 MMOL/L (ref 98–112)
CO2 SERPL-SCNC: 26 MMOL/L (ref 21–32)
COLOR UR AUTO: YELLOW
CREAT BLD-MCNC: 1.5 MG/DL
EGFRCR SERPLBLD CKD-EPI 2021: 48 ML/MIN/1.73M2 (ref 60–?)
EOSINOPHIL # BLD AUTO: 0.07 X10(3) UL (ref 0–0.7)
EOSINOPHIL NFR BLD AUTO: 0.9 %
ERYTHROCYTE [DISTWIDTH] IN BLOOD BY AUTOMATED COUNT: 20.5 %
GLUCOSE BLD-MCNC: 149 MG/DL (ref 70–99)
GLUCOSE UR STRIP.AUTO-MCNC: NORMAL MG/DL
HCT VFR BLD AUTO: 29.5 %
HGB BLD-MCNC: 9.2 G/DL
HYALINE CASTS #/AREA URNS AUTO: PRESENT /LPF
IMM GRANULOCYTES # BLD AUTO: 0.03 X10(3) UL (ref 0–1)
IMM GRANULOCYTES NFR BLD: 0.4 %
INR BLD: 3.98 (ref 0.85–1.16)
KETONES UR STRIP.AUTO-MCNC: NEGATIVE MG/DL
LEUKOCYTE ESTERASE UR QL STRIP.AUTO: NEGATIVE
LYMPHOCYTES # BLD AUTO: 0.44 X10(3) UL (ref 1–4)
LYMPHOCYTES NFR BLD AUTO: 5.4 %
MCH RBC QN AUTO: 26.4 PG (ref 26–34)
MCHC RBC AUTO-ENTMCNC: 31.2 G/DL (ref 31–37)
MCV RBC AUTO: 84.5 FL
MONOCYTES # BLD AUTO: 0.79 X10(3) UL (ref 0.1–1)
MONOCYTES NFR BLD AUTO: 9.7 %
MRSA DNA SPEC QL NAA+PROBE: NEGATIVE
NEUTROPHILS # BLD AUTO: 6.78 X10 (3) UL (ref 1.5–7.7)
NEUTROPHILS # BLD AUTO: 6.78 X10(3) UL (ref 1.5–7.7)
NEUTROPHILS NFR BLD AUTO: 83 %
NITRITE UR QL STRIP.AUTO: NEGATIVE
OSMOLALITY SERPL CALC.SUM OF ELEC: 286 MOSM/KG (ref 275–295)
PH UR STRIP.AUTO: 5.5 [PH] (ref 5–8)
PLATELET # BLD AUTO: 192 10(3)UL (ref 150–450)
POTASSIUM SERPL-SCNC: 3.8 MMOL/L (ref 3.5–5.1)
PROT UR STRIP.AUTO-MCNC: 100 MG/DL
PROTHROMBIN TIME: 38.2 SECONDS (ref 11.6–14.8)
Q-T INTERVAL: 394 MS
QRS DURATION: 96 MS
QTC CALCULATION (BEZET): 431 MS
R AXIS: 79 DEGREES
RBC # BLD AUTO: 3.49 X10(6)UL
RBC UR QL AUTO: NEGATIVE
SARS-COV-2 RNA RESP QL NAA+PROBE: NOT DETECTED
SODIUM SERPL-SCNC: 133 MMOL/L (ref 136–145)
SP GR UR STRIP.AUTO: 1.02 (ref 1–1.03)
T AXIS: 168 DEGREES
UROBILINOGEN UR STRIP.AUTO-MCNC: 3 MG/DL
VENTRICULAR RATE: 72 BPM
WBC # BLD AUTO: 8.2 X10(3) UL (ref 4–11)

## 2023-08-20 PROCEDURE — 80048 BASIC METABOLIC PNL TOTAL CA: CPT | Performed by: INTERNAL MEDICINE

## 2023-08-20 PROCEDURE — 3E053XZ INTRODUCTION OF VASOPRESSOR INTO PERIPHERAL ARTERY, PERCUTANEOUS APPROACH: ICD-10-PCS | Performed by: INTERNAL MEDICINE

## 2023-08-20 PROCEDURE — 85610 PROTHROMBIN TIME: CPT | Performed by: STUDENT IN AN ORGANIZED HEALTH CARE EDUCATION/TRAINING PROGRAM

## 2023-08-20 PROCEDURE — 74177 CT ABD & PELVIS W/CONTRAST: CPT | Performed by: EMERGENCY MEDICINE

## 2023-08-20 PROCEDURE — 81001 URINALYSIS AUTO W/SCOPE: CPT | Performed by: EMERGENCY MEDICINE

## 2023-08-20 PROCEDURE — 93306 TTE W/DOPPLER COMPLETE: CPT | Performed by: INTERNAL MEDICINE

## 2023-08-20 PROCEDURE — 87641 MR-STAPH DNA AMP PROBE: CPT | Performed by: NURSE PRACTITIONER

## 2023-08-20 PROCEDURE — 85730 THROMBOPLASTIN TIME PARTIAL: CPT | Performed by: INTERNAL MEDICINE

## 2023-08-20 PROCEDURE — 76705 ECHO EXAM OF ABDOMEN: CPT | Performed by: STUDENT IN AN ORGANIZED HEALTH CARE EDUCATION/TRAINING PROGRAM

## 2023-08-20 PROCEDURE — 71275 CT ANGIOGRAPHY CHEST: CPT | Performed by: EMERGENCY MEDICINE

## 2023-08-20 PROCEDURE — 85025 COMPLETE CBC W/AUTO DIFF WBC: CPT | Performed by: INTERNAL MEDICINE

## 2023-08-20 RX ORDER — HEPARIN SODIUM 1000 [USP'U]/ML
5000 INJECTION, SOLUTION INTRAVENOUS; SUBCUTANEOUS ONCE
Status: COMPLETED | OUTPATIENT
Start: 2023-08-20 | End: 2023-08-20

## 2023-08-20 RX ORDER — POTASSIUM CHLORIDE 20 MEQ/1
40 TABLET, EXTENDED RELEASE ORAL ONCE
Status: COMPLETED | OUTPATIENT
Start: 2023-08-20 | End: 2023-08-20

## 2023-08-20 RX ORDER — TAMSULOSIN HYDROCHLORIDE 0.4 MG/1
0.4 CAPSULE ORAL DAILY
Status: DISCONTINUED | OUTPATIENT
Start: 2023-08-20 | End: 2023-08-26

## 2023-08-20 RX ORDER — SODIUM CHLORIDE 9 MG/ML
INJECTION, SOLUTION INTRAVENOUS CONTINUOUS
Status: DISCONTINUED | OUTPATIENT
Start: 2023-08-20 | End: 2023-08-20

## 2023-08-20 RX ORDER — ACETAMINOPHEN 325 MG/1
650 TABLET ORAL EVERY 6 HOURS PRN
Status: DISCONTINUED | OUTPATIENT
Start: 2023-08-20 | End: 2023-08-26

## 2023-08-20 RX ORDER — HEPARIN SODIUM AND DEXTROSE 10000; 5 [USP'U]/100ML; G/100ML
INJECTION INTRAVENOUS CONTINUOUS
Status: DISCONTINUED | OUTPATIENT
Start: 2023-08-20 | End: 2023-08-25

## 2023-08-20 RX ORDER — ATORVASTATIN CALCIUM 40 MG/1
40 TABLET, FILM COATED ORAL NIGHTLY
Status: DISCONTINUED | OUTPATIENT
Start: 2023-08-20 | End: 2023-08-26

## 2023-08-20 RX ORDER — CEFAZOLIN SODIUM/WATER 2 G/20 ML
2 SYRINGE (ML) INTRAVENOUS EVERY 8 HOURS
Status: DISCONTINUED | OUTPATIENT
Start: 2023-08-20 | End: 2023-08-22

## 2023-08-20 RX ORDER — PANTOPRAZOLE SODIUM 40 MG/1
40 TABLET, DELAYED RELEASE ORAL
Status: DISCONTINUED | OUTPATIENT
Start: 2023-08-20 | End: 2023-08-26

## 2023-08-20 RX ORDER — SERTRALINE HYDROCHLORIDE 100 MG/1
100 TABLET, FILM COATED ORAL EVERY MORNING
Status: DISCONTINUED | OUTPATIENT
Start: 2023-08-20 | End: 2023-08-26

## 2023-08-20 RX ORDER — HEPARIN SODIUM AND DEXTROSE 10000; 5 [USP'U]/100ML; G/100ML
1000 INJECTION INTRAVENOUS ONCE
Status: COMPLETED | OUTPATIENT
Start: 2023-08-20 | End: 2023-08-20

## 2023-08-20 RX ORDER — FUROSEMIDE 10 MG/ML
40 INJECTION INTRAMUSCULAR; INTRAVENOUS
Status: DISCONTINUED | OUTPATIENT
Start: 2023-08-20 | End: 2023-08-25

## 2023-08-20 RX ORDER — OXYBUTYNIN CHLORIDE 5 MG/1
15 TABLET, EXTENDED RELEASE ORAL DAILY
Status: DISCONTINUED | OUTPATIENT
Start: 2023-08-20 | End: 2023-08-26

## 2023-08-20 RX ORDER — OXYCODONE AND ACETAMINOPHEN 10; 325 MG/1; MG/1
1 TABLET ORAL EVERY 6 HOURS PRN
Status: DISCONTINUED | OUTPATIENT
Start: 2023-08-20 | End: 2023-08-26

## 2023-08-20 RX ORDER — BUPROPION HYDROCHLORIDE 150 MG/1
150 TABLET ORAL DAILY
Status: DISCONTINUED | OUTPATIENT
Start: 2023-08-20 | End: 2023-08-26

## 2023-08-20 RX ORDER — ONDANSETRON 2 MG/ML
4 INJECTION INTRAMUSCULAR; INTRAVENOUS EVERY 6 HOURS PRN
Status: DISCONTINUED | OUTPATIENT
Start: 2023-08-20 | End: 2023-08-26

## 2023-08-20 NOTE — PLAN OF CARE
Received pt. From ED at 0300. Pt. A & O x4. VSS - dopamine infusing to maintain SBP >90. ANDERSON. Pt. Very restless, states he's been having \"parkinson like symptoms\" r/t pain. Voiding. Left foot toe wound, clean & dry, photo of wound in chart. Will continue to monitor closely.

## 2023-08-20 NOTE — PROGRESS NOTES
08/20/23 1430   BiPAP   $ RT Standby Charge (per 15 min) 1   BiPAP/CPAP Monitored Parameters   KANE Protocol Yes   Toleration Refused

## 2023-08-20 NOTE — ED INITIAL ASSESSMENT (HPI)
PT to ED from home via ems w/ c/o BLE edema. Reports worsening sob, denies cp. Pt w/ significant cardiac hx. Taking lasix. EMS reports hypotension PTA.  Pt states labile BP

## 2023-08-21 LAB
ANION GAP SERPL CALC-SCNC: 6 MMOL/L (ref 0–18)
APTT PPP: 66.5 SECONDS (ref 23.3–35.6)
BUN BLD-MCNC: 24 MG/DL (ref 7–18)
CALCIUM BLD-MCNC: 8 MG/DL (ref 8.5–10.1)
CHLORIDE SERPL-SCNC: 101 MMOL/L (ref 98–112)
CO2 SERPL-SCNC: 26 MMOL/L (ref 21–32)
CREAT BLD-MCNC: 1.24 MG/DL
EGFRCR SERPLBLD CKD-EPI 2021: 61 ML/MIN/1.73M2 (ref 60–?)
GLUCOSE BLD-MCNC: 124 MG/DL (ref 70–99)
OSMOLALITY SERPL CALC.SUM OF ELEC: 281 MOSM/KG (ref 275–295)
PLATELET # BLD AUTO: 188 10(3)UL (ref 150–450)
POTASSIUM SERPL-SCNC: 3.9 MMOL/L (ref 3.5–5.1)
POTASSIUM SERPL-SCNC: 4.1 MMOL/L (ref 3.5–5.1)
SODIUM SERPL-SCNC: 133 MMOL/L (ref 136–145)

## 2023-08-21 PROCEDURE — 85730 THROMBOPLASTIN TIME PARTIAL: CPT | Performed by: INTERNAL MEDICINE

## 2023-08-21 PROCEDURE — 80048 BASIC METABOLIC PNL TOTAL CA: CPT | Performed by: INTERNAL MEDICINE

## 2023-08-21 PROCEDURE — 85049 AUTOMATED PLATELET COUNT: CPT | Performed by: INTERNAL MEDICINE

## 2023-08-21 PROCEDURE — 97116 GAIT TRAINING THERAPY: CPT

## 2023-08-21 PROCEDURE — 99213 OFFICE O/P EST LOW 20 MIN: CPT

## 2023-08-21 PROCEDURE — 97162 PT EVAL MOD COMPLEX 30 MIN: CPT

## 2023-08-21 PROCEDURE — 84132 ASSAY OF SERUM POTASSIUM: CPT | Performed by: INTERNAL MEDICINE

## 2023-08-21 PROCEDURE — 97530 THERAPEUTIC ACTIVITIES: CPT

## 2023-08-21 PROCEDURE — 97166 OT EVAL MOD COMPLEX 45 MIN: CPT

## 2023-08-21 RX ORDER — HYDROCORTISONE 25 MG/G
CREAM TOPICAL 2 TIMES DAILY
Status: DISCONTINUED | OUTPATIENT
Start: 2023-08-21 | End: 2023-08-26

## 2023-08-21 RX ORDER — CETIRIZINE HYDROCHLORIDE 10 MG/1
10 TABLET ORAL DAILY
Status: DISCONTINUED | OUTPATIENT
Start: 2023-08-21 | End: 2023-08-26

## 2023-08-21 NOTE — PLAN OF CARE
A/Ox4, slightly forgetful, constant extremity movements. Denies chest pain or dyspnea. Rate controlled afib, mild hypotension. Lasix started as ordered, low dose levophed required to maintain SBP>90. Good diuresis. L great toe dressing changed. Redness noted L thigh, marked, pt states is not painful. Updated on POC. Problem: CARDIOVASCULAR - ADULT  Goal: Maintains optimal cardiac output and hemodynamic stability  Description: INTERVENTIONS:  - Monitor vital signs, rhythm, and trends  - Monitor for bleeding, hypotension and signs of decreased cardiac output  - Evaluate effectiveness of vasoactive medications to optimize hemodynamic stability  - Monitor arterial and/or venous puncture sites for bleeding and/or hematoma  - Assess quality of pulses, skin color and temperature  - Assess for signs of decreased coronary artery perfusion - ex.  Angina  - Evaluate fluid balance, assess for edema, trend weights  Outcome: Progressing  Goal: Absence of cardiac arrhythmias or at baseline  Description: INTERVENTIONS:  - Continuous cardiac monitoring, monitor vital signs, obtain 12 lead EKG if indicated  - Evaluate effectiveness of antiarrhythmic and heart rate control medications as ordered  - Initiate emergency measures for life threatening arrhythmias  - Monitor electrolytes and administer replacement therapy as ordered  Outcome: Progressing     Problem: RESPIRATORY - ADULT  Goal: Achieves optimal ventilation and oxygenation  Description: INTERVENTIONS:  - Assess for changes in respiratory status  - Assess for changes in mentation and behavior  - Position to facilitate oxygenation and minimize respiratory effort  - Oxygen supplementation based on oxygen saturation or ABGs  - Provide Smoking Cessation handout, if applicable  - Encourage broncho-pulmonary hygiene including cough, deep breathe, Incentive Spirometry  - Assess the need for suctioning and perform as needed  - Assess and instruct to report SOB or any respiratory difficulty  - Respiratory Therapy support as indicated  - Manage/alleviate anxiety  - Monitor for signs/symptoms of CO2 retention  Outcome: Progressing

## 2023-08-21 NOTE — CM/SW NOTE
Patient worked with therapy, recommending home with homehealth care--order obtained, face to face obtained--referrals sent in [facility]--pending.   Patient currently with [facility] [id number] for nursing services--PT and OT added--updated order as well as face to face completed--reserved in [address] [place] [address]

## 2023-08-21 NOTE — CONSULTS
[facility]  Report of Inpatient Wound Care Consultation    Pretty Garcia Patient Status:  Inpatient    1950 MRN [id number]   [facility] 6NE-A Attending Veronica Lindsey MD   Hosp Day # 1 PCP Librado Martinez MD     Reason for Consultation:  Left great toe    History of Present Illness:  Pretty Garcia is a a(n) 74year old male. Patient presents with a full thickness wound to left great toe. Patient is known to this writer, he follows up at the outpatient wound clinic for the toe wounds. Complains of  intermittent pain on the right 4th toe.     History:  Past Medical History:   Diagnosis Date    Anxiety     Arrhythmia     A-Flutter    ATRIAL FIBRILLATION     Cardiomyopathy ([address]     Congestive heart disease ([address]     last 2019    COPD     no exac in several years per pt as of 10/18/16, no O2    Coronary artery disease involving coronary bypass graft of native heart without angina pectoris 2017    Depression     DM2 (diabetes mellitus, type 2) (HCC)     elevated glucose due to steroid use for lung infection    Exposure to medical diagnostic radiation     High blood pressure     History of blood transfusion     [employer]    Lumbar stenosis     Mixed hyperlipidemia 10/03/2016    MRSA (methicillin resistant staph aureus) culture positive 2022    right foot    KANE (obstructive sleep apnea) Split-2022    AHI-43    Osteoarthritis     Parkinson's disease ([address]     Peripheral vascular disease ([address]     Prostate cancer ([address] 2010    s/p brachytherapy    Radiation cystitis 2023    Sleep apnea     no cpap     Past Surgical History:   Procedure Laterality Date    ANGIOGRAM      ANGIOPLASTY (CORONARY)      CABG  2013    CATARACT      bilateral eyes    CATH PERCUTANEOUS  TRANSLUMINAL CORONARY ANGIOPLASTY  2001    stent    COLONOSCOPY      COLONOSCOPY N/A 2018    4 mm tubular adenoma, diverticulosis, int hem, rpt     CYSTOURETHROSCOPY,BIOPSY  2023    HIP REPLACEMENT SURGERY  03/2015    right hip replacement     ORTHOPEDIC SURG (PBP) Bilateral     shoulder surgery    OTHER Right 2013    femoral endarterectomy    OTHER  08/2021    bilateral foot/toe surgery-ulcers    OTHER SURGICAL HISTORY      left knee ligament repair    OTHER SURGICAL HISTORY      left hand laceration repair 4th, 5th fingers    OTHER SURGICAL HISTORY      rhinoplasty    OTHER SURGICAL HISTORY      tsa right 1/11/2010    REMV CATARACT EXTRACAP,INSERT LENS Left 08/13/2014    Procedure: LEFT PHACOEMULSIFICATION OF CATARACT WITH INTRAOCULAR LENS IMPLANT [id number];  Surgeon: Abdirahman Santana MD;  Location: [address] [place] [address]    REMV F.B.,EYE,ANT CHMBR/LENS Left 09/03/2014    Procedure: REMOVAL FOREIGN BODY OF EYE;  Surgeon: Abdirahman Santana MD;  Location: [address] [place] [address] [place] HERNIA,5+Y/O,REDUCIBL  03/19/2009    Performed by Mariel French at [place] [address]        reports that he quit smoking about 10 years ago. His smoking use included cigarettes. He has a 20.00 pack-year smoking history. He has never used smokeless tobacco. He reports current alcohol use. He reports current drug use. Frequency: 5.00 times per week. Drug: Cannabis.     Allergies:  @ALLERGY    Laboratory Data:  Lab Results   Component Value Date    .0 08/21/2023    K 4.1 08/21/2023    PTT 66.5 08/21/2023         Impression:  Wound 07/17/23 #5 Right 4th toe Neuropathic Toe (Comment which one) Right (Active)   Date First Assessed: 07/17/23    Wound Number (Wound Clinic Only): #5 Right 4th toe  Primary Wound Type: (c) Neuropathic  Location: Toe (Comment which one)  Wound Location Orientation: Right      Assessments 8/21/2023  2:34 PM   Wound Image     Drainage Amount None   Wound Length (cm) 0.5 cm   Wound Width (cm) 0.5 cm   Wound Surface Area (cm^2) 0.25 cm^2   Wound Depth (cm) 0 cm   Wound Volume (cm^3) 0 cm^3   Wound Healing % 100   Margins Attached edges   Non-staged Wound Description Full thickness   Wound Odor None   Shape No open wound at this time. Scabbed/callous formation       Wound 07/17/23 #6 Right Plantar foot Neuropathic Right;Plantar (Active)   Date First Assessed: 07/17/23    Wound Number (Wound Clinic Only): #6 Right Plantar foot  Primary Wound Type: (c) Neuropathic  Wound Location Orientation: Right;Plantar      Assessments 8/21/2023  2:36 PM   Wound Image      Drainage Amount None   Wound Odor None   Shape No open wound, calloused. Wound 08/20/23 Neuropathic Toe (Comment which one) Left (Active)   Date First Assessed/Time First Assessed: 08/20/23 0316   Present on Original Admission: Yes  Primary Wound Type: Neuropathic  Location: Toe (Comment which one)  Wound Location Orientation: Left      Assessments 8/21/2023  2:37 PM   Wound Image      Drainage Amount Scant   Drainage Description Serous; Yellow   Wound Length (cm) 1.3 cm   Wound Width (cm) 3 cm   Wound Surface Area (cm^2) 3.9 cm^2   Wound Depth (cm) 0.1 cm   Wound Volume (cm^3) 0.39 cm^3   Margins Well-defined edges   Non-staged Wound Description Full thickness   Nicolette-wound Assessment Edema;Pink   Wound Granulation Tissue Pale Grey;Pink;Firm   Wound Bed Granulation (%) 75 %   Wound Bed Slough (%) 25 %   Wound Odor None     Local pulse: audible dorsalis pedis/posterior  tibia with a handheld doppler  Capillary refill >3 seconds   Temperature : within normal limits  Color : some erythema to left lower leg/upper leg    Wound Cleaning and Dressings: 1. Left great toe  Showering directions: May shower with protection  Wound cleansing:  Cleanser with saline  Wound product: Honey gel and Dry gauze. Secure with tape  Dressing change frequency:  Change dressing daily and/or PRN    2. Right fourth toe, right plantar foot- Cleanse with saline. Paint with betadine daily and as needed.       Compression Therapy:   Elevate leg(s) as much as possible      Miscellaneous/Additional Orders:  Offloading: Open toe post op shoe/Darco shoe Care Summary: Discussed Plan of Care at beside with patient. Patient verbally acknowledges understanding of all instructions and all questions were answered. Recommendations: Follow up at the wound clinic if discharge to home. Thank you for allowing me to participate in the care of your patient. Time Spent 30 minutes  Thank you.     Arslan Ponce RN, BSN [facility]   Wound Care pager [phone]  8/21/2023  2:41 PM

## 2023-08-21 NOTE — PLAN OF CARE
Assumed care of patient 0730. Medications given as ordered. Pt turned q2h. Pt complains of pain and receives PRN medications for the pain. Pt up to the chair with one assist and walker. Pt is oriented x4. Pt moves all extremities well. Pt's SBP>90. Pt uses the urinal. Pt uses the call light appropriately. Problem: Diabetes/Glucose Control  Goal: Glucose maintained within prescribed range  Description: INTERVENTIONS:  - Monitor Blood Glucose as ordered  - Assess for signs and symptoms of hyperglycemia and hypoglycemia  - Administer ordered medications to maintain glucose within target range  - Assess barriers to adequate nutritional intake and initiate nutrition consult as needed  - Instruct patient on self management of diabetes  Outcome: Progressing     Problem: CARDIOVASCULAR - ADULT  Goal: Maintains optimal cardiac output and hemodynamic stability  Description: INTERVENTIONS:  - Monitor vital signs, rhythm, and trends  - Monitor for bleeding, hypotension and signs of decreased cardiac output  - Evaluate effectiveness of vasoactive medications to optimize hemodynamic stability  - Monitor arterial and/or venous puncture sites for bleeding and/or hematoma  - Assess quality of pulses, skin color and temperature  - Assess for signs of decreased coronary artery perfusion - ex.  Angina  - Evaluate fluid balance, assess for edema, trend weights  Outcome: Progressing  Goal: Absence of cardiac arrhythmias or at baseline  Description: INTERVENTIONS:  - Continuous cardiac monitoring, monitor vital signs, obtain 12 lead EKG if indicated  - Evaluate effectiveness of antiarrhythmic and heart rate control medications as ordered  - Initiate emergency measures for life threatening arrhythmias  - Monitor electrolytes and administer replacement therapy as ordered  Outcome: Progressing     Problem: RESPIRATORY - ADULT  Goal: Achieves optimal ventilation and oxygenation  Description: INTERVENTIONS:  - Assess for changes in respiratory status  - Assess for changes in mentation and behavior  - Position to facilitate oxygenation and minimize respiratory effort  - Oxygen supplementation based on oxygen saturation or ABGs  - Provide Smoking Cessation handout, if applicable  - Encourage broncho-pulmonary hygiene including cough, deep breathe, Incentive Spirometry  - Assess the need for suctioning and perform as needed  - Assess and instruct to report SOB or any respiratory difficulty  - Respiratory Therapy support as indicated  - Manage/alleviate anxiety  - Monitor for signs/symptoms of CO2 retention  Outcome: Progressing

## 2023-08-21 NOTE — PHYSICAL THERAPY NOTE
PHYSICAL THERAPY EVALUATION - INPATIENT     Room Number: 8567/8290-B  Evaluation Date: 8/21/2023  Type of Evaluation: Initial  Physician Order: PT Eval and Treat    Presenting Problem: Supratherapeutic INR, Cardiogenic Shock  Co-Morbidities : Chronic opiate dependence, HTN, HL, DMII, PVD, Parkinson's?, CAD s/p CABG, HF, chronic A-fib, choronic LBP, COPD  Reason for Therapy: Mobility Dysfunction and Discharge Planning    History related to current admission: Patient is a 74year old male who presented to the ED on 8/19/2023 from home for BLE edema and SOB. Pt diagnosed with cardiogenic shock, supratherapeutic INR, acute on chronic heart failure, chronic A-fib, LLE cellulitis, bilateral foot wounds. Recent Admits:  6/3/23-6/5/23 with Syncope, UTI-->HOME  7/5/23-7/7/23 with Syncope, Fall-->HOME PT/RN      ASSESSMENT   In this PT evaluation, the patient presents with the following impairments: 1) impaired aerobic capacity--hypotension, 2) impaired integumentary integrity--bilateral foot wounds and large ecchymosis of left leg and hip, 3) impaired static and dynamic sitting and standing balance, and 4) impaired BLE strength. These impairments and comorbidities manifest themselves as functional limitations in independent bed mobility, transfers, and gait. The patient is below baseline and would benefit from skilled inpatient PT to address the above deficits to assist patient in returning to prior to level of function. Functional outcome measures completed include AM-PAC. The AM-PAC '6-Clicks' Inpatient Basic Mobility Short Form was completed and this patient is demonstrating a Approx Degree of Impairment: 41.77%  degree of impairment in mobility. Research supports that patients with this level of impairment may benefit from [address]  PT Discharge Recommendations: Home with home health PT (Homemaking assistance)    PLAN  PT Treatment Plan: Bed mobility; Endurance; Patient education; Family education;Gait training;Neuromuscular re-educate;Strengthening;Stair training;Transfer training;Balance training  Rehab Potential : Good  Frequency (Obs): 3-5x/week  Number of Visits to Meet Established Goals: 3      CURRENT GOALS    Goal #1 Patient is able to demonstrate supine - sit EOB @ level: modified independent     Goal #2 Patient is able to demonstrate transfers Sit to/from Stand at assistance level: modified independent     Goal #3 Patient is able to ambulate 150 feet with assist device: walker - rolling at assistance level: modified independent     Goal #4 The pt will ascend/descend 14 stairs with use of a handrail with SBA. Goal #5    Goal #6    Goal Comments: Goals established on 2023    HOME SITUATION  Type of Home: House   Home Layout: Multi-level  Stairs to Enter : 3  Railing: Yes  Stairs to Bedroom: 14  Railing: Yes    Lives With: Alone  Drives: Yes (but has not been driving due to dizziness and imbalance, \"I've been very careful about that. \")  Patient Owned Equipment: Rolling walker;Cane  Patient Regularly Uses: Reading glasses    Prior Level of [place]: The pt reports that he has been using a walker on the main level and uses a cane to take the stairs and ambulate on the second floor. The pt reports he has been falling frequently, due to syncope, but also fell last week out of bed. SUBJECTIVE  In regards to having more assistance at home, \"I was talking with [facility] about that. \"      OBJECTIVE  Precautions: Bed/chair alarm  Fall Risk: High fall risk    WEIGHT BEARING RESTRICTION  Weight Bearing Restriction: None                PAIN ASSESSMENT  Ratin          COGNITION  Overall Cognitive Status:  WFL - within functional limits    RANGE OF MOTION AND STRENGTH ASSESSMENT  Upper extremity ROM and strength are within functional limits     Lower extremity ROM is within functional limits     Lower extremity strength is within functional limits, grossly 4/5    BALANCE  Static Sitting: Good  Dynamic Sitting: Fair +  Static Standing: Fair -  Dynamic Standing: Poor +      ACTIVITY TOLERANCE  Pulse: 85  Heart Rate Source: Monitor  Resp: 18     08/21/23 1038 08/21/23 1041 08/21/23 1052   Vital Signs   /64 113/66 99/68   BP Location Right arm Right arm Right arm   BP Method Automatic Automatic Automatic   Patient Position Lying Sitting  (at EOB) Sitting  (in chair after ambulation)         O2 WALK  Oxygen Therapy  SPO2% on Oxygen at Rest: 90  At rest oxygen flow (liters per minute): 5        AM-PAC '6-Clicks' INPATIENT SHORT FORM - BASIC MOBILITY  How much difficulty does the patient currently have. .. Patient Difficulty: Turning over in bed (including adjusting bedclothes, sheets and blankets)?: None   Patient Difficulty: Sitting down on and standing up from a chair with arms (e.g., wheelchair, bedside commode, etc.): A Little   Patient Difficulty: Moving from lying on back to sitting on the side of the bed?: A Little   How much help from another person does the patient currently need. .. Help from Another: Moving to and from a bed to a chair (including a wheelchair)?: A Little   Help from Another: Need to walk in hospital room?: A Little   Help from Another: Climbing 3-5 steps with a railing?: A Little       AM-PAC Score:  Raw Score: 19   Approx Degree of Impairment: 41.77%   Standardized Score (AM-PAC Scale): 45.44   CMS Modifier (G-Code): CK    FUNCTIONAL ABILITY STATUS  Gait Assessment   Functional Mobility/Gait Assessment  Gait Assistance: Contact guard assist  Distance (ft): 20  Assistive Device: Rolling walker  Pattern: Within Functional Limits (step-through gait)    Skilled Therapy Provided     Bed Mobility:  Rolling: Mod I  Supine to sit: SBA   Sit to supine: NT     Transfer Mobility:  Sit to stand: CGA   Stand to sit: CGA  Gait = CGA    Therapist's Comments: Ambulation limited due to hypotension.     Exercise/Education Provided:  Bed mobility  Functional activity tolerated  Gait training  Neuromuscular re-educate  Posture  Strengthening  Transfer training    Patient End of Session: Up in chair;Needs met;Call light within reach;RN aware of session/findings; All patient questions and concerns addressed      Patient Evaluation Complexity Level:  History High - 3 or more personal factors and/or co-morbidities   Examination of body systems High - addressing a total of 4 or more elements   Clinical Presentation Moderate - Evolving   Clinical Decision Making Moderate - Evolving       PT Session Time: 30 minutes  Gait Training: 10 minutes

## 2023-08-21 NOTE — PROGRESS NOTES
Critical Care Progress Note        NAME: Josue Crandall - ROOM: [address] [place], [place] [address]O - MRN: [id number] - Age: 74year old - : 1950  Date of Admission: 2023  9:18 PM  Admission Diagnosis: Hypoalbuminemia [E88.09]  JOSE (acute kidney injury) ([facility] [address] [N17.9]  Supratherapeutic INR [R79.1]  Left leg cellulitis [L03.116]  Hypotension, unspecified hypotension type [I95.9]      Last 24hrs: No events overnight, concerned about being sent home too soon    OBJECTIVE:   23  0500 23  0600 23  0630 23  0700   BP: 112/70 106/74  112/66   BP Location:       Pulse: 70 72  78   Resp:    Temp:       TempSrc:       SpO2: 97% 95%  91%   Weight:   199 lb 8.3 oz (90.5 kg)    Height:           Oxygen Therapy  SpO2: 91 %  O2 Device: Nasal cannula  O2 Flow Rate (L/min): 3 L/min  Pulse Oximetry Type: Continuous  Oximetry Probe Site Changed: No  Pulse Ox Probe Location: Ear lobe                Ventilator Settings:             Wt Readings from Last 3 Encounters:  23 : 199 lb 8.3 oz (90.5 kg)  08/10/23 : 205 lb (93 kg)  23 : 195 lb 15.8 oz (88.9 kg)        Intake/Output Summary (Last 24 hours) at 2023 0801  Last data filed at 2023 0630  Gross per 24 hour   Intake 2194 ml   Output 2400 ml   Net -206 ml       Scheduled Medication:   ceFAZolin  2 g Intravenous Q8H    furosemide  40 mg Intravenous BID (Diuretic)    atorvastatin  40 mg Oral Nightly    buPROPion ER  150 mg Oral Daily    [Held by provider] Mirabegron ER  50 mg Oral Daily    Oxybutynin Chloride ER  15 mg Oral Daily    pantoprazole  40 mg Oral QAM AC    sertraline  100 mg Oral QAM    tamsulosin  0.4 mg Oral Daily     Continuous Infusing Medication:   norepinephrine Stopped (23 0507)    continuous dose heparin 600 Units/hr (23 0400)     PRN Medication:acetaminophen, ondansetron, oxyCODONE-acetaminophen     Lungs: clear to auscultation bilaterally  Heart: irregularly irregular rhythm  Abdomen: soft, non-tender; bowel sounds normal; no masses,  no organomegaly  Extremities: extremities normal, atraumatic, no cyanosis or edema      Labs reviewed as noted below        ASSESSMENT/PLAN:    Shock - cardiogenic from CHF v sepsis  - Lactic cleared  - Maintain MAP >65, wean levo as tolerated, if HR trends too high will change to juan  - Cultures NGTD  CHF/Atrial fibrillation  - Cardiology following  - Xarelto-on hold, heparin gtt per Cards  - Echo w/ mildly depressed EF and severe TR  -diuresis as tolerated  -monitor HR  Hypoxia  -due to atelectasis, fluid overload  -encourage IS  -wean O2 as tolerated  JOSE, Hyponatremia - suspect from cardiorenal syndrome  - Diuretics as tolerated  - Monitor I/O  Supratherapeutic INR - suspect Xarelto effect vs hepatic congestion  -Abd US w/ fatty liver and mild ascites  - Repeat INR downtrending  LE Infection  - Ancef (evening of 8/19- )  - Cultures pending  Proph  - heparin gtt  Dispo  - Full Code        Medically necessary and clinically appropriate Critical Care Time: 35 minutes                 [facility]  Pulmonary and Critical Care

## 2023-08-21 NOTE — PLAN OF CARE
Assumed care of patient at approximately 1930 resting in bed. Pts lung sounds diminished, no crackles noted. Pt denies SOB at rest on 3L NC. Levophed gtt titrated per order. Pt c/o generalized pain, prn percocet given as ordered. Skin with multiple bruises and abrasions, pt states he has frequent falls, fall precautions in place. Pt updated on plan of care. See flow sheets for further details. Problem: CARDIOVASCULAR - ADULT  Goal: Maintains optimal cardiac output and hemodynamic stability  Description: INTERVENTIONS:  - Monitor vital signs, rhythm, and trends  - Monitor for bleeding, hypotension and signs of decreased cardiac output  - Evaluate effectiveness of vasoactive medications to optimize hemodynamic stability  - Monitor arterial and/or venous puncture sites for bleeding and/or hematoma  - Assess quality of pulses, skin color and temperature  - Assess for signs of decreased coronary artery perfusion - ex.  Angina  - Evaluate fluid balance, assess for edema, trend weights  Outcome: Progressing  Goal: Absence of cardiac arrhythmias or at baseline  Description: INTERVENTIONS:  - Continuous cardiac monitoring, monitor vital signs, obtain 12 lead EKG if indicated  - Evaluate effectiveness of antiarrhythmic and heart rate control medications as ordered  - Initiate emergency measures for life threatening arrhythmias  - Monitor electrolytes and administer replacement therapy as ordered  Outcome: Progressing     Problem: RESPIRATORY - ADULT  Goal: Achieves optimal ventilation and oxygenation  Description: INTERVENTIONS:  - Assess for changes in respiratory status  - Assess for changes in mentation and behavior  - Position to facilitate oxygenation and minimize respiratory effort  - Oxygen supplementation based on oxygen saturation or ABGs  - Provide Smoking Cessation handout, if applicable  - Encourage broncho-pulmonary hygiene including cough, deep breathe, Incentive Spirometry  - Assess the need for suctioning and perform as needed  - Assess and instruct to report SOB or any respiratory difficulty  - Respiratory Therapy support as indicated  - Manage/alleviate anxiety  - Monitor for signs/symptoms of CO2 retention  Outcome: Progressing

## 2023-08-22 ENCOUNTER — APPOINTMENT (OUTPATIENT)
Dept: INTERVENTIONAL RADIOLOGY/VASCULAR | Facility: HOSPITAL | Age: 73
End: 2023-08-22
Attending: INTERNAL MEDICINE
Payer: MEDICARE

## 2023-08-22 LAB
ANION GAP SERPL CALC-SCNC: 5 MMOL/L (ref 0–18)
APTT PPP: 53.5 SECONDS (ref 23.3–35.6)
BUN BLD-MCNC: 24 MG/DL (ref 7–18)
CALCIUM BLD-MCNC: 8 MG/DL (ref 8.5–10.1)
CHLORIDE SERPL-SCNC: 100 MMOL/L (ref 98–112)
CO2 SERPL-SCNC: 31 MMOL/L (ref 21–32)
CREAT BLD-MCNC: 1.02 MG/DL
EGFRCR SERPLBLD CKD-EPI 2021: 77 ML/MIN/1.73M2 (ref 60–?)
GLUCOSE BLD-MCNC: 117 MG/DL (ref 70–99)
OSMOLALITY SERPL CALC.SUM OF ELEC: 287 MOSM/KG (ref 275–295)
PLATELET # BLD AUTO: 153 10(3)UL (ref 150–450)
POTASSIUM SERPL-SCNC: 3.5 MMOL/L (ref 3.5–5.1)
POTASSIUM SERPL-SCNC: 3.8 MMOL/L (ref 3.5–5.1)
SODIUM SERPL-SCNC: 136 MMOL/L (ref 136–145)

## 2023-08-22 PROCEDURE — B2111ZZ FLUOROSCOPY OF MULTIPLE CORONARY ARTERIES USING LOW OSMOLAR CONTRAST: ICD-10-PCS | Performed by: INTERNAL MEDICINE

## 2023-08-22 PROCEDURE — 85049 AUTOMATED PLATELET COUNT: CPT | Performed by: INTERNAL MEDICINE

## 2023-08-22 PROCEDURE — 80048 BASIC METABOLIC PNL TOTAL CA: CPT | Performed by: INTERNAL MEDICINE

## 2023-08-22 PROCEDURE — 99153 MOD SED SAME PHYS/QHP EA: CPT | Performed by: INTERNAL MEDICINE

## 2023-08-22 PROCEDURE — 99152 MOD SED SAME PHYS/QHP 5/>YRS: CPT | Performed by: INTERNAL MEDICINE

## 2023-08-22 PROCEDURE — 85730 THROMBOPLASTIN TIME PARTIAL: CPT | Performed by: INTERNAL MEDICINE

## 2023-08-22 PROCEDURE — 93461 R&L HRT ART/VENTRICLE ANGIO: CPT | Performed by: INTERNAL MEDICINE

## 2023-08-22 PROCEDURE — B3121ZZ FLUOROSCOPY OF LEFT SUBCLAVIAN ARTERY USING LOW OSMOLAR CONTRAST: ICD-10-PCS | Performed by: INTERNAL MEDICINE

## 2023-08-22 PROCEDURE — 4A023N6 MEASUREMENT OF CARDIAC SAMPLING AND PRESSURE, RIGHT HEART, PERCUTANEOUS APPROACH: ICD-10-PCS | Performed by: INTERNAL MEDICINE

## 2023-08-22 PROCEDURE — 84132 ASSAY OF SERUM POTASSIUM: CPT | Performed by: INTERNAL MEDICINE

## 2023-08-22 RX ORDER — POLYETHYLENE GLYCOL 3350 17 G/17G
17 POWDER, FOR SOLUTION ORAL DAILY PRN
Status: DISCONTINUED | OUTPATIENT
Start: 2023-08-22 | End: 2023-08-26

## 2023-08-22 RX ORDER — DOCUSATE SODIUM 100 MG/1
100 CAPSULE, LIQUID FILLED ORAL 2 TIMES DAILY
Status: DISCONTINUED | OUTPATIENT
Start: 2023-08-22 | End: 2023-08-26

## 2023-08-22 RX ORDER — MIDAZOLAM HYDROCHLORIDE 1 MG/ML
INJECTION INTRAMUSCULAR; INTRAVENOUS
Status: COMPLETED
Start: 2023-08-22 | End: 2023-08-22

## 2023-08-22 RX ORDER — SODIUM CHLORIDE 9 MG/ML
INJECTION, SOLUTION INTRAVENOUS
Status: DISCONTINUED | OUTPATIENT
Start: 2023-08-23 | End: 2023-08-22 | Stop reason: HOSPADM

## 2023-08-22 RX ORDER — HEPARIN SODIUM 5000 [USP'U]/ML
INJECTION, SOLUTION INTRAVENOUS; SUBCUTANEOUS
Status: COMPLETED
Start: 2023-08-22 | End: 2023-08-22

## 2023-08-22 RX ORDER — BISACODYL 10 MG
10 SUPPOSITORY, RECTAL RECTAL
Status: DISCONTINUED | OUTPATIENT
Start: 2023-08-22 | End: 2023-08-26

## 2023-08-22 RX ORDER — ENEMA 19; 7 G/133ML; G/133ML
1 ENEMA RECTAL ONCE AS NEEDED
Status: DISCONTINUED | OUTPATIENT
Start: 2023-08-22 | End: 2023-08-26

## 2023-08-22 RX ORDER — LIDOCAINE HYDROCHLORIDE 10 MG/ML
INJECTION, SOLUTION EPIDURAL; INFILTRATION; INTRACAUDAL; PERINEURAL
Status: COMPLETED
Start: 2023-08-22 | End: 2023-08-22

## 2023-08-22 NOTE — PLAN OF CARE
Received pt resting in bed. VSS on 2L nasal cannula. Heparin gtt infusing per ACS protocol, plan for right heart cath today, clarified with cardiology about timing of turning off heparin, heparin gtt stopped @ 08:04 this morning per cardiology. Consent signed and in chart for procedure.      Received pt back from cath lab @

## 2023-08-22 NOTE — PROCEDURES
OPERATIVE REPORT - DIAGNOSTIC CARDIAC CATHETERIZATION       Date of Procedure: 8/22/2023       Performing Physician: Sheryle Dally. Summer Dunne MD       Pre-Procedure Diagnosis:   1. Right heart failure  2. Wide open tricuspid regurgitation  3. CAD h/o CABG and PCI  4. HFmrEF  5. ICM  6. Persistent AF  7. COPD  8. Pulmonary HTN  9. PAD s/p multiple b/l PTA      Post-Procedure Diagnosis:   1. Same as pre      Procedures performed:   1. Left heart catheterization   2. Right heart catheterization   3. Selective bilateral coronary and CABG angiography   4. Left subclavian arteriogram  5. Moderate sedation   6. Ultrasound guided access of right common femoral artery and   right femoral vein   7. Perclose R CFA and R FV      Indications: This is a 74year old male the above history being considered for TV repair who presents for left and right heart catheterization with coronary and CABG angiography to evaluate for obstructive CAD and to measure filling pressure and intracardiac hemodynamics. Description of Procedure: Informed consent was obtained from the patient in writing. The risks and benefits of the procedure were reviewed in detail with the patient, including but not limited to the risk of myocardial infarction, stroke, and death. After a thorough discussion of these risks and benefits, the patient agreed to proceed and signed an informed consent document. The patient was brought to the cardiac catheterization laboratory at [facility] in the fasting state. Moderate sedation was employed using 2 mg of IV Versed and 50 mcg of IV fentanyl. A trained professional under my supervision and I observed the patient from [phone] until . All operators present for the case performed standard pre-procedural prep including hand washing, sterile gloves, gown, mask, and cap. All aspects of the maximum sterile barrier technique were followed.  A preprocedural time out was performed with all physicians, technologists, and nurses involved with the procedure. 2% lidocaine was infiltrated subcutaneously in the right groin for local anesthesia. Ultrasound guided vascular access was obtained in the right femoral vein with a 5F sheath using a micropuncture needle and the modified Seldinger technique. Dark nonpulsatile blood seen on bleed back and sheath gently flushed. Ultrasound guided vascular access was also obtained in the right common femoral artery with a 6F sheath using the modified Seldinger technique. Bright red pulsatile blood was seen and the sheath was flushed. We aimed to enter the R CFA above the prior Dacron patch. We felt the arteriotomy site was just overlying the pelvic rim and suitable for vascular closure. A 7F balloon floatation catheter was introduced into the venous sheath. The balloon was inflated and the catheter was advanced into the right atrium, right ventricle, pulmonary artery, and pulmonary capillary wedge positions. Measurements of pressure were taken at each position. Dave cardiac outputs were measured. The balloon was deflated and the catheter was removed under fluoroscopy and the sheath was flushed. 6F JL4 and JR4 diagnostic catheters were advanced over a J tipped wire through the arterial sheath and engaged in the left and right coronary arteries respectively to perform selective bilateral coronary angiography in multiple orthogonal angles. The JR4 was used to cannulate the SVG to OM and SVG to RCA. We then used an RADHA catheter to engage the left subclavian. Due to tortuosity we needed to use a Glidewire to advance the wire and then the catheter. There was damping of the pressure waveform due to deep cannulation with the catheter so we disengaged and took nonselective LIMA shots. There was adequate visualization of the artery and touchdown into the LAD. A L subclavian arteriogram was performed to make sure there was no subclavian stenosis and to help guide positioning of the catheter and wire.  All catheter exchanges were performed over a wire. All catheters were removed over a wire. At the conclusion of the procedure, all catheters and wires were removed. The arterial and venous sheaths were removed and hemostasis achieved with a Perclose Proglide deployed in the standard fashion. We monitored for 10-15 minutes and there was no bleeding or hematoma. Distal pulses were intact. The patient tolerated the procedure well without complication. EBL <10 ml  Total contrast 100  See procedure log for total radiation dose. Findings:   1. Hemodynamics   - Aorta 84/49/65, LV 85/9/10   - RA 27/28/19, RV 41/8/15, PA 41/18/29, PCWP 21/26/18   - Dave CO 8.5 L/min, CI 4.1 L/min/m2, PVR 1.3 HERNANDEZ  - Mixed venous O2 saturation 56%  aorta saturation 85% on RA. 2. Selective Coronary and CABG Angiography   - Left main: No stenosis  - LAD: Proximal  after a diagonal and first septal   - Ramus: medium sized, no stenosis  - LCX: Patent proximal stents into a medium sized OM, then supplies a smaller OM2 into the AV groove  - RCA: Dominant. Proximal RCA . PDA and RPL receive grade 2 collaterals from LCA. - LIMA to LAD widely patent, supplies distal LAD which has luminal irregularities    - SVG to OM occlusion at aorta  - SVG to RCA occlusion at aorta  - L subclavian tortuous but no stenosis    Conclusions   1. Severe native CAD with patent LCX stents, but  of proximal LAD and RCA. 2. Patent LIMA to LAD, with known  SVG's to RCA and OM  3. Moderate elevated right heart pressures with high V-waves in RA suggestive of severe TR. 4. Moderate pulmonary HTN    Recommendations   - Continue diuresis, GDMT  - CT surgery evaluation of whether he would be appropriate candidate for redo sternotomy and TV repair with possible bypass of RCA.    - If deemed too high risk, he may be considered for percutaneous tricuspid valve repair  - Bedrest x 3-4 hours       Isis Ross MD   Interventional Cardiology   [facility]   Office: [phone]

## 2023-08-22 NOTE — PLAN OF CARE
Assumed care of patient at approximately 1930 resting in bed. Pts lungs diminished, no crackles noted, on 3L NC. Heparin gtt per ACS protocol. Pt c/o generalized pain, prn percocet given as ordered. Pt NPO for right heart cath scheduled for today. Pt updated on plan of care. See flow sheets for further details. Problem: CARDIOVASCULAR - ADULT  Goal: Maintains optimal cardiac output and hemodynamic stability  Description: INTERVENTIONS:  - Monitor vital signs, rhythm, and trends  - Monitor for bleeding, hypotension and signs of decreased cardiac output  - Evaluate effectiveness of vasoactive medications to optimize hemodynamic stability  - Monitor arterial and/or venous puncture sites for bleeding and/or hematoma  - Assess quality of pulses, skin color and temperature  - Assess for signs of decreased coronary artery perfusion - ex.  Angina  - Evaluate fluid balance, assess for edema, trend weights  Outcome: Progressing  Goal: Absence of cardiac arrhythmias or at baseline  Description: INTERVENTIONS:  - Continuous cardiac monitoring, monitor vital signs, obtain 12 lead EKG if indicated  - Evaluate effectiveness of antiarrhythmic and heart rate control medications as ordered  - Initiate emergency measures for life threatening arrhythmias  - Monitor electrolytes and administer replacement therapy as ordered  Outcome: Progressing     Problem: RESPIRATORY - ADULT  Goal: Achieves optimal ventilation and oxygenation  Description: INTERVENTIONS:  - Assess for changes in respiratory status  - Assess for changes in mentation and behavior  - Position to facilitate oxygenation and minimize respiratory effort  - Oxygen supplementation based on oxygen saturation or ABGs  - Provide Smoking Cessation handout, if applicable  - Encourage broncho-pulmonary hygiene including cough, deep breathe, Incentive Spirometry  - Assess the need for suctioning and perform as needed  - Assess and instruct to report SOB or any respiratory difficulty  - Respiratory Therapy support as indicated  - Manage/alleviate anxiety  - Monitor for signs/symptoms of CO2 retention  Outcome: Progressing

## 2023-08-22 NOTE — OCCUPATIONAL THERAPY NOTE
OCCUPATIONAL THERAPY                OT treatment attempted. The patient is about to leave unit for right heart catheterization. Will re-attempt later as able and as appropriate.

## 2023-08-23 LAB
ANION GAP SERPL CALC-SCNC: 6 MMOL/L (ref 0–18)
APTT PPP: 50.9 SECONDS (ref 23.3–35.6)
BUN BLD-MCNC: 14 MG/DL (ref 7–18)
CALCIUM BLD-MCNC: 8 MG/DL (ref 8.5–10.1)
CHLORIDE SERPL-SCNC: 100 MMOL/L (ref 98–112)
CO2 SERPL-SCNC: 30 MMOL/L (ref 21–32)
CREAT BLD-MCNC: 0.79 MG/DL
EGFRCR SERPLBLD CKD-EPI 2021: 93 ML/MIN/1.73M2 (ref 60–?)
ERYTHROCYTE [DISTWIDTH] IN BLOOD BY AUTOMATED COUNT: 21.2 %
GLUCOSE BLD-MCNC: 95 MG/DL (ref 70–99)
HCT VFR BLD AUTO: 28.1 %
HGB BLD-MCNC: 8.5 G/DL
MCH RBC QN AUTO: 25.8 PG (ref 26–34)
MCHC RBC AUTO-ENTMCNC: 30.2 G/DL (ref 31–37)
MCV RBC AUTO: 85.2 FL
OSMOLALITY SERPL CALC.SUM OF ELEC: 282 MOSM/KG (ref 275–295)
PLATELET # BLD AUTO: 157 10(3)UL (ref 150–450)
PLATELET # BLD AUTO: 157 10(3)UL (ref 150–450)
POTASSIUM SERPL-SCNC: 3.5 MMOL/L (ref 3.5–5.1)
POTASSIUM SERPL-SCNC: 3.5 MMOL/L (ref 3.5–5.1)
POTASSIUM SERPL-SCNC: 4.1 MMOL/L (ref 3.5–5.1)
RBC # BLD AUTO: 3.3 X10(6)UL
SODIUM SERPL-SCNC: 136 MMOL/L (ref 136–145)
WBC # BLD AUTO: 5.4 X10(3) UL (ref 4–11)

## 2023-08-23 PROCEDURE — 80048 BASIC METABOLIC PNL TOTAL CA: CPT | Performed by: INTERNAL MEDICINE

## 2023-08-23 PROCEDURE — 84132 ASSAY OF SERUM POTASSIUM: CPT | Performed by: HOSPITALIST

## 2023-08-23 PROCEDURE — 85049 AUTOMATED PLATELET COUNT: CPT | Performed by: INTERNAL MEDICINE

## 2023-08-23 PROCEDURE — 85027 COMPLETE CBC AUTOMATED: CPT | Performed by: INTERNAL MEDICINE

## 2023-08-23 PROCEDURE — 84132 ASSAY OF SERUM POTASSIUM: CPT | Performed by: INTERNAL MEDICINE

## 2023-08-23 PROCEDURE — 85730 THROMBOPLASTIN TIME PARTIAL: CPT | Performed by: INTERNAL MEDICINE

## 2023-08-23 RX ORDER — POTASSIUM CHLORIDE 20 MEQ/1
40 TABLET, EXTENDED RELEASE ORAL EVERY 4 HOURS
Status: COMPLETED | OUTPATIENT
Start: 2023-08-23 | End: 2023-08-23

## 2023-08-23 RX ORDER — ASPIRIN 81 MG/1
81 TABLET ORAL DAILY
Status: DISCONTINUED | OUTPATIENT
Start: 2023-08-23 | End: 2023-08-26

## 2023-08-23 RX ORDER — METOPROLOL SUCCINATE 25 MG/1
25 TABLET, EXTENDED RELEASE ORAL
Status: DISCONTINUED | OUTPATIENT
Start: 2023-08-23 | End: 2023-08-26

## 2023-08-23 NOTE — OCCUPATIONAL THERAPY NOTE
Chart reviewed and attempted to see pt for skilled OT services this date. RN made aware of attempt. Pt occupied with phone call. OT continue to follow.

## 2023-08-23 NOTE — PLAN OF CARE
Received patient at 0730. Alert and oriented x4. Tele rhythm afib, rates controlled. O2 saturation 95% on room air. Breath sounds diminished. Bed is locked and in low position. Call light and personal belongings in reach. No c/o chest pain or shortness of breath. Pt voiding with no issue. Pt ambulated in room with no issues. Back pain treated with PRN percocet. Cards to see. Care plan reviewed, pt verbalizes understanding.

## 2023-08-23 NOTE — PHYSICAL THERAPY NOTE
Attempted to see Pt this PM - RN aware of attempt. Pt on phone call, and wanting more time. Will f/u later today if time permits, after all other patients are attempted per tentative schedule.

## 2023-08-23 NOTE — PLAN OF CARE
Patient is  Alert and oriented x4. Tele rhythm afib O2 saturation 94% on room air Breath sounds clear. Bed is locked and in low position. Call light and personal items within reach. No C/O chest pain or shortness of breath. Pt voiding with no issue. Skin dry/Intact. Reviewed plan of care and patient verbalizes understanding. Poc: mary alice, pt to see, dr boss to see        Problem: Diabetes/Glucose Control  Goal: Glucose maintained within prescribed range  Description: INTERVENTIONS:  - Monitor Blood Glucose as ordered  - Assess for signs and symptoms of hyperglycemia and hypoglycemia  - Administer ordered medications to maintain glucose within target range  - Assess barriers to adequate nutritional intake and initiate nutrition consult as needed  - Instruct patient on self management of diabetes  Outcome: Progressing     Problem: CARDIOVASCULAR - ADULT  Goal: Maintains optimal cardiac output and hemodynamic stability  Description: INTERVENTIONS:  - Monitor vital signs, rhythm, and trends  - Monitor for bleeding, hypotension and signs of decreased cardiac output  - Evaluate effectiveness of vasoactive medications to optimize hemodynamic stability  - Monitor arterial and/or venous puncture sites for bleeding and/or hematoma  - Assess quality of pulses, skin color and temperature  - Assess for signs of decreased coronary artery perfusion - ex.  Angina  - Evaluate fluid balance, assess for edema, trend weights  Outcome: Progressing  Goal: Absence of cardiac arrhythmias or at baseline  Description: INTERVENTIONS:  - Continuous cardiac monitoring, monitor vital signs, obtain 12 lead EKG if indicated  - Evaluate effectiveness of antiarrhythmic and heart rate control medications as ordered  - Initiate emergency measures for life threatening arrhythmias  - Monitor electrolytes and administer replacement therapy as ordered  Outcome: Progressing

## 2023-08-24 ENCOUNTER — APPOINTMENT (OUTPATIENT)
Dept: GENERAL RADIOLOGY | Facility: HOSPITAL | Age: 73
End: 2023-08-24
Attending: INTERNAL MEDICINE
Payer: MEDICARE

## 2023-08-24 LAB
ANION GAP SERPL CALC-SCNC: 3 MMOL/L (ref 0–18)
APTT PPP: 50.9 SECONDS (ref 23.3–35.6)
BASOPHILS # BLD AUTO: 0.05 X10(3) UL (ref 0–0.2)
BASOPHILS NFR BLD AUTO: 0.9 %
BUN BLD-MCNC: 11 MG/DL (ref 7–18)
CALCIUM BLD-MCNC: 8.2 MG/DL (ref 8.5–10.1)
CHLORIDE SERPL-SCNC: 100 MMOL/L (ref 98–112)
CO2 SERPL-SCNC: 33 MMOL/L (ref 21–32)
CREAT BLD-MCNC: 0.7 MG/DL
EGFRCR SERPLBLD CKD-EPI 2021: 96 ML/MIN/1.73M2 (ref 60–?)
EOSINOPHIL # BLD AUTO: 0.2 X10(3) UL (ref 0–0.7)
EOSINOPHIL NFR BLD AUTO: 3.5 %
ERYTHROCYTE [DISTWIDTH] IN BLOOD BY AUTOMATED COUNT: 21.2 %
GLUCOSE BLD-MCNC: 111 MG/DL (ref 70–99)
HCT VFR BLD AUTO: 28.8 %
HGB BLD-MCNC: 9 G/DL
IMM GRANULOCYTES # BLD AUTO: 0.02 X10(3) UL (ref 0–1)
IMM GRANULOCYTES NFR BLD: 0.3 %
LYMPHOCYTES # BLD AUTO: 0.69 X10(3) UL (ref 1–4)
LYMPHOCYTES NFR BLD AUTO: 12 %
MCH RBC QN AUTO: 25.9 PG (ref 26–34)
MCHC RBC AUTO-ENTMCNC: 31.3 G/DL (ref 31–37)
MCV RBC AUTO: 83 FL
MONOCYTES # BLD AUTO: 0.77 X10(3) UL (ref 0.1–1)
MONOCYTES NFR BLD AUTO: 13.4 %
NEUTROPHILS # BLD AUTO: 4.02 X10 (3) UL (ref 1.5–7.7)
NEUTROPHILS # BLD AUTO: 4.02 X10(3) UL (ref 1.5–7.7)
NEUTROPHILS NFR BLD AUTO: 69.9 %
OSMOLALITY SERPL CALC.SUM OF ELEC: 282 MOSM/KG (ref 275–295)
PLATELET # BLD AUTO: 163 10(3)UL (ref 150–450)
POTASSIUM SERPL-SCNC: 3.7 MMOL/L (ref 3.5–5.1)
RBC # BLD AUTO: 3.47 X10(6)UL
SODIUM SERPL-SCNC: 136 MMOL/L (ref 136–145)
WBC # BLD AUTO: 5.8 X10(3) UL (ref 4–11)

## 2023-08-24 PROCEDURE — 97535 SELF CARE MNGMENT TRAINING: CPT

## 2023-08-24 PROCEDURE — 85025 COMPLETE CBC W/AUTO DIFF WBC: CPT | Performed by: INTERNAL MEDICINE

## 2023-08-24 PROCEDURE — 73590 X-RAY EXAM OF LOWER LEG: CPT | Performed by: INTERNAL MEDICINE

## 2023-08-24 PROCEDURE — 97530 THERAPEUTIC ACTIVITIES: CPT

## 2023-08-24 PROCEDURE — 85730 THROMBOPLASTIN TIME PARTIAL: CPT | Performed by: HOSPITALIST

## 2023-08-24 PROCEDURE — 73562 X-RAY EXAM OF KNEE 3: CPT | Performed by: INTERNAL MEDICINE

## 2023-08-24 PROCEDURE — 80048 BASIC METABOLIC PNL TOTAL CA: CPT | Performed by: INTERNAL MEDICINE

## 2023-08-24 PROCEDURE — 97116 GAIT TRAINING THERAPY: CPT

## 2023-08-24 RX ORDER — SPIRONOLACTONE 25 MG/1
25 TABLET ORAL DAILY
Status: DISCONTINUED | OUTPATIENT
Start: 2023-08-24 | End: 2023-08-26

## 2023-08-24 RX ORDER — POTASSIUM CHLORIDE 20 MEQ/1
40 TABLET, EXTENDED RELEASE ORAL ONCE
Status: COMPLETED | OUTPATIENT
Start: 2023-08-24 | End: 2023-08-24

## 2023-08-24 NOTE — PLAN OF CARE
Patient alert and oriented x 4. Up with x1 assist using walker. Unsteady gait. On RA. A fib on tele. Continent of bowel and bladder. Complaints of chronic pain, PRN medication administered. No complaints of shortness of breat or chest pain/discomfort. POC: discussed with patient. Fall precautions in place. Call light within reach. Problem: Diabetes/Glucose Control  Goal: Glucose maintained within prescribed range  Description: INTERVENTIONS:  - Monitor Blood Glucose as ordered  - Assess for signs and symptoms of hyperglycemia and hypoglycemia  - Administer ordered medications to maintain glucose within target range  - Assess barriers to adequate nutritional intake and initiate nutrition consult as needed  - Instruct patient on self management of diabetes  Outcome: Progressing     Problem: CARDIOVASCULAR - ADULT  Goal: Maintains optimal cardiac output and hemodynamic stability  Description: INTERVENTIONS:  - Monitor vital signs, rhythm, and trends  - Monitor for bleeding, hypotension and signs of decreased cardiac output  - Evaluate effectiveness of vasoactive medications to optimize hemodynamic stability  - Monitor arterial and/or venous puncture sites for bleeding and/or hematoma  - Assess quality of pulses, skin color and temperature  - Assess for signs of decreased coronary artery perfusion - ex.  Angina  - Evaluate fluid balance, assess for edema, trend weights  Outcome: Progressing  Goal: Absence of cardiac arrhythmias or at baseline  Description: INTERVENTIONS:  - Continuous cardiac monitoring, monitor vital signs, obtain 12 lead EKG if indicated  - Evaluate effectiveness of antiarrhythmic and heart rate control medications as ordered  - Initiate emergency measures for life threatening arrhythmias  - Monitor electrolytes and administer replacement therapy as ordered  Outcome: Progressing     Problem: RESPIRATORY - ADULT  Goal: Achieves optimal ventilation and oxygenation  Description: INTERVENTIONS:  - Assess for changes in respiratory status  - Assess for changes in mentation and behavior  - Position to facilitate oxygenation and minimize respiratory effort  - Oxygen supplementation based on oxygen saturation or ABGs  - Provide Smoking Cessation handout, if applicable  - Encourage broncho-pulmonary hygiene including cough, deep breathe, Incentive Spirometry  - Assess the need for suctioning and perform as needed  - Assess and instruct to report SOB or any respiratory difficulty  - Respiratory Therapy support as indicated  - Manage/alleviate anxiety  - Monitor for signs/symptoms of CO2 retention  Outcome: Progressing

## 2023-08-24 NOTE — PHYSICAL THERAPY NOTE
PHYSICAL THERAPY TREATMENT NOTE - INPATIENT    Room Number: 9166/5147-S     Session: 1     Number of Visits to Meet Established Goals: 5    Presenting Problem: Supratherapeutic INR, Cardiogenic Shock  Co-Morbidities : Parkinson's, opiate dependence, DM, CAD,CABG , PCI, depression, anxiety    History related to current admission: Patient is a 74year old male who presented to the ED on 8/19/2023 from home for BLE edema and SOB. Pt diagnosed with cardiogenic shock, supratherapeutic INR, acute on chronic heart failure, chronic A-fib, LLE cellulitis, bilateral foot wounds. Recent Admits:  6/3/23-6/5/23 with Syncope, UTI-->HOME  7/5/23-7/7/23 with Syncope, Fall-->HOME PT/RN    ASSESSMENT     Pt w/ 2 lob during gait that required mod a of 1 to regain - once in bathroom and one during gait while turning. Feel pt is at a high risk for falls and would benefit from [facility] at d/c. Pt w/ recent falls, self described inability to manage his home, weight loss and some confusion/difficulty w/ attention. Feel he will benefit from ANGELINA to insure highest level of function prior to return home, but also longer term plan may need to be considered. Unsure if pt is safe to live alone. Also discussed L le appearance w/ rn, concerned about bruising and potential deformity at L knee. DISCHARGE RECOMMENDATIONS  PT Discharge Recommendations: Sub-acute rehabilitation     PLAN  PT Treatment Plan: Patient education; Family education;Gait training;Strengthening;Transfer training;Balance training  Rehab Potential : Good  Frequency (Obs): 3-5x/week    CURRENT GOALS     Goal #1 Patient is able to demonstrate supine - sit EOB @ level: modified independent   met 8/24   Goal #2 Patient is able to demonstrate transfers Sit to/from Stand at assistance level: modified independent      Goal #3 Patient is able to ambulate 150 feet with assist device: walker - rolling at assistance level: modified independent      Goal #4 The pt will ascend/descend 14 stairs with use of a handrail with SBA. Goal #5     Goal #6     Goal Comments: Goals established on 8/21/2023 8/24/2023 all goals ongoing      SUBJECTIVE  \"I lost 15 pounds in last 3 months. \"  \"I can't lift my sugar onto the shelf\"    OBJECTIVE  Precautions: Bed/chair alarm    WEIGHT BEARING RESTRICTION  Weight Bearing Restriction: None                PAIN ASSESSMENT   Rating: Unable to rate  Location: L le at rest, did not c/o of pain w/ gait  Management Techniques: Other (Comment) (Discussed w/ rn the bruising, bony prominence on lateral side of knee)    BALANCE                                                                                                                       Static Sitting: Good  Dynamic Sitting: Fair           Static Standing: Fair -  Dynamic Standing: Poor    ACTIVITY TOLERANCE                         O2 WALK         AM-PAC '6-Clicks' INPATIENT SHORT FORM - BASIC MOBILITY  How much difficulty does the patient currently have. .. Patient Difficulty: Turning over in bed (including adjusting bedclothes, sheets and blankets)?: None   Patient Difficulty: Sitting down on and standing up from a chair with arms (e.g., wheelchair, bedside commode, etc.): A Little   Patient Difficulty: Moving from lying on back to sitting on the side of the bed?: None   How much help from another person does the patient currently need. .. Help from Another: Moving to and from a bed to a chair (including a wheelchair)?: A Little   Help from Another: Need to walk in hospital room?: A Lot   Help from Another: Climbing 3-5 steps with a railing?: A Lot       AM-PAC Score:  Raw Score: 18   Approx Degree of Impairment: 46.58%   Standardized Score (AM-PAC Scale): 43.63   CMS Modifier (G-Code): CK    FUNCTIONAL ABILITY STATUS  Gait Assessment   Functional Mobility/Gait Assessment  Gait Assistance:  Moderate assistance  Distance (ft): 75  Assistive Device: Rolling walker  Pattern: R Foot drop;R Steppage;L Foot flat (Unsteady on turns)    Skilled Therapy Provided    Bed Mobility:  Rolling: Left - mod I   Supine<>Sit: Mod I   Sit<>Supine: NT     Transfer Mobility:  Sit<>Stand: CGA, took pt two attempts to achieve standing. Once in standing, noted posterior lean, but pt was able to gain balance. Stand<>Sit: CGA   Gait: Pt w/ one lob in bathroom posteriorly - mod a of 1 to regain. Pt completed gait w/ CGA until turn back to room which pt impulsively whipped walker around and then lob that required mod a of 1 to regain. Therapist's Comments: Pt requires extra time due to excessive talking and at times tangential discussion. Pt talking so much at times he is not attending to task at hand, putting him at risk for falls. Needs frequent redirection and cues to remain on task. Decreased safety awareness. Patient End of Session: Up in chair;Needs met;Call light within reach;RN aware of session/findings; All patient questions and concerns addressed; Alarm set (Rn Sarah aware of treatment session)    PT Session Time: 30 minutes  Gait Training: 15 minutes  Therapeutic Activity: 13 minutes  Therapeutic Exercise: 0 minutes   Neuromuscular Re-education: 0 minutes

## 2023-08-24 NOTE — PLAN OF CARE
Received patient at [facility]. Alert and oriented x 4. Tele Rhythm A.fib, oxygen maintained on room air. Breath sounds diminished, clear. Skin -scattered bruising due to frequent recent falling at home. Skin tear L arm-mepilex, L neuropathic toe covered with clean, intact dressing. Last bowel movement 8/23. Patient voiding with no issues. Patient reports no cardiac symptoms, No chest pain or shortness of breath. Bed is locked and in low position. Call light and personal items within reach. Pt up stand by with walker. Reviewed plan of care and patient verbalizes understanding. POC-heparin gtt       Problem: Diabetes/Glucose Control  Goal: Glucose maintained within prescribed range  Description: INTERVENTIONS:  - Monitor Blood Glucose as ordered  - Assess for signs and symptoms of hyperglycemia and hypoglycemia  - Administer ordered medications to maintain glucose within target range  - Assess barriers to adequate nutritional intake and initiate nutrition consult as needed  - Instruct patient on self management of diabetes  Outcome: Progressing     Problem: CARDIOVASCULAR - ADULT  Goal: Maintains optimal cardiac output and hemodynamic stability  Description: INTERVENTIONS:  - Monitor vital signs, rhythm, and trends  - Monitor for bleeding, hypotension and signs of decreased cardiac output  - Evaluate effectiveness of vasoactive medications to optimize hemodynamic stability  - Monitor arterial and/or venous puncture sites for bleeding and/or hematoma  - Assess quality of pulses, skin color and temperature  - Assess for signs of decreased coronary artery perfusion - ex.  Angina  - Evaluate fluid balance, assess for edema, trend weights  Outcome: Progressing  Goal: Absence of cardiac arrhythmias or at baseline  Description: INTERVENTIONS:  - Continuous cardiac monitoring, monitor vital signs, obtain 12 lead EKG if indicated  - Evaluate effectiveness of antiarrhythmic and heart rate control medications as ordered  - Initiate emergency measures for life threatening arrhythmias  - Monitor electrolytes and administer replacement therapy as ordered  Outcome: Progressing     Problem: RESPIRATORY - ADULT  Goal: Achieves optimal ventilation and oxygenation  Description: INTERVENTIONS:  - Assess for changes in respiratory status  - Assess for changes in mentation and behavior  - Position to facilitate oxygenation and minimize respiratory effort  - Oxygen supplementation based on oxygen saturation or ABGs  - Provide Smoking Cessation handout, if applicable  - Encourage broncho-pulmonary hygiene including cough, deep breathe, Incentive Spirometry  - Assess the need for suctioning and perform as needed  - Assess and instruct to report SOB or any respiratory difficulty  - Respiratory Therapy support as indicated  - Manage/alleviate anxiety  - Monitor for signs/symptoms of CO2 retention  Outcome: Progressing

## 2023-08-24 NOTE — CM/SW NOTE
Spoke with OT. PT/OT are recommending ANGELINA at discharge. Per OT conversation with pt, pt is requesting [facility]. SW sent ANGELINA referrals in [place], and also included [facility]. PASRR uploaded. SW to present options once available. Will need to upload PT evaluation once entered.      RAMONITA Vicente, [facility]  Discharge Planner  [id number]

## 2023-08-25 LAB
APTT PPP: 47.1 SECONDS (ref 23.3–35.6)
APTT PPP: 51.8 SECONDS (ref 23.3–35.6)
POTASSIUM SERPL-SCNC: 4.3 MMOL/L (ref 3.5–5.1)

## 2023-08-25 PROCEDURE — 84132 ASSAY OF SERUM POTASSIUM: CPT | Performed by: INTERNAL MEDICINE

## 2023-08-25 PROCEDURE — 85730 THROMBOPLASTIN TIME PARTIAL: CPT | Performed by: INTERNAL MEDICINE

## 2023-08-25 RX ORDER — ZOLPIDEM TARTRATE 5 MG/1
5 TABLET ORAL NIGHTLY PRN
Status: DISCONTINUED | OUTPATIENT
Start: 2023-08-25 | End: 2023-08-26

## 2023-08-25 RX ORDER — FUROSEMIDE 40 MG/1
40 TABLET ORAL DAILY
Status: DISCONTINUED | OUTPATIENT
Start: 2023-08-25 | End: 2023-08-26

## 2023-08-25 RX ORDER — SPIRONOLACTONE 25 MG/1
25 TABLET ORAL DAILY
Qty: 90 TABLET | Refills: 0 | Status: SHIPPED | OUTPATIENT
Start: 2023-08-26 | End: 2023-11-24

## 2023-08-25 RX ORDER — METOPROLOL SUCCINATE 25 MG/1
25 TABLET, EXTENDED RELEASE ORAL
Qty: 180 TABLET | Refills: 0 | Status: SHIPPED | OUTPATIENT
Start: 2023-08-25 | End: 2023-11-23

## 2023-08-25 RX ORDER — FUROSEMIDE 40 MG/1
40 TABLET ORAL DAILY
Qty: 90 TABLET | Refills: 0 | Status: SHIPPED | OUTPATIENT
Start: 2023-08-25 | End: 2023-11-23

## 2023-08-25 NOTE — CM/SW NOTE
08/25/23 0951   Choice of Post-Acute Provider   Informed patient of right to choose their preferred provider Yes   List of appropriate post-acute services provided to patient/family with quality data Yes   Patient/family choice [id number] [facility] given to Patient       SW met with pt at bedside to discuss dc planning. Pt is alert and oriented x3. Discussed that PT is recommending ANG[facility]INA at discharge. Pt in agreement. [place] [facility] list given to pt to review. Pt has had history w/ [facility] ANGELINA in the past and is wishing to back. [facility] can accept. [facility] [address] in [place]. Await discharge clearance.      The [facility] at [place] [address]    Ph: [address] [place], MSW, [facility]  Discharge Planner  [id number]

## 2023-08-25 NOTE — PLAN OF CARE
Alert and oriented x4. On RA. Denies any SOB or chest pain. A fib on tele, rates controlled. Continent of bowel and bladder. Chronic back pain controlled with prn meds. Up 1 x walker. Fall precautions in place. Problem: CARDIOVASCULAR - ADULT  Goal: Maintains optimal cardiac output and hemodynamic stability  Description: INTERVENTIONS:  - Monitor vital signs, rhythm, and trends  - Monitor for bleeding, hypotension and signs of decreased cardiac output  - Evaluate effectiveness of vasoactive medications to optimize hemodynamic stability  - Monitor arterial and/or venous puncture sites for bleeding and/or hematoma  - Assess quality of pulses, skin color and temperature  - Assess for signs of decreased coronary artery perfusion - ex.  Angina  - Evaluate fluid balance, assess for edema, trend weights  Outcome: Progressing  Goal: Absence of cardiac arrhythmias or at baseline  Description: INTERVENTIONS:  - Continuous cardiac monitoring, monitor vital signs, obtain 12 lead EKG if indicated  - Evaluate effectiveness of antiarrhythmic and heart rate control medications as ordered  - Initiate emergency measures for life threatening arrhythmias  - Monitor electrolytes and administer replacement therapy as ordered  Outcome: Progressing     Problem: RESPIRATORY - ADULT  Goal: Achieves optimal ventilation and oxygenation  Description: INTERVENTIONS:  - Assess for changes in respiratory status  - Assess for changes in mentation and behavior  - Position to facilitate oxygenation and minimize respiratory effort  - Oxygen supplementation based on oxygen saturation or ABGs  - Provide Smoking Cessation handout, if applicable  - Encourage broncho-pulmonary hygiene including cough, deep breathe, Incentive Spirometry  - Assess the need for suctioning and perform as needed  - Assess and instruct to report SOB or any respiratory difficulty  - Respiratory Therapy support as indicated  - Manage/alleviate anxiety  - Monitor for signs/symptoms of CO2 retention  Outcome: Progressing

## 2023-08-25 NOTE — PLAN OF CARE
Patient alert and oriented x 4. Up with x1 assist using walker. On RA. A Fib on tele. Heparin gtt infusing per order. Continent of bowel and incontinent of bladder at times. Complaints of back pain, which patient states is chronic, PRN medication administered with relief. No complaints of shortness of breath or chest pain/discomfort. POC discussed with patient. Fall precautions in place. Call light within reach. Problem: Diabetes/Glucose Control  Goal: Glucose maintained within prescribed range  Description: INTERVENTIONS:  - Monitor Blood Glucose as ordered  - Assess for signs and symptoms of hyperglycemia and hypoglycemia  - Administer ordered medications to maintain glucose within target range  - Assess barriers to adequate nutritional intake and initiate nutrition consult as needed  - Instruct patient on self management of diabetes  Outcome: Progressing     Problem: CARDIOVASCULAR - ADULT  Goal: Maintains optimal cardiac output and hemodynamic stability  Description: INTERVENTIONS:  - Monitor vital signs, rhythm, and trends  - Monitor for bleeding, hypotension and signs of decreased cardiac output  - Evaluate effectiveness of vasoactive medications to optimize hemodynamic stability  - Monitor arterial and/or venous puncture sites for bleeding and/or hematoma  - Assess quality of pulses, skin color and temperature  - Assess for signs of decreased coronary artery perfusion - ex.  Angina  - Evaluate fluid balance, assess for edema, trend weights  Outcome: Progressing  Goal: Absence of cardiac arrhythmias or at baseline  Description: INTERVENTIONS:  - Continuous cardiac monitoring, monitor vital signs, obtain 12 lead EKG if indicated  - Evaluate effectiveness of antiarrhythmic and heart rate control medications as ordered  - Initiate emergency measures for life threatening arrhythmias  - Monitor electrolytes and administer replacement therapy as ordered  Outcome: Progressing     Problem: RESPIRATORY - ADULT  Goal: Achieves optimal ventilation and oxygenation  Description: INTERVENTIONS:  - Assess for changes in respiratory status  - Assess for changes in mentation and behavior  - Position to facilitate oxygenation and minimize respiratory effort  - Oxygen supplementation based on oxygen saturation or ABGs  - Provide Smoking Cessation handout, if applicable  - Encourage broncho-pulmonary hygiene including cough, deep breathe, Incentive Spirometry  - Assess the need for suctioning and perform as needed  - Assess and instruct to report SOB or any respiratory difficulty  - Respiratory Therapy support as indicated  - Manage/alleviate anxiety  - Monitor for signs/symptoms of CO2 retention  Outcome: Progressing

## 2023-08-26 VITALS
BODY MASS INDEX: 22.19 KG/M2 | HEIGHT: 71 IN | SYSTOLIC BLOOD PRESSURE: 110 MMHG | DIASTOLIC BLOOD PRESSURE: 63 MMHG | RESPIRATION RATE: 18 BRPM | WEIGHT: 158.5 LBS | OXYGEN SATURATION: 96 % | TEMPERATURE: 98 F | HEART RATE: 70 BPM

## 2023-08-26 RX ORDER — ZOLPIDEM TARTRATE 5 MG/1
5 TABLET ORAL NIGHTLY PRN
Qty: 10 TABLET | Refills: 0 | Status: SHIPPED | OUTPATIENT
Start: 2023-08-26

## 2023-08-26 RX ORDER — OXYCODONE AND ACETAMINOPHEN 10; 325 MG/1; MG/1
1 TABLET ORAL EVERY 6 HOURS PRN
Qty: 20 TABLET | Refills: 0 | Status: SHIPPED | OUTPATIENT
Start: 2023-08-26 | End: 2023-08-31

## 2023-08-26 NOTE — CM/SW NOTE
08/26/23 1000   Discharge disposition   Expected discharge disposition subacute   Post Acute Care Provider Dominic at [facility]     Pt medically cleared for discharge today. Confirmed with Rosanna at [employer] that pt can admit today. Anise Habermann arranged for 3:30 pm. Pt agreeable to associated costs. PCS updated and available for RN to print.     Dominic at [employer] [phone]   [facility]: [id number]    JOSEPHINE ZambranoN, RN-BC    [id number]

## 2023-08-28 ENCOUNTER — INITIAL APN SNF VISIT (OUTPATIENT)
Dept: INTERNAL MEDICINE CLINIC | Age: 73
End: 2023-08-28

## 2023-08-28 VITALS
RESPIRATION RATE: 18 BRPM | HEART RATE: 70 BPM | TEMPERATURE: 97 F | BODY MASS INDEX: 21 KG/M2 | SYSTOLIC BLOOD PRESSURE: 118 MMHG | DIASTOLIC BLOOD PRESSURE: 66 MMHG | OXYGEN SATURATION: 97 % | WEIGHT: 152.19 LBS

## 2023-08-28 DIAGNOSIS — I25.5 ISCHEMIC CARDIOMYOPATHY: ICD-10-CM

## 2023-08-28 DIAGNOSIS — I50.42 CHRONIC COMBINED SYSTOLIC AND DIASTOLIC HEART FAILURE (HCC): ICD-10-CM

## 2023-08-28 DIAGNOSIS — F32.4 MAJOR DEPRESSIVE DISORDER IN PARTIAL REMISSION, UNSPECIFIED WHETHER RECURRENT (HCC): ICD-10-CM

## 2023-08-28 DIAGNOSIS — G63 POLYNEUROPATHY IN DISEASES CLASSIFIED ELSEWHERE (HCC): ICD-10-CM

## 2023-08-28 DIAGNOSIS — S80.10XA HEMATOMA OF LOWER LEG: ICD-10-CM

## 2023-08-28 DIAGNOSIS — M48.062 SPINAL STENOSIS OF LUMBAR REGION WITH NEUROGENIC CLAUDICATION: ICD-10-CM

## 2023-08-28 DIAGNOSIS — I48.20 CHRONIC ATRIAL FIBRILLATION (HCC): Primary | ICD-10-CM

## 2023-08-28 DIAGNOSIS — I10 BENIGN ESSENTIAL HTN: ICD-10-CM

## 2023-08-31 ENCOUNTER — SNF VISIT (OUTPATIENT)
Dept: INTERNAL MEDICINE CLINIC | Age: 73
End: 2023-08-31

## 2023-08-31 VITALS
HEART RATE: 67 BPM | RESPIRATION RATE: 18 BRPM | OXYGEN SATURATION: 92 % | TEMPERATURE: 97 F | SYSTOLIC BLOOD PRESSURE: 120 MMHG | WEIGHT: 154.88 LBS | BODY MASS INDEX: 22 KG/M2 | DIASTOLIC BLOOD PRESSURE: 71 MMHG

## 2023-08-31 DIAGNOSIS — F11.90 OPIATE USE: ICD-10-CM

## 2023-08-31 DIAGNOSIS — R53.1 WEAKNESS: ICD-10-CM

## 2023-08-31 DIAGNOSIS — I10 BENIGN ESSENTIAL HTN: ICD-10-CM

## 2023-08-31 DIAGNOSIS — M48.061 SPINAL STENOSIS OF LUMBAR REGION, UNSPECIFIED WHETHER NEUROGENIC CLAUDICATION PRESENT: ICD-10-CM

## 2023-08-31 DIAGNOSIS — G63 POLYNEUROPATHY IN DISEASES CLASSIFIED ELSEWHERE (HCC): ICD-10-CM

## 2023-08-31 DIAGNOSIS — M48.062 NEUROGENIC CLAUDICATION DUE TO LUMBAR SPINAL STENOSIS: ICD-10-CM

## 2023-08-31 DIAGNOSIS — I48.20 CHRONIC ATRIAL FIBRILLATION (HCC): Primary | ICD-10-CM

## 2023-08-31 DIAGNOSIS — Z78.9 DECREASED ACTIVITIES OF DAILY LIVING (ADL): ICD-10-CM

## 2023-08-31 DIAGNOSIS — S80.10XA HEMATOMA OF LOWER LEG: ICD-10-CM

## 2023-08-31 DIAGNOSIS — I50.42 CHRONIC COMBINED SYSTOLIC AND DIASTOLIC HEART FAILURE (HCC): ICD-10-CM

## 2023-08-31 PROCEDURE — 99309 SBSQ NF CARE MODERATE MDM 30: CPT | Performed by: NURSE PRACTITIONER

## 2023-08-31 PROCEDURE — 1111F DSCHRG MED/CURRENT MED MERGE: CPT | Performed by: NURSE PRACTITIONER

## 2023-08-31 RX ORDER — OXYCODONE AND ACETAMINOPHEN 10; 325 MG/1; MG/1
1 TABLET ORAL EVERY 6 HOURS PRN
Qty: 40 TABLET | Refills: 0 | Status: SHIPPED | OUTPATIENT
Start: 2023-08-31

## 2023-09-07 ENCOUNTER — SNF VISIT (OUTPATIENT)
Dept: INTERNAL MEDICINE CLINIC | Age: 73
End: 2023-09-07

## 2023-09-07 DIAGNOSIS — Z78.9 DECREASED ACTIVITIES OF DAILY LIVING (ADL): ICD-10-CM

## 2023-09-07 DIAGNOSIS — I25.5 ISCHEMIC CARDIOMYOPATHY: ICD-10-CM

## 2023-09-07 DIAGNOSIS — D64.9 ANEMIA, UNSPECIFIED TYPE: ICD-10-CM

## 2023-09-07 DIAGNOSIS — G63 POLYNEUROPATHY IN DISEASES CLASSIFIED ELSEWHERE (HCC): Primary | ICD-10-CM

## 2023-09-07 DIAGNOSIS — R53.1 WEAKNESS: ICD-10-CM

## 2023-09-07 DIAGNOSIS — I50.40 COMBINED SYSTOLIC AND DIASTOLIC HEART FAILURE, UNSPECIFIED HF CHRONICITY (HCC): ICD-10-CM

## 2023-09-07 DIAGNOSIS — I48.20 CHRONIC ATRIAL FIBRILLATION (HCC): ICD-10-CM

## 2023-09-07 DIAGNOSIS — M48.062 SPINAL STENOSIS OF LUMBAR REGION WITH NEUROGENIC CLAUDICATION: ICD-10-CM

## 2023-09-08 VITALS
HEART RATE: 79 BPM | BODY MASS INDEX: 23 KG/M2 | RESPIRATION RATE: 18 BRPM | WEIGHT: 162 LBS | TEMPERATURE: 98 F | OXYGEN SATURATION: 96 % | DIASTOLIC BLOOD PRESSURE: 59 MMHG | SYSTOLIC BLOOD PRESSURE: 107 MMHG

## 2023-09-11 ENCOUNTER — SNF DISCHARGE (OUTPATIENT)
Dept: INTERNAL MEDICINE CLINIC | Age: 73
End: 2023-09-11

## 2023-09-11 DIAGNOSIS — D50.9 IRON DEFICIENCY ANEMIA, UNSPECIFIED IRON DEFICIENCY ANEMIA TYPE: ICD-10-CM

## 2023-09-11 DIAGNOSIS — I25.5 ISCHEMIC CARDIOMYOPATHY: ICD-10-CM

## 2023-09-11 DIAGNOSIS — M48.062 SPINAL STENOSIS OF LUMBAR REGION WITH NEUROGENIC CLAUDICATION: ICD-10-CM

## 2023-09-11 DIAGNOSIS — Z78.9 DECREASED ACTIVITIES OF DAILY LIVING (ADL): ICD-10-CM

## 2023-09-11 DIAGNOSIS — G63 POLYNEUROPATHY IN DISEASES CLASSIFIED ELSEWHERE (HCC): Primary | ICD-10-CM

## 2023-09-11 DIAGNOSIS — I48.20 CHRONIC ATRIAL FIBRILLATION (HCC): ICD-10-CM

## 2023-09-11 DIAGNOSIS — I50.40 COMBINED SYSTOLIC AND DIASTOLIC HEART FAILURE, UNSPECIFIED HF CHRONICITY (HCC): ICD-10-CM

## 2023-09-12 VITALS
WEIGHT: 162 LBS | OXYGEN SATURATION: 97 % | HEART RATE: 77 BPM | DIASTOLIC BLOOD PRESSURE: 67 MMHG | RESPIRATION RATE: 18 BRPM | SYSTOLIC BLOOD PRESSURE: 117 MMHG | BODY MASS INDEX: 23 KG/M2 | TEMPERATURE: 98 F

## (undated) DEVICE — SUTURE MONOCRYL 3-0 Y497G

## (undated) DEVICE — Device

## (undated) DEVICE — LAWSON - DEVICE VASCBAND RAD REG 24CM

## (undated) DEVICE — STERILE POLYISOPRENE POWDER-FREE SURGICAL GLOVES: Brand: PROTEXIS

## (undated) DEVICE — FAN SPRAY KIT: Brand: PULSAVAC®

## (undated) DEVICE — FLOSEAL HEMOSTATIC MATRIX, 5ML: Brand: FLOSEAL HEMOSTATIC MATRIX

## (undated) DEVICE — CHLORAPREP 26ML APPLICATOR

## (undated) DEVICE — SURGICAL INSTRUMENT OR ACCESSORY FOR ORTHOPEDIC BONE FIXATION: Brand: FLOWER E-KIT, ADVANCED

## (undated) DEVICE — SURGICAL INSTRUMENT OR ACCESSORY FOR ORTHOPEDIC BONE FIXATION: Brand: CANNULATED DRILL BIT, 3.0MM

## (undated) DEVICE — KIT DRN 1/8IN PVC 3 SPRG EVAC

## (undated) DEVICE — SOL NACL IRRIG 0.9% 1000ML BTL

## (undated) DEVICE — NON-ADHERENT DRESSING: Brand: TELFA

## (undated) DEVICE — GAUZE SPONGES,12 PLY: Brand: CURITY

## (undated) DEVICE — 9534HP TRANSPARENT DRSG W/FRAME: Brand: 3M™ TEGADERM™

## (undated) DEVICE — GYN CDS: Brand: MEDLINE INDUSTRIES, INC.

## (undated) DEVICE — SOL H2O IRRIGATION 3000ML

## (undated) DEVICE — C-ARMOR C-ARM EQUIPMENT COVERS CLEAR STERILE UNIVERSAL FIT 12 PER CASE: Brand: C-ARMOR

## (undated) DEVICE — SUTURE VICRYL 3-0 SH

## (undated) DEVICE — SUTURE MONOCRYL 4-0 PS-2

## (undated) DEVICE — SYRINGE,TOOMEY,IRRIGATION,70CC,STERILE: Brand: MEDLINE

## (undated) DEVICE — KENDALL SCD EXPRESS SLEEVES, KNEE LENGTH, MEDIUM: Brand: KENDALL SCD

## (undated) DEVICE — INTEGRA® BILAYER MATRIX WOUND DRESSING 4 IN*5 IN (10 CM*12.5 CM): Brand: INTEGRA®

## (undated) DEVICE — SYRINGE 30ML LL TIP

## (undated) DEVICE — LAPAROTOMY SPONGE - RF AND X-RAY DETECTABLE PRE-WASHED: Brand: SITUATE

## (undated) DEVICE — 3.0MM PRECISION NEURO (MATCH HEAD)

## (undated) DEVICE — UNDYED BRAIDED (POLYGLACTIN 910), SYNTHETIC ABSORBABLE SUTURE: Brand: COATED VICRYL

## (undated) DEVICE — SOL  .9 1000ML BTL

## (undated) DEVICE — ABDOMINAL PAD: Brand: DERMACEA

## (undated) DEVICE — LOWER EXTREMITY CDS-LF: Brand: MEDLINE INDUSTRIES, INC.

## (undated) DEVICE — DRAPE MINI C-ARM

## (undated) DEVICE — PIN DSTRCT 14MM

## (undated) DEVICE — FORCEP CUSH BAY BP DISP 7.5IN

## (undated) DEVICE — PEN: MARKING STD PT 100/CS: Brand: MEDICAL ACTION INDUSTRIES

## (undated) DEVICE — TOURNIQUET CUFF 24 DUAL STR

## (undated) DEVICE — DRAPE SHEET LG

## (undated) DEVICE — SLEEVE KENDALL SCD EXPRESS MED

## (undated) DEVICE — GAMMEX® PI HYBRID SIZE 6.5, STERILE POWDER-FREE SURGICAL GLOVE, POLYISOPRENE AND NEOPRENE BLEND: Brand: GAMMEX

## (undated) DEVICE — PREMIUM WET SKIN PREP TRAY: Brand: MEDLINE INDUSTRIES, INC.

## (undated) DEVICE — OCCLUSIVE GAUZE STRIP OVERWRAP,3% BISMUTH TRIBROMOPHENATE IN PETROLATUM BLEND: Brand: XEROFORM

## (undated) DEVICE — DRILL SURGICAL ACP 11MM

## (undated) DEVICE — SOLUTION  .9 3000ML

## (undated) DEVICE — 4.0MM OVAL CARBIDE BUR

## (undated) DEVICE — ZIMMER® STERILE DISPOSABLE TOURNIQUET CUFF WITH PLC, DUAL PORT, SINGLE BLADDER, 18 IN. (46 CM)

## (undated) DEVICE — GOWN SURG AERO CHROME XXL

## (undated) DEVICE — SUTURE ETHILON 2-0 FS

## (undated) DEVICE — GAUZE PACKING IODOFORM 1/4X5

## (undated) DEVICE — SUTURE ETHILON 3-0 PS-2

## (undated) DEVICE — MAXCESS C MODULE

## (undated) DEVICE — TUR/ENDOSCOPIC CABLE, 10' (3.05 M): Brand: CONMED

## (undated) DEVICE — SCD SLEEVE KNEE HI BLEND

## (undated) DEVICE — GAMMEX® PI HYBRID SIZE 8.5, STERILE POWDER-FREE SURGICAL GLOVE, POLYISOPRENE AND NEOPRENE BLEND: Brand: GAMMEX

## (undated) DEVICE — EXOFIN TISSUE ADHESIVE 1.0ML

## (undated) DEVICE — GOWN SURG AERO BLUE PERF LG

## (undated) DEVICE — SURGICAL INSTRUMENT OR ACCESSORY FOR ORTHOPEDIC BONE FIXATION: Brand: POWER DRIVER G-WIRE KIT, 3.0-4.5MM CANNULATED SCREWS

## (undated) DEVICE — SUTURE PDS II 4-0 PS-2

## (undated) DEVICE — CERVICAL COLLAR: Brand: DEROYAL

## (undated) DEVICE — MEDI-VAC NON-CONDUCTIVE SUCTION TUBING: Brand: CARDINAL HEALTH

## (undated) DEVICE — SUTURE ETHILON 3-0 PS-1

## (undated) DEVICE — STERILE SYNTHETIC POLYISOPRENE POWDER-FREE SURGICAL GLOVES WITH HYDROGEL COATING: Brand: PROTEXIS

## (undated) DEVICE — SPECIMEN CONTAINER,POSITIVE SEAL INDICATOR, OR PACKAGED: Brand: PRECISION

## (undated) DEVICE — LAWSON - CATH RADIAL TIG 6F 4X110CM

## (undated) DEVICE — CERVICAL CDS: Brand: MEDLINE INDUSTRIES, INC.

## (undated) DEVICE — DISSECTOR 9/16X.25IN KTNR SPNG

## (undated) DEVICE — GAMMEX® NON-LATEX PI TEXTURED SIZE 7.5, STERILE POLYISOPRENE POWDER-FREE SURGICAL GLOVE: Brand: GAMMEX

## (undated) DEVICE — BAG DRAIN INFECTION CNTRL 2000

## (undated) DEVICE — NON-ADHERENT STRIPS,OIL EMULSION: Brand: CURITY

## (undated) DEVICE — TUBING CYSTO

## (undated) DEVICE — STANDARD HYPODERMIC NEEDLE,POLYPROPYLENE HUB: Brand: MONOJECT

## (undated) DEVICE — 3M™ STERI-DRAPE™ INSTRUMENT POUCH 1018: Brand: STERI-DRAPE™

## (undated) DEVICE — SUTURE PROLENE 2-0 CT-2

## (undated) DEVICE — BLADE SAW KM33-11

## (undated) DEVICE — MEGADYNE ELECTRODE ADULT PT RT

## (undated) DEVICE — PROXIMATE SKIN STAPLERS (35 WIDE) CONTAINS 35 STAINLESS STEEL STAPLES (FIXED HEAD): Brand: PROXIMATE

## (undated) DEVICE — 11.1-M5 MULTIMODALITY KIT 5

## (undated) DEVICE — 1200CC GUARDIAN II: Brand: GUARDIAN

## (undated) NOTE — ED AVS SNAPSHOT
Lilian Antonio. MRN: [id number]    Department:  [facility] Emergency Department   Date of Visit:  8/14/2019           Disclosure     Insurance plans vary and the physician(s) referred by the ER may not be covered by your plan.  Please contac tell this physician (or your personal doctor if your instructions are to return to your personal doctor) about any new or lasting problems. The primary care or specialist physician will see patients referred from the [facility] Emergency Department.  Wing Pope

## (undated) NOTE — LETTER
Date: 8/30/2021  Patient name: Luz Rodriguez  YOB: 1950  Medical Record Number: [id number]  Coverage: Payor: MEDICARE / Plan: MEDICARE PART A&B / Product Type: *No Product type* /   Insurance ID: [id number]  Patient Address: [address] [place] [address] OFFLOADING Routine Completed José Luis Cox DPMADELEINE     - OffLoading shoe wear:    Darco shoe       Wound 08/16/21 #6 Left 5th toe Old surgical Dorsal;Left (Active)   Date First Assessed/Time First Assessed: 08/16/21 1401    Wound Number (Wound Clinic Only): # all questions were answered. Follow Up:  Discharge from outpatient services.     Additional Notes:  Patient to use antibiotic ointment for next 1-2 weeks    Additional home health DME: No

## (undated) NOTE — IP AVS SNAPSHOT
[address]            (For Outpatient Use Only) Initial Admit Date: 12/1/2020   Inpt/Obs Admit Date: Inpt: 12/3/20 / Obs: 12/01/20   Discharge Date:    Nohemy Proper:  [de-identified]   MRN: [de-identified]   CSN: [id number]   CEID: [id number] Subscriber ID: [id number] Pt Rel to Subscriber: SELF   TERTIARY INSURANCE   Payor:  Plan:    Group Number:  Insurance Type:    Subscriber Name:  Subscriber :    Subscriber ID:  Pt Rel to Subscriber:    Hospital Account Financial Class: Medicare    Dec

## (undated) NOTE — LETTER
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  (R73.09) Impaired glucose metabolism  (M21.961) Deformity of right foot      Wound 07/17/23 #5 Right 4th toe Neuropathic Toe (Comment which one) Right (Active)   Date First Assessed: 07/17/23    Wound Number (Wound Clinic Only): #5 Right 4th toe  Primary Wound Type: (c) Neuropathic  Location: Toe (Comment which one)  Wound Location Orientation: Right      Assessments 7/17/2023  2:22 PM   Wound Image     Drainage Amount Scant   Drainage Description Yellow;Serous   Treatments Compression   Wound Length (cm) 0.6 cm   Wound Width (cm) 0.5 cm   Wound Surface Area (cm^2) 0.3 cm^2   Wound Depth (cm) 0.1 cm   Wound Volume (cm^3) 0.03 cm^3   Margins Well-defined edges   Non-staged Wound Description Full thickness   Nicolette-wound Assessment Maceration;Moist   Wound Granulation Tissue Firm;Pale Grey   Wound Bed Granulation (%) 60 %   Wound Bed Slough (%) 40 %   Wound Odor None   Shoe Type Tomás post op shoe;Peg liner       No associated orders.        Wound 07/17/23 #6 Right Plantar foot Neuropathic Right;Plantar (Active)   Date First Assessed: 07/17/23    Wound Number (Wound Clinic Only): #6 Right Plantar foot  Primary Wound Type: (c) Neuropathic  Wound Location Orientation: Right;Plantar      Assessments 7/17/2023  2:23 PM   Wound Image     Wound Length (cm) 0.3 cm   Wound Width (cm) 1 cm   Wound Surface Area (cm^2) 0.3 cm^2   Wound Depth (cm) 0 cm   Wound Volume (cm^3) 0 cm^3   Margins Well-defined edges   Non-staged Wound Description Full thickness   Nicolette-wound Assessment Callous   Wound Odor None   Shape 100% callous   Shoe Type Tomás post op shoe;Peg liner       No associated orders. Wound Cleaning and Dressings:  Showering directions: May shower with protection  Wound cleansing:  Cleanse with normal saline or wound cleanser  Wound cleaning frequency: Daily  Wound product: Honey gel and Dry gauze  Dressing change frequency:  Change dressing daily and/or PRN  Enzymatic agent:  Honey        Follow Up:  Return in about 2 weeks (around 7/31/2023) for Wound followup. Additional Notes:  Honey gel to toe wound daily, cover with dry dressing. New Darco shoe with peg insert provided.

## (undated) NOTE — LETTER
[facility] [address], [address]  Consent for Procedure/Sedation    Date: 12/3/2020     Time: 1653      1.  I authorize the performance upon Nichole Guerrero. the following: Peripheral angiography, atherectomy, percutaneous tra Signature of person authorized                                           Relationship to  to consent for patient: ___________________________   patient: ___________________    Witness: ______________________________________  Date: _____________________

## (undated) NOTE — LETTER
[address], [place]  Authorization for Invasive Procedures  1.  I hereby authorize Dr. Oleg Nguyen , my physician and whomever may be designated as the doctor's assistant, to perform the following operation and/or procedure:  Megha a directed donor transfusion, I will discuss this with my physician.      5. I consent to the photographing of the operations or procedures to be performed for the purposes of advancing medicine, science, and/or education, provided my identity is not reveal Witness Signature: ____________________________________________ Date: __________ Time: ___________    Statement of Physician  My signature below affirms that prior to the time of the procedure, I have explained to the patient and/or his legal representativ

## (undated) NOTE — LETTER
Date: 6/28/2021  Patient name: Gemini Dugan  YOB: 1950  Medical Record Number: [id number]  Coverage: Payor: MEDICARE / Plan: MEDICARE PART A&B / Product Type: *No Product type* /   Insurance ID: [id number]  Patient Address: [address] Date Order Priority Status Authorizing Provider   06/28/21 1411 OP WOUND DRESSING Routine Completed Harriett Cox Alert, DPM     - Additional Wound Dressing Information:    Skin graft on right foot. Do not change for 1 week.  After 1 week apply antibiotic ointmen Dorsal;Left  Wound Description (Comments): 5th toe      Assessments 5/24/2021  1:20 PM 6/28/2021  1:17 PM   Wound Image       Drainage Amount — Scant   Drainage Description — Serous; Yellow   Dressing — Bandaid; Other   Nicolette-wound Assessment — Dry;Edema; Red times per week       Wound Number: 1 right foot    Wound Cleaning and Dressings:  Showering directions: May shower with protection  Wound cleansing:  Cleanse with normal saline or wound cleanser  Wound cleaning frequency: with dressing changes  Wound produ

## (undated) NOTE — LETTER
Kari Reyes 89 [address] [facility] [address], [address] [facility]     PICC LINE INSERTION CONSENT     I agree to have a Peripherally Inserted Central Catheter (PICC) placed in my arm.    1. The PICC insertion procedure, care, maintenance, risks, benefits, and c also discussed reasonable alternatives to the PICC, including risks, benefits, and side effects related to the alternatives and risks related to not receiving this procedure      _____________________ ___________ ____________________________________   Date

## (undated) NOTE — LETTER
Date: 7/12/2021  Patient name: Paola Rodriguez  YOB: 1950  Medical Record Number: [id number]  Coverage: Payor: MEDICARE / Plan: MEDICARE PART A&B / Product Type: *No Product type* /   Insurance ID: [id number]  Patient Address: [address] [place] [address] Completed Rashida Cox, DPM     - Additional Wound Dressing Information:    Skin graft on right foot. Do not change for 1 week. After 1 week apply antibiotic ointment and sterile dressings.  Antibiotic ointment to left 5th toe   06/28/21 1411 WOUND CARE OF Dorsal;Left  Wound Description (Comments): 5th toe      Assessments 5/24/2021  1:20 PM 7/12/2021  2:25 PM   Wound Image       Drainage Amount — Scant   Drainage Description — Serous; Yellow   Dressing — Bandaid; Other   Wound Length (cm) — 0.1 cm   Wound Wid WOUND DRESSING Routine Completed Hudson Cox, DPM     - Dressing:    Prism collagen     - Dressing:    Hydrofera ready     - Dressing:    Medipore tape (no substitution)     - Cleansing:    Cleanse with normal saline or wound cleanser     - Frequency: (around 7/26/2021) for 15 min follow up.     Additional Notes:  HH to see patient MWF

## (undated) NOTE — LETTER
Date: 8/2/2021  Patient name: Zakiya Piedra  YOB: 1950  Medical Record Number: [id number]  Coverage: Payor: MEDICARE / Plan: MEDICARE PART A&B / Product Type: *No Product type* /   Insurance ID: [id number]  Patient Address: [address] [place] [address] product application Routine Completed Ebenezer Levin DPM   06/21/21 1429 OP WOUND DRESSING Routine Completed Anastasia Cox, ALICIA     - Dressing:    Dry gauze     - Additional Wound Dressing Information:    Antibiotic ointment     - Frequency:    Change dres cm^2   Wound Depth (cm) — 0 cm   Wound Volume (cm^3) — 0 cm^3   Margins — Well-defined edges   Non-staged Wound Description — Full thickness   Nicolette-wound Assessment — Edema;Dry; Other (Comment)   Wound Granulation Tissue — Pink;Firm   Wound Bed Granulation Cleaning and Dressings:  Showering directions: May shower with protection  Wound cleansing:  Cleanse with normal saline or wound cleanser  Wound cleaning frequency: with dressing changes  Wound product: Silver alginate, Dry gauze and Medipore tape (no subs

## (undated) NOTE — IP AVS SNAPSHOT
[address]            (For Outpatient Use Only) Initial Admit Date: 10/10/2019   Inpt/Obs Admit Date: Inpt: 10/11/19 / Obs: N/A   Discharge Date:    Sarah Beth Later:  [de-identified]   MRN: [de-identified]   CSN: [id number]   CEID: [id number] Payor:  Plan:    Group Number:  Insurance Type:    Subscriber Name:  Subscriber :    Subscriber ID:  Pt Rel to Subscriber:    Hospital Account Financial Class: Medicare    2019

## (undated) NOTE — IP AVS SNAPSHOT
Patient Demographics     Address  [facility] [place] [address] Phone  [phone] (Home) *Preferred*  [phone] [facility]) E-mail Address  [email or web address]      Emergency Contact(s)     Name Relation Home Work Mobile    [place] [facility] [facility] [address]  [phone]             Noemí Glez MD. Schedule an appointment as soon as possible for a visit in 1 week.     Specialty:  Internal Medicine  Why:  PCP, follow up 1 week after discharge from rehab  Contact information:  [address] Lelia Clark MD         bumetanide 2 MG Tabs  Commonly known as:  BUMEX  Next dose due: This afternoon      Take 1 tablet (2 mg total) by mouth 2 (two) times daily.    Yin Alvarez MD         buPROPion HCl ER (XL) 150 MG Tb24  Commonly known as:  [place] Next dose due: Tomorrow morning      TAKE 1 TABLET(50 MG) BY MOUTH DAILY   Ana Zimmer MD         sodium chloride 0.9% SOLN 100 mL with Ampicillin-Sulbactam Sodium 3 (2-1) g SOLR 3 g      Inject 3 g into the vein every 8 (eight) hours.  Check weekly cbc bm 0.9% 100 mL MBP/ADD-vantage 10/18/19 0853 New Bag      [id number] Ampicillin-Sulbactam Sodium (UNASYN) 3 g in sodium chloride 0.9% 100 mL MBP/ADD-vantage 10/18/19 1555 New Bag  OK WITH PHARMACIST TO GIVE EARLY    [id number] Clopidogrel Bisulfate (PLAVIX) tab [id number] gabapentin (NEURONTIN) cap 300 mg 10/18/19 0852 Given      [id number] gabapentin (NEURONTIN) cap 300 mg 10/18/19 1556 Given      [id number] metoprolol succinate (Toprol XL) partial tablet 12.5 mg 10/18/19 0609 Given  HR-60 BP-128/71    [id number] E Procedure Component Value Units Date/Time    Anaerobic Culture [[id number]] Collected:  10/12/19 1041    Order Status:  Completed Lab Status:  Final result Updated:  10/17/19 1433    Specimen:  Tissue from Foot, right      Anaerobic Culture No Anaerobes is Specimen:  Blood,peripheral      Blood Culture Result No Growth 5 Days    Blood Culture FREQ X 2 [[id number]] Collected:  10/10/19 2046    Order Status:  Completed Lab Status:  Final result Updated:  10/15/19 2100    Specimen:  Blood,peripheral      Blood -on diuretics    PPX: petar Carpenter DO  [facility] Hospitalist  [phone]    [facility] Hospitalist History and Physical      Patient presents with:  Deep Vein Thrombosis (cardiovascular)       PCP: Pk Boles MD      History of Present Illness: Harjeet Performed by Millie Cho MD at [address] [facility]   • ENDOSCOPY,  SINUS SEPTOPLASTY,ANTROSTOMIES Bilateral 1/16/2019    Performed by Paulo Barragan MD at [address] [facility] Right 8/13/2019    Perfor Frequency: 4 or more times a week      Drinks per session: 1 or 2      Binge frequency: Never      Comment: 1 drink per night       Fam Hx  Family History   Problem Relation Age of Onset   • High Blood Pressure Father    • High Cholesterol Father    • erythema, pt has minimal ankle joint flexion    Skin: Skin color, texture, turgor normal. No rashes or lesions.     Neurologic: Moving all extremities spontaneously, no focal deficit appreciated     Data Review:    LABS:   Lab Results   Component Value Date None.  INDICATIONS:  eval for DVT  TECHNIQUE:  Real time, grey scale, and duplex ultrasound was used to evaluate the lower extremity venous system. B-mode two-dimensional images of the vascular structures, Doppler spectral analysis, and color flow.   Doppl - History:   Risk factors:  Hypertension.   Hyperlipidemia. ---------------------------------------------------------------------------- Impressions - The right great and small saphenous veins were mapped using color flow   doppler. - The left great and sma ! GSV !Left s/f junction    ! No    !5.20mm!----------------------------! +----+---------------------+------+------+----------------------------+ ! GSV ! Left proximal thigh  ! No    !4.30mm!----------------------------! +----+---------------------+------+----- +----+---------------------+------+------+----------------------------+ ! SSV ! Proximal left calf   ! No    !1.90mm! sclerotic.                  ! +----+---------------------+------+------+----------------------------+ ! SSV ! Mid left calf        ! No    !3.20m Filed:  10/14/2019 10:33 AM Date of Service:  10/11/2019  2:57 PM Status:  Addendum    :  Mackenzie Lin MD (Physician)    Related Notes:  Original Note by Mackenzie Lin MD (Physician) filed at 10/11/2019  3:24 PM       [facility] Cardiology Consultat Filed:  10/18/2019  5:22 PM Date of Service:  10/18/2019  5:19 PM Status:  Signed    :  Mikel Cuevas MD (Physician)           General Medicine Discharge Summary     Patient ID:  Sallie Ours.  68year old  [id number]  2/6/1950    Ad #Afib with current intermittent  cindy  -cards on consult, cont low dose BB  -cont sotolol  -eliquis given 10/14, to resume anticoagulation today if ok with podiatry  - hold ccb     #CAD s/p cabg and PCI, NSVT  -cards following  - no recurrence of NSVT on also seen at the 2nd, 3rd and 4th MTP joints. There is dislocation at the 2nd MTP joint. There are likely additional hammertoe deformities involving the 2nd through 4th digits. No acute fracture demonstrated.   There is a small corticated plantar heel s by: Susan Schaefer MD on 10/10/2019 at 21:44     Approved by: Susan Schaefer MD            Xr Foot (2 View), Right (cpt=73620)    Result Date: 10/11/2019  PROCEDURE:  XR FOOT (2 VIEW), RIGHT (CPT=73620)  INDICATIONS:  evaluate bone sp ray resection  COMPARISON: - Impressions - The right great and small saphenous veins were mapped using color flow   doppler. - The left great and small saphenous veins were mapped using color flow   doppler.  --------------------------------------------------------------------------- ! GSV !Left proximal thigh  ! No    !4.30mm!----------------------------! +----+---------------------+------+------+----------------------------+ ! GSV ! Left mid thigh       ! No    !------! Chronic thrombosed/occluded. ! +----+---------------------+------+----- +----+---------------------+------+------+----------------------------+ ! SSV ! Mid left calf        ! No    !3.20mm!----------------------------! +----+---------------------+------+------+----------------------------+ ! SSV ! Distal left calf     ! No    !2.10m TAKE 1 TABLET BY MOUTH DAILY BEFORE A MEAL    apixaban (ELIQUIS) 5 MG Oral Tab  Take 1 tablet (5 mg total) by mouth 2 (two) times daily.     SOTALOL  MG Oral Tab  TAKE 1 TABLET BY MOUTH EVERY 12 HOURS    atorvastatin 40 MG Oral Tab  Take 1 tablet (40        Govind Faust MD in 2 week(s)   Infectious disease  [address] [facility] [phone]    Oct21 Follow Up with Trace Benitez MD   Monday Oct 21, 2019 10:00 AM  [address] [place] Physician Order: PT Eval and Treat    Presenting Problem: s/p I & D and revision amputation 1st ray right foot 10/12/19, I and D Right foot with wound vac placment 10/16/19[MD.2]  Reason for Therapy: Mobility Dysfunction and Discharge Planning    History r Past Surgical History[MD.1]  Past Surgical History:   Procedure Laterality Date   • ANGIOGRAM  2001   • ANGIOPLASTY (CORONARY)     • CABG  8/2013   • CATARACT      bilateral eyes   • CATH PERCUTANEOUS  TRANSLUMINAL CORONARY ANGIOPLASTY  2001    stent   • C Performed by Elizabeth Camara MD at [address] [facility] [address]   • TOE AMPUTATION Right 9/24/2019    Performed by Rossy Connors MD at [facility] MAIN OR   • [facility]  Type of Home: House   Home Layout: Two level  Stairs to Enter : 3 AM-PAC '6-Clicks' INPATIENT SHORT FORM - BASIC MOBILITY  How much difficulty does the patient currently have. .. [MD.1]  -   Turning over in bed (including adjusting bedclothes, sheets and blankets)?: None   -   Sitting down on and standing up from a chair w D/Revision 1st ray amputation R foot 10/12/19, excisional debridement and wound vac placement R foot 1/16/19 . Pertinent comorbidities and personal factors impacting therapy include anxiety, a fib, PVD, Parkinsons, OA. HTN, HD, B TSA.  .  In this PT evalua Goal #3 Patient is able to ambulate 100 feet with assist device: walker - rolling at assistance level: modified independent, maintaining WB restrictions.      Goal #4    Goal #5    Goal #6    Goal Comments: Goals established on 10/18/2019[MD.1]     Attribut Principal Problem:    Cellulitis of lower extremity, unspecified laterality  Active Problems:    Cellulitis of lower extremity      Past Medical History  Past Medical History:   Diagnosis Date   • Anxiety    • Arrhythmia     atrial fibrillation   • ATRIAL • HEART CORONARY ARTERY BYPASS GRAFT N/A 8/26/2013    Performed by Teresa Hodges MD at / [place] [id number] Left 3/19/2009    Performed by Melinda Blas MD at [address] [place] [address]  3/2015    right hip re -   Turning over in bed (including adjusting bedclothes, sheets and blankets)?: None   -   Sitting down on and standing up from a chair with arms (e.g., wheelchair, bedside commode, etc.): A Little   -   Moving from lying on back to sitting on the side of scooter use to maintain PWB on RLE and pt has history of shoulder surgeries.  At this time, pt remains below baseline for mobility due to pain, dec balance in standing, poor ability to maintain PWB on RLE, dec gait mechanics, dec activity tolerance, and dec Reason for Therapy: ADL/IADL Dysfunction and Discharge Planning    History related to current admission:   Admitted[MM.1] 10/10 from home[MM.2] for L foot pain; found to have[MM. 1] L foot cellulitis and[MM.2] gangrene of R foot[MM. 1]      --[MM.2]S/p Right Performed by Millie Cho MD at [facility]   • ENDOSCOPY,  SINUS SEPTOPLASTY,ANTROSTOMIES Bilateral 1/16/2019    Performed by Paulo Barragan MD at [address] [facility] Right 8/13/2019    Perfor Shower/Tub and Equipment: Walk-in shower; Tub-shower combo(walk in 1st flr, tub/shw 2nd flr)       Occupation/Status: (retired industrial  )     Drives: Yes  Patient Regularly Uses: Glasses    Prior Level of Function:[MM.1] , lives alone i Upper extremity ROM is within functional limits     Upper extremity strength is within functional limits    COORDINATION  Gross Motor    [facility]    Fine Motor    [facility]      ADDITIONAL TESTS                                    NEUROLOGICAL FINDINGS aware of session/findings; All patient questions and concerns addressed; Other (Comment)(BLEs elevated, pillow under RLE)    ASSESSMENT     Patient is a 68year old male admitted on 10/10/2019 for foot pain, gangrene.  Complete medical history and occupationa OT Discharge Recommendations: Sub-acute rehabilitation(elos 7-10 days)  OT Device Recommendations: TBD    PLAN  OT Treatment Plan: Energy conservation/work simplification techniques;ADL training;Functional transfer training;UE strengthening/ROM; Endurance t Author:  Rachel Newell OT Service:  Rehab Author Type:  Occupational Therapist    Filed:  10/16/2019  1:33 PM Date of Service:  10/16/2019 12:28 PM Status:  Signed    :  Rachel Newell OT (Occupational Therapist)       OCCUPATIONAL THERAPY Past Surgical History:   Procedure Laterality Date   • ANGIOGRAM  2001   • ANGIOPLASTY (CORONARY)     • CABG  8/2013   • CATARACT      bilateral eyes   • CATH PERCUTANEOUS  TRANSLUMINAL CORONARY ANGIOPLASTY  2001    stent   • COLONOSCOPY     • COLONOSCOPY, • TONSILLECTOMY         OCCUPATIONAL PROFILE    HOME SITUATION  Type of Home: House  Home Layout: Two level  Lives With: Alone    Toilet and Equipment: Standard height toilet; Other (Comment)(has no GB or ledge to grasp)  Shower/Tub and Equipment: Walk-in s Behavioral/Emotional/Social: pleasant, cooperative, talkative    RANGE OF MOTION AND STRENGTH ASSESSMENT    Upper extremity ROM is within functional limits   Upper extremity strength is within functional limits 5/5    COORDINATION  Gross Motor    WFL    Fi H[MM.1]e[MM.2] transferred from scooter to recliner with min A. Patient completed toilet tfr usi[MM. 1]ng[MM. 2] RW with demo and repeated cueing for PWB on RLE, decreased carryover noted. Patient able to transfer on /of stdf height to[MM. 1]ile[MM.2]t with and  this patient  is demonstrating a 47% degree impairment in activities of daily living. Research supports that patients with this level of impairment often benefit from SNF/IRF. Subacute rehab is recommended for 7-10  days.   After this period of reha MM.1 - Brayan Marin OT on 10/16/2019 12:28 PM  MM. 2 - Brayan Marin OT on 10/16/2019  1:28 PM                     Video Swallow Study Notes    No notes of this type exist for this encounter.      SLP Notes    No notes of this type exist for this

## (undated) NOTE — IP AVS SNAPSHOT
Patient Demographics     Address  [facility] [place] [address] Phone  [phone] [facility]) *Preferred*  [phone] [facility]) E-mail Address  [email or web address]      Emergency Contact(s)     Name Relation Home Work Mobile    Luz Follow-up Information     Joana Milan MD In 2 weeks. Specialty: SURGERY, VASCULAR  Contact information:  [address]             Molly Sanches MD In 2 weeks.     Specialty: Internal Medicine  Contac Inject 20 mL (2 g total) into the vein every 8 (eight) hours. Will need weekly labs with CBC, BMP, sed rate and CRP while on IV abx.   Stop taking on: January 15, 2021   Nina Zamudio, [address]   [    ]   [    ]   [    ]   [    ]     Clopidogrel Bisulfate 7 Take 1 tablet (50 mg total) by mouth daily. Kindra Fields MD   [    ]   [    ]   [    ]   [    ]     spironolactone 25 MG Tabs  Commonly known as: ALDACTONE      Take 1 tablet (25 mg total) by mouth daily.    Silvia Snider MD   [    ]   [    ] [id number] bumetanide (BUMEX) tab 2 mg 12/09/20 0958 Given      [id number] ceFAZolin sodium (ANCEF/KEFZOL) 2 GM/20ML premix IV syringe 2 g 12/08/20 2145 Given      [id number] ceFAZolin sodium (ANCEF/KEFZOL) 2 GM/20ML premix IV syringe 2 g 12/09/20 0533 Given Procedure Component Value Units Date/Time    Anaerobic Culture Once [[id number]] Collected: 12/02/20 0800    Order Status: Completed Lab Status: Final result Updated: 12/07/20 4697    Specimen: Other from Foot, right      Anaerobic Culture No Anaerobes iso Filed: 12/1/2020  9:02 PM Date of Service: 12/1/2020  4:23 PM Status: Signed    : Joseph Paige DO (Physician)       [facility] Hospitalist History and Physical      Patient presents with:  Cellulitis  Arrythmia/Palpitations  Pain       PCP: Savana Tompkins Procedure Laterality Date   • ANGIOGRAM  2001   • ANGIOPLASTY (CORONARY)     • CABG  8/2013   • CATARACT      bilateral eyes   • CATH PERCUTANEOUS  TRANSLUMINAL CORONARY ANGIOPLASTY  2001    stent   • COLONOSCOPY     • COLONOSCOPY, POSSIBLE BIOPSY, POSSIBL • TOE AMPUTATION Right 9/24/2019    Performed by Rudy Ramires MD at [facility] MAIN OR   • TONSILLECTOMY          ALL:    Pollen                  OTHER (SEE COMMENTS)    Comment:Nasal congestion       •  MELOXICAM 15 MG Oral Tab, TAKE 1 TABLET BY MOUTH DAILY •  Sotalol HCl 120 MG Oral Tab, TAKE 1 TABLET BY MOUTH EVERY 12 HOURS    •  bumetanide 2 MG Oral Tab, Take 1 tablet (2 mg total) by mouth 2 (two) times daily.     •  TAMSULOSIN HCL 0.4 MG Oral Cap, TAKE 1 CAPSULE BY MOUTH EVERY DAY    •  Fluticasone Propion MSK: Full range of motion in extremities, no clubbing, no cyanosis  Skin: R foot with blanchable erythema. Multiple R foot ulcers[MS.1], no visible drainage. Prior amputations. [MS.2]   Neuro:  Grossly intact, no focal deficits      Data Review:    LABS: CONCLUSION:  Postsurgical changes are noted. No new cortical erosion is seen. No subcutaneous emphysema is seen. If there is persistent clinical concern then consider MRI.    Dictated by (CST): Ally Mccrary MD on 12/01/2020 at 3:53 PM     Finalized by (CST Consults signed by Rola Braswell MD at 12/4/2020  9:08 AM     Author: Rola Braswell MD Service: Vascular Surgery Author Type: Physician    Filed: 12/4/2020  9:08 AM Date of Service: 12/3/2020  8:58 AM Status: Signed    : Rola Braswell MD (Ph • CATH PERCUTANEOUS  TRANSLUMINAL CORONARY ANGIOPLASTY  2001    stent   • COLONOSCOPY     • COLONOSCOPY, POSSIBLE BIOPSY, POSSIBLE POLYPECTOMY 37734 N/A 6/11/2018    Performed by Caroline Cam MD at [facility] Problem Relation Age of Onset   • High Blood Pressure Father    • High Cholesterol Father    • Heart Disorder Father    • High Blood Pressure Mother    • High Cholesterol Mother    • Stroke Mother    • Diabetes Paternal Grandmother    • High Blood Pressure •  tamsulosin HCl (FLOMAX) cap 0.4 mg, 0.4 mg, Oral, Daily    Review of Systems:    CONSTITUTIONAL: denies fever, chills  ENT: denies sore throat, nasal drainage/congestion  CHEST/CVS: denies cough, sputum, trouble breathing/SOB, chest pain, HELLER  GI: denie Yoly Celis MD  12/3/2020  8:58 AM[MG.1]      Electronically signed by Jayleen Renee MD on 12/4/2020  9:08 AM   Attribution Key    MG.1 - Jayleen Renee MD on 12/3/2020  8:58 AM  MG.2 - Jayleen Renee MD on 12/4/2020  9:07 AM Denies chest pain, no SOB        # CAD s/p CABG, A-fib  # PAD  meds ordered  No chest pain, no SOB, no palpitation  Follow up with cardiology     # Parkinson's disease  Follow up with neurology     # Secondary peripheral neuropathy   - cont gabapentin   - Take 800 mg by mouth 4 (four) times daily. Meclizine HCl 25 MG Oral Tab  Take 25 mg by mouth 3 (three) times daily as needed for Dizziness. tamsulosin (FLOMAX) cap  Take 0.4 mg by mouth daily.     Eszopiclone (LUNESTA) 1 MG Oral Tab  Take 1 tablet ( MS.2 - Cristofer Carty DO on 12/9/2020  3:09 PM                     Imaging Results (HF patients)    Chest X-Ray Results (HF patients only)    No exam resulted this encounter.       2D Echocardiogram Results (HF patients only)    No exam resulted this e • CATH PERCUTANEOUS  TRANSLUMINAL CORONARY ANGIOPLASTY  2001    stent   • COLONOSCOPY     • COLONOSCOPY, POSSIBLE BIOPSY, POSSIBLE POLYPECTOMY 09032 N/A 6/11/2018    Performed by Caroline Cam MD at [facility] The patient reports he's being discharged today    Patient’s self-stated goal is for his foot to heal    OBJECTIVE  Precautions: Limb alert - right;Bed/chair alarm; Other (Comment)(wound vac R foot)    WEIGHT BEARING RESTRICTION  Weight Bearing Restriction: Skilled Therapy Provided: The patient was seated in the bedside chair upon entering the room. The patient performed seated LE exercises, including LAQ, ankle pumps, and marching x 10 each.  The patient was able to perform sit to stand transfer with stand by Filed: 12/8/2020  1:03 PM Date of Service: 12/8/2020 12:57 PM Status: Signed    : Yokasta Chin PT (Physical Therapist)        PHYSICAL THERAPY TREATMENT NOTE - INPATIENT    Room Number: 7017/1410-J     Session:[DM.1] 1[DM.2]   Number of Visits to Performed by Shaun Gerber DPM at [facility] MAIN OR   • FOOT JOINT FUSION Right 11/15/2016    Performed by Michelle Pandey MD at [facility] MAIN OR   • [facility]    Performed by Luis Alberto Rocha MD at [address] [place] [address] BALANCE                                                                                                                     Static Sitting: Good  Dynamic Sitting: Good           Static Standing: Fair -  Dynamic Standing: Poor +    ACTIVITY TOLERANCE The patient tolerated treatment well with improving functional mobility. He was able to maintain NWB with ambulation.  The patient is below their baseline level of functional mobility and would benefit from continued skilled physical therapy while in the ac Presenting Problem: R LE cellulitis, 12/3 s/p R LE stenting,angiogram, see op note    Physician Order: IP Consult to Occupational Therapy  Reason for Therapy: ADL/IADL Dysfunction and Discharge Planning    History related to current admission: Pt was[MS. 1] • Coronary artery disease involving coronary bypass graft of native heart without angina pectoris 5/17/2017   • Depression    • DM2 (diabetes mellitus, type 2) (HCC)     elevated glucose due to steroid use for lung infection   • High blood pressure    • Mi • ORTHOPEDIC SURG (PBP) Bilateral     shoulder surgery   • OTHER Right 2013    femoral endarterectomy   • OTHER SURGICAL HISTORY      left knee ligament repair   • OTHER SURGICAL HISTORY      left hand laceration repair 4th, 5th fingers   • OTHER SURGICAL Upper extremity strength is within functional limits     COORDINATION  Gross Motor    WFL    Fine Motor    WFL      ADDITIONAL TESTS                                    NEUROLOGICAL FINDINGS                   ACTIVITY TOLERANCE                         O2 SA Patient is a 65year old male admitted on 12/1/2020 for[MS. 1] R LE cellulitis, 12/3 s/p angioplasty and stenting R femoral artery[MS.2]. Complete medical history and occupational profile noted above. Functional outcome measures completed include[MS. 1] AM-P Frequency (Obs): 3x/week  Number of Visits to Meet Established Goals: 5    ADL Goals[MS.1]   Patient will perform lower body dressing:  with min assist  Patient will perform toileting: with supervision[MS. 2]    Functional Transfer Goals[MS.1]  Patient will - -wound vac, antibiotics, increase activity, pain control    - See additional Care Plan goals for specific interventions   Patient/Family Short Term Goal     Interdisciplinary Progressing    Description: Patient's Short Term Goal: to go to [facility]    Interven

## (undated) NOTE — LETTER
[address], [place]  Authorization for Invasive Procedures  1.  I hereby authorize Dr. Jose Noel , my physician and whomever may be designated as the doctor's assistant, to perform the following operation and/or procedure:  CARDIAC CA 5. I consent to the photographing of the operations or procedures to be performed for the purposes of advancing medicine, science, and/or education, provided my identity is not revealed.  If the procedure has been videotaped, the physician/surgeon will obta __________ Time: ___________    Statement of Physician  My signature below affirms that prior to the time of the procedure, I have explained to the patient and/or his legal representative, the risks and benefits involved in the proposed treatment and any r

## (undated) NOTE — LETTER
Kari Reyes [address] 6 [address]  [place], [address] [facility]    Consent for Operation  Date: __________________                                Time: _______________    1.  I authorize the performance upon Maggi Negrete. the following operation:  Proc procedure has been videotaped, the surgeon will obtain the original videotape. The hospital will not be responsible for storage or maintenance of this tape.   7. For the purpose of advancing medical education, I consent to the admittance of observers to the STATEMENTS REQUIRING INSERTION OR COMPLETION WERE FILLED IN.     Signature of Patient:   ___________________________    When the patient is a minor or mentally incompetent to give consent:  Signature of person authorized to consent for patient: ____________ supplements, and pills I can buy without a prescription (including street drugs/illegal medications). Failure to inform my anesthesiologist about these medicines may increase my risk of anesthetic complications. iv.  If I am allergic to anything or have ha Anesthesiologist Signature     Date   Time  I have discussed the procedure and information above with the patient (or patient’s representative) and answered their questions. The patient or their representative has agreed to have anesthesia services.     ___

## (undated) NOTE — LETTER
Kari Reyes [address] 6 [address]  [place], [address] [facility]    Consent for Operation  Date: __________________                                Time: _______________    1.  I authorize the performance upon Anna Marie Roberson. the following operation:  Proc procedure has been videotaped, the surgeon will obtain the original videotape. The hospital will not be responsible for storage or maintenance of this tape.   7. For the purpose of advancing medical education, I consent to the admittance of observers to the STATEMENTS REQUIRING INSERTION OR COMPLETION WERE FILLED IN.     Signature of Patient:   ___________________________    When the patient is a minor or mentally incompetent to give consent:  Signature of person authorized to consent for patient: ____________ supplements, and pills I can buy without a prescription (including street drugs/illegal medications). Failure to inform my anesthesiologist about these medicines may increase my risk of anesthetic complications. iv.  If I am allergic to anything or have ha Anesthesiologist Signature     Date   Time  I have discussed the procedure and information above with the patient (or patient’s representative) and answered their questions. The patient or their representative has agreed to have anesthesia services.     ___

## (undated) NOTE — LETTER
[facility] [address], [address] [place] [address]  Consent for Procedure/Sedation  Date:  8/21/2023         Time: 1430      I hereby authorize Freedom Hung, my physician and his/her assistants (if applicable), which may include medical students, residents, and/or fellows, to perform the following surgical operation/ procedure and administer such anesthesia as may be determined necessary by my physician:  Operation/Procedure name (s)  Cardiac Catheterization, Left Ventricular Cineangiography, Bilateral Selective Coronary Angiography and/or Right Heart Catheterization;  on Melody Arnold   2. I recognize that during the surgical operation/procedure, unforeseen conditions may necessitate additional or different procedures than those listed above. I, therefore, further authorize and request that the above-named surgeon, assistants, or designees perform such procedures as are, in their judgment, necessary and desirable. 3.   My surgeon/physician has discussed prior to my surgery the potential benefits, risks and side effects of this procedure; the likelihood of achieving goals; and potential problems that might occur during recuperation. They also discussed reasonable alternatives to the procedure, including risks, benefits, and side effects related to the alternatives and risks related to not receiving this procedure. I have had all my questions answered and I acknowledge that no guarantee has been made as to the result that may be obtained. 4.   Should the need arise during my operation/procedure, which includes change of level of care prior to discharge, I also consent to the administration of blood and/or blood products. Further, I understand that despite careful testing and screening of blood or blood products by collecting agencies, I may still be subject to ill effects as a result of receiving a blood transfusion and/or blood products.   The following are some, but not all, of the potential risks that can occur: fever and allergic reactions, hemolytic reactions, transmission of diseases such as Hepatitis, AIDS and Cytomegalovirus (CMV) and fluid overload. In the event that I wish to have an autologous transfusion of my own blood, or a directed donor transfusion, I will discuss this with my physician. Check only if Refusing Blood or Blood Products  I understand refusal of blood or blood products as deemed necessary by my physician may have serious consequences to my condition to include possible death. I hereby assume responsibility for my refusal and release the hospital, its personnel, and my physicians from any responsibility for the consequences of my refusal.          o  Refuse      5. I authorize the use of any specimen, organs, tissues, body parts or foreign objects that may be removed from my body during the operation/procedure for diagnosis, research or teaching purposes and their subsequent disposal by hospital authorities. I also authorize the release of specimen test results and/or written reports to my treating physician on the hospital medical staff or other referring or consulting physicians involved in my care, at the discretion of the Pathologist or my treating physician. 6.   I consent to the photographing or videotaping of the operations or procedures to be performed, including appropriate portions of my body for medical, scientific, or educational purposes, provided my identity is not revealed by the pictures or by descriptive texts accompanying them. If the procedure has been photographed/videotaped, the surgeon will obtain the original picture, image, videotape or CD. The hospital will not be responsible for storage, release or maintenance of the picture, image, tape or CD.    7.   I consent to the presence of a  or observers in the operating room as deemed necessary by my physician or their designees.     8.   I recognize that in the event my procedure results in extended X-Ray/fluoroscopy time, I may develop a skin reaction. 9. If I have a Do Not Attempt Resuscitation (DNAR) order in place, that status will be suspended while in the operating room, procedural suite, and during the recovery period unless otherwise explicitly stated by me (or a person authorized to consent on my behalf). The surgeon or my attending physician will determine when the applicable recovery period ends for purposes of reinstating the DNAR order. 10. Patients having a sterilization procedure: I understand that if the procedure is successful the results will be permanent and it will therefore be impossible for me to inseminate, conceive, or bear children. I also understand that the procedure is intended to result in sterility, although the result has not been guaranteed. 11. I acknowledge that my physician has explained sedation/analgesia administration to me including the risk and benefits I consent to the administration of sedation/analgesia as may be necessary or desirable in the judgment of my physician.     I CERTIFY THAT I HAVE READ AND FULLY UNDERSTAND THE ABOVE CONSENT TO OPERATION and/or OTHER PROCEDURE.        ____________________________________       _________________________________      ______________________________  Signature of Patient         Signature of Responsible Person        Printed Name of Responsible Person    ____________________________________      _________________________________      ______________________________       Signature of Witness          Relationship to Patient                       Date                                       Time  Patient Name: Kassy Leija     : 1950                 Printed: 2023      Medical Record #: [id number]                      Page 1 of 1

## (undated) NOTE — LETTER
Date: 8/16/2021  Patient name: Gemini Dugan  YOB: 1950  Medical Record Number: [id number]  Coverage: Payor: MEDICARE / Plan: MEDICARE PART A&B / Product Type: *No Product type* /   Insurance ID: [id number]  Patient Address: [address]   Shape unable to view wound bed, pin noted   Shoe Type Surgical Shoe       No Linked orders to display       Wound Number:  All wounds    Wound Cleaning and Dressings:  Showering directions: May shower with protection  Wound cleansing:  Alis

## (undated) NOTE — LETTER
Date: 6/21/2021  Patient name: Ganesh Elias  YOB: 1950  Medical Record Number: [id number]  Coverage: Payor: MEDICARE / Plan: MEDICARE PART A&B / Product Type: *No Product type* /   Insurance ID: [id number]  Patient Address: [address] [place] [place] [address] - Additional Wound Dressing Information:    Antibiotic ointment     - Frequency:    Change dressing daily and PRN   06/21/21 0733 WOUND CARE OFFLOADING Routine Completed Jina Cox DPM     - OffLoading shoe wear:    Polly shoe     - Additional [place]lo[place] Granulation (%) — 100 %   Undermining — 0.4 cm   Undermining Start Clock Position of Wound — 12 o'clock   Undermining End Clock Position of Wound — 12 o'clock   Margins — Well-defined edges   Pressure Injury Stage — Stage 3       Inactive Orders   Date [place] instructions and all questions were answered. Follow Up:  Return in about 1 week (around 6/28/2021) for follow up at 4:30 ok per provider. Additional Notes:  New patient referral. HH to see patient WF. Please notify if unable to accept patient.

## (undated) NOTE — LETTER
Kari Reyes [address] 6 [address]  [place], [address] [place] [address]    Consent for Operation  Date: __________________                                Time: _______________    1.  I authorize the performance upon Mike Temple. the following operation:  Proc revealed by the pictures or by descriptive texts accompanying them. If the procedure has been videotaped, the surgeon will obtain the original videotape. The hospital will not be responsible for storage or maintenance of this tape.   6. For the purpose of a THAT MY DOCTOR PROVIDED ME WITH THE ABOVE EXPLANATIONS, THAT ALL BLANKS OR STATEMENTS REQUIRING INSERTION OR COMPLETION WERE FILLED IN.     Signature of Patient:   ___________________________    When the patient is a minor or mentally incompetent to give co iii. All of the medicines I take (including prescriptions, herbal supplements, and pills I can buy without a prescription (including street drugs/illegal medications).  Failure to inform my anesthesiologist about these medicines may increase my risk of anes _____________________________________________________________________________  Anesthesiologist Signature     Date   Time  I have discussed the procedure and information above with the patient (or patient’s representative) and answered their questions.  The

## (undated) NOTE — LETTER
Kari Reyes [address] 6 [address]  [place], [address] [facility]    Consent for Operation  Date: __________________                                Time: _______________    1.  I authorize the performance upon Cornell Amezcua. the following operation:  Proc procedure has been videotaped, the surgeon will obtain the original videotape. The hospital will not be responsible for storage or maintenance of this tape.   6. For the purpose of advancing medical education, I consent to the admittance of observers to the STATEMENTS REQUIRING INSERTION OR COMPLETION WERE FILLED IN.     Signature of Patient:   ___________________________    When the patient is a minor or mentally incompetent to give consent:  Signature of person authorized to consent for patient: ____________ supplements, and pills I can buy without a prescription (including street drugs/illegal medications). Failure to inform my anesthesiologist about these medicines may increase my risk of anesthetic complications. iv.  If I am allergic to anything or have ha Anesthesiologist Signature     Date   Time  I have discussed the procedure and information above with the patient (or patient’s representative) and answered their questions. The patient or their representative has agreed to have anesthesia services.     ___

## (undated) NOTE — LETTER
[facility] [address], [address]  Consent for Procedure/Sedation    Date:        Time:       1.  I authorize the performance upon Azael Li. the following: Peripheral angiography, atherectomy, percutaneous transluminal a you may develop a skin reaction.       Signature of Patient: _______________________________________________________    Signature of person authorized                                           Relationship to  to consent for patient: _______________________

## (undated) NOTE — LETTER
[facility] [address], [address]  Consent for Procedure/Sedation    Date: 12/3/2020    Time: 1123      1.  I authorize the performance upon Alejandrina Matthews. the following: Right  Lower Extremity Peripheral angiography, atherec Signature of person authorized                                           Relationship to  to consent for patient: ___________________________   patient: ___________________    Witness: ______________________________________  Date: _____________________